# Patient Record
Sex: FEMALE | Race: WHITE | NOT HISPANIC OR LATINO | Employment: OTHER | URBAN - METROPOLITAN AREA
[De-identification: names, ages, dates, MRNs, and addresses within clinical notes are randomized per-mention and may not be internally consistent; named-entity substitution may affect disease eponyms.]

---

## 2023-03-09 ENCOUNTER — TELEPHONE (OUTPATIENT)
Dept: PRIMARY CARE | Facility: CLINIC | Age: 68
End: 2023-03-09
Payer: MEDICARE

## 2023-03-09 DIAGNOSIS — E11.9 TYPE 2 DIABETES MELLITUS WITHOUT COMPLICATION, WITHOUT LONG-TERM CURRENT USE OF INSULIN (MULTI): Primary | ICD-10-CM

## 2023-03-09 PROBLEM — I10 BENIGN HYPERTENSION: Status: ACTIVE | Noted: 2023-03-09

## 2023-03-09 PROBLEM — R26.9 GAIT ABNORMALITY: Status: ACTIVE | Noted: 2023-03-09

## 2023-03-09 PROBLEM — E03.9 HYPOTHYROID: Status: ACTIVE | Noted: 2023-03-09

## 2023-03-09 PROBLEM — H93.19 TINNITUS: Status: ACTIVE | Noted: 2023-03-09

## 2023-03-09 PROBLEM — G43.019 INTRACTABLE MIGRAINE WITHOUT AURA AND WITHOUT STATUS MIGRAINOSUS: Status: ACTIVE | Noted: 2023-03-09

## 2023-03-09 PROBLEM — R19.5 POSITIVE COLORECTAL CANCER SCREENING USING COLOGUARD TEST: Status: ACTIVE | Noted: 2023-03-09

## 2023-03-09 PROBLEM — M50.90 CERVICAL DISC DISEASE: Status: ACTIVE | Noted: 2023-03-09

## 2023-03-09 PROBLEM — E78.5 DYSLIPIDEMIA: Status: ACTIVE | Noted: 2023-03-09

## 2023-03-09 PROBLEM — G93.5 CHIARI I MALFORMATION (MULTI): Status: ACTIVE | Noted: 2023-03-09

## 2023-03-09 RX ORDER — METOPROLOL SUCCINATE 50 MG/1
1 TABLET, EXTENDED RELEASE ORAL DAILY
COMMUNITY
End: 2023-07-31 | Stop reason: SDUPTHER

## 2023-03-09 RX ORDER — LEVOTHYROXINE SODIUM 75 UG/1
1 TABLET ORAL DAILY
COMMUNITY
Start: 2017-03-24 | End: 2023-03-20 | Stop reason: SDUPTHER

## 2023-03-09 RX ORDER — MELOXICAM 15 MG/1
TABLET ORAL
COMMUNITY
Start: 2022-08-01 | End: 2023-05-03 | Stop reason: SDUPTHER

## 2023-03-09 RX ORDER — LISINOPRIL 5 MG/1
1 TABLET ORAL DAILY
COMMUNITY
End: 2023-07-31 | Stop reason: SDUPTHER

## 2023-03-09 RX ORDER — ATORVASTATIN CALCIUM 40 MG/1
TABLET, FILM COATED ORAL
COMMUNITY
Start: 2022-08-27 | End: 2023-04-03 | Stop reason: SDUPTHER

## 2023-03-09 RX ORDER — BLOOD-GLUCOSE METER
EACH MISCELLANEOUS
COMMUNITY
Start: 2019-05-17 | End: 2024-05-01 | Stop reason: SDUPTHER

## 2023-03-09 RX ORDER — METFORMIN HYDROCHLORIDE 500 MG/1
1000 TABLET ORAL
Qty: 360 TABLET | Refills: 1 | Status: SHIPPED | OUTPATIENT
Start: 2023-03-09 | End: 2023-07-31 | Stop reason: SDUPTHER

## 2023-03-09 NOTE — TELEPHONE ENCOUNTER
Spoke with patient she would like to take the 500 mg tabs equaling 1000 so she would like to take 2 500mg tabs twice daily.

## 2023-03-17 ENCOUNTER — TELEPHONE (OUTPATIENT)
Dept: PRIMARY CARE | Facility: CLINIC | Age: 68
End: 2023-03-17
Payer: MEDICARE

## 2023-03-17 DIAGNOSIS — E03.9 HYPOTHYROIDISM, UNSPECIFIED TYPE: Primary | ICD-10-CM

## 2023-03-17 NOTE — TELEPHONE ENCOUNTER
**left on voicemail     Refill request for Synthroid 75 mcg    No pharmacy was left on voicemail   --she is asking for a 30 day supply with refills (cheaper for a 30 day supply)

## 2023-03-20 RX ORDER — LEVOTHYROXINE SODIUM 75 UG/1
75 TABLET ORAL DAILY
Qty: 30 TABLET | Refills: 0 | Status: SHIPPED | OUTPATIENT
Start: 2023-03-20 | End: 2023-04-17 | Stop reason: SDUPTHER

## 2023-04-03 ENCOUNTER — TELEPHONE (OUTPATIENT)
Dept: PRIMARY CARE | Facility: CLINIC | Age: 68
End: 2023-04-03
Payer: MEDICARE

## 2023-04-03 DIAGNOSIS — E78.49 OTHER HYPERLIPIDEMIA: Primary | ICD-10-CM

## 2023-04-03 RX ORDER — ATORVASTATIN CALCIUM 40 MG/1
40 TABLET, FILM COATED ORAL DAILY
Qty: 90 TABLET | Refills: 3 | Status: SHIPPED | OUTPATIENT
Start: 2023-04-03 | End: 2023-07-31 | Stop reason: SDUPTHER

## 2023-04-17 ENCOUNTER — TELEPHONE (OUTPATIENT)
Dept: PRIMARY CARE | Facility: CLINIC | Age: 68
End: 2023-04-17
Payer: MEDICARE

## 2023-04-17 DIAGNOSIS — E03.9 HYPOTHYROIDISM, UNSPECIFIED TYPE: ICD-10-CM

## 2023-04-17 RX ORDER — LEVOTHYROXINE SODIUM 75 UG/1
75 TABLET ORAL DAILY
Qty: 90 TABLET | Refills: 1 | Status: SHIPPED | OUTPATIENT
Start: 2023-04-17 | End: 2023-07-31 | Stop reason: SDUPTHER

## 2023-04-17 NOTE — TELEPHONE ENCOUNTER
She needs a refill on her synthroid 75 mcg takes it 1 time a day please send to [facility] in [place]

## 2023-05-03 DIAGNOSIS — R26.9 GAIT ABNORMALITY: Primary | ICD-10-CM

## 2023-05-03 RX ORDER — MELOXICAM 15 MG/1
15 TABLET ORAL AS NEEDED
Qty: 30 TABLET | Refills: 1 | Status: SHIPPED | OUTPATIENT
Start: 2023-05-03 | End: 2023-07-05 | Stop reason: SDUPTHER

## 2023-07-05 DIAGNOSIS — R26.9 GAIT ABNORMALITY: ICD-10-CM

## 2023-07-05 RX ORDER — MELOXICAM 15 MG/1
15 TABLET ORAL AS NEEDED
Qty: 30 TABLET | Refills: 1 | Status: SHIPPED | OUTPATIENT
Start: 2023-07-05 | End: 2023-07-31 | Stop reason: SDUPTHER

## 2023-07-31 ENCOUNTER — OFFICE VISIT (OUTPATIENT)
Dept: PRIMARY CARE | Facility: CLINIC | Age: 68
End: 2023-07-31
Payer: MEDICARE

## 2023-07-31 VITALS
OXYGEN SATURATION: 98 % | BODY MASS INDEX: 30.37 KG/M2 | HEART RATE: 64 BPM | HEIGHT: 66 IN | SYSTOLIC BLOOD PRESSURE: 128 MMHG | DIASTOLIC BLOOD PRESSURE: 82 MMHG | RESPIRATION RATE: 16 BRPM | WEIGHT: 189 LBS

## 2023-07-31 DIAGNOSIS — I10 BENIGN HYPERTENSION: Chronic | ICD-10-CM

## 2023-07-31 DIAGNOSIS — M54.16 LUMBAR RADICULOPATHY, RIGHT: Primary | ICD-10-CM

## 2023-07-31 DIAGNOSIS — E78.49 OTHER HYPERLIPIDEMIA: ICD-10-CM

## 2023-07-31 DIAGNOSIS — E11.9 TYPE 2 DIABETES MELLITUS WITHOUT COMPLICATION, WITHOUT LONG-TERM CURRENT USE OF INSULIN (MULTI): ICD-10-CM

## 2023-07-31 DIAGNOSIS — E03.9 HYPOTHYROIDISM, UNSPECIFIED TYPE: ICD-10-CM

## 2023-07-31 PROBLEM — Z00.00 MEDICARE ANNUAL WELLNESS VISIT, SUBSEQUENT: Status: ACTIVE | Noted: 2023-07-31

## 2023-07-31 PROCEDURE — 99214 OFFICE O/P EST MOD 30 MIN: CPT | Performed by: STUDENT IN AN ORGANIZED HEALTH CARE EDUCATION/TRAINING PROGRAM

## 2023-07-31 PROCEDURE — 3044F HG A1C LEVEL LT 7.0%: CPT | Performed by: STUDENT IN AN ORGANIZED HEALTH CARE EDUCATION/TRAINING PROGRAM

## 2023-07-31 PROCEDURE — 3079F DIAST BP 80-89 MM HG: CPT | Performed by: STUDENT IN AN ORGANIZED HEALTH CARE EDUCATION/TRAINING PROGRAM

## 2023-07-31 PROCEDURE — 1036F TOBACCO NON-USER: CPT | Performed by: STUDENT IN AN ORGANIZED HEALTH CARE EDUCATION/TRAINING PROGRAM

## 2023-07-31 PROCEDURE — 4010F ACE/ARB THERAPY RXD/TAKEN: CPT | Performed by: STUDENT IN AN ORGANIZED HEALTH CARE EDUCATION/TRAINING PROGRAM

## 2023-07-31 PROCEDURE — 1160F RVW MEDS BY RX/DR IN RCRD: CPT | Performed by: STUDENT IN AN ORGANIZED HEALTH CARE EDUCATION/TRAINING PROGRAM

## 2023-07-31 PROCEDURE — 3074F SYST BP LT 130 MM HG: CPT | Performed by: STUDENT IN AN ORGANIZED HEALTH CARE EDUCATION/TRAINING PROGRAM

## 2023-07-31 PROCEDURE — 1159F MED LIST DOCD IN RCRD: CPT | Performed by: STUDENT IN AN ORGANIZED HEALTH CARE EDUCATION/TRAINING PROGRAM

## 2023-07-31 RX ORDER — ATORVASTATIN CALCIUM 40 MG/1
40 TABLET, FILM COATED ORAL DAILY
Qty: 90 TABLET | Refills: 3 | Status: SHIPPED | OUTPATIENT
Start: 2023-07-31 | End: 2024-04-03

## 2023-07-31 RX ORDER — LISINOPRIL 5 MG/1
5 TABLET ORAL DAILY
Qty: 90 TABLET | Refills: 1 | Status: SHIPPED | OUTPATIENT
Start: 2023-07-31 | End: 2023-09-11

## 2023-07-31 RX ORDER — METFORMIN HYDROCHLORIDE 500 MG/1
1000 TABLET ORAL
Qty: 360 TABLET | Refills: 1 | Status: SHIPPED | OUTPATIENT
Start: 2023-07-31 | End: 2023-09-20

## 2023-07-31 RX ORDER — METHYLPREDNISOLONE 4 MG/1
TABLET ORAL
Qty: 21 TABLET | Refills: 0 | Status: SHIPPED | OUTPATIENT
Start: 2023-07-31 | End: 2023-08-07

## 2023-07-31 RX ORDER — LEVOTHYROXINE SODIUM 75 UG/1
75 TABLET ORAL DAILY
Qty: 90 TABLET | Refills: 1 | Status: SHIPPED | OUTPATIENT
Start: 2023-07-31 | End: 2023-10-17

## 2023-07-31 RX ORDER — TIZANIDINE 4 MG/1
4 TABLET ORAL NIGHTLY PRN
Qty: 30 TABLET | Refills: 0 | Status: SHIPPED | OUTPATIENT
Start: 2023-07-31 | End: 2023-11-01

## 2023-07-31 RX ORDER — MELOXICAM 15 MG/1
15 TABLET ORAL AS NEEDED
Qty: 30 TABLET | Refills: 1 | Status: SHIPPED | OUTPATIENT
Start: 2023-07-31 | End: 2023-08-28

## 2023-07-31 RX ORDER — METOPROLOL SUCCINATE 50 MG/1
50 TABLET, EXTENDED RELEASE ORAL DAILY
Qty: 30 TABLET | Refills: 2 | Status: SHIPPED | OUTPATIENT
Start: 2023-07-31 | End: 2023-09-11

## 2023-07-31 ASSESSMENT — ENCOUNTER SYMPTOMS
ARTHRALGIAS: 1
FATIGUE: 0
NUMBNESS: 0
LIGHT-HEADEDNESS: 0
MYALGIAS: 1
OCCASIONAL FEELINGS OF UNSTEADINESS: 0
SHORTNESS OF BREATH: 0
DEPRESSION: 0
TREMORS: 0
PALPITATIONS: 0
BACK PAIN: 1
SPEECH DIFFICULTY: 0
LOSS OF SENSATION IN FEET: 0
FEVER: 0
SEIZURES: 0
UNEXPECTED WEIGHT CHANGE: 0
WEAKNESS: 0

## 2023-07-31 ASSESSMENT — PATIENT HEALTH QUESTIONNAIRE - PHQ9
2. FEELING DOWN, DEPRESSED OR HOPELESS: NOT AT ALL
SUM OF ALL RESPONSES TO PHQ9 QUESTIONS 1 AND 2: 0
1. LITTLE INTEREST OR PLEASURE IN DOING THINGS: NOT AT ALL

## 2023-07-31 NOTE — ASSESSMENT & PLAN NOTE
Due for A1c  Continues metformin 1000mg BID  Is on atorvastatin 40mg, and lisinopril 5mg  Recommend annual foot and eye exams

## 2023-07-31 NOTE — ASSESSMENT & PLAN NOTE
Mobic for inflammation, to be taken with food  Tizanidine for muscle relaxation, counseled on side effect of drowsiness and precautions to take as well as timing medication when not driving or operating heavy machinery   Given AVS with gentle stretching routine to incorporate at home  No red flag symptoms warranting imaging at this time  Follow up if symptoms worsen or fail to improve  Medrol dose pack

## 2023-07-31 NOTE — PROGRESS NOTES
"Patient Name:  Nanci Colón  MRN:  [id number]  :  1955    Subjective   Patient ID: Nanci Colón is a 67 y.o. female who presents for RIGHT SIDE pinched nerve .    HPI     66 yo female presents in need of refills for chronic medical conditions and concern of back pain     This has happened before to her  Did well with PT and steroid  Was told it was a \"pinched nerve\"  No trauma hx  Denies saddle anesthesia, bowel or bladder incontinence, radiation of symptom, hx of malignancy, hx of chronic steroid usage  Having trouble doing home exercises due to pain     Review of Systems   Constitutional:  Negative for fatigue, fever and unexpected weight change.   Respiratory:  Negative for shortness of breath.    Cardiovascular:  Negative for chest pain and palpitations.   Musculoskeletal:  Positive for arthralgias, back pain and myalgias.   Allergic/Immunologic: Negative for immunocompromised state.   Neurological:  Negative for tremors, seizures, syncope, speech difficulty, weakness, light-headedness and numbness.     Objective   /82   Pulse 64   Resp 16   Ht 1.676 m (5' 6\")   Wt 85.7 kg (189 lb)   SpO2 98%   BMI 30.51 kg/m²     Physical Exam  Constitutional:       Appearance: Normal appearance.   HENT:      Head: Normocephalic and atraumatic.   Cardiovascular:      Rate and Rhythm: Normal rate.   Pulmonary:      Effort: Pulmonary effort is normal.   Musculoskeletal:      Comments: R lumbar paraspinal hypertonicity worse with rotation   Neurological:      General: No focal deficit present.      Mental Status: She is alert and oriented to person, place, and time.      Sensory: No sensory deficit.      Motor: No weakness.      Coordination: Coordination normal.      Gait: Gait normal.   Psychiatric:         Mood and Affect: Mood normal.         Behavior: Behavior normal.     Assessment/Plan   Problem List Items Addressed This Visit       Type 2 diabetes mellitus without complication, without long-term " current use of insulin (CMS/HCC) (Chronic)     Due for A1c  Continues metformin 1000mg BID  Is on atorvastatin 40mg, and lisinopril 5mg  Recommend annual foot and eye exams         Relevant Medications    metFORMIN (Glucophage) 500 mg tablet    Other Relevant Orders    Hemoglobin A1C    Comprehensive Metabolic Panel    Hypothyroid (Chronic)     TSH checked in 2023, WNL continue 75mcg dose of levothyroxine         Relevant Medications    levothyroxine (Synthroid) 75 mcg tablet    Benign hypertension (Chronic)     Goal BP >130/80  Continue lisinopril 5mg   Work on maintaining a healthy weight, monitoring sodium intake, consistent activity and exercise  Avoid chronic use of NSAIDs  Do not use sudafed for decongestant when ill           Relevant Medications    lisinopril 5 mg tablet    metoprolol succinate XL (Toprol-XL) 50 mg 24 hr tablet    Other Relevant Orders    ECG 12 lead    Lumbar radiculopathy, right - Primary     Mobic for inflammation, to be taken with food  Tizanidine for muscle relaxation, counseled on side effect of drowsiness and precautions to take as well as timing medication when not driving or operating heavy machinery   Given AVS with gentle stretching routine to incorporate at home  No red flag symptoms warranting imaging at this time  Follow up if symptoms worsen or fail to improve  Medrol dose pack           Relevant Medications    meloxicam (Mobic) 15 mg tablet    methylPREDNISolone (Medrol Dospak) 4 mg tablets    tiZANidine (Zanaflex) 4 mg tablet    Other Relevant Orders    Follow Up In [facility] - PCP - Established     Other Visit Diagnoses       Other hyperlipidemia        Relevant Medications    atorvastatin (Lipitor) 40 mg tablet    Other Relevant Orders    Lipid Panel

## 2023-07-31 NOTE — ASSESSMENT & PLAN NOTE
Goal BP >130/80  Continue lisinopril 5mg   Work on maintaining a healthy weight, monitoring sodium intake, consistent activity and exercise  Avoid chronic use of NSAIDs  Do not use sudafed for decongestant when ill

## 2023-07-31 NOTE — ASSESSMENT & PLAN NOTE
PNEUMONIA vaccine- Ordered/Declined/Not Indicated  SHINGLES vaccine- Ordered/Declined/Not Indicated  Screening tests:  Colon cancer screening--> Ordered/Declined/Not Indicated  Breast Cancer screening--> Ordered/Declined/Not Indicated  Cervical Cancer Screening--> Not Indicated  DXA screening--> Ordered/Declined/Not Indicated  During the course of the visit the patient was educated and counseled about age appropriate screening and preventive services. Completed preventive screenings were documented in the chart and orders were placed for outstanding screenings/procedures as documented in the Assessment and Plan.  Patient Instructions (the written plan) was given to the patient at check out.

## 2023-08-21 ENCOUNTER — TELEPHONE (OUTPATIENT)
Dept: PRIMARY CARE | Facility: CLINIC | Age: 68
End: 2023-08-21
Payer: MEDICARE

## 2023-08-21 NOTE — TELEPHONE ENCOUNTER
Patient was seen on 7/31 and medication was called in to help her pain she's having for possible pinched nerve. The medication hasn't been helping and still having pain. She wants to know if something else can be called in or do you want her to make an appointment to be seen. Ty/Natasha

## 2023-08-21 NOTE — TELEPHONE ENCOUNTER
Did she start PT yet that was also ordered at last appointment. Also recommend trying OTC voltaren gel for relief.

## 2023-08-28 DIAGNOSIS — M54.16 LUMBAR RADICULOPATHY, RIGHT: ICD-10-CM

## 2023-08-28 RX ORDER — MELOXICAM 15 MG/1
15 TABLET ORAL DAILY PRN
Qty: 90 TABLET | Refills: 0 | Status: SHIPPED
Start: 2023-08-28 | End: 2023-11-01 | Stop reason: WASHOUT

## 2023-09-10 DIAGNOSIS — I10 BENIGN HYPERTENSION: Chronic | ICD-10-CM

## 2023-09-11 RX ORDER — LISINOPRIL 5 MG/1
5 TABLET ORAL DAILY
Qty: 90 TABLET | Refills: 1 | Status: SHIPPED | OUTPATIENT
Start: 2023-09-11 | End: 2024-03-11

## 2023-09-11 RX ORDER — METOPROLOL SUCCINATE 50 MG/1
50 TABLET, EXTENDED RELEASE ORAL DAILY
Qty: 90 TABLET | Refills: 1 | Status: SHIPPED | OUTPATIENT
Start: 2023-09-11 | End: 2024-03-11

## 2023-09-19 DIAGNOSIS — E11.9 TYPE 2 DIABETES MELLITUS WITHOUT COMPLICATION, WITHOUT LONG-TERM CURRENT USE OF INSULIN (MULTI): ICD-10-CM

## 2023-09-20 RX ORDER — METFORMIN HYDROCHLORIDE 500 MG/1
TABLET ORAL
Qty: 360 TABLET | Refills: 0 | Status: SHIPPED
Start: 2023-09-20 | End: 2023-11-01 | Stop reason: SDUPTHER

## 2023-10-13 DIAGNOSIS — E03.9 HYPOTHYROIDISM, UNSPECIFIED TYPE: ICD-10-CM

## 2023-10-17 RX ORDER — LEVOTHYROXINE SODIUM 75 UG/1
75 TABLET ORAL DAILY
Qty: 90 TABLET | Refills: 1 | Status: SHIPPED | OUTPATIENT
Start: 2023-10-17 | End: 2023-11-01 | Stop reason: SDUPTHER

## 2023-10-23 ENCOUNTER — LAB (OUTPATIENT)
Dept: LAB | Facility: LAB | Age: 68
End: 2023-10-23
Payer: MEDICARE

## 2023-10-23 DIAGNOSIS — E11.9 TYPE 2 DIABETES MELLITUS WITHOUT COMPLICATION, WITHOUT LONG-TERM CURRENT USE OF INSULIN (MULTI): ICD-10-CM

## 2023-10-23 DIAGNOSIS — E78.49 OTHER HYPERLIPIDEMIA: ICD-10-CM

## 2023-10-23 LAB
ALBUMIN SERPL BCP-MCNC: 4.6 G/DL (ref 3.4–5)
ALP SERPL-CCNC: 56 U/L (ref 33–136)
ALT SERPL W P-5'-P-CCNC: 14 U/L (ref 7–45)
ANION GAP SERPL CALC-SCNC: 14 MMOL/L (ref 10–20)
AST SERPL W P-5'-P-CCNC: 17 U/L (ref 9–39)
BILIRUB SERPL-MCNC: 0.8 MG/DL (ref 0–1.2)
BUN SERPL-MCNC: 23 MG/DL (ref 6–23)
CALCIUM SERPL-MCNC: 9.8 MG/DL (ref 8.6–10.3)
CHLORIDE SERPL-SCNC: 103 MMOL/L (ref 98–107)
CHOLEST SERPL-MCNC: 189 MG/DL (ref 0–199)
CHOLESTEROL/HDL RATIO: 3.2
CO2 SERPL-SCNC: 25 MMOL/L (ref 21–32)
CREAT SERPL-MCNC: 0.79 MG/DL (ref 0.5–1.05)
GFR SERPL CREATININE-BSD FRML MDRD: 83 ML/MIN/1.73M*2
GLUCOSE SERPL-MCNC: 116 MG/DL (ref 74–99)
HDLC SERPL-MCNC: 59.9 MG/DL
LDLC SERPL CALC-MCNC: 104 MG/DL
NON HDL CHOLESTEROL: 129 MG/DL (ref 0–149)
POTASSIUM SERPL-SCNC: 5.1 MMOL/L (ref 3.5–5.3)
PROT SERPL-MCNC: 6.8 G/DL (ref 6.4–8.2)
SODIUM SERPL-SCNC: 137 MMOL/L (ref 136–145)
TRIGL SERPL-MCNC: 126 MG/DL (ref 0–149)
VLDL: 25 MG/DL (ref 0–40)

## 2023-10-23 PROCEDURE — 80061 LIPID PANEL: CPT

## 2023-10-23 PROCEDURE — 83036 HEMOGLOBIN GLYCOSYLATED A1C: CPT

## 2023-10-23 PROCEDURE — 80053 COMPREHEN METABOLIC PANEL: CPT

## 2023-10-23 PROCEDURE — 36415 COLL VENOUS BLD VENIPUNCTURE: CPT

## 2023-10-24 LAB
EST. AVERAGE GLUCOSE BLD GHB EST-MCNC: 131 MG/DL
HBA1C MFR BLD: 6.2 %

## 2023-11-01 ENCOUNTER — OFFICE VISIT (OUTPATIENT)
Dept: PRIMARY CARE | Facility: CLINIC | Age: 68
End: 2023-11-01
Payer: MEDICARE

## 2023-11-01 VITALS
DIASTOLIC BLOOD PRESSURE: 71 MMHG | HEART RATE: 76 BPM | OXYGEN SATURATION: 95 % | SYSTOLIC BLOOD PRESSURE: 127 MMHG | HEIGHT: 66 IN | RESPIRATION RATE: 16 BRPM | BODY MASS INDEX: 30.53 KG/M2 | WEIGHT: 190 LBS

## 2023-11-01 DIAGNOSIS — E11.9 TYPE 2 DIABETES MELLITUS WITHOUT COMPLICATION, WITHOUT LONG-TERM CURRENT USE OF INSULIN (MULTI): ICD-10-CM

## 2023-11-01 DIAGNOSIS — Z00.00 MEDICARE ANNUAL WELLNESS VISIT, INITIAL: Primary | ICD-10-CM

## 2023-11-01 DIAGNOSIS — Z12.31 ENCOUNTER FOR SCREENING MAMMOGRAM FOR BREAST CANCER: ICD-10-CM

## 2023-11-01 DIAGNOSIS — E03.9 HYPOTHYROIDISM, UNSPECIFIED TYPE: ICD-10-CM

## 2023-11-01 DIAGNOSIS — E78.5 DYSLIPIDEMIA: ICD-10-CM

## 2023-11-01 DIAGNOSIS — M47.817 LUMBAR AND SACRAL OSTEOARTHRITIS: ICD-10-CM

## 2023-11-01 DIAGNOSIS — L98.9 SKIN LESION: ICD-10-CM

## 2023-11-01 PROCEDURE — 1036F TOBACCO NON-USER: CPT | Performed by: STUDENT IN AN ORGANIZED HEALTH CARE EDUCATION/TRAINING PROGRAM

## 2023-11-01 PROCEDURE — 3078F DIAST BP <80 MM HG: CPT | Performed by: STUDENT IN AN ORGANIZED HEALTH CARE EDUCATION/TRAINING PROGRAM

## 2023-11-01 PROCEDURE — 3044F HG A1C LEVEL LT 7.0%: CPT | Performed by: STUDENT IN AN ORGANIZED HEALTH CARE EDUCATION/TRAINING PROGRAM

## 2023-11-01 PROCEDURE — G0438 PPPS, INITIAL VISIT: HCPCS | Performed by: STUDENT IN AN ORGANIZED HEALTH CARE EDUCATION/TRAINING PROGRAM

## 2023-11-01 PROCEDURE — 4010F ACE/ARB THERAPY RXD/TAKEN: CPT | Performed by: STUDENT IN AN ORGANIZED HEALTH CARE EDUCATION/TRAINING PROGRAM

## 2023-11-01 PROCEDURE — 99397 PER PM REEVAL EST PAT 65+ YR: CPT | Performed by: STUDENT IN AN ORGANIZED HEALTH CARE EDUCATION/TRAINING PROGRAM

## 2023-11-01 PROCEDURE — 99214 OFFICE O/P EST MOD 30 MIN: CPT | Performed by: STUDENT IN AN ORGANIZED HEALTH CARE EDUCATION/TRAINING PROGRAM

## 2023-11-01 PROCEDURE — 1160F RVW MEDS BY RX/DR IN RCRD: CPT | Performed by: STUDENT IN AN ORGANIZED HEALTH CARE EDUCATION/TRAINING PROGRAM

## 2023-11-01 PROCEDURE — 1159F MED LIST DOCD IN RCRD: CPT | Performed by: STUDENT IN AN ORGANIZED HEALTH CARE EDUCATION/TRAINING PROGRAM

## 2023-11-01 PROCEDURE — 93000 ELECTROCARDIOGRAM COMPLETE: CPT | Performed by: STUDENT IN AN ORGANIZED HEALTH CARE EDUCATION/TRAINING PROGRAM

## 2023-11-01 PROCEDURE — 3049F LDL-C 100-129 MG/DL: CPT | Performed by: STUDENT IN AN ORGANIZED HEALTH CARE EDUCATION/TRAINING PROGRAM

## 2023-11-01 PROCEDURE — 1170F FXNL STATUS ASSESSED: CPT | Performed by: STUDENT IN AN ORGANIZED HEALTH CARE EDUCATION/TRAINING PROGRAM

## 2023-11-01 PROCEDURE — 3074F SYST BP LT 130 MM HG: CPT | Performed by: STUDENT IN AN ORGANIZED HEALTH CARE EDUCATION/TRAINING PROGRAM

## 2023-11-01 RX ORDER — LEVOTHYROXINE SODIUM 75 UG/1
75 TABLET ORAL DAILY
Qty: 30 TABLET | Refills: 3
Start: 2023-11-01 | End: 2024-03-11

## 2023-11-01 RX ORDER — LEVOTHYROXINE SODIUM 75 UG/1
75 TABLET ORAL DAILY
Qty: 30 TABLET | Refills: 3 | Status: SHIPPED
Start: 2023-11-01 | End: 2023-11-01 | Stop reason: ENTERED-IN-ERROR

## 2023-11-01 RX ORDER — CELECOXIB 200 MG/1
200 CAPSULE ORAL 2 TIMES DAILY PRN
Qty: 60 CAPSULE | Refills: 3 | Status: SHIPPED | OUTPATIENT
Start: 2023-11-01 | End: 2024-03-04

## 2023-11-01 RX ORDER — METFORMIN HYDROCHLORIDE 500 MG/1
500 TABLET ORAL
Qty: 360 TABLET | Refills: 0
Start: 2023-11-01 | End: 2024-05-01 | Stop reason: DRUGHIGH

## 2023-11-01 ASSESSMENT — ACTIVITIES OF DAILY LIVING (ADL)
GROCERY_SHOPPING: INDEPENDENT
MANAGING_FINANCES: INDEPENDENT
BATHING: INDEPENDENT
DRESSING: INDEPENDENT
TAKING_MEDICATION: INDEPENDENT
DOING_HOUSEWORK: INDEPENDENT

## 2023-11-01 ASSESSMENT — VISUAL ACUITY
OD_CC: 20/20
OS_CC: 20/20

## 2023-11-01 ASSESSMENT — ENCOUNTER SYMPTOMS
FATIGUE: 0
APPETITE CHANGE: 0
ABDOMINAL PAIN: 0
VOICE CHANGE: 0
FACIAL ASYMMETRY: 0
DYSPHORIC MOOD: 0
CONFUSION: 0
FEVER: 0
ARTHRALGIAS: 1
LOSS OF SENSATION IN FEET: 0
OCCASIONAL FEELINGS OF UNSTEADINESS: 0
UNEXPECTED WEIGHT CHANGE: 0
ACTIVITY CHANGE: 0
PALPITATIONS: 0
BACK PAIN: 1
DEPRESSION: 0
TROUBLE SWALLOWING: 0
SPEECH DIFFICULTY: 0

## 2023-11-01 ASSESSMENT — PATIENT HEALTH QUESTIONNAIRE - PHQ9
SUM OF ALL RESPONSES TO PHQ9 QUESTIONS 1 AND 2: 0
2. FEELING DOWN, DEPRESSED OR HOPELESS: NOT AT ALL
1. LITTLE INTEREST OR PLEASURE IN DOING THINGS: NOT AT ALL

## 2023-11-01 NOTE — PROGRESS NOTES
"Subjective   Reason for Visit: Nanci Colón is an 67 y.o. female here for a Medicare Wellness visit.     Past Medical, Surgical, and Family History reviewed and updated in chart.    Reviewed all medications by prescribing practitioner or clinical pharmacist (such as prescriptions, OTCs, herbal therapies and supplements) and documented in the medical record.    HPI    66 yo female presents for initial medicare and general follow up    Recent labs reviewed: Doing well with A1c 6.7 to 6.2 , trying to eat differently to make progress  On statin but increase in recent LDL       Patient Care Team:  Diana Blunt DO as PCP - General (Family Medicine)  Oneil Vargas MD as On Call Attending Physician (Neurology)  Brandon Kohli MD as Referring Physician (Orthopaedic Surgery)     Review of Systems   Constitutional:  Negative for activity change, appetite change, fatigue, fever and unexpected weight change.   HENT:  Negative for trouble swallowing and voice change.    Eyes:  Negative for visual disturbance.   Cardiovascular:  Negative for chest pain, palpitations and leg swelling.   Gastrointestinal:  Negative for abdominal pain.   Musculoskeletal:  Positive for arthralgias and back pain. Negative for gait problem.   Skin:  Negative for rash.   Allergic/Immunologic: Negative for immunocompromised state.   Neurological:  Negative for facial asymmetry and speech difficulty.   Psychiatric/Behavioral:  Negative for confusion and dysphoric mood.      Objective   Vitals:  /71   Pulse 76   Resp 16   Ht 1.676 m (5' 6\")   Wt 86.2 kg (190 lb)   SpO2 95%   BMI 30.67 kg/m²       Physical Exam  Constitutional:       Appearance: Normal appearance.   HENT:      Head: Normocephalic and atraumatic.        Comments: Small raised red lesion of the nose, scaly  Eyes:      Extraocular Movements: Extraocular movements intact.   Cardiovascular:      Rate and Rhythm: Normal rate and regular rhythm.   Pulmonary:      Effort: " Pulmonary effort is normal. No respiratory distress.   Musculoskeletal:      Right lower leg: No edema.      Left lower leg: No edema.   Skin:     Coloration: Skin is not jaundiced or pale.   Neurological:      General: No focal deficit present.      Mental Status: She is alert and oriented to person, place, and time.   Psychiatric:         Mood and Affect: Mood normal.         Behavior: Behavior normal.         Thought Content: Thought content normal.         Judgment: Judgment normal.     Assessment/Plan   Problem List Items Addressed This Visit       Type 2 diabetes mellitus without complication, without long-term current use of insulin (CMS/[facility]) (Chronic)    Current Assessment & Plan     Improving A1c now at 6.2, discussed possible decrease of metformin with patient, currently taking 1000mg BID-->500mg BID now and see if stable   Is on atorvastatin 40mg, and lisinopril 5mg  Recommend annual foot and eye exams         Relevant Medications    metFORMIN (Glucophage) 500 mg tablet    Other Relevant Orders    Hemoglobin A1c    Hypothyroid (Chronic)    Relevant Medications    levothyroxine (Synthroid) 75 mcg tablet    Other Relevant Orders    Tsh With Reflex To Free T4 If Abnormal    Dyslipidemia    Relevant Orders    Lipid Panel    Medicare annual wellness visit, initial - Primary    Current Assessment & Plan     PNEUMONIA vaccine-Declined  SHINGLES vaccine- Declined  INFLUENZA vaccine- Declined  Screening tests:  Colon cancer screening--> Completed 2022  Breast Cancer screening--> Ordered  Cervical Cancer Screening--> Seeing [facility]  DXA screening-->Declined  During the course of the visit the patient was educated and counseled about age appropriate screening and preventive services. Completed preventive screenings were documented in the chart and orders were placed for outstanding screenings/procedures as documented in the Assessment and Plan.  Patient Instructions (the written plan) was given to the  patient at check out.           Relevant Orders    ECG 12 lead (Clinic Performed) (Completed)    Lumbar and sacral osteoarthritis (Chronic)    Current Assessment & Plan     Radicular symptoms resolved but chronic arthritic ache continues   Has tizanidine just as needed, mobic takes PRN   Will start celebrex instead of mobic, continue to take with food   Wants to try glucosamine   PT has not helped          Relevant Medications    celecoxib (CeleBREX) 200 mg capsule    Other Relevant Orders    Follow Up In [facility] - PCP - Established     Other Visit Diagnoses       Encounter for screening mammogram for breast cancer        Relevant Orders    BI mammo bilateral screening tomosynthesis    Skin lesion        Relevant Orders    Referral to Dermatology

## 2023-11-01 NOTE — PATIENT INSTRUCTIONS
Cut back on metformin to 500mg twice a day   New medicine to try instead of meloxicam   Mammogram  Before follow up labs  Dermatology follow up

## 2023-11-01 NOTE — ASSESSMENT & PLAN NOTE
Radicular symptoms resolved but chronic arthritic ache continues   Has tizanidine just as needed, mobic takes PRN   Will start celebrex instead of mobic, continue to take with food   Wants to try glucosamine   PT has not helped

## 2023-11-01 NOTE — ASSESSMENT & PLAN NOTE
PNEUMONIA vaccine-Declined  SHINGLES vaccine- Declined  INFLUENZA vaccine- Declined  Screening tests:  Colon cancer screening--> Completed 2022  Breast Cancer screening--> Ordered  Cervical Cancer Screening--> Seeing [facility]  DXA screening-->Declined  During the course of the visit the patient was educated and counseled about age appropriate screening and preventive services. Completed preventive screenings were documented in the chart and orders were placed for outstanding screenings/procedures as documented in the Assessment and Plan.  Patient Instructions (the written plan) was given to the patient at check out.

## 2023-11-01 NOTE — ASSESSMENT & PLAN NOTE
Improving A1c now at 6.2, discussed possible decrease of metformin with patient, currently taking 1000mg BID-->500mg BID now and see if stable   Is on atorvastatin 40mg, and lisinopril 5mg  Recommend annual foot and eye exams

## 2024-03-02 DIAGNOSIS — M47.817 LUMBAR AND SACRAL OSTEOARTHRITIS: ICD-10-CM

## 2024-03-04 RX ORDER — CELECOXIB 200 MG/1
CAPSULE ORAL
Qty: 60 CAPSULE | Refills: 3 | Status: SHIPPED | OUTPATIENT
Start: 2024-03-04

## 2024-03-11 DIAGNOSIS — I10 BENIGN HYPERTENSION: Chronic | ICD-10-CM

## 2024-03-11 DIAGNOSIS — E03.9 HYPOTHYROIDISM, UNSPECIFIED TYPE: ICD-10-CM

## 2024-03-11 RX ORDER — LEVOTHYROXINE SODIUM 75 UG/1
75 TABLET ORAL DAILY
Qty: 90 TABLET | Refills: 3 | Status: SHIPPED | OUTPATIENT
Start: 2024-03-11

## 2024-03-11 RX ORDER — LISINOPRIL 5 MG/1
5 TABLET ORAL DAILY
Qty: 90 TABLET | Refills: 3 | Status: SHIPPED | OUTPATIENT
Start: 2024-03-11

## 2024-03-11 RX ORDER — METOPROLOL SUCCINATE 50 MG/1
50 TABLET, EXTENDED RELEASE ORAL DAILY
Qty: 90 TABLET | Refills: 3 | Status: SHIPPED | OUTPATIENT
Start: 2024-03-11

## 2024-04-03 DIAGNOSIS — E78.49 OTHER HYPERLIPIDEMIA: ICD-10-CM

## 2024-04-03 RX ORDER — ATORVASTATIN CALCIUM 40 MG/1
40 TABLET, FILM COATED ORAL DAILY
Qty: 90 TABLET | Refills: 3 | Status: SHIPPED | OUTPATIENT
Start: 2024-04-03

## 2024-04-22 ENCOUNTER — LAB (OUTPATIENT)
Dept: LAB | Facility: LAB | Age: 69
End: 2024-04-22
Payer: MEDICARE

## 2024-04-22 ENCOUNTER — TELEPHONE (OUTPATIENT)
Dept: PRIMARY CARE | Facility: CLINIC | Age: 69
End: 2024-04-22

## 2024-04-22 DIAGNOSIS — E78.5 DYSLIPIDEMIA: ICD-10-CM

## 2024-04-22 DIAGNOSIS — E11.9 TYPE 2 DIABETES MELLITUS WITHOUT COMPLICATION, WITHOUT LONG-TERM CURRENT USE OF INSULIN (MULTI): ICD-10-CM

## 2024-04-22 DIAGNOSIS — E03.9 HYPOTHYROIDISM, UNSPECIFIED TYPE: ICD-10-CM

## 2024-04-22 LAB
CHOLEST SERPL-MCNC: 193 MG/DL (ref 0–199)
CHOLESTEROL/HDL RATIO: 2.7
EST. AVERAGE GLUCOSE BLD GHB EST-MCNC: 166 MG/DL
HBA1C MFR BLD: 7.4 %
HDLC SERPL-MCNC: 70.5 MG/DL
LDLC SERPL CALC-MCNC: 102 MG/DL
NON HDL CHOLESTEROL: 123 MG/DL (ref 0–149)
TRIGL SERPL-MCNC: 102 MG/DL (ref 0–149)
TSH SERPL-ACNC: 3.83 MIU/L (ref 0.44–3.98)
VLDL: 20 MG/DL (ref 0–40)

## 2024-04-22 PROCEDURE — 83036 HEMOGLOBIN GLYCOSYLATED A1C: CPT

## 2024-04-22 PROCEDURE — 84443 ASSAY THYROID STIM HORMONE: CPT

## 2024-04-22 PROCEDURE — 36415 COLL VENOUS BLD VENIPUNCTURE: CPT

## 2024-04-22 PROCEDURE — 80061 LIPID PANEL: CPT

## 2024-04-22 NOTE — TELEPHONE ENCOUNTER
----- Message from Diana Blunt DO sent at 4/22/2024  9:51 AM EDT -----  A1c has significantly worsened since last labs 6.2 to 7.4  Increase metformin to 1000mg BID  Work on regular exercise and making changes to diet- lean protein and fiber are priority, work on reducing processed foods and those with added sugars.

## 2024-04-23 ENCOUNTER — TELEPHONE (OUTPATIENT)
Dept: PRIMARY CARE | Facility: CLINIC | Age: 69
End: 2024-04-23
Payer: MEDICARE

## 2024-04-23 NOTE — TELEPHONE ENCOUNTER
Patient has not been able to sleep on and off for last few weeks.wondering if you can prescribe her medication to help.   Ty gonzales  Upcoming apt 05-01-24

## 2024-04-24 NOTE — TELEPHONE ENCOUNTER
"-Sleep only long enough to feel rested and then get out of bed  -Go to bed and get up at the same time every day  -Do not try to force yourself to sleep. If you can't sleep, get out of bed and try again later.  -Have coffee, tea, and other foods that have caffeine only in the morning and not after 3 PM.   -Avoid alcohol in the late afternoon, evening, and bedtime  -Avoid smoking, especially in the evening  -Keep your bedroom dark, cool, quiet, and free of reminders of work or other things that cause you stress.  -Exercise several days a week, but not right before bed  -Avoid looking at phones or reading devices (\"e-books\") that give off light before bed. This can make it harder to fall asleep.    Other things that can improve sleep include:  -Relaxation therapy, in which you focus on relaxing all the muscles in your body 1 by 1  -You can drink non-caffeinated teas at bed time such as Lavender or chamomile  -Try essential oil aromatherapy mist in your bedroom to create a relaxing atmosphere.    I would also recommend she try melatonin 5mg nightly until her appointment if still having issues can discuss other options.     "

## 2024-05-01 ENCOUNTER — OFFICE VISIT (OUTPATIENT)
Dept: PRIMARY CARE | Facility: CLINIC | Age: 69
End: 2024-05-01
Payer: MEDICARE

## 2024-05-01 VITALS
HEIGHT: 66 IN | OXYGEN SATURATION: 97 % | SYSTOLIC BLOOD PRESSURE: 118 MMHG | WEIGHT: 187 LBS | HEART RATE: 84 BPM | BODY MASS INDEX: 30.05 KG/M2 | DIASTOLIC BLOOD PRESSURE: 80 MMHG

## 2024-05-01 DIAGNOSIS — E11.9 TYPE 2 DIABETES MELLITUS WITHOUT COMPLICATION, WITHOUT LONG-TERM CURRENT USE OF INSULIN (MULTI): Primary | ICD-10-CM

## 2024-05-01 DIAGNOSIS — E78.5 DYSLIPIDEMIA: ICD-10-CM

## 2024-05-01 DIAGNOSIS — H93.13 TINNITUS OF BOTH EARS: ICD-10-CM

## 2024-05-01 DIAGNOSIS — F51.01 PRIMARY INSOMNIA: ICD-10-CM

## 2024-05-01 DIAGNOSIS — Z86.39 HX OF THYROID NODULE: ICD-10-CM

## 2024-05-01 DIAGNOSIS — H61.22 IMPACTED CERUMEN, LEFT EAR: ICD-10-CM

## 2024-05-01 PROCEDURE — 4010F ACE/ARB THERAPY RXD/TAKEN: CPT | Performed by: STUDENT IN AN ORGANIZED HEALTH CARE EDUCATION/TRAINING PROGRAM

## 2024-05-01 PROCEDURE — 1160F RVW MEDS BY RX/DR IN RCRD: CPT | Performed by: STUDENT IN AN ORGANIZED HEALTH CARE EDUCATION/TRAINING PROGRAM

## 2024-05-01 PROCEDURE — 99214 OFFICE O/P EST MOD 30 MIN: CPT | Performed by: STUDENT IN AN ORGANIZED HEALTH CARE EDUCATION/TRAINING PROGRAM

## 2024-05-01 PROCEDURE — 1159F MED LIST DOCD IN RCRD: CPT | Performed by: STUDENT IN AN ORGANIZED HEALTH CARE EDUCATION/TRAINING PROGRAM

## 2024-05-01 PROCEDURE — 69210 REMOVE IMPACTED EAR WAX UNI: CPT | Performed by: STUDENT IN AN ORGANIZED HEALTH CARE EDUCATION/TRAINING PROGRAM

## 2024-05-01 PROCEDURE — 3049F LDL-C 100-129 MG/DL: CPT | Performed by: STUDENT IN AN ORGANIZED HEALTH CARE EDUCATION/TRAINING PROGRAM

## 2024-05-01 PROCEDURE — 3079F DIAST BP 80-89 MM HG: CPT | Performed by: STUDENT IN AN ORGANIZED HEALTH CARE EDUCATION/TRAINING PROGRAM

## 2024-05-01 PROCEDURE — 1036F TOBACCO NON-USER: CPT | Performed by: STUDENT IN AN ORGANIZED HEALTH CARE EDUCATION/TRAINING PROGRAM

## 2024-05-01 PROCEDURE — 3051F HG A1C>EQUAL 7.0%<8.0%: CPT | Performed by: STUDENT IN AN ORGANIZED HEALTH CARE EDUCATION/TRAINING PROGRAM

## 2024-05-01 PROCEDURE — 3074F SYST BP LT 130 MM HG: CPT | Performed by: STUDENT IN AN ORGANIZED HEALTH CARE EDUCATION/TRAINING PROGRAM

## 2024-05-01 RX ORDER — BLOOD-GLUCOSE METER
100 EACH MISCELLANEOUS
Qty: 100 STRIP | Refills: 3 | Status: SHIPPED | OUTPATIENT
Start: 2024-05-01

## 2024-05-01 RX ORDER — MELATONIN 5 MG
CAPSULE ORAL
COMMUNITY

## 2024-05-01 RX ORDER — METFORMIN HYDROCHLORIDE 500 MG/1
1000 TABLET ORAL
Qty: 360 TABLET | Refills: 0 | Status: SHIPPED | OUTPATIENT
Start: 2024-05-01

## 2024-05-01 RX ORDER — TRAZODONE HYDROCHLORIDE 50 MG/1
50 TABLET ORAL NIGHTLY PRN
Qty: 30 TABLET | Refills: 1 | Status: SHIPPED | OUTPATIENT
Start: 2024-05-01 | End: 2025-05-01

## 2024-05-01 ASSESSMENT — ENCOUNTER SYMPTOMS
SHORTNESS OF BREATH: 0
LOSS OF SENSATION IN FEET: 0
DEPRESSION: 0
FEVER: 0
OCCASIONAL FEELINGS OF UNSTEADINESS: 0
FATIGUE: 1
SLEEP DISTURBANCE: 1
CONFUSION: 0

## 2024-05-01 ASSESSMENT — COLUMBIA-SUICIDE SEVERITY RATING SCALE - C-SSRS
6. HAVE YOU EVER DONE ANYTHING, STARTED TO DO ANYTHING, OR PREPARED TO DO ANYTHING TO END YOUR LIFE?: NO
1. IN THE PAST MONTH, HAVE YOU WISHED YOU WERE DEAD OR WISHED YOU COULD GO TO SLEEP AND NOT WAKE UP?: NO
2. HAVE YOU ACTUALLY HAD ANY THOUGHTS OF KILLING YOURSELF?: NO

## 2024-05-01 NOTE — PATIENT INSTRUCTIONS
"What can I do to improve my sleep? - You can follow good \"sleep hygiene.\" That means that you:    -Sleep only long enough to feel rested and then get out of bed  -Go to bed and get up at the same time every day  -Do not try to force yourself to sleep. If you can't sleep, get out of bed and try again later.  -Have coffee, tea, and other foods that have caffeine only in the morning and not after 3 PM.   -Avoid alcohol in the late afternoon, evening, and bedtime  -Avoid smoking, especially in the evening  -Keep your bedroom dark, cool, quiet, and free of reminders of work or other things that cause you stress.  -Exercise several days a week, but not right before bed  -Avoid looking at phones or reading devices (\"e-books\") that give off light before bed. This can make it harder to fall asleep.    Other things that can improve sleep include:  -Relaxation therapy, in which you focus on relaxing all the muscles in your body 1 by 1  -You can drink non-caffeinated teas at bed time such as Lavender or chamomile  -Try essential oil aromatherapy mist in your bedroom to create a relaxing atmosphere.    "

## 2024-05-01 NOTE — ASSESSMENT & PLAN NOTE
Significantly worsened on labs- 6.2 to 7.4  We will go back to 1000mg BID dosing of metformin   Is on atorvastatin 40mg, and lisinopril 5mg  Recommend annual foot and eye exams

## 2024-05-01 NOTE — PROGRESS NOTES
"Patient Name:  Nanci Colón  MRN:  [id number]  :  1955    Subjective   Patient ID: Nanci Colón is a 68 y.o. female who presents for Follow-up (Pt here for follow up, go over labs and has had insomnia tried the melatonin and it didn't help so she took 10 mg and it helped her fall asleep but she is unable to stay asleep.  Has ringing in her ears for years but now is louder and keeping her awake.  Has hx of thyroid nodules and they haven't been checked in a while and would like them checked. ).    HPI     69 yo female presents for follow up and concerns    Main concern is chronic sleep issues  Cutting back on screen time       A1c 6.2 to 7.4    Hx of thyroid nodules, requesting check   Component  Ref Range & Units 9 d ago  (24) 1 yr ago  (23) 4 yr ago  (19) 4 yr ago  (19)   Thyroid Stimulating Hormone  0.44 - 3.98 mIU/L 3.83 1.55 CM 1.37 CM 1.21      Review of Systems   Constitutional:  Positive for fatigue. Negative for fever.   HENT:  Positive for tinnitus.    Respiratory:  Negative for shortness of breath.    Cardiovascular:  Negative for chest pain.   Allergic/Immunologic: Negative for immunocompromised state.   Psychiatric/Behavioral:  Positive for sleep disturbance. Negative for confusion.      Objective   /80 (BP Location: Left arm, Patient Position: Sitting)   Pulse 84   Ht 1.676 m (5' 6\")   Wt 84.8 kg (187 lb)   SpO2 97%   BMI 30.18 kg/m²     Physical Exam  Constitutional:       Appearance: Normal appearance.   HENT:      Head: Normocephalic and atraumatic.      Right Ear: Tympanic membrane normal.      Left Ear: Tympanic membrane normal. There is impacted cerumen.   Cardiovascular:      Rate and Rhythm: Normal rate and regular rhythm.   Pulmonary:      Effort: Pulmonary effort is normal.   Neurological:      General: No focal deficit present.      Mental Status: She is alert and oriented to person, place, and time.   Psychiatric:         Mood and Affect: " Mood normal.         Behavior: Behavior normal.     Assessment/Plan   Problem List Items Addressed This Visit             ICD-10-CM    Type 2 diabetes mellitus without complication, without long-term current use of insulin (Multi) - Primary (Chronic) E11.9     Significantly worsened on labs- 6.2 to 7.4  We will go back to 1000mg BID dosing of metformin   Is on atorvastatin 40mg, and lisinopril 5mg  Recommend annual foot and eye exams         Relevant Medications    metFORMIN (Glucophage) 500 mg tablet    Other Relevant Orders    Follow Up In [facility] - PCP - Established    Hemoglobin A1C    Tinnitus H93.19    Relevant Orders    Referral to Audiology    Dyslipidemia E78.5    Relevant Orders    Lipid Panel    Primary insomnia F51.01     10mg melatonin is not helping  Only getting 3-4 hours nightly  Review of sleep hygiene          Relevant Medications    melatonin 5 mg capsule    traZODone (Desyrel) 50 mg tablet    Other Relevant Orders    Comprehensive Metabolic Panel    Hx of thyroid nodule Z86.39    Relevant Orders    US thyroid    Tsh With Reflex To Free T4 If Abnormal     Other Visit Diagnoses         Codes    Impacted cerumen, left ear     H61.22        Impacted cerumen was identified blocking the ear canal.   External canal was lavaged with warm water  No trauma to the external ear canal during procedure. Patient tolerated well. verbal care instructions provided to patient.

## 2024-05-06 ENCOUNTER — TELEPHONE (OUTPATIENT)
Dept: PRIMARY CARE | Facility: CLINIC | Age: 69
End: 2024-05-06
Payer: MEDICARE

## 2024-05-06 NOTE — TELEPHONE ENCOUNTER
Patient called into the answering service and said the pill you gave her is not helping her sleep.

## 2024-05-07 NOTE — TELEPHONE ENCOUNTER
She can try taking 2 tablets nightly. If that doesn't work then I would recommend making an appointment to review other options.

## 2024-05-16 ENCOUNTER — HOSPITAL ENCOUNTER (OUTPATIENT)
Dept: RADIOLOGY | Facility: HOSPITAL | Age: 69
Discharge: HOME | End: 2024-05-16
Payer: MEDICARE

## 2024-05-16 DIAGNOSIS — Z86.39 HX OF THYROID NODULE: ICD-10-CM

## 2024-05-16 PROCEDURE — 76536 US EXAM OF HEAD AND NECK: CPT

## 2024-05-16 PROCEDURE — 76536 US EXAM OF HEAD AND NECK: CPT | Performed by: RADIOLOGY

## 2024-05-20 ENCOUNTER — TELEPHONE (OUTPATIENT)
Dept: PRIMARY CARE | Facility: CLINIC | Age: 69
End: 2024-05-20
Payer: MEDICARE

## 2024-05-20 NOTE — TELEPHONE ENCOUNTER
----- Message from Diana Blunt, DO sent at 5/19/2024 12:59 PM EDT -----  Lab work still pending completion but US showing consistent fingdings from her previous US in 2014. No further workup indicated at this time.

## 2024-06-05 ENCOUNTER — CLINICAL SUPPORT (OUTPATIENT)
Dept: AUDIOLOGY | Facility: HOSPITAL | Age: 69
End: 2024-06-05
Payer: MEDICARE

## 2024-06-05 DIAGNOSIS — H90.3 SENSORINEURAL HEARING LOSS, ASYMMETRICAL: ICD-10-CM

## 2024-06-05 DIAGNOSIS — H93.13 TINNITUS OF BOTH EARS: Primary | ICD-10-CM

## 2024-06-05 PROCEDURE — 92550 TYMPANOMETRY & REFLEX THRESH: CPT | Performed by: AUDIOLOGIST

## 2024-06-05 PROCEDURE — 92557 COMPREHENSIVE HEARING TEST: CPT | Performed by: AUDIOLOGIST

## 2024-06-05 ASSESSMENT — PAIN SCALES - GENERAL: PAINLEVEL_OUTOF10: 4

## 2024-06-05 ASSESSMENT — PAIN - FUNCTIONAL ASSESSMENT: PAIN_FUNCTIONAL_ASSESSMENT: 0-10

## 2024-06-05 NOTE — LETTER
2024         Diana Blunt DO  [address] [facility] [address]  [place] [id number]      Patient: Nanci Colón   YOB: 1955   Date of Visit: 2024       Dear Diana Blunt DO:    Thank you for referring Nanci Colón to me for evaluation. Below are my notes for this consultation.  If you have questions, please do not hesitate to call me. I look forward to following your patient along with you.       Sincerely,     MARTÍN Fonseca, CCC-A  Senior Audiologist      CC:   No Recipients  ______________________________________________________________________________________    AUDIOLOGY ADULT AUDIOMETRIC EVALUATION      Name:  Nanci Colón  :  1955  Age:  68 y.o.  Date of Evaluation:  2024     History:  Reason for visit:  Ms. Colón is seen today at the request of Diana Blunt DO for an evaluation of hearing.  Patient complains of Tinnitus and Hearing Loss (Previous test had shown left sided hearing loss.  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  Family history of hearing loss:  older brother, young adult with ear trauma during  service  Otitis Media: recurrent in childhood, none in adulthood    Otalgia:  no  Aural Fullness:  no  PE Tubes:  no  Other otologic surgical history:  no  Hearing aid history: none, recommended 4 years ago, but did not pursue  Other significant history: none    EVALUATION         RESULTS:    Otoscopic Evaluation:         Both ears:  clear ear canal, tympanic membrane visualized         Immittance Measures: 226Hz       Right Probe Ear: Tympanogram shows normal middle ear pressure, static compliance, and ear canal volume.  Acoustic reflexes (ipsilateral and contralateral) were consistent with pure tone results.       Left Probe Ear: Tympanogram shows normal middle ear pressure, static compliance, and ear canal volume.  Acoustic reflexes were absent ipsilaterally  and contralaterally.  Ear canal seal was difficult to establish and maintain      Pure Tone Audiometry:  Conventional audiometry (125-8000Hz) via insert earphones with good response reliability       Right ear: normal hearing sensitivity from 125-1500Hz sloping to moderate high frequency sensorineural hearing loss        Left ear: normal hearing sensitivity from 125-500Hz sloping to severe high frequency sensorineural hearing loss     Speech Audiometry:        Right Ear:  Speech Reception Threshold (SRT) was obtained at 15 dBHL                 Word Recognition scores were excellent at 40dBSL re: SRT       Left Ear:  Speech Reception Threshold (SRT) was obtained at 30 dBHL                 Word Recognition scores were excellent at 40dBSL re: SRT    Distortion Product Otoacoustic Emissions: Assesses the cochlear outer hair cell function (7412-0125 Hz frequency range)         Right Ear:  present 1500Hz, absent 2000-8000Hz          Left Ear:  absent      IMPRESSIONS:  Today's test results are consistent with severe high frequency sensorineural hearing loss in the left ear and moderate high frequency sensorineural hearing loss in the right ear.  Her word discrimination scores are excellent bilaterally.  Her tympanic membrane mobility is normal bilaterally.  If there are no medical contraindications, this patient could be considered a good candidate for binaural amplification.     RECOMMENDATIONS:  -Medical / Otolaryngology consultation regarding asymmetric hearing loss.  Obtain  medical clearance for hearing aid fitting  -Hearing Aid consultation. Patient to check with insurance regarding benefits and covered providers.   -Enrichment of the sound environment (such as use of soft music, fan, or noise generators) and use of hearing aids to reduce tinnitus annoyance.    PATIENT EDUCATION:   Discussed results and recommendations at length with Ms. Colón.  Questions were addressed and the patient was encouraged to contact our department should concerns arise.    Time:  12:58 to 13:50    MARTÍN Fonseca, CCC-A  Senior Audiologist

## 2024-06-05 NOTE — PROGRESS NOTES
AUDIOLOGY ADULT AUDIOMETRIC EVALUATION      Name:  Nanci Colón  :  1955  Age:  68 y.o.  Date of Evaluation:  2024     History:  Reason for visit:  Ms. Colón is seen today at the request of Diana Blunt DO for an evaluation of hearing.  Patient complains of Tinnitus and Hearing Loss (Previous test had shown left sided hearing loss.  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  Family history of hearing loss:  older brother, young adult with ear trauma during  service  Otitis Media: recurrent in childhood, none in adulthood    Otalgia:  no  Aural Fullness:  no  PE Tubes:  no  Other otologic surgical history:  no  Hearing aid history: none, recommended 4 years ago, but did not pursue  Other significant history: none    EVALUATION         RESULTS:    Otoscopic Evaluation:        Both ears:  clear ear canal, tympanic membrane visualized         Immittance Measures: 226Hz       Right Probe Ear: Tympanogram shows normal middle ear pressure, static compliance, and ear canal volume.  Acoustic reflexes (ipsilateral and contralateral) were consistent with pure tone results.       Left Probe Ear: Tympanogram shows normal middle ear pressure, static compliance, and ear canal volume.  Acoustic reflexes were absent ipsilaterally  and contralaterally.  Ear canal seal was difficult to establish and maintain      Pure Tone Audiometry:  Conventional audiometry (125-8000Hz) via insert earphones with good response  reliability       Right ear: normal hearing sensitivity from 125-1500Hz sloping to moderate high frequency sensorineural hearing loss        Left ear: normal hearing sensitivity from 125-500Hz sloping to severe high frequency sensorineural hearing loss     Speech Audiometry:        Right Ear:  Speech Reception Threshold (SRT) was obtained at 15 dBHL                 Word Recognition scores were excellent at 40dBSL re: SRT       Left Ear:  Speech Reception Threshold (SRT) was obtained at 30 dBHL                 Word Recognition scores were excellent at 40dBSL re: SRT    Distortion Product Otoacoustic Emissions: Assesses the cochlear outer hair cell function (3508-2662 Hz frequency range)         Right Ear:  present 1500Hz, absent 2000-8000Hz          Left Ear:  absent      IMPRESSIONS:  Today's test results are consistent with severe high frequency sensorineural hearing loss in the left ear and moderate high frequency sensorineural hearing loss in the right ear.  Her word discrimination scores are excellent bilaterally.  Her tympanic membrane mobility is normal bilaterally.  If there are no medical contraindications, this patient could be considered a good candidate for binaural amplification.     RECOMMENDATIONS:  -Medical / Otolaryngology consultation regarding asymmetric hearing loss.  Obtain medical clearance for hearing aid fitting  -Hearing Aid consultation. Patient to check with insurance regarding benefits and covered providers.   -Enrichment of the sound environment (such as use of soft music, fan, or noise generators) and use of hearing aids to reduce tinnitus annoyance.    PATIENT EDUCATION:   Discussed results and recommendations at length with Ms. Colón.  Questions were addressed and the patient was encouraged to contact our department should concerns arise.    Time:  12:58 to 13:50    MARTÍN Fonseca, CCC-A  Senior Audiologist

## 2024-06-22 DIAGNOSIS — M47.817 LUMBAR AND SACRAL OSTEOARTHRITIS: ICD-10-CM

## 2024-06-22 DIAGNOSIS — E11.9 TYPE 2 DIABETES MELLITUS WITHOUT COMPLICATION, WITHOUT LONG-TERM CURRENT USE OF INSULIN (MULTI): ICD-10-CM

## 2024-06-24 RX ORDER — METFORMIN HYDROCHLORIDE 500 MG/1
1000 TABLET ORAL
Qty: 360 TABLET | Refills: 1 | Status: SHIPPED | OUTPATIENT
Start: 2024-06-24

## 2024-06-24 RX ORDER — CELECOXIB 200 MG/1
CAPSULE ORAL
Qty: 60 CAPSULE | Refills: 3 | Status: SHIPPED | OUTPATIENT
Start: 2024-06-24

## 2024-07-08 DIAGNOSIS — F51.01 PRIMARY INSOMNIA: ICD-10-CM

## 2024-07-08 RX ORDER — TRAZODONE HYDROCHLORIDE 50 MG/1
TABLET ORAL
Qty: 30 TABLET | Refills: 1 | Status: SHIPPED | OUTPATIENT
Start: 2024-07-08

## 2024-08-21 ENCOUNTER — OFFICE VISIT (OUTPATIENT)
Dept: PRIMARY CARE | Facility: CLINIC | Age: 69
End: 2024-08-21
Payer: MEDICARE

## 2024-08-21 VITALS
WEIGHT: 181.8 LBS | HEIGHT: 66 IN | SYSTOLIC BLOOD PRESSURE: 114 MMHG | DIASTOLIC BLOOD PRESSURE: 60 MMHG | BODY MASS INDEX: 29.22 KG/M2 | HEART RATE: 80 BPM | RESPIRATION RATE: 16 BRPM | OXYGEN SATURATION: 98 %

## 2024-08-21 DIAGNOSIS — R11.0 NAUSEA: ICD-10-CM

## 2024-08-21 DIAGNOSIS — E11.9 TYPE 2 DIABETES MELLITUS WITHOUT COMPLICATION, WITHOUT LONG-TERM CURRENT USE OF INSULIN (MULTI): Chronic | ICD-10-CM

## 2024-08-21 DIAGNOSIS — R10.9 STOMACH PAIN: Primary | ICD-10-CM

## 2024-08-21 DIAGNOSIS — F51.01 PRIMARY INSOMNIA: ICD-10-CM

## 2024-08-21 LAB — POC HEMOGLOBIN A1C: 7.5 % (ref 4.2–6.5)

## 2024-08-21 PROCEDURE — 99214 OFFICE O/P EST MOD 30 MIN: CPT | Performed by: NURSE PRACTITIONER

## 2024-08-21 PROCEDURE — 3008F BODY MASS INDEX DOCD: CPT | Performed by: NURSE PRACTITIONER

## 2024-08-21 PROCEDURE — 3051F HG A1C>EQUAL 7.0%<8.0%: CPT | Performed by: NURSE PRACTITIONER

## 2024-08-21 PROCEDURE — 83036 HEMOGLOBIN GLYCOSYLATED A1C: CPT | Performed by: NURSE PRACTITIONER

## 2024-08-21 PROCEDURE — 1125F AMNT PAIN NOTED PAIN PRSNT: CPT | Performed by: NURSE PRACTITIONER

## 2024-08-21 PROCEDURE — 1036F TOBACCO NON-USER: CPT | Performed by: NURSE PRACTITIONER

## 2024-08-21 PROCEDURE — 4010F ACE/ARB THERAPY RXD/TAKEN: CPT | Performed by: NURSE PRACTITIONER

## 2024-08-21 PROCEDURE — 3074F SYST BP LT 130 MM HG: CPT | Performed by: NURSE PRACTITIONER

## 2024-08-21 PROCEDURE — 3078F DIAST BP <80 MM HG: CPT | Performed by: NURSE PRACTITIONER

## 2024-08-21 PROCEDURE — 1159F MED LIST DOCD IN RCRD: CPT | Performed by: NURSE PRACTITIONER

## 2024-08-21 PROCEDURE — 1160F RVW MEDS BY RX/DR IN RCRD: CPT | Performed by: NURSE PRACTITIONER

## 2024-08-21 PROCEDURE — 3049F LDL-C 100-129 MG/DL: CPT | Performed by: NURSE PRACTITIONER

## 2024-08-21 RX ORDER — METFORMIN HYDROCHLORIDE 500 MG/1
500 TABLET ORAL
Qty: 360 TABLET | Refills: 1 | Status: SHIPPED | OUTPATIENT
Start: 2024-08-21

## 2024-08-21 RX ORDER — PIOGLITAZONEHYDROCHLORIDE 30 MG/1
30 TABLET ORAL DAILY
Qty: 30 TABLET | Refills: 2 | Status: SHIPPED | OUTPATIENT
Start: 2024-08-21 | End: 2024-11-19

## 2024-08-21 RX ORDER — DOXEPIN HYDROCHLORIDE 10 MG/1
10 CAPSULE ORAL NIGHTLY
Qty: 30 CAPSULE | Refills: 2 | Status: SHIPPED | OUTPATIENT
Start: 2024-08-21 | End: 2024-11-19

## 2024-08-21 RX ORDER — PANTOPRAZOLE SODIUM 20 MG/1
20 TABLET, DELAYED RELEASE ORAL 2 TIMES DAILY
Qty: 60 TABLET | Refills: 2 | Status: SHIPPED | OUTPATIENT
Start: 2024-08-21 | End: 2024-11-19

## 2024-08-21 ASSESSMENT — ENCOUNTER SYMPTOMS
SLEEP DISTURBANCE: 1
NAUSEA: 1
OCCASIONAL FEELINGS OF UNSTEADINESS: 0
LOSS OF SENSATION IN FEET: 0
DEPRESSION: 0
BACK PAIN: 1
ABDOMINAL PAIN: 1

## 2024-08-21 ASSESSMENT — ANXIETY QUESTIONNAIRES
5. BEING SO RESTLESS THAT IT IS HARD TO SIT STILL: NOT AT ALL
4. TROUBLE RELAXING: NOT AT ALL
GAD7 TOTAL SCORE: 0
1. FEELING NERVOUS, ANXIOUS, OR ON EDGE: NOT AT ALL
3. WORRYING TOO MUCH ABOUT DIFFERENT THINGS: NOT AT ALL
7. FEELING AFRAID AS IF SOMETHING AWFUL MIGHT HAPPEN: NOT AT ALL
2. NOT BEING ABLE TO STOP OR CONTROL WORRYING: NOT AT ALL
6. BECOMING EASILY ANNOYED OR IRRITABLE: NOT AT ALL

## 2024-08-21 ASSESSMENT — COLUMBIA-SUICIDE SEVERITY RATING SCALE - C-SSRS
2. HAVE YOU ACTUALLY HAD ANY THOUGHTS OF KILLING YOURSELF?: NO
1. IN THE PAST MONTH, HAVE YOU WISHED YOU WERE DEAD OR WISHED YOU COULD GO TO SLEEP AND NOT WAKE UP?: NO
6. HAVE YOU EVER DONE ANYTHING, STARTED TO DO ANYTHING, OR PREPARED TO DO ANYTHING TO END YOUR LIFE?: NO

## 2024-08-21 ASSESSMENT — PAIN SCALES - GENERAL: PAINLEVEL: 2

## 2024-08-21 NOTE — PATIENT INSTRUCTIONS
Your in-office A1C was elevated today at 7.5%. I have prescribed pantoprazole 20mg twice daily for new onset of abdominal pain associated with nausea and radiating to the back since doubling your dose of Metformin.  I recommend that you reduce Metformin to 50mg twice daily and start Actos 30 mg daily.  I have prescribed Doxepin to be taken 30 minutes before bedtime for sleeplessness. Trazodone discontinued. Complete labs as ordered by your PCP and keep your predetermined office visit with your PCP on 11/20/2024.

## 2024-08-21 NOTE — PROGRESS NOTES
"Subjective   Patient ID: Nanci Colón is a 68 y.o. female who presents for GI Problem (X 2 weeks stomach pain, frequent BM pain radiates up to rib cage while she lays down sleeps on back or side , nausea) and Insomnia (Stopped melatonin and trazadone to see if it would help her stomach no change really would like something for sleep ).    This is a patient of Dr. Blunt coming in with multiple medical complaints.  She complains of burning sensation in his stomach associated with nausea and radiating to her back and upper ribs.  Reports that her symptoms are exacerbated by laying down.  Reports that her symptoms started when her metformin was increased from 500 mg twice daily to 1000 mg twice daily and has been worsening for the past 2 weeks.  She does not drink alcohol and is not jaundiced.  She denies history of acute pancreatitis.  Advised that Mylanta alleviates her symptoms by about 70%.  She also complains of sleeplessness and reports that she stopped taking trazodone that was prescribed for her because it was not helping her symptoms.  Her in office hemoglobin A1c was elevated at 7.5% today.         Review of Systems   Gastrointestinal:  Positive for abdominal pain and nausea.   Musculoskeletal:  Positive for back pain.   Psychiatric/Behavioral:  Positive for sleep disturbance.    All other systems reviewed and are negative.      Objective   /60 (BP Location: Left arm, Patient Position: Sitting, BP Cuff Size: Adult)   Pulse 80   Resp 16   Ht 1.676 m (5' 6\")   Wt 82.5 kg (181 lb 12.8 oz)   SpO2 98%   BMI 29.34 kg/m²     Physical Exam  Constitutional:       Appearance: Normal appearance.   HENT:      Head: Normocephalic and atraumatic.      Right Ear: External ear normal.      Left Ear: External ear normal.      Nose: Nose normal.      Mouth/Throat:      Mouth: Mucous membranes are moist.   Cardiovascular:      Rate and Rhythm: Normal rate.   Pulmonary:      Effort: Pulmonary effort is normal. "   Abdominal:      General: Bowel sounds are normal. There is no distension.      Palpations: Abdomen is soft. There is no mass.      Tenderness: There is no abdominal tenderness. There is no right CVA tenderness, left CVA tenderness, guarding or rebound.      Hernia: No hernia is present.   Musculoskeletal:      Cervical back: Neck supple.   Skin:     General: Skin is warm and dry.   Neurological:      General: No focal deficit present.      Mental Status: She is alert and oriented to person, place, and time.   Psychiatric:         Mood and Affect: Mood normal.         Behavior: Behavior normal.         Thought Content: Thought content normal.         Judgment: Judgment normal.         Assessment/Plan   Problem List Items Addressed This Visit    None

## 2024-09-09 ENCOUNTER — TELEPHONE (OUTPATIENT)
Dept: PRIMARY CARE | Facility: CLINIC | Age: 69
End: 2024-09-09

## 2024-09-09 ENCOUNTER — HOSPITAL ENCOUNTER (EMERGENCY)
Facility: HOSPITAL | Age: 69
Discharge: OTHER INSTITUTION | End: 2024-09-11
Attending: EMERGENCY MEDICINE
Payer: MEDICARE

## 2024-09-09 ENCOUNTER — LAB (OUTPATIENT)
Dept: LAB | Facility: LAB | Age: 69
End: 2024-09-09
Payer: MEDICARE

## 2024-09-09 ENCOUNTER — APPOINTMENT (OUTPATIENT)
Dept: RADIOLOGY | Facility: HOSPITAL | Age: 69
End: 2024-09-09
Payer: MEDICARE

## 2024-09-09 ENCOUNTER — APPOINTMENT (OUTPATIENT)
Dept: PRIMARY CARE | Facility: CLINIC | Age: 69
End: 2024-09-09
Payer: MEDICARE

## 2024-09-09 ENCOUNTER — HOSPITAL ENCOUNTER (OUTPATIENT)
Dept: RADIOLOGY | Facility: HOSPITAL | Age: 69
Discharge: HOME | End: 2024-09-09
Payer: MEDICARE

## 2024-09-09 VITALS
BODY MASS INDEX: 28.9 KG/M2 | SYSTOLIC BLOOD PRESSURE: 118 MMHG | OXYGEN SATURATION: 98 % | DIASTOLIC BLOOD PRESSURE: 76 MMHG | HEIGHT: 66 IN | HEART RATE: 62 BPM | WEIGHT: 179.8 LBS

## 2024-09-09 DIAGNOSIS — E11.9 TYPE 2 DIABETES MELLITUS WITHOUT COMPLICATION, WITHOUT LONG-TERM CURRENT USE OF INSULIN (MULTI): ICD-10-CM

## 2024-09-09 DIAGNOSIS — R63.4 WEIGHT LOSS: ICD-10-CM

## 2024-09-09 DIAGNOSIS — Z86.39 HX OF THYROID NODULE: ICD-10-CM

## 2024-09-09 DIAGNOSIS — R17 JAUNDICE: ICD-10-CM

## 2024-09-09 DIAGNOSIS — N17.9 AKI (ACUTE KIDNEY INJURY) (CMS-HCC): Primary | ICD-10-CM

## 2024-09-09 DIAGNOSIS — K86.89 PANCREATIC MASS (HHS-HCC): ICD-10-CM

## 2024-09-09 DIAGNOSIS — E78.5 DYSLIPIDEMIA: ICD-10-CM

## 2024-09-09 DIAGNOSIS — R17 JAUNDICE: Primary | ICD-10-CM

## 2024-09-09 DIAGNOSIS — F51.01 PRIMARY INSOMNIA: ICD-10-CM

## 2024-09-09 LAB
ALBUMIN SERPL BCP-MCNC: 3.9 G/DL (ref 3.4–5)
ALBUMIN SERPL BCP-MCNC: 4 G/DL (ref 3.4–5)
ALP SERPL-CCNC: 1701 U/L (ref 33–136)
ALP SERPL-CCNC: 1707 U/L (ref 33–136)
ALT SERPL W P-5'-P-CCNC: 270 U/L (ref 7–45)
ALT SERPL W P-5'-P-CCNC: 294 U/L (ref 7–45)
ANION GAP SERPL CALC-SCNC: 16 MMOL/L (ref 10–20)
ANION GAP SERPL CALC-SCNC: 16 MMOL/L (ref 10–20)
AST SERPL W P-5'-P-CCNC: 494 U/L (ref 9–39)
AST SERPL W P-5'-P-CCNC: 535 U/L (ref 9–39)
BASOPHILS # BLD AUTO: 0.03 X10*3/UL (ref 0–0.1)
BASOPHILS # BLD AUTO: 0.05 X10*3/UL (ref 0–0.1)
BASOPHILS NFR BLD AUTO: 0.5 %
BASOPHILS NFR BLD AUTO: 0.8 %
BILIRUB DIRECT SERPL-MCNC: 6.1 MG/DL (ref 0–0.3)
BILIRUB SERPL-MCNC: 10.2 MG/DL (ref 0–1.2)
BILIRUB SERPL-MCNC: 10.6 MG/DL (ref 0–1.2)
BUN SERPL-MCNC: 53 MG/DL (ref 6–23)
BUN SERPL-MCNC: 56 MG/DL (ref 6–23)
CALCIUM SERPL-MCNC: 8.8 MG/DL (ref 8.6–10.3)
CALCIUM SERPL-MCNC: 9.2 MG/DL (ref 8.6–10.3)
CHLORIDE SERPL-SCNC: 105 MMOL/L (ref 98–107)
CHLORIDE SERPL-SCNC: 107 MMOL/L (ref 98–107)
CHOLEST SERPL-MCNC: 130 MG/DL (ref 0–199)
CHOLESTEROL/HDL RATIO: 14.3
CO2 SERPL-SCNC: 16 MMOL/L (ref 21–32)
CO2 SERPL-SCNC: 18 MMOL/L (ref 21–32)
CREAT SERPL-MCNC: 4.21 MG/DL (ref 0.5–1.05)
CREAT SERPL-MCNC: 4.69 MG/DL (ref 0.5–1.05)
EGFRCR SERPLBLD CKD-EPI 2021: 10 ML/MIN/1.73M*2
EGFRCR SERPLBLD CKD-EPI 2021: 11 ML/MIN/1.73M*2
EOSINOPHIL # BLD AUTO: 0.17 X10*3/UL (ref 0–0.7)
EOSINOPHIL # BLD AUTO: 0.22 X10*3/UL (ref 0–0.7)
EOSINOPHIL NFR BLD AUTO: 2.6 %
EOSINOPHIL NFR BLD AUTO: 3.4 %
ERYTHROCYTE [DISTWIDTH] IN BLOOD BY AUTOMATED COUNT: 14.8 % (ref 11.5–14.5)
ERYTHROCYTE [DISTWIDTH] IN BLOOD BY AUTOMATED COUNT: 15 % (ref 11.5–14.5)
EST. AVERAGE GLUCOSE BLD GHB EST-MCNC: 186 MG/DL
GLUCOSE SERPL-MCNC: 200 MG/DL (ref 74–99)
GLUCOSE SERPL-MCNC: 206 MG/DL (ref 74–99)
HBA1C MFR BLD: 8.1 %
HCT VFR BLD AUTO: 37.3 % (ref 36–46)
HCT VFR BLD AUTO: 39.5 % (ref 36–46)
HCV AB SER QL: NONREACTIVE
HDLC SERPL-MCNC: 9.1 MG/DL
HGB BLD-MCNC: 12.6 G/DL (ref 12–16)
HGB BLD-MCNC: 12.9 G/DL (ref 12–16)
IMM GRANULOCYTES # BLD AUTO: 0.02 X10*3/UL (ref 0–0.7)
IMM GRANULOCYTES # BLD AUTO: 0.02 X10*3/UL (ref 0–0.7)
IMM GRANULOCYTES NFR BLD AUTO: 0.3 % (ref 0–0.9)
IMM GRANULOCYTES NFR BLD AUTO: 0.3 % (ref 0–0.9)
LACTATE SERPL-SCNC: 1 MMOL/L (ref 0.4–2)
LDLC SERPL CALC-MCNC: 59 MG/DL
LIPASE SERPL-CCNC: 1089 U/L (ref 9–82)
LIPASE SERPL-CCNC: 1477 U/L (ref 9–82)
LYMPHOCYTES # BLD AUTO: 0.79 X10*3/UL (ref 1.2–4.8)
LYMPHOCYTES # BLD AUTO: 1.12 X10*3/UL (ref 1.2–4.8)
LYMPHOCYTES NFR BLD AUTO: 12.2 %
LYMPHOCYTES NFR BLD AUTO: 17.1 %
MCH RBC QN AUTO: 30.6 PG (ref 26–34)
MCH RBC QN AUTO: 31 PG (ref 26–34)
MCHC RBC AUTO-ENTMCNC: 32.7 G/DL (ref 32–36)
MCHC RBC AUTO-ENTMCNC: 33.8 G/DL (ref 32–36)
MCV RBC AUTO: 92 FL (ref 80–100)
MCV RBC AUTO: 94 FL (ref 80–100)
MONOCYTES # BLD AUTO: 0.71 X10*3/UL (ref 0.1–1)
MONOCYTES # BLD AUTO: 0.83 X10*3/UL (ref 0.1–1)
MONOCYTES NFR BLD AUTO: 11 %
MONOCYTES NFR BLD AUTO: 12.7 %
NEUTROPHILS # BLD AUTO: 4.39 X10*3/UL (ref 1.2–7.7)
NEUTROPHILS # BLD AUTO: 4.67 X10*3/UL (ref 1.2–7.7)
NEUTROPHILS NFR BLD AUTO: 66.8 %
NEUTROPHILS NFR BLD AUTO: 72.3 %
NON HDL CHOLESTEROL: 121 MG/DL (ref 0–149)
NRBC BLD-RTO: 0 /100 WBCS (ref 0–0)
NRBC BLD-RTO: 0 /100 WBCS (ref 0–0)
PLATELET # BLD AUTO: 125 X10*3/UL (ref 150–450)
PLATELET # BLD AUTO: 135 X10*3/UL (ref 150–450)
POTASSIUM SERPL-SCNC: 4.2 MMOL/L (ref 3.5–5.3)
POTASSIUM SERPL-SCNC: 4.3 MMOL/L (ref 3.5–5.3)
PROT SERPL-MCNC: 6.9 G/DL (ref 6.4–8.2)
PROT SERPL-MCNC: 7.2 G/DL (ref 6.4–8.2)
RBC # BLD AUTO: 4.06 X10*6/UL (ref 4–5.2)
RBC # BLD AUTO: 4.21 X10*6/UL (ref 4–5.2)
SODIUM SERPL-SCNC: 135 MMOL/L (ref 136–145)
SODIUM SERPL-SCNC: 135 MMOL/L (ref 136–145)
TRIGL SERPL-MCNC: 312 MG/DL (ref 0–149)
TSH SERPL-ACNC: 1.23 MIU/L (ref 0.44–3.98)
VLDL: 62 MG/DL (ref 0–40)
WBC # BLD AUTO: 6.5 X10*3/UL (ref 4.4–11.3)
WBC # BLD AUTO: 6.6 X10*3/UL (ref 4.4–11.3)

## 2024-09-09 PROCEDURE — 74176 CT ABD & PELVIS W/O CONTRAST: CPT

## 2024-09-09 PROCEDURE — 80053 COMPREHEN METABOLIC PANEL: CPT

## 2024-09-09 PROCEDURE — 2500000004 HC RX 250 GENERAL PHARMACY W/ HCPCS (ALT 636 FOR OP/ED): Performed by: EMERGENCY MEDICINE

## 2024-09-09 PROCEDURE — 76705 ECHO EXAM OF ABDOMEN: CPT

## 2024-09-09 PROCEDURE — 83605 ASSAY OF LACTIC ACID: CPT | Performed by: NURSE PRACTITIONER

## 2024-09-09 PROCEDURE — 82248 BILIRUBIN DIRECT: CPT | Performed by: NURSE PRACTITIONER

## 2024-09-09 PROCEDURE — 3051F HG A1C>EQUAL 7.0%<8.0%: CPT | Performed by: STUDENT IN AN ORGANIZED HEALTH CARE EDUCATION/TRAINING PROGRAM

## 2024-09-09 PROCEDURE — 3078F DIAST BP <80 MM HG: CPT | Performed by: STUDENT IN AN ORGANIZED HEALTH CARE EDUCATION/TRAINING PROGRAM

## 2024-09-09 PROCEDURE — 1036F TOBACCO NON-USER: CPT | Performed by: STUDENT IN AN ORGANIZED HEALTH CARE EDUCATION/TRAINING PROGRAM

## 2024-09-09 PROCEDURE — 4010F ACE/ARB THERAPY RXD/TAKEN: CPT | Performed by: STUDENT IN AN ORGANIZED HEALTH CARE EDUCATION/TRAINING PROGRAM

## 2024-09-09 PROCEDURE — 3074F SYST BP LT 130 MM HG: CPT | Performed by: STUDENT IN AN ORGANIZED HEALTH CARE EDUCATION/TRAINING PROGRAM

## 2024-09-09 PROCEDURE — 76705 ECHO EXAM OF ABDOMEN: CPT | Performed by: RADIOLOGY

## 2024-09-09 PROCEDURE — 85025 COMPLETE CBC W/AUTO DIFF WBC: CPT

## 2024-09-09 PROCEDURE — 2500000004 HC RX 250 GENERAL PHARMACY W/ HCPCS (ALT 636 FOR OP/ED): Performed by: NURSE PRACTITIONER

## 2024-09-09 PROCEDURE — 80048 BASIC METABOLIC PNL TOTAL CA: CPT | Performed by: NURSE PRACTITIONER

## 2024-09-09 PROCEDURE — 3049F LDL-C 100-129 MG/DL: CPT | Performed by: STUDENT IN AN ORGANIZED HEALTH CARE EDUCATION/TRAINING PROGRAM

## 2024-09-09 PROCEDURE — 84443 ASSAY THYROID STIM HORMONE: CPT

## 2024-09-09 PROCEDURE — 83690 ASSAY OF LIPASE: CPT

## 2024-09-09 PROCEDURE — 96374 THER/PROPH/DIAG INJ IV PUSH: CPT

## 2024-09-09 PROCEDURE — 80061 LIPID PANEL: CPT

## 2024-09-09 PROCEDURE — 3008F BODY MASS INDEX DOCD: CPT | Performed by: STUDENT IN AN ORGANIZED HEALTH CARE EDUCATION/TRAINING PROGRAM

## 2024-09-09 PROCEDURE — 83036 HEMOGLOBIN GLYCOSYLATED A1C: CPT

## 2024-09-09 PROCEDURE — 1160F RVW MEDS BY RX/DR IN RCRD: CPT | Performed by: STUDENT IN AN ORGANIZED HEALTH CARE EDUCATION/TRAINING PROGRAM

## 2024-09-09 PROCEDURE — 99214 OFFICE O/P EST MOD 30 MIN: CPT | Performed by: STUDENT IN AN ORGANIZED HEALTH CARE EDUCATION/TRAINING PROGRAM

## 2024-09-09 PROCEDURE — 83690 ASSAY OF LIPASE: CPT | Performed by: NURSE PRACTITIONER

## 2024-09-09 PROCEDURE — 86803 HEPATITIS C AB TEST: CPT

## 2024-09-09 PROCEDURE — 36415 COLL VENOUS BLD VENIPUNCTURE: CPT

## 2024-09-09 PROCEDURE — 96361 HYDRATE IV INFUSION ADD-ON: CPT

## 2024-09-09 PROCEDURE — 74176 CT ABD & PELVIS W/O CONTRAST: CPT | Performed by: STUDENT IN AN ORGANIZED HEALTH CARE EDUCATION/TRAINING PROGRAM

## 2024-09-09 PROCEDURE — 99285 EMERGENCY DEPT VISIT HI MDM: CPT | Mod: 25

## 2024-09-09 PROCEDURE — 1159F MED LIST DOCD IN RCRD: CPT | Performed by: STUDENT IN AN ORGANIZED HEALTH CARE EDUCATION/TRAINING PROGRAM

## 2024-09-09 PROCEDURE — 85025 COMPLETE CBC W/AUTO DIFF WBC: CPT | Performed by: NURSE PRACTITIONER

## 2024-09-09 PROCEDURE — 36415 COLL VENOUS BLD VENIPUNCTURE: CPT | Performed by: NURSE PRACTITIONER

## 2024-09-09 RX ORDER — ONDANSETRON HYDROCHLORIDE 2 MG/ML
4 INJECTION, SOLUTION INTRAVENOUS ONCE
Status: COMPLETED | OUTPATIENT
Start: 2024-09-09 | End: 2024-09-09

## 2024-09-09 RX ORDER — ONDANSETRON 8 MG/1
8 TABLET, ORALLY DISINTEGRATING ORAL EVERY 8 HOURS PRN
Qty: 10 TABLET | Refills: 0 | Status: SHIPPED | OUTPATIENT
Start: 2024-09-09 | End: 2024-09-16

## 2024-09-09 ASSESSMENT — LIFESTYLE VARIABLES
HAVE YOU EVER FELT YOU SHOULD CUT DOWN ON YOUR DRINKING: NO
EVER FELT BAD OR GUILTY ABOUT YOUR DRINKING: NO
EVER HAD A DRINK FIRST THING IN THE MORNING TO STEADY YOUR NERVES TO GET RID OF A HANGOVER: NO
HAVE PEOPLE ANNOYED YOU BY CRITICIZING YOUR DRINKING: NO
TOTAL SCORE: 0

## 2024-09-09 ASSESSMENT — ENCOUNTER SYMPTOMS
DECREASED CONCENTRATION: 0
LOSS OF SENSATION IN FEET: 0
ABDOMINAL PAIN: 1
UNEXPECTED WEIGHT CHANGE: 1
FATIGUE: 1
VOMITING: 0
DIARRHEA: 1
APPETITE CHANGE: 1
DEPRESSION: 0
OCCASIONAL FEELINGS OF UNSTEADINESS: 0
NAUSEA: 1
ACTIVITY CHANGE: 1
SHORTNESS OF BREATH: 0
CONFUSION: 0
ABDOMINAL DISTENTION: 0
ANAL BLEEDING: 0

## 2024-09-09 ASSESSMENT — COLUMBIA-SUICIDE SEVERITY RATING SCALE - C-SSRS
2. HAVE YOU ACTUALLY HAD ANY THOUGHTS OF KILLING YOURSELF?: NO
1. IN THE PAST MONTH, HAVE YOU WISHED YOU WERE DEAD OR WISHED YOU COULD GO TO SLEEP AND NOT WAKE UP?: NO
2. HAVE YOU ACTUALLY HAD ANY THOUGHTS OF KILLING YOURSELF?: NO
6. HAVE YOU EVER DONE ANYTHING, STARTED TO DO ANYTHING, OR PREPARED TO DO ANYTHING TO END YOUR LIFE?: NO
6. HAVE YOU EVER DONE ANYTHING, STARTED TO DO ANYTHING, OR PREPARED TO DO ANYTHING TO END YOUR LIFE?: NO
1. IN THE PAST MONTH, HAVE YOU WISHED YOU WERE DEAD OR WISHED YOU COULD GO TO SLEEP AND NOT WAKE UP?: NO

## 2024-09-09 ASSESSMENT — PATIENT HEALTH QUESTIONNAIRE - PHQ9
2. FEELING DOWN, DEPRESSED OR HOPELESS: NOT AT ALL
1. LITTLE INTEREST OR PLEASURE IN DOING THINGS: NOT AT ALL
SUM OF ALL RESPONSES TO PHQ9 QUESTIONS 1 AND 2: 0

## 2024-09-09 ASSESSMENT — PAIN - FUNCTIONAL ASSESSMENT: PAIN_FUNCTIONAL_ASSESSMENT: 0-10

## 2024-09-09 NOTE — ED PROVIDER NOTES
HPI   Chief Complaint   Patient presents with    abnormal labs     Jaundice       68-year-old female with a past medical history of diabetes, hypothyroidism, hyperlipidemia presents to the emergency department today at direction of her primary care provider for abdominal pain and yellowing of eyes.  Patient states that she has been having abdominal discomfort for the past month that radiates to her right and left upper quadrant.  Was initially in the epigastric region.  She does have nausea but no significant vomiting.  She is also having frequent bowel movements.  No blood or mucus noted.  She was decreased on her metformin which helped the burning in the stomach however she continues to have the radiation of the abdominal pain and is having increasing yellowing of her skin over the past couple of days.  She does have increased fatigue and decreased in appetite.  She has had weight loss as well.  Denies any chest pain, shortness of breath, dizziness headache or syncope.  No known fever or chills.  States she is not in a significant amount of pain at this time.                          Rachel Coma Scale Score: 15                  Patient History   Past Medical History:   Diagnosis Date    Personal history of other diseases of the circulatory system 09/15/2017    History of hypertension    Personal history of other endocrine, nutritional and metabolic disease 02/24/2016    History of type 2 diabetes mellitus    Personal history of other endocrine, nutritional and metabolic disease 03/09/2017    History of hypothyroidism    Personal history of other endocrine, nutritional and metabolic disease     History of hyperlipidemia    Type 2 diabetes mellitus with other skin complications (Multi)     Diabetic dermopathy     Past Surgical History:   Procedure Laterality Date    OTHER SURGICAL HISTORY  09/01/2022    Foot surgery    OTHER SURGICAL HISTORY  09/01/2022    Appendectomy    OTHER SURGICAL HISTORY  09/01/2022    Knee  surgery     No family history on file.  Social History     Tobacco Use    Smoking status: Never    Smokeless tobacco: Never   Vaping Use    Vaping status: Never Used   Substance Use Topics    Alcohol use: Never    Drug use: Never       Physical Exam   ED Triage Vitals [09/09/24 1640]   Temperature Heart Rate Respirations BP   36.9 °C (98.4 °F) 66 18 141/80      Pulse Ox Temp Source Heart Rate Source Patient Position   97 % Skin Monitor Sitting      BP Location FiO2 (%)     Left arm --       Physical Exam  Vitals and nursing note reviewed.   Constitutional:       General: She is not in acute distress.     Appearance: Normal appearance. She is not toxic-appearing.      Comments: Scleral icterus   HENT:      Right Ear: Tympanic membrane normal.      Left Ear: Tympanic membrane normal.      Mouth/Throat:      Mouth: Mucous membranes are moist.      Pharynx: Oropharynx is clear.   Eyes:      Extraocular Movements: Extraocular movements intact.      Pupils: Pupils are equal, round, and reactive to light.   Cardiovascular:      Rate and Rhythm: Normal rate and regular rhythm.      Pulses: Normal pulses.      Heart sounds: Normal heart sounds.   Pulmonary:      Effort: Pulmonary effort is normal.      Breath sounds: Normal breath sounds.   Abdominal:      General: Abdomen is flat. Bowel sounds are normal.      Palpations: Abdomen is soft.      Tenderness: There is abdominal tenderness in the right upper quadrant and left upper quadrant. There is no guarding or rebound.   Musculoskeletal:         General: Normal range of motion.      Cervical back: Normal range of motion and neck supple.   Skin:     General: Skin is warm and dry.      Capillary Refill: Capillary refill takes less than 2 seconds.   Neurological:      General: No focal deficit present.      Mental Status: She is alert and oriented to person, place, and time.   Psychiatric:         Mood and Affect: Mood normal.         Behavior: Behavior normal.          Judgment: Judgment normal.         Labs Reviewed   CBC WITH AUTO DIFFERENTIAL - Abnormal       Result Value    WBC 6.6      nRBC 0.0      RBC 4.06      Hemoglobin 12.6      Hematocrit 37.3      MCV 92      MCH 31.0      MCHC 33.8      RDW 14.8 (*)     Platelets 125 (*)     Neutrophils % 66.8      Immature Granulocytes %, Automated 0.3      Lymphocytes % 17.1      Monocytes % 12.7      Eosinophils % 2.6      Basophils % 0.5      Neutrophils Absolute 4.39      Immature Granulocytes Absolute, Automated 0.02      Lymphocytes Absolute 1.12 (*)     Monocytes Absolute 0.83      Eosinophils Absolute 0.17      Basophils Absolute 0.03     BASIC METABOLIC PANEL - Abnormal    Glucose 200 (*)     Sodium 135 (*)     Potassium 4.3      Chloride 107      Bicarbonate 16 (*)     Anion Gap 16      Urea Nitrogen 56 (*)     Creatinine 4.69 (*)     eGFR 10 (*)     Calcium 9.2     HEPATIC FUNCTION PANEL - Abnormal    Albumin 3.9      Bilirubin, Total 10.2 (*)     Bilirubin, Direct 6.1 (*)     Alkaline Phosphatase 1,701 (*)      (*)      (*)     Total Protein 7.2     LIPASE - Abnormal    Lipase 1,089 (*)     Narrative:     Venipuncture immediately after or during the administration of Metamizole may lead to falsely low results. Testing should be performed immediately prior to Metamizole dosing.   LACTATE - Normal    Lactate 1.0      Narrative:     Venipuncture immediately after or during the administration of Metamizole may lead to falsely low results. Testing should be performed immediately  prior to Metamizole dosing.     Pain Management Panel           No data to display              CT abdomen pelvis wo IV contrast   Final Result   1.  Limited noncontrast CT scan. There is distended gallbladder with   pancreatic and biliary dilatation without CT evidence of biliary   calculi. There is question of fullness of the pancreatic head   concerning for an underlying pancreatic head mass. Further evaluation   with multiphase MR  abdomen is recommended.             Signed by: Adelfo Álvarez 9/9/2024 8:48 PM   Dictation workstation:   [id number]      US gallbladder    (Results Pending)       ED Course & MDM   ED Course as of 09/09/24 2125   Mon Sep 09, 2024   2117 Patient's  went home and is not at bedside at this time.  Patient did request that I call the  and update him on the results. [RB]      ED Course User Index  [RB] NUVIA Clark       Medical Decision Making  I did review patient's labs from outpatient setting which are highly concerning at this time.  She has tenderness in the left upper and right upper quadrant has declining wanting anything for discomfort.  She does have nausea.  Was given Zofran and fluids in the ED.  Labs are obtained.  Lipase is elevated at 1089.  Hepatic function shows an elevated bilirubin at 10.2 and direct bili at 6.1.  Alk phos of 1701  and AST of 494.  BMP shows an MARK with a BUN of 56 and creat of 4.69.  10 months ago she had normal kidney functions.  Lactic is negative CBC shows no leukocytosis or signs of anemia.  CT of the abdomen pelvis shows pancreatic and biliary dilation without evidence of biliary calculi there is question of fullness in the pancreatic head concerning for an underlying pancreatic head mass.  An ultrasound was ordered.  I discussed results noted with patient and  over the phone.  Plan to request transfer for the patient for further evaluation.  Will continue to monitor in the ED until transferred.  See oncoming physician's note for further details.        Procedure  Procedures     NUVIA Clark  09/09/24 2127

## 2024-09-09 NOTE — PROGRESS NOTES
"Patient Name:  Nanci Colón  MRN:  [id number]  :  1955    Subjective   Patient ID: Nanci Colón is a 68 y.o. female who presents for Follow-up (Discuss from deepti's sick visit /Horrible taste in mouth, nausea, aching, tiredness  ).    HPI     67 yo female presents for follow up from sick visit  with deepti   X 2 weeks stomach pain, frequent BM pain radiates up to rib cage while she lays down sleeps on back or side , nausea     --> given pantoprazole, reduced metformin to 500mg BID, started actos 30mg    --> doxepin given for sleep, trazodone discontinued    Reports that her burning in her stomach is better but all other symptoms remain    Stopped taking actos     Noted today to have increased yellowing of the skin, her family is noticing it as well     [phone]    Review of Systems   Constitutional:  Positive for activity change, appetite change, fatigue and unexpected weight change.   Respiratory:  Negative for shortness of breath.    Cardiovascular:  Negative for chest pain.   Gastrointestinal:  Positive for abdominal pain, diarrhea and nausea. Negative for abdominal distention, anal bleeding and vomiting.   Allergic/Immunologic: Negative for immunocompromised state.   Psychiatric/Behavioral:  Negative for confusion and decreased concentration.      Objective   /76   Pulse 62   Ht 1.676 m (5' 6\")   Wt 81.6 kg (179 lb 12.8 oz)   SpO2 98%   BMI 29.02 kg/m²     Physical Exam  Constitutional:       Appearance: Normal appearance.   HENT:      Head: Normocephalic and atraumatic.   Eyes:      Comments: Jaundice of the sclera    Cardiovascular:      Rate and Rhythm: Normal rate and regular rhythm.   Pulmonary:      Effort: Pulmonary effort is normal.   Abdominal:      General: Bowel sounds are normal.      Comments: Epigastric tenderness    Skin:     Coloration: Skin is jaundiced.   Neurological:      Mental Status: She is alert and oriented to person, place, and time.   Psychiatric:         " Mood and Affect: Mood normal.         Behavior: Behavior normal.     Assessment/Plan   Problem List Items Addressed This Visit    None  Visit Diagnoses         Codes    Jaundice    -  Primary R17    Relevant Medications    ondansetron ODT (Zofran-ODT) 8 mg disintegrating tablet    Other Relevant Orders    Comprehensive metabolic panel    CBC and Auto Differential    CT abdomen pelvis w and wo IV contrast    Hepatitis C antibody    Weight loss     R63.4    Relevant Medications    ondansetron ODT (Zofran-ODT) 8 mg disintegrating tablet    Other Relevant Orders    CT abdomen pelvis w and wo IV contrast    Hepatitis C antibody          Concerned with systemic symptoms not ongoing for a month, with the precipitous weight loss  Rule out liver/pancreatic/gallbladder etiology with labs and imaging  Will get labs today, imaging we will schedule for first available  Stop metformin  Zofran for nausea to try to help her tolerate food better  Close follow up   Continue pantoprazole

## 2024-09-09 NOTE — TELEPHONE ENCOUNTER
----- Message from Diana Blunt sent at 9/9/2024  2:40 PM EDT -----  Initial results from labs: extremely high lipase and liver function tests, plus low platelets. I would recommend the ED to expedite testing and start therapy for likely pancreatitis or liver etiology.

## 2024-09-10 PROCEDURE — 2500000001 HC RX 250 WO HCPCS SELF ADMINISTERED DRUGS (ALT 637 FOR MEDICARE OP): Performed by: EMERGENCY MEDICINE

## 2024-09-10 PROCEDURE — 2500000004 HC RX 250 GENERAL PHARMACY W/ HCPCS (ALT 636 FOR OP/ED): Performed by: EMERGENCY MEDICINE

## 2024-09-10 PROCEDURE — 96376 TX/PRO/DX INJ SAME DRUG ADON: CPT

## 2024-09-10 RX ORDER — ONDANSETRON HYDROCHLORIDE 2 MG/ML
4 INJECTION, SOLUTION INTRAVENOUS ONCE
Status: COMPLETED | OUTPATIENT
Start: 2024-09-10 | End: 2024-09-10

## 2024-09-10 RX ORDER — LISINOPRIL 10 MG/1
5 TABLET ORAL DAILY
Status: DISCONTINUED | OUTPATIENT
Start: 2024-09-10 | End: 2024-09-10

## 2024-09-10 RX ORDER — DOXEPIN HYDROCHLORIDE 10 MG/1
10 CAPSULE ORAL NIGHTLY
Status: DISCONTINUED | OUTPATIENT
Start: 2024-09-10 | End: 2024-09-11 | Stop reason: HOSPADM

## 2024-09-10 RX ORDER — OXYCODONE HYDROCHLORIDE 5 MG/1
5 TABLET ORAL ONCE
Status: COMPLETED | OUTPATIENT
Start: 2024-09-10 | End: 2024-09-10

## 2024-09-10 RX ORDER — LEVOTHYROXINE SODIUM 50 UG/1
75 TABLET ORAL DAILY
Status: DISCONTINUED | OUTPATIENT
Start: 2024-09-10 | End: 2024-09-11 | Stop reason: HOSPADM

## 2024-09-10 RX ORDER — METOPROLOL SUCCINATE 50 MG/1
50 TABLET, EXTENDED RELEASE ORAL DAILY
Status: DISCONTINUED | OUTPATIENT
Start: 2024-09-10 | End: 2024-09-11 | Stop reason: HOSPADM

## 2024-09-10 ASSESSMENT — PAIN SCALES - GENERAL
PAINLEVEL_OUTOF10: 6
PAINLEVEL_OUTOF10: 0 - NO PAIN
PAINLEVEL_OUTOF10: 0 - NO PAIN

## 2024-09-10 ASSESSMENT — PAIN - FUNCTIONAL ASSESSMENT: PAIN_FUNCTIONAL_ASSESSMENT: 0-10

## 2024-09-10 NOTE — PROGRESS NOTES
Patient care signed out to me by Dr. Servin. Pt awaiting tx to [facility]. No beds are expected today. Will allow pt to eat.    MDM/ED Course  ED Course as of 09/10/24 1000   Mon Sep 09, 2024   2117 Patient's  went home and is not at bedside at this time.  Patient did request that I call the  and update him on the results. [RB]      ED Course User Index  [RB] Connie Pastor, APRN-CNP         Diagnoses as of 09/10/24 1000   MARK (acute kidney injury) (CMS-HCC)   Pancreatic mass ([facility])

## 2024-09-10 NOTE — ED PROVIDER NOTES
The patient's case was signed to me by the outgoing physician.  Per their instructions, I followed up with the patient's CAT scan of the abdomen and pelvis.  This demonstrated a likely mass on the pancreatic head pancreas as well as dilatation of the biliary tree.  Given this, the patient will likely require ERCP which is not available at [facility].  I paged and spoke with internal medicine at [facility] regarding this patient's case.  I spoke with Dr. Peres regarding this patient's case.  She agreed the patient could be admitted to her service.  This time, the patient is awaiting transfer.  Patient's case is signed out to the oncoming physician please see their note for further evaluation and ultimate disposition from the emergency department however, I I expect however the patient to be transferred in otherwise stable condition to [facility].     Javid Servin MD  09/13/24 3492

## 2024-09-11 ENCOUNTER — APPOINTMENT (OUTPATIENT)
Dept: RADIOLOGY | Facility: HOSPITAL | Age: 69
DRG: 438 | End: 2024-09-11
Payer: MEDICARE

## 2024-09-11 ENCOUNTER — HOSPITAL ENCOUNTER (INPATIENT)
Facility: HOSPITAL | Age: 69
LOS: 4 days | Discharge: HOME | DRG: 438 | End: 2024-09-15
Attending: HOSPITALIST | Admitting: HOSPITALIST
Payer: MEDICARE

## 2024-09-11 ENCOUNTER — APPOINTMENT (OUTPATIENT)
Dept: GASTROENTEROLOGY | Facility: HOSPITAL | Age: 69
DRG: 438 | End: 2024-09-11
Payer: MEDICARE

## 2024-09-11 ENCOUNTER — ANESTHESIA EVENT (OUTPATIENT)
Dept: GASTROENTEROLOGY | Facility: HOSPITAL | Age: 69
End: 2024-09-11
Payer: MEDICARE

## 2024-09-11 ENCOUNTER — ANESTHESIA (OUTPATIENT)
Dept: GASTROENTEROLOGY | Facility: HOSPITAL | Age: 69
End: 2024-09-11
Payer: MEDICARE

## 2024-09-11 VITALS
BODY MASS INDEX: 28.61 KG/M2 | SYSTOLIC BLOOD PRESSURE: 145 MMHG | OXYGEN SATURATION: 98 % | RESPIRATION RATE: 18 BRPM | WEIGHT: 178 LBS | HEIGHT: 66 IN | TEMPERATURE: 98.1 F | HEART RATE: 91 BPM | DIASTOLIC BLOOD PRESSURE: 82 MMHG

## 2024-09-11 DIAGNOSIS — K86.89 PANCREATIC MASS (HHS-HCC): ICD-10-CM

## 2024-09-11 DIAGNOSIS — N17.9 AKI (ACUTE KIDNEY INJURY) (CMS-HCC): Primary | ICD-10-CM

## 2024-09-11 DIAGNOSIS — C80.1 BILIARY OBSTRUCTION DUE TO MALIGNANT NEOPLASM (MULTI): ICD-10-CM

## 2024-09-11 DIAGNOSIS — K83.1 BILIARY OBSTRUCTION DUE TO MALIGNANT NEOPLASM (MULTI): ICD-10-CM

## 2024-09-11 DIAGNOSIS — K83.1 OBSTRUCTION OF BILE DUCT (CMS-HCC): ICD-10-CM

## 2024-09-11 LAB
ABO GROUP (TYPE) IN BLOOD: NORMAL
ALBUMIN SERPL BCP-MCNC: 3.2 G/DL (ref 3.4–5)
ALP SERPL-CCNC: 1831 U/L (ref 33–136)
ALT SERPL W P-5'-P-CCNC: 290 U/L (ref 7–45)
ANION GAP SERPL CALC-SCNC: 17 MMOL/L (ref 10–20)
ANTIBODY SCREEN: NORMAL
AST SERPL W P-5'-P-CCNC: 594 U/L (ref 9–39)
BASOPHILS # BLD AUTO: 0.05 X10*3/UL (ref 0–0.1)
BASOPHILS NFR BLD AUTO: 0.7 %
BILIRUB DIRECT SERPL-MCNC: 6.9 MG/DL (ref 0–0.3)
BILIRUB SERPL-MCNC: 10.4 MG/DL (ref 0–1.2)
BUN SERPL-MCNC: 48 MG/DL (ref 6–23)
CALCIUM SERPL-MCNC: 9 MG/DL (ref 8.6–10.3)
CANCER AG19-9 SERPL-ACNC: 2960.13 U/ML
CHLORIDE SERPL-SCNC: 110 MMOL/L (ref 98–107)
CO2 SERPL-SCNC: 14 MMOL/L (ref 21–32)
CREAT SERPL-MCNC: 4.35 MG/DL (ref 0.5–1.05)
EGFRCR SERPLBLD CKD-EPI 2021: 11 ML/MIN/1.73M*2
EOSINOPHIL # BLD AUTO: 0.26 X10*3/UL (ref 0–0.7)
EOSINOPHIL NFR BLD AUTO: 3.7 %
ERYTHROCYTE [DISTWIDTH] IN BLOOD BY AUTOMATED COUNT: 15.5 % (ref 11.5–14.5)
EST. AVERAGE GLUCOSE BLD GHB EST-MCNC: 183 MG/DL
GLUCOSE BLD MANUAL STRIP-MCNC: 119 MG/DL (ref 74–99)
GLUCOSE BLD MANUAL STRIP-MCNC: 122 MG/DL (ref 74–99)
GLUCOSE BLD MANUAL STRIP-MCNC: 146 MG/DL (ref 74–99)
GLUCOSE BLD MANUAL STRIP-MCNC: 148 MG/DL (ref 74–99)
GLUCOSE BLD MANUAL STRIP-MCNC: 237 MG/DL (ref 74–99)
GLUCOSE SERPL-MCNC: 148 MG/DL (ref 74–99)
HBA1C MFR BLD: 8 %
HCT VFR BLD AUTO: 32.9 % (ref 36–46)
HGB BLD-MCNC: 11.1 G/DL (ref 12–16)
HOLD SPECIMEN: NORMAL
IMM GRANULOCYTES # BLD AUTO: 0.02 X10*3/UL (ref 0–0.7)
IMM GRANULOCYTES NFR BLD AUTO: 0.3 % (ref 0–0.9)
INR PPP: 1.7 (ref 0.9–1.1)
LYMPHOCYTES # BLD AUTO: 1.08 X10*3/UL (ref 1.2–4.8)
LYMPHOCYTES NFR BLD AUTO: 15.5 %
MAGNESIUM SERPL-MCNC: 1.9 MG/DL (ref 1.6–2.4)
MCH RBC QN AUTO: 30.9 PG (ref 26–34)
MCHC RBC AUTO-ENTMCNC: 33.7 G/DL (ref 32–36)
MCV RBC AUTO: 92 FL (ref 80–100)
MONOCYTES # BLD AUTO: 0.99 X10*3/UL (ref 0.1–1)
MONOCYTES NFR BLD AUTO: 14.2 %
NEUTROPHILS # BLD AUTO: 4.59 X10*3/UL (ref 1.2–7.7)
NEUTROPHILS NFR BLD AUTO: 65.6 %
NRBC BLD-RTO: 0 /100 WBCS (ref 0–0)
PLATELET # BLD AUTO: 119 X10*3/UL (ref 150–450)
POTASSIUM SERPL-SCNC: 3.8 MMOL/L (ref 3.5–5.3)
PROT SERPL-MCNC: 6 G/DL (ref 6.4–8.2)
PROTHROMBIN TIME: 19 SECONDS (ref 9.8–12.8)
RBC # BLD AUTO: 3.59 X10*6/UL (ref 4–5.2)
RH FACTOR (ANTIGEN D): NORMAL
SODIUM SERPL-SCNC: 137 MMOL/L (ref 136–145)
WBC # BLD AUTO: 7 X10*3/UL (ref 4.4–11.3)

## 2024-09-11 PROCEDURE — 3700000002 HC GENERAL ANESTHESIA TIME - EACH INCREMENTAL 1 MINUTE

## 2024-09-11 PROCEDURE — 82947 ASSAY GLUCOSE BLOOD QUANT: CPT

## 2024-09-11 PROCEDURE — 2720000007 HC OR 272 NO HCPCS

## 2024-09-11 PROCEDURE — 88342 IMHCHEM/IMCYTCHM 1ST ANTB: CPT | Performed by: PATHOLOGY

## 2024-09-11 PROCEDURE — 43238 EGD US FINE NEEDLE BX/ASPIR: CPT | Performed by: INTERNAL MEDICINE

## 2024-09-11 PROCEDURE — 83735 ASSAY OF MAGNESIUM: CPT | Performed by: HOSPITALIST

## 2024-09-11 PROCEDURE — C1876 STENT, NON-COA/NON-COV W/DEL: HCPCS

## 2024-09-11 PROCEDURE — 88341 IMHCHEM/IMCYTCHM EA ADD ANTB: CPT | Performed by: PATHOLOGY

## 2024-09-11 PROCEDURE — 43274 ERCP DUCT STENT PLACEMENT: CPT | Performed by: INTERNAL MEDICINE

## 2024-09-11 PROCEDURE — 80048 BASIC METABOLIC PNL TOTAL CA: CPT | Performed by: HOSPITALIST

## 2024-09-11 PROCEDURE — 85025 COMPLETE CBC W/AUTO DIFF WBC: CPT | Performed by: HOSPITALIST

## 2024-09-11 PROCEDURE — 86901 BLOOD TYPING SEROLOGIC RH(D): CPT | Performed by: HOSPITALIST

## 2024-09-11 PROCEDURE — 88172 CYTP DX EVAL FNA 1ST EA SITE: CPT | Performed by: PATHOLOGY

## 2024-09-11 PROCEDURE — 86301 IMMUNOASSAY TUMOR CA 19-9: CPT | Mod: AHULAB | Performed by: HOSPITALIST

## 2024-09-11 PROCEDURE — 2500000004 HC RX 250 GENERAL PHARMACY W/ HCPCS (ALT 636 FOR OP/ED): Performed by: ANESTHESIOLOGIST ASSISTANT

## 2024-09-11 PROCEDURE — 36415 COLL VENOUS BLD VENIPUNCTURE: CPT | Performed by: HOSPITALIST

## 2024-09-11 PROCEDURE — 2500000004 HC RX 250 GENERAL PHARMACY W/ HCPCS (ALT 636 FOR OP/ED): Performed by: HOSPITALIST

## 2024-09-11 PROCEDURE — 88307 TISSUE EXAM BY PATHOLOGIST: CPT | Performed by: PATHOLOGY

## 2024-09-11 PROCEDURE — 88305 TISSUE EXAM BY PATHOLOGIST: CPT | Performed by: PATHOLOGY

## 2024-09-11 PROCEDURE — 7100000001 HC RECOVERY ROOM TIME - INITIAL BASE CHARGE

## 2024-09-11 PROCEDURE — 2500000005 HC RX 250 GENERAL PHARMACY W/O HCPCS: Performed by: HOSPITALIST

## 2024-09-11 PROCEDURE — 88341 IMHCHEM/IMCYTCHM EA ADD ANTB: CPT | Mod: TC,AHULAB | Performed by: INTERNAL MEDICINE

## 2024-09-11 PROCEDURE — A43274 PR ERCP STENT PLACEMENT BILIARY/PANCREATIC DUCT: Performed by: ANESTHESIOLOGIST ASSISTANT

## 2024-09-11 PROCEDURE — 76770 US EXAM ABDO BACK WALL COMP: CPT

## 2024-09-11 PROCEDURE — 85610 PROTHROMBIN TIME: CPT | Performed by: HOSPITALIST

## 2024-09-11 PROCEDURE — 1100000001 HC PRIVATE ROOM DAILY

## 2024-09-11 PROCEDURE — 99223 1ST HOSP IP/OBS HIGH 75: CPT | Performed by: HOSPITALIST

## 2024-09-11 PROCEDURE — 83036 HEMOGLOBIN GLYCOSYLATED A1C: CPT | Mod: AHULAB | Performed by: HOSPITALIST

## 2024-09-11 PROCEDURE — 2500000001 HC RX 250 WO HCPCS SELF ADMINISTERED DRUGS (ALT 637 FOR MEDICARE OP): Performed by: HOSPITALIST

## 2024-09-11 PROCEDURE — 2500000005 HC RX 250 GENERAL PHARMACY W/O HCPCS: Performed by: ANESTHESIOLOGIST ASSISTANT

## 2024-09-11 PROCEDURE — 2500000001 HC RX 250 WO HCPCS SELF ADMINISTERED DRUGS (ALT 637 FOR MEDICARE OP): Performed by: ANESTHESIOLOGIST ASSISTANT

## 2024-09-11 PROCEDURE — 3700000001 HC GENERAL ANESTHESIA TIME - INITIAL BASE CHARGE

## 2024-09-11 PROCEDURE — 2780000003 HC OR 278 NO HCPCS

## 2024-09-11 PROCEDURE — 88173 CYTOPATH EVAL FNA REPORT: CPT | Mod: TC | Performed by: INTERNAL MEDICINE

## 2024-09-11 PROCEDURE — 99222 1ST HOSP IP/OBS MODERATE 55: CPT | Performed by: SURGERY

## 2024-09-11 PROCEDURE — 0F798DZ DILATION OF COMMON BILE DUCT WITH INTRALUMINAL DEVICE, VIA NATURAL OR ARTIFICIAL OPENING ENDOSCOPIC: ICD-10-PCS | Performed by: INTERNAL MEDICINE

## 2024-09-11 PROCEDURE — 82248 BILIRUBIN DIRECT: CPT | Performed by: HOSPITALIST

## 2024-09-11 PROCEDURE — 2500000004 HC RX 250 GENERAL PHARMACY W/ HCPCS (ALT 636 FOR OP/ED): Performed by: INTERNAL MEDICINE

## 2024-09-11 PROCEDURE — 88173 CYTOPATH EVAL FNA REPORT: CPT | Performed by: PATHOLOGY

## 2024-09-11 PROCEDURE — 7100000002 HC RECOVERY ROOM TIME - EACH INCREMENTAL 1 MINUTE

## 2024-09-11 PROCEDURE — A43274 PR ERCP STENT PLACEMENT BILIARY/PANCREATIC DUCT: Performed by: ANESTHESIOLOGY

## 2024-09-11 PROCEDURE — 76770 US EXAM ABDO BACK WALL COMP: CPT | Performed by: STUDENT IN AN ORGANIZED HEALTH CARE EDUCATION/TRAINING PROGRAM

## 2024-09-11 PROCEDURE — C1769 GUIDE WIRE: HCPCS

## 2024-09-11 PROCEDURE — 0FBG8ZX EXCISION OF PANCREAS, VIA NATURAL OR ARTIFICIAL OPENING ENDOSCOPIC, DIAGNOSTIC: ICD-10-PCS | Performed by: INTERNAL MEDICINE

## 2024-09-11 PROCEDURE — 2550000001 HC RX 255 CONTRASTS: Performed by: INTERNAL MEDICINE

## 2024-09-11 PROCEDURE — 99221 1ST HOSP IP/OBS SF/LOW 40: CPT

## 2024-09-11 RX ORDER — FENTANYL CITRATE 50 UG/ML
INJECTION, SOLUTION INTRAMUSCULAR; INTRAVENOUS CONTINUOUS PRN
Status: DISCONTINUED | OUTPATIENT
Start: 2024-09-11 | End: 2024-09-11

## 2024-09-11 RX ORDER — ONDANSETRON HYDROCHLORIDE 2 MG/ML
4 INJECTION, SOLUTION INTRAVENOUS EVERY 8 HOURS PRN
Status: DISCONTINUED | OUTPATIENT
Start: 2024-09-11 | End: 2024-09-15 | Stop reason: HOSPADM

## 2024-09-11 RX ORDER — MIDAZOLAM HYDROCHLORIDE 1 MG/ML
INJECTION INTRAMUSCULAR; INTRAVENOUS CONTINUOUS PRN
Status: DISCONTINUED | OUTPATIENT
Start: 2024-09-11 | End: 2024-09-11

## 2024-09-11 RX ORDER — PROPOFOL 10 MG/ML
INJECTION, EMULSION INTRAVENOUS CONTINUOUS PRN
Status: DISCONTINUED | OUTPATIENT
Start: 2024-09-11 | End: 2024-09-11

## 2024-09-11 RX ORDER — DEXTROSE 50 % IN WATER (D50W) INTRAVENOUS SYRINGE
25
Status: DISCONTINUED | OUTPATIENT
Start: 2024-09-11 | End: 2024-09-15 | Stop reason: HOSPADM

## 2024-09-11 RX ORDER — LIDOCAINE HYDROCHLORIDE 40 MG/ML
SOLUTION TOPICAL AS NEEDED
Status: DISCONTINUED | OUTPATIENT
Start: 2024-09-11 | End: 2024-09-11

## 2024-09-11 RX ORDER — HEPARIN SODIUM 5000 [USP'U]/ML
5000 INJECTION, SOLUTION INTRAVENOUS; SUBCUTANEOUS EVERY 8 HOURS SCHEDULED
Status: DISCONTINUED | OUTPATIENT
Start: 2024-09-11 | End: 2024-09-15 | Stop reason: HOSPADM

## 2024-09-11 RX ORDER — TRAMADOL HYDROCHLORIDE 50 MG/1
50 TABLET ORAL ONCE
Status: COMPLETED | OUTPATIENT
Start: 2024-09-11 | End: 2024-09-11

## 2024-09-11 RX ORDER — MORPHINE SULFATE 2 MG/ML
1 INJECTION, SOLUTION INTRAMUSCULAR; INTRAVENOUS EVERY 4 HOURS PRN
Status: DISCONTINUED | OUTPATIENT
Start: 2024-09-11 | End: 2024-09-12

## 2024-09-11 RX ORDER — SENNOSIDES 8.6 MG/1
2 TABLET ORAL 2 TIMES DAILY
Status: DISCONTINUED | OUTPATIENT
Start: 2024-09-11 | End: 2024-09-15 | Stop reason: HOSPADM

## 2024-09-11 RX ORDER — LIDOCAINE HYDROCHLORIDE 20 MG/ML
INJECTION, SOLUTION EPIDURAL; INFILTRATION; INTRACAUDAL; PERINEURAL AS NEEDED
Status: DISCONTINUED | OUTPATIENT
Start: 2024-09-11 | End: 2024-09-11

## 2024-09-11 RX ORDER — LEVOTHYROXINE SODIUM 75 UG/1
75 TABLET ORAL DAILY
Status: DISCONTINUED | OUTPATIENT
Start: 2024-09-11 | End: 2024-09-15 | Stop reason: HOSPADM

## 2024-09-11 RX ORDER — MORPHINE SULFATE 2 MG/ML
2 INJECTION, SOLUTION INTRAMUSCULAR; INTRAVENOUS ONCE
Status: COMPLETED | OUTPATIENT
Start: 2024-09-11 | End: 2024-09-11

## 2024-09-11 RX ORDER — SODIUM CHLORIDE, SODIUM LACTATE, POTASSIUM CHLORIDE, CALCIUM CHLORIDE 600; 310; 30; 20 MG/100ML; MG/100ML; MG/100ML; MG/100ML
50 INJECTION, SOLUTION INTRAVENOUS CONTINUOUS
Status: DISCONTINUED | OUTPATIENT
Start: 2024-09-11 | End: 2024-09-15 | Stop reason: HOSPADM

## 2024-09-11 RX ORDER — METOPROLOL SUCCINATE 50 MG/1
50 TABLET, EXTENDED RELEASE ORAL DAILY
Status: DISCONTINUED | OUTPATIENT
Start: 2024-09-11 | End: 2024-09-15 | Stop reason: HOSPADM

## 2024-09-11 RX ORDER — TALC
3 POWDER (GRAM) TOPICAL NIGHTLY PRN
Status: DISCONTINUED | OUTPATIENT
Start: 2024-09-11 | End: 2024-09-15 | Stop reason: HOSPADM

## 2024-09-11 RX ORDER — DEXTROSE 50 % IN WATER (D50W) INTRAVENOUS SYRINGE
12.5
Status: DISCONTINUED | OUTPATIENT
Start: 2024-09-11 | End: 2024-09-15 | Stop reason: HOSPADM

## 2024-09-11 RX ORDER — ONDANSETRON 4 MG/1
4 TABLET, ORALLY DISINTEGRATING ORAL EVERY 8 HOURS PRN
Status: DISCONTINUED | OUTPATIENT
Start: 2024-09-11 | End: 2024-09-15 | Stop reason: HOSPADM

## 2024-09-11 RX ORDER — INSULIN LISPRO 100 [IU]/ML
0-10 INJECTION, SOLUTION INTRAVENOUS; SUBCUTANEOUS EVERY 4 HOURS
Status: DISCONTINUED | OUTPATIENT
Start: 2024-09-11 | End: 2024-09-14

## 2024-09-11 RX ORDER — MORPHINE SULFATE 2 MG/ML
2 INJECTION, SOLUTION INTRAMUSCULAR; INTRAVENOUS EVERY 4 HOURS PRN
Status: DISCONTINUED | OUTPATIENT
Start: 2024-09-11 | End: 2024-09-12

## 2024-09-11 SDOH — SOCIAL STABILITY: SOCIAL INSECURITY: HAS ANYONE EVER THREATENED TO HURT YOUR FAMILY OR YOUR PETS?: NO

## 2024-09-11 SDOH — SOCIAL STABILITY: SOCIAL INSECURITY: WERE YOU ABLE TO COMPLETE ALL THE BEHAVIORAL HEALTH SCREENINGS?: YES

## 2024-09-11 SDOH — SOCIAL STABILITY: SOCIAL INSECURITY: DO YOU FEEL ANYONE HAS EXPLOITED OR TAKEN ADVANTAGE OF YOU FINANCIALLY OR OF YOUR PERSONAL PROPERTY?: NO

## 2024-09-11 SDOH — SOCIAL STABILITY: SOCIAL INSECURITY: ARE THERE ANY APPARENT SIGNS OF INJURIES/BEHAVIORS THAT COULD BE RELATED TO ABUSE/NEGLECT?: NO

## 2024-09-11 SDOH — SOCIAL STABILITY: SOCIAL INSECURITY: ABUSE: ADULT

## 2024-09-11 SDOH — HEALTH STABILITY: MENTAL HEALTH: CURRENT SMOKER: 0

## 2024-09-11 SDOH — SOCIAL STABILITY: SOCIAL INSECURITY: HAVE YOU HAD THOUGHTS OF HARMING ANYONE ELSE?: NO

## 2024-09-11 SDOH — SOCIAL STABILITY: SOCIAL INSECURITY: DO YOU FEEL UNSAFE GOING BACK TO THE PLACE WHERE YOU ARE LIVING?: NO

## 2024-09-11 SDOH — SOCIAL STABILITY: SOCIAL INSECURITY: DOES ANYONE TRY TO KEEP YOU FROM HAVING/CONTACTING OTHER FRIENDS OR DOING THINGS OUTSIDE YOUR HOME?: NO

## 2024-09-11 SDOH — SOCIAL STABILITY: SOCIAL INSECURITY: ARE YOU OR HAVE YOU BEEN THREATENED OR ABUSED PHYSICALLY, EMOTIONALLY, OR SEXUALLY BY ANYONE?: NO

## 2024-09-11 ASSESSMENT — LIFESTYLE VARIABLES
AUDIT-C TOTAL SCORE: 0
AUDIT-C TOTAL SCORE: 0
HOW OFTEN DO YOU HAVE 6 OR MORE DRINKS ON ONE OCCASION: NEVER
HOW MANY STANDARD DRINKS CONTAINING ALCOHOL DO YOU HAVE ON A TYPICAL DAY: PATIENT DOES NOT DRINK
HOW OFTEN DO YOU HAVE A DRINK CONTAINING ALCOHOL: NEVER
SKIP TO QUESTIONS 9-10: 1

## 2024-09-11 ASSESSMENT — ENCOUNTER SYMPTOMS
ALLERGIC/IMMUNOLOGIC NEGATIVE: 1
CARDIOVASCULAR NEGATIVE: 1
ABDOMINAL DISTENTION: 0
HEMATOLOGIC/LYMPHATIC NEGATIVE: 1
BLOOD IN STOOL: 0
ENDOCRINE NEGATIVE: 1
DIARRHEA: 0
FATIGUE: 1
NEUROLOGICAL NEGATIVE: 1
ABDOMINAL PAIN: 1
PSYCHIATRIC NEGATIVE: 1
APPETITE CHANGE: 1
UNEXPECTED WEIGHT CHANGE: 1
EYES NEGATIVE: 1
NAUSEA: 1
MUSCULOSKELETAL NEGATIVE: 1
VOMITING: 0
RESPIRATORY NEGATIVE: 1
COLOR CHANGE: 1

## 2024-09-11 ASSESSMENT — PAIN SCALES - GENERAL
PAINLEVEL_OUTOF10: 0 - NO PAIN
PAINLEVEL_OUTOF10: 0 - NO PAIN
PAINLEVEL_OUTOF10: 7
PAINLEVEL_OUTOF10: 7
PAINLEVEL_OUTOF10: 0 - NO PAIN
PAINLEVEL_OUTOF10: 7
PAINLEVEL_OUTOF10: 7
PAINLEVEL_OUTOF10: 0 - NO PAIN
PAINLEVEL_OUTOF10: 2
PAINLEVEL_OUTOF10: 0 - NO PAIN

## 2024-09-11 ASSESSMENT — PAIN SCALES - WONG BAKER
WONGBAKER_NUMERICALRESPONSE: HURTS EVEN MORE
WONGBAKER_NUMERICALRESPONSE: HURTS EVEN MORE

## 2024-09-11 ASSESSMENT — COLUMBIA-SUICIDE SEVERITY RATING SCALE - C-SSRS
2. HAVE YOU ACTUALLY HAD ANY THOUGHTS OF KILLING YOURSELF?: NO
6. HAVE YOU EVER DONE ANYTHING, STARTED TO DO ANYTHING, OR PREPARED TO DO ANYTHING TO END YOUR LIFE?: NO
1. IN THE PAST MONTH, HAVE YOU WISHED YOU WERE DEAD OR WISHED YOU COULD GO TO SLEEP AND NOT WAKE UP?: NO

## 2024-09-11 ASSESSMENT — PATIENT HEALTH QUESTIONNAIRE - PHQ9
2. FEELING DOWN, DEPRESSED OR HOPELESS: NOT AT ALL
1. LITTLE INTEREST OR PLEASURE IN DOING THINGS: NOT AT ALL
SUM OF ALL RESPONSES TO PHQ9 QUESTIONS 1 & 2: 0

## 2024-09-11 ASSESSMENT — ACTIVITIES OF DAILY LIVING (ADL)
LACK_OF_TRANSPORTATION: NO
ASSISTIVE_DEVICE: EYEGLASSES
WALKS IN HOME: INDEPENDENT
PATIENT'S MEMORY ADEQUATE TO SAFELY COMPLETE DAILY ACTIVITIES?: YES
FEEDING YOURSELF: INDEPENDENT
HEARING - LEFT EAR: FUNCTIONAL
ADEQUATE_TO_COMPLETE_ADL: YES
DRESSING YOURSELF: INDEPENDENT
BATHING: INDEPENDENT
GROOMING: INDEPENDENT
TOILETING: INDEPENDENT
JUDGMENT_ADEQUATE_SAFELY_COMPLETE_DAILY_ACTIVITIES: YES
LACK_OF_TRANSPORTATION: NO
HEARING - RIGHT EAR: FUNCTIONAL

## 2024-09-11 ASSESSMENT — PAIN - FUNCTIONAL ASSESSMENT
PAIN_FUNCTIONAL_ASSESSMENT: VAS (VISUAL ANALOG SCALE)
PAIN_FUNCTIONAL_ASSESSMENT: 0-10

## 2024-09-11 ASSESSMENT — COGNITIVE AND FUNCTIONAL STATUS - GENERAL
MOBILITY SCORE: 24
PATIENT BASELINE BEDBOUND: NO
DAILY ACTIVITIY SCORE: 24

## 2024-09-11 ASSESSMENT — PAIN DESCRIPTION - LOCATION
LOCATION: ABDOMEN
LOCATION: HEAD
LOCATION: ABDOMEN
LOCATION: ABDOMEN

## 2024-09-11 ASSESSMENT — PAIN DESCRIPTION - ORIENTATION
ORIENTATION: ANTERIOR
ORIENTATION: RIGHT;LEFT;MID;UPPER
ORIENTATION: RIGHT

## 2024-09-11 ASSESSMENT — PAIN SCALES - PAIN ASSESSMENT IN ADVANCED DEMENTIA (PAINAD): TOTALSCORE: MEDICATION (SEE MAR)

## 2024-09-11 NOTE — CONSULTS
Reason For Consult  Obstructive jaundice    Assessment/Plan   Patient with obstructive jaundice with a CA 19-9 greater than 2500.  Just got back from ERCP/EUS.  Pancreatic head mass was described and biopsies were performed.  A biliary stent was placed.  Ms. Colón [place] [facility] [place] [facility]ology unit.  No plans for surgical intervention on gallbladder at this time      History Of Present Illness  Nanci Colón is a 68 y.o. female presenting with 2 to 3-week history of malaise upper abdominal pain decreased appetite.  Recently she noticed her urine been much darker.  She went to [facility] where she was found to be jaundiced with a bilirubin over 10.  Transferred to our facility for further evaluation.  Surgical history of appendectomy and some orthopedic procedures.  Medical history of GERD, hyperlipidemia, diabetes     Past Medical History  She has a past medical history of Personal history of other diseases of the circulatory system (09/15/2017), Personal history of other endocrine, nutritional and metabolic disease (02/24/2016), Personal history of other endocrine, nutritional and metabolic disease (03/09/2017), Personal history of other endocrine, nutritional and metabolic disease, and Type 2 diabetes mellitus with other skin complications (Multi).    Surgical History  She has a past surgical history that includes Other surgical history (09/01/2022); Other surgical history (09/01/2022); and Other surgical history (09/01/2022).     Social History  She reports that she has never smoked. She has never used smokeless tobacco. She reports that she does not drink alcohol and does not use drugs.    Family History  No family history on file.     Allergies  Shellfish containing products    Review of Systems  Constitutional: no weight loss, no fevers, no malaise  HEENT: negative  Neck: negative  Pulmonary: no SOB, no cough  CV: no chest pain,  "otherwise negative  GI: See HPI  : no hematuria, retention.  MS: no aches/pains  Neurologic: negative  Skin: no rashes, lesions  HEME: no bleeding tendency, no bruising  Psych: no mood issues    Physical Exam  General: well appearing, no acute distress, well nourished  HEENT: Scleral icterus present  Neck: supple  Pulmonary: lungs clear to auscultation bilaterally  CV: RR, S1S2, no murmurs.  Pulses palpable and equal.  Good capillary refill  Abdomen: soft, minimally tender epigastrium right upper quadrant.  : grossly normal external genitalia  MS: grossly normal  Neurologic: alert and oriented, strength/sensation intact  Skin: Floridly jaundiced, no lesions  Psych: mood appropriate    Last Recorded Vitals  Blood pressure 140/72, pulse 57, temperature 36.6 °C (97.9 °F), temperature source Temporal, resp. rate 14, height 1.676 m (5' 5.98\"), weight 84.5 kg (186 lb 3.2 oz), SpO2 96%.    Relevant Results  Lab Results   Component Value Date    WBC 7.0 09/11/2024    HGB 11.1 (L) 09/11/2024    HCT 32.9 (L) 09/11/2024    MCV 92 09/11/2024     (L) 09/11/2024         Sodium   Date Value Ref Range Status   09/11/2024 137 136 - 145 mmol/L Final     Lactate   Date Value Ref Range Status   09/09/2024 1.0 0.4 - 2.0 mmol/L Final     Comment:     MILD ICTERUS DETECTED. The result may be falsely decreased due to icterus or other interferents. Clinical correlation is recommended. Repeat testing may be considered.     Chloride   Date Value Ref Range Status   09/11/2024 110 (H) 98 - 107 mmol/L Final     Urea Nitrogen   Date Value Ref Range Status   09/11/2024 48 (H) 6 - 23 mg/dL Final         Lab Results   Component Value Date    K 3.8 09/11/2024    CALCIUM 9.0 09/11/2024      CT abdomen pelvis wo IV contrast 09/09/2024    Narrative  Interpreted By:  Adelfo Álvarez,  STUDY:  CT ABDOMEN PELVIS WO IV CONTRAST;  9/9/2024 8:18 pm    INDICATION:  Signs/Symptoms:generalized abd pain; elevated LFT's, jaundice,  elevated " lipase.    COMPARISON:  None    ACCESSION NUMBER(S):  [id number]    ORDERING CLINICIAN:  MIRIAN SIMMONS    TECHNIQUE:  CT of the abdomen and pelvis was performed. Contiguous axial images  were obtained through the abdomen and pelvis. Coronal and sagittal  reconstructions at 3 mm slice thickness were performed.  No  intravenous contrast was administered.    FINDINGS:  Please note that the evaluation of vessels, lymph nodes and organs is  limited without intravenous contrast.    LOWER CHEST:  There is no acute abnormality in the lower chest. The heart is normal  in size without evidence of pericardial effusion. No pleural effusion  is present.    ABDOMEN:    LIVER:  The liver is normal in size and contour.    BILE DUCTS:  There is intrahepatic and extrahepatic biliary dilatation.    GALLBLADDER:  The gallbladder is distended and there is suspected dependent sludge  in the gallbladder. No gallbladder wall thickening.    PANCREAS:  There is pancreatic ductal dilatation measuring 5 mm and there is  suspected focal fullness of the pancreatic head, for example axial  image 41 and coronal image 57.    SPLEEN:  Normal-size.    ADRENAL GLANDS:  Within normal limits.    KIDNEYS AND URETERS:  The kidneys are normal in size and unremarkable in appearance.  No  hydroureteronephrosis or nephroureterolithiasis is identified.    PELVIS:    BLADDER:  The urinary bladder is incompletely distended, limiting assessment.    REPRODUCTIVE ORGANS:  No pelvic masses.    BOWEL:  The stomach is incompletely distended, limiting evaluation for focal  wall thickening. There is no bowel wall dilatation or obstruction.  The appendix is filled with appendicolith and appears normal.  Scattered colonic diverticulosis without diverticulitis.    VESSELS:  The abdominal aorta is normal in caliber.    PERITONEUM/RETROPERITONEUM/LYMPH NODES:  There is no ascites or intraperitoneal free air. There is no  lymphadenopathy by CT criteria. There is  nonspecific central  mesenteric edema.    ABDOMINAL WALL:  The abdominal wall soft tissues appear normal.    BONES:  No suspicious osseous lesions are identified. Degenerative discogenic  disease is noted in the lower thoracic and lumbar spine. Grade 2  degenerative anterolisthesis of L5 over S1.    Impression  1.  Limited noncontrast CT scan. There is distended gallbladder with  pancreatic and biliary dilatation without CT evidence of biliary  calculi. There is question of fullness of the pancreatic head  concerning for an underlying pancreatic head mass. Further evaluation  with multiphase MR abdomen is recommended.      Signed by: Adelfo Álvarez 9/9/2024 8:48 PM  Dictation workstation:   [id number]          I spent 40 minutes in the professional and overall care of this patient.

## 2024-09-11 NOTE — CARE PLAN
The patient's goals for the shift include sleep    The clinical goals for the shift include medication

## 2024-09-11 NOTE — ANESTHESIA PROCEDURE NOTES
Airway  Date/Time: 9/11/2024 2:00 PM  Urgency: elective    Airway not difficult    Staffing  Performed: VEL   Authorized by: Joaquín Petersen MD    Performed by: VEL King  Patient location during procedure: OR    Indications and Patient Condition  Indications for airway management: anesthesia  Spontaneous ventilation: present  Sedation level: moderate (conscious sedation)  Preoxygenated: yes  Mask difficulty assessment: 4 - unable to mask vent +/- NMBA  Planned trial extubation    Final Airway Details  Final airway type: endotracheal airway      Successful airway: ETT  Cuffed: yes   Successful intubation technique: direct laryngoscopy  Facilitating devices/methods: intubating stylet  Endotracheal tube insertion site: oral  Blade: Monserrat  Blade size: #3  ETT size (mm): 7.0  Cormack-Lehane Classification: grade IIa - partial view of glottis  Placement verified by: capnometry   Measured from: lips  ETT to lips (cm): 21  Number of attempts at approach: 1

## 2024-09-11 NOTE — H&P
History Of Present Illness  Nanci Colón is a 68 y.o. female with a PMH of DM2, hypothyroidism, HLD, insomnia, Chiari I malformation presenting with acute pancreatitis, MARK, elevated LFTs.  Ms Colón has not been feeling well for the past month. Has been fatigued, SOB, dec po intake due to change of taste and nausea with almost all food and drink.   Has had epigastric pain that is burning; awakens her from sleep. The burning pain tends to be worse when she is hungry although it is not always relieved with food.   +nausea. Denies vomiting or diarrhea.   She saw a NP at her PCP's office 8/21 who diagnosed her with gastritis secondary to Metformin. He dec her Metformin dose and prescribed a medication for her stomach, she cannot remember the name of it. Provided minimal relief. At that time she was noted to have no scleral icterus, per pt.   Recently a family member noted that her eyes were yellow.   She has noticed that her urine is more yellow than normal.     No family history of pancreatic cancer.     Work up at [place] [facility] showed normal CBC; Cr sig elevated at 4.69, previous Cr 7/23 was 0.7.   LFTs inc, , .   T bili 10.2, D bili 6.1  Lipase 1089.   CT A/P showed intra and extrahepatic duct dilatation, distended GB, filled with sludge, no wall thickening.   Focal fullness of the pancreatic head.   Ms Colón was transferred to [facility] for ERCP.      Past Medical History  Past Medical History:   Diagnosis Date    Personal history of other diseases of the circulatory system 09/15/2017    History of hypertension    Personal history of other endocrine, nutritional and metabolic disease 02/24/2016    History of type 2 diabetes mellitus    Personal history of other endocrine, nutritional and metabolic disease 03/09/2017    History of hypothyroidism    Personal history of other endocrine, nutritional and metabolic disease     History of hyperlipidemia    Type 2 diabetes mellitus with other skin complications  (Multi)     Diabetic dermopathy       Surgical History  Past Surgical History:   Procedure Laterality Date    OTHER SURGICAL HISTORY  09/01/2022    Foot surgery    OTHER SURGICAL HISTORY  09/01/2022    Appendectomy    OTHER SURGICAL HISTORY  09/01/2022    Knee surgery        Social History  She reports that she has never smoked. She has never used smokeless tobacco. She reports that she does not drink alcohol and does not use drugs.    Family History  No family history on file.     Allergies  Shellfish containing products    Review of Systems   Constitutional:  Positive for appetite change, fatigue and unexpected weight change.   HENT: Negative.     Eyes: Negative.    Respiratory: Negative.     Cardiovascular: Negative.    Gastrointestinal:  Positive for abdominal pain and nausea. Negative for abdominal distention, blood in stool, diarrhea and vomiting.   Genitourinary: Negative.    Musculoskeletal: Negative.    Skin: Negative.    Allergic/Immunologic: Negative.    Neurological: Negative.    Hematological: Negative.    Psychiatric/Behavioral: Negative.          Physical Exam  Vitals reviewed.   Constitutional:       Appearance: Normal appearance.      Comments: Middle aged female, pleasant, no distress. Alert, oriented, able to provide answers to questions.    HENT:      Head: Normocephalic and atraumatic.      Mouth/Throat:      Mouth: Mucous membranes are moist.   Eyes:      General: Scleral icterus present.      Extraocular Movements: Extraocular movements intact.      Conjunctiva/sclera: Conjunctivae normal.      Pupils: Pupils are equal, round, and reactive to light.   Cardiovascular:      Rate and Rhythm: Normal rate and regular rhythm.      Pulses: Normal pulses.      Heart sounds: Normal heart sounds.   Pulmonary:      Effort: Pulmonary effort is normal.      Breath sounds: Normal breath sounds.      Comments: Lungs clear  Abdominal:      General: Abdomen is flat. Bowel sounds are normal.      Palpations:  "Abdomen is soft.      Comments: Abdomen soft, non-distended, no pain on palpation.   Strong's sign negative.    Musculoskeletal:         General: Normal range of motion.   Skin:     General: Skin is warm and dry.      Coloration: Skin is jaundiced.   Neurological:      General: No focal deficit present.      Mental Status: She is alert and oriented to person, place, and time.   Psychiatric:         Mood and Affect: Mood normal.         Behavior: Behavior normal.         Thought Content: Thought content normal.         Judgment: Judgment normal.          Last Recorded Vitals  Blood pressure 150/79, pulse 78, temperature 37 °C (98.6 °F), temperature source Oral, resp. rate 16, height 1.676 m (5' 5.98\"), weight 84.5 kg (186 lb 3.2 oz), SpO2 94%.    Relevant Results        Results for orders placed or performed during the hospital encounter of 09/11/24 (from the past 24 hour(s))   SST TOP   Result Value Ref Range    Extra Tube Hold for add-ons.    CBC and Auto Differential   Result Value Ref Range    WBC 7.0 4.4 - 11.3 x10*3/uL    nRBC 0.0 0.0 - 0.0 /100 WBCs    RBC 3.59 (L) 4.00 - 5.20 x10*6/uL    Hemoglobin 11.1 (L) 12.0 - 16.0 g/dL    Hematocrit 32.9 (L) 36.0 - 46.0 %    MCV 92 80 - 100 fL    MCH 30.9 26.0 - 34.0 pg    MCHC 33.7 32.0 - 36.0 g/dL    RDW 15.5 (H) 11.5 - 14.5 %    Platelets 119 (L) 150 - 450 x10*3/uL    Neutrophils % 65.6 40.0 - 80.0 %    Immature Granulocytes %, Automated 0.3 0.0 - 0.9 %    Lymphocytes % 15.5 13.0 - 44.0 %    Monocytes % 14.2 2.0 - 10.0 %    Eosinophils % 3.7 0.0 - 6.0 %    Basophils % 0.7 0.0 - 2.0 %    Neutrophils Absolute 4.59 1.20 - 7.70 x10*3/uL    Immature Granulocytes Absolute, Automated 0.02 0.00 - 0.70 x10*3/uL    Lymphocytes Absolute 1.08 (L) 1.20 - 4.80 x10*3/uL    Monocytes Absolute 0.99 0.10 - 1.00 x10*3/uL    Eosinophils Absolute 0.26 0.00 - 0.70 x10*3/uL    Basophils Absolute 0.05 0.00 - 0.10 x10*3/uL   Hepatic Function Panel   Result Value Ref Range    Albumin 3.2 (L) " 3.4 - 5.0 g/dL    Bilirubin, Total 10.4 (H) 0.0 - 1.2 mg/dL    Bilirubin, Direct 6.9 (H) 0.0 - 0.3 mg/dL    Alkaline Phosphatase 1,831 (H) 33 - 136 U/L     (H) 7 - 45 U/L     (H) 9 - 39 U/L    Total Protein 6.0 (L) 6.4 - 8.2 g/dL   Basic metabolic panel   Result Value Ref Range    Glucose 148 (H) 74 - 99 mg/dL    Sodium 137 136 - 145 mmol/L    Potassium 3.8 3.5 - 5.3 mmol/L    Chloride 110 (H) 98 - 107 mmol/L    Bicarbonate 14 (L) 21 - 32 mmol/L    Anion Gap 17 10 - 20 mmol/L    Urea Nitrogen 48 (H) 6 - 23 mg/dL    Creatinine 4.35 (H) 0.50 - 1.05 mg/dL    eGFR 11 (L) >60 mL/min/1.73m*2    Calcium 9.0 8.6 - 10.3 mg/dL   Type And Screen   Result Value Ref Range    ABO TYPE A     Rh TYPE POS     ANTIBODY SCREEN NEG    Protime-INR   Result Value Ref Range    Protime 19.0 (H) 9.8 - 12.8 seconds    INR 1.7 (H) 0.9 - 1.1   Magnesium   Result Value Ref Range    Magnesium 1.90 1.60 - 2.40 mg/dL     US gallbladder    Result Date: 9/9/2024  Interpreted By:  Xander Hawkins, STUDY: US GALLBLADDER;  9/9/2024 9:44 pm   INDICATION: Signs/Symptoms:abnormal liver enzymes.     COMPARISON: CT 9/9/2024   ACCESSION NUMBER(S): [id number]   ORDERING CLINICIAN: VALE HOOKS   TECHNIQUE: Multiple sonographic grayscale and color images of the right upper quadrant were performed.   FINDINGS: The examination is limited secondary to shadowing from overlying ribs. The liver demonstrates normal echogenicity. There is intrahepatic and extrahepatic biliary duct dilatation. The common bile duct measures 1.6 cm in diameter proximally. The common bile duct is obscured distally secondary to shadowing from overlying bowel gas. There is gallbladder sludge and gallbladder wall thickening measuring 4 mm in thickness. Per the sonographer, sonographic Strong sign is negative.   There is obscuration of the pancreatic head and tail secondary to shadowing from overlying bowel gas. There is evidence of dilatation of the pancreatic duct  measuring 8 mm in diameter.   The right kidney measures 10.1 cm in length. No right hydronephrosis.       Intrahepatic and extrahepatic biliary duct dilatation with the proximal common bile duct measuring 1.6 cm in diameter. There is also evidence of dilatation of the pancreatic duct measuring 8 mm in diameter. There is obscuration of the distal common bile duct and the pancreatic head secondary to shadowing from bowel gas on the ultrasound examination. As described on the 9/9/2024 CT examination, the findings are concerning for underlying mass, and pancreatic protocol MRI is recommended for further evaluation.   Gallbladder sludge and wall thickening. Per the sonographer, sonographic Strong sign is negative.   MACRO: None   Signed by: Xander Hawkins 9/9/2024 10:18 PM Dictation workstation:   [id number]    CT abdomen pelvis wo IV contrast    Result Date: 9/9/2024  Interpreted By:  Adelfo Álvarez, STUDY: CT ABDOMEN PELVIS WO IV CONTRAST;  9/9/2024 8:18 pm   INDICATION: Signs/Symptoms:generalized abd pain; elevated LFT's, jaundice, elevated lipase.   COMPARISON: None   ACCESSION NUMBER(S): [id number]   ORDERING CLINICIAN: MIRIAN SIMMONS   TECHNIQUE: CT of the abdomen and pelvis was performed. Contiguous axial images were obtained through the abdomen and pelvis. Coronal and sagittal reconstructions at 3 mm slice thickness were performed.  No intravenous contrast was administered.   FINDINGS: Please note that the evaluation of vessels, lymph nodes and organs is limited without intravenous contrast.   LOWER CHEST: There is no acute abnormality in the lower chest. The heart is normal in size without evidence of pericardial effusion. No pleural effusion is present.   ABDOMEN:   LIVER: The liver is normal in size and contour.   BILE DUCTS: There is intrahepatic and extrahepatic biliary dilatation.   GALLBLADDER: The gallbladder is distended and there is suspected dependent sludge in the gallbladder. No gallbladder wall  thickening.   PANCREAS: There is pancreatic ductal dilatation measuring 5 mm and there is suspected focal fullness of the pancreatic head, for example axial image 41 and coronal image 57.   SPLEEN: Normal-size.   ADRENAL GLANDS: Within normal limits.   KIDNEYS AND URETERS: The kidneys are normal in size and unremarkable in appearance.  No hydroureteronephrosis or nephroureterolithiasis is identified.   PELVIS:   BLADDER: The urinary bladder is incompletely distended, limiting assessment.   REPRODUCTIVE ORGANS: No pelvic masses.   BOWEL: The stomach is incompletely distended, limiting evaluation for focal wall thickening. There is no bowel wall dilatation or obstruction. The appendix is filled with appendicolith and appears normal. Scattered colonic diverticulosis without diverticulitis.   VESSELS: The abdominal aorta is normal in caliber.   PERITONEUM/RETROPERITONEUM/LYMPH NODES: There is no ascites or intraperitoneal free air. There is no lymphadenopathy by CT criteria. There is nonspecific central mesenteric edema.   ABDOMINAL WALL: The abdominal wall soft tissues appear normal.   BONES: No suspicious osseous lesions are identified. Degenerative discogenic disease is noted in the lower thoracic and lumbar spine. Grade 2 degenerative anterolisthesis of L5 over S1.       1.  Limited noncontrast CT scan. There is distended gallbladder with pancreatic and biliary dilatation without CT evidence of biliary calculi. There is question of fullness of the pancreatic head concerning for an underlying pancreatic head mass. Further evaluation with multiphase MR abdomen is recommended.     Signed by: Adelfo Álvarez 9/9/2024 8:48 PM Dictation workstation:   [id number]        Assessment/Plan   Assessment & Plan  MARK (acute kidney injury) (CMS-[facility])  - US kidney  - UA  - may be pre-renal due to poor po intake  - Nephrology consult    Acute pancreatitis  - likely the source of her epigastric pain  - NPO  - IVF  - Morphine for pain.      Possible pancreatic head mass  - MR Abdomen   - GI consult for possible EGD with biopsy  - Check CA 19-9    Distended GB with sludge  - Strong's sign negative  - General surgery consult for possible cholecystectomy    Hypothyroidism  - cont synthroid    DM2  - ISS   - check blood glu every 4 hrs    Code status  - FULL           I spent 65 minutes in the professional and overall care of this patient.      Krysta Peres MD

## 2024-09-11 NOTE — PROGRESS NOTES
09/11/24 1206   Discharge Planning   Living Arrangements Spouse/significant other   Support Systems Spouse/significant other;Children;Family members   Assistance Needed independent wtih all care at baseline   Type of Residence Private residence   Who is requesting discharge planning? Provider   Home or Post Acute Services None   Expected Discharge Disposition Home   Does the patient need discharge transport arranged? No   Financial Resource Strain   How hard is it for you to pay for the very basics like food, housing, medical care, and heating? Not hard   Housing Stability   In the last 12 months, was there a time when you were not able to pay the mortgage or rent on time? N   In the past 12 months, how many times have you moved where you were living? 0   At any time in the past 12 months, were you homeless or living in a shelter (including now)? N   Transportation Needs   In the past 12 months, has lack of transportation kept you from medical appointments or from getting medications? no   In the past 12 months, has lack of transportation kept you from meetings, work, or from getting things needed for daily living? No     Met with patient at bedside  Explained role of TCC  Patient admitted acute pancreatitis, jaundice    Patient independent at baseline  Dr Diana Blnut pcp  Ty in [place]   Able to get self to appts, to obtain meds, food, etc  Denies any further dc needs  Spouse or family will transport patient home when discharged    ADOD 1-3 days  BARRIERS labs, MARK, elevated LFTs   DISPO home

## 2024-09-11 NOTE — ANESTHESIA PREPROCEDURE EVALUATION
Patient: Nanci Colón    Procedure Information       Date/Time: 09/11/24 1330    Scheduled providers: Steven Casas DO; Joaquín Petersen MD    Procedures:       ENDOSCOPIC ULTRASOUND (UPPER)      ERCP    Location: [facility]            Relevant Problems   Cardiac   (+) Benign hypertension      Endocrine   (+) Hypothyroid   (+) Type 2 diabetes mellitus without complication, without long-term current use of insulin (Multi)      Musculoskeletal   (+) Lumbar and sacral osteoarthritis      HEENT   (+) Sensorineural hearing loss, asymmetrical       Clinical information reviewed:    Allergies  Meds                Past Medical History:   Diagnosis Date    Personal history of other diseases of the circulatory system 09/15/2017    History of hypertension    Personal history of other endocrine, nutritional and metabolic disease 02/24/2016    History of type 2 diabetes mellitus    Personal history of other endocrine, nutritional and metabolic disease 03/09/2017    History of hypothyroidism    Personal history of other endocrine, nutritional and metabolic disease     History of hyperlipidemia    Type 2 diabetes mellitus with other skin complications (Multi)     Diabetic dermopathy      Past Surgical History:   Procedure Laterality Date    OTHER SURGICAL HISTORY  09/01/2022    Foot surgery    OTHER SURGICAL HISTORY  09/01/2022    Appendectomy    OTHER SURGICAL HISTORY  09/01/2022    Knee surgery     Social History     Tobacco Use    Smoking status: Never    Smokeless tobacco: Never   Vaping Use    Vaping status: Never Used   Substance Use Topics    Alcohol use: Never    Drug use: Never      Current Outpatient Medications   Medication Instructions    atorvastatin (LIPITOR) 40 mg, oral, Daily    blood sugar diagnostic (OneTouch Verio test strips) strip 100 strips, miscellaneous, Daily RT    celecoxib (CeleBREX) 200 mg capsule TAKE ONE CAPSULE BY MOUTH TWICE A DAY AS NEEDED FOR MODERATE PAIN    doxepin (SINEQUAN) 10  "mg, oral, Nightly    lisinopril 5 mg, oral, Daily    metoprolol succinate XL (TOPROL-XL) 50 mg, oral, Daily    ondansetron ODT (ZOFRAN-ODT) 8 mg, oral, Every 8 hours PRN    Synthroid 75 mcg, oral, Daily      Allergies   Allergen Reactions    Shellfish Containing Products Anaphylaxis        Chemistry    Lab Results   Component Value Date/Time     09/11/2024 0209    K 3.8 09/11/2024 0209     (H) 09/11/2024 0209    CO2 14 (L) 09/11/2024 0209    BUN 48 (H) 09/11/2024 0209    CREATININE 4.35 (H) 09/11/2024 0209    Lab Results   Component Value Date/Time    CALCIUM 9.0 09/11/2024 0209    ALKPHOS 1,831 (H) 09/11/2024 0209     (H) 09/11/2024 0209     (H) 09/11/2024 0209    BILITOT 10.4 (H) 09/11/2024 0209          Lab Results   Component Value Date    HGBA1C 8.0 (H) 09/11/2024     Lab Results   Component Value Date/Time    WBC 7.0 09/11/2024 0209    HGB 11.1 (L) 09/11/2024 0209    HCT 32.9 (L) 09/11/2024 0209     (L) 09/11/2024 0209     Lab Results   Component Value Date/Time    PROTIME 19.0 (H) 09/11/2024 0209    INR 1.7 (H) 09/11/2024 0209     No results found for: \"ABORH\"  No results found for this or any previous visit (from the past 4464 hour(s)).  No results found for this or any previous visit from the past 1095 days.       Visit Vitals  /72   Pulse 62   Temp 36.7 °C (98.1 °F) (Temporal)   Resp 14   Ht 1.676 m (5' 5.98\")   Wt 84.5 kg (186 lb 3.2 oz)   SpO2 98%   BMI 30.07 kg/m²   OB Status Postmenopausal   Smoking Status Never   BSA 1.98 m²     NPO/Void Status  Date of Last Liquid: 09/11/24  Time of Last Liquid: 0830  Date of Last Solid: 09/10/24  Time of Last Solid: 1100        Physical Exam    Airway  Mallampati: II  TM distance: >3 FB  Neck ROM: full     Cardiovascular - normal exam     Dental - normal exam     Pulmonary - normal exam     Abdominal - normal exam             Anesthesia Plan    History of general anesthesia?: yes  History of complications of general " anesthesia?: no    ASA 2     general     The patient is not a current smoker.    intravenous induction   Postoperative administration of opioids is intended.  Anesthetic plan and risks discussed with patient.  Use of blood products discussed with patient who.    Plan discussed with CAA.

## 2024-09-11 NOTE — CONSULTS
Reason For Consult  ERCP for ductal dilatation, possible obstruction of CBD, biopsy of pancreatic head mass.    History Of Present Illness  Nanci Colón is a 68 y.o. female with a PMH of DM2, hypothyroidism, HLD, insomnia, Chiari I malformation presenting with acute pancreatitis, MARK, and elevated LFTs.   Patient reports that symptoms started last month.  She was having low energy, weird taste in her mouth, nausea but no vomiting, and pain along with burning in the epigastric area.  She saw a primary care CNP on 8/21/2024 who thought that her symptoms are due to increase in her metformin dose, and she was prescribed pantoprazole.  She went to her PCP in the [place] on 9/9/2024 because her symptoms would not go away even though the burning got better.  Her outpatient labs showed an elevated bilirubin at 10.2 and direct bili at 6.1. Alk phos of 1701,  and AST of 494. BMP showed MARK with a BUN of 56 and creat of 4.69. 10 months ago she had normal kidney functions. CT A/P showed pancreatic and biliary dilation without evidence of biliary calculi. Fullness in the pancreatic head was concerning for an underlying pancreatic head mass. Pt was transferred to [facility] for further workup.  PT denies fever/chills, itching, dysphagia, odynophagia, hematemesis, hematochezia, melena, nausea/vomiting/diarrhea, hematuria, EtOH use, marijuana, tobacco products, street drugs, NSAIDs, sick contact, and family history of UGIC/CRC.  She is not on anticoagulation.   Today labs notable for BUN/creatinine 48/4.35, GFR 11, alk phos 1,831, , , bili total 10.4, bili direct 6.9 INR 1.7, H&H 11.1/32.9.  Lipase on 9/9 1,089.  CT A/P on 9/9/2024 showed intrahepatic and extrahepatic biliary dilatation. The gallbladder is distended and there is suspected dependent sludge in the gallbladder. No gallbladder wall thickening. There is pancreatic ductal dilatation measuring 5 mm and there is suspected focal fullness of the  pancreatic head.   US gallbladder on 9/9/2024 showed intrahepatic and extrahepatic biliary duct dilatation with the proximal common bile duct measuring 1.6 cm in diameter. There is also evidence of dilatation of the pancreatic duct measuring 8 mm in diameter. There is obscuration of the distal common bile duct and the pancreatic head secondary to shadowing from bowel gas on the ultrasound examination.     Gallbladder sludge and wall thickening. Per the sonographer, sonographic Strong sign is negative.  No EGD in the past.   Last colonoscopy on 10/14/2022 done  because of positive Cologuard testing showed diverticulosis in the sigmoid colon and descending colon.  Hemorrhoids found perianal exam. No specimens collected.  Recommended repeat colonoscopy within 5 years for screening purposes.  GI was consulted for ERCP for ductal dilatation, possible obstruction of CBD, biopsy of pancreatic head mass.      Past Medical History  She has a past medical history of Personal history of other diseases of the circulatory system (09/15/2017), Personal history of other endocrine, nutritional and metabolic disease (02/24/2016), Personal history of other endocrine, nutritional and metabolic disease (03/09/2017), Personal history of other endocrine, nutritional and metabolic disease, and Type 2 diabetes mellitus with other skin complications (Multi).    Surgical History  She has a past surgical history that includes Other surgical history (09/01/2022); Other surgical history (09/01/2022); and Other surgical history (09/01/2022).     Social History  She reports that she has never smoked. She has never used smokeless tobacco. She reports that she does not drink alcohol and does not use drugs.    Family History  No family history on file.     Allergies  Shellfish containing products    Review of Systems  Review of Systems   Constitutional:  Positive for fatigue.   HENT: Negative.     Eyes: Negative.    Respiratory: Negative.    "  Cardiovascular: Negative.    Gastrointestinal:  Positive for abdominal pain and nausea.   Endocrine: Negative.    Genitourinary: Negative.    Musculoskeletal: Negative.    Skin:  Positive for color change.   Allergic/Immunologic: Negative.    Neurological: Negative.    Hematological: Negative.    Psychiatric/Behavioral: Negative.           Physical Exam  Physical Exam  Vitals reviewed.   Constitutional:       Appearance: She is obese.   HENT:      Head: Normocephalic and atraumatic.   Cardiovascular:      Rate and Rhythm: Normal rate and regular rhythm.      Pulses: Normal pulses.      Heart sounds: Normal heart sounds.   Pulmonary:      Effort: Pulmonary effort is normal.      Breath sounds: Normal breath sounds.   Abdominal:      General: Bowel sounds are normal. There is no distension.      Palpations: Abdomen is soft. There is no mass.      Tenderness: There is no abdominal tenderness. There is no guarding or rebound.      Hernia: No hernia is present.   Musculoskeletal:         General: Normal range of motion.      Cervical back: Normal range of motion and neck supple.   Skin:     Coloration: Skin is jaundiced.   Neurological:      General: No focal deficit present.      Mental Status: She is alert and oriented to person, place, and time.   Psychiatric:         Mood and Affect: Mood normal.         Behavior: Behavior normal.           Last Recorded Vitals  Blood pressure 150/79, pulse 78, temperature 37 °C (98.6 °F), temperature source Oral, resp. rate 16, height 1.676 m (5' 5.98\"), weight 84.5 kg (186 lb 3.2 oz), SpO2 94%.    Relevant Results      Scheduled medications  [Held by provider] heparin (porcine), 5,000 Units, subcutaneous, q8h LAMONTE  insulin lispro, 0-10 Units, subcutaneous, q4h  levothyroxine, 75 mcg, oral, Daily  metoprolol succinate XL, 50 mg, oral, Daily  sennosides, 2 tablet, oral, BID      Continuous medications  lactated Ringer's, 100 mL/hr, Last Rate: 100 mL/hr (09/11/24 0211)      PRN " medications  PRN medications: dextrose, dextrose, glucagon, glucagon, melatonin, morphine, morphine, ondansetron ODT **OR** ondansetron      Results for orders placed or performed during the hospital encounter of 09/11/24 (from the past 24 hour(s))   SST TOP   Result Value Ref Range    Extra Tube Hold for add-ons.    CBC and Auto Differential   Result Value Ref Range    WBC 7.0 4.4 - 11.3 x10*3/uL    nRBC 0.0 0.0 - 0.0 /100 WBCs    RBC 3.59 (L) 4.00 - 5.20 x10*6/uL    Hemoglobin 11.1 (L) 12.0 - 16.0 g/dL    Hematocrit 32.9 (L) 36.0 - 46.0 %    MCV 92 80 - 100 fL    MCH 30.9 26.0 - 34.0 pg    MCHC 33.7 32.0 - 36.0 g/dL    RDW 15.5 (H) 11.5 - 14.5 %    Platelets 119 (L) 150 - 450 x10*3/uL    Neutrophils % 65.6 40.0 - 80.0 %    Immature Granulocytes %, Automated 0.3 0.0 - 0.9 %    Lymphocytes % 15.5 13.0 - 44.0 %    Monocytes % 14.2 2.0 - 10.0 %    Eosinophils % 3.7 0.0 - 6.0 %    Basophils % 0.7 0.0 - 2.0 %    Neutrophils Absolute 4.59 1.20 - 7.70 x10*3/uL    Immature Granulocytes Absolute, Automated 0.02 0.00 - 0.70 x10*3/uL    Lymphocytes Absolute 1.08 (L) 1.20 - 4.80 x10*3/uL    Monocytes Absolute 0.99 0.10 - 1.00 x10*3/uL    Eosinophils Absolute 0.26 0.00 - 0.70 x10*3/uL    Basophils Absolute 0.05 0.00 - 0.10 x10*3/uL   Hepatic Function Panel   Result Value Ref Range    Albumin 3.2 (L) 3.4 - 5.0 g/dL    Bilirubin, Total 10.4 (H) 0.0 - 1.2 mg/dL    Bilirubin, Direct 6.9 (H) 0.0 - 0.3 mg/dL    Alkaline Phosphatase 1,831 (H) 33 - 136 U/L     (H) 7 - 45 U/L     (H) 9 - 39 U/L    Total Protein 6.0 (L) 6.4 - 8.2 g/dL   Basic metabolic panel   Result Value Ref Range    Glucose 148 (H) 74 - 99 mg/dL    Sodium 137 136 - 145 mmol/L    Potassium 3.8 3.5 - 5.3 mmol/L    Chloride 110 (H) 98 - 107 mmol/L    Bicarbonate 14 (L) 21 - 32 mmol/L    Anion Gap 17 10 - 20 mmol/L    Urea Nitrogen 48 (H) 6 - 23 mg/dL    Creatinine 4.35 (H) 0.50 - 1.05 mg/dL    eGFR 11 (L) >60 mL/min/1.73m*2    Calcium 9.0 8.6 - 10.3 mg/dL    Type And Screen   Result Value Ref Range    ABO TYPE A     Rh TYPE POS     ANTIBODY SCREEN NEG    Protime-INR   Result Value Ref Range    Protime 19.0 (H) 9.8 - 12.8 seconds    INR 1.7 (H) 0.9 - 1.1   Magnesium   Result Value Ref Range    Magnesium 1.90 1.60 - 2.40 mg/dL   POCT GLUCOSE   Result Value Ref Range    POCT Glucose 148 (H) 74 - 99 mg/dL   Hemoglobin A1C   Result Value Ref Range    Hemoglobin A1C 8.0 (H) see below %    Estimated Average Glucose 183 Not Established mg/dL   POCT GLUCOSE   Result Value Ref Range    POCT Glucose 122 (H) 74 - 99 mg/dL    US renal complete    Result Date: 9/11/2024  Interpreted By:  Asher Knutson, STUDY: US RENAL COMPLETE; 9/11/2024 4:51 am   INDICATION: Signs/Symptoms:MARK.   COMPARISON: CT abdomen and pelvis 09/09/2024   ACCESSION NUMBER(S): [id number]   ORDERING CLINICIAN: ASAEL WIGGINS   TECHNIQUE: Sonography of the kidneys and urinary bladder was performed.   FINDINGS: Right Kidney: *Renal length: 11.2 cm *Parenchyma: Normal parenchymal echogenicity. Normal parenchymal thickness. *Collecting system: No hydronephrosis. *Calculus: No echogenic, shadowing calculus. *Lesion: None.   Left Kidney: *Renal length: 10.8 cm *Parenchyma: Normal parenchymal echogenicity. Normal parenchymal thickness. *Collecting system: No hydronephrosis. *Calculus: No echogenic, shadowing calculus. *Lesion: None.   Bladder: Partially distended, limited for evaluation.       No hydronephrosis.   MACRO: None   Signed by: Asher Knutson 9/11/2024 8:06 AM Dictation workstation:   [id number]    US gallbladder    Result Date: 9/9/2024  Interpreted By:  Xander Hawkins, STUDY: US GALLBLADDER;  9/9/2024 9:44 pm   INDICATION: Signs/Symptoms:abnormal liver enzymes.     COMPARISON: CT 9/9/2024   ACCESSION NUMBER(S): [id number]   ORDERING CLINICIAN: VAEL HOOKS   TECHNIQUE: Multiple sonographic grayscale and color images of the right upper quadrant were performed.   FINDINGS: The examination is  limited secondary to shadowing from overlying ribs. The liver demonstrates normal echogenicity. There is intrahepatic and extrahepatic biliary duct dilatation. The common bile duct measures 1.6 cm in diameter proximally. The common bile duct is obscured distally secondary to shadowing from overlying bowel gas. There is gallbladder sludge and gallbladder wall thickening measuring 4 mm in thickness. Per the sonographer, sonographic Strong sign is negative.   There is obscuration of the pancreatic head and tail secondary to shadowing from overlying bowel gas. There is evidence of dilatation of the pancreatic duct measuring 8 mm in diameter.   The right kidney measures 10.1 cm in length. No right hydronephrosis.       Intrahepatic and extrahepatic biliary duct dilatation with the proximal common bile duct measuring 1.6 cm in diameter. There is also evidence of dilatation of the pancreatic duct measuring 8 mm in diameter. There is obscuration of the distal common bile duct and the pancreatic head secondary to shadowing from bowel gas on the ultrasound examination. As described on the 9/9/2024 CT examination, the findings are concerning for underlying mass, and pancreatic protocol MRI is recommended for further evaluation.   Gallbladder sludge and wall thickening. Per the sonographer, sonographic Strong sign is negative.   MACRO: None   Signed by: Xander Hawkins 9/9/2024 10:18 PM Dictation workstation:   [id number]    CT abdomen pelvis wo IV contrast    Result Date: 9/9/2024  Interpreted By:  Adelfo Álvarez, STUDY: CT ABDOMEN PELVIS WO IV CONTRAST;  9/9/2024 8:18 pm   INDICATION: Signs/Symptoms:generalized abd pain; elevated LFT's, jaundice, elevated lipase.   COMPARISON: None   ACCESSION NUMBER(S): [id number]   ORDERING CLINICIAN: MIRIAN SIMMONS   TECHNIQUE: CT of the abdomen and pelvis was performed. Contiguous axial images were obtained through the abdomen and pelvis. Coronal and sagittal reconstructions at 3 mm  slice thickness were performed.  No intravenous contrast was administered.   FINDINGS: Please note that the evaluation of vessels, lymph nodes and organs is limited without intravenous contrast.   LOWER CHEST: There is no acute abnormality in the lower chest. The heart is normal in size without evidence of pericardial effusion. No pleural effusion is present.   ABDOMEN:   LIVER: The liver is normal in size and contour.   BILE DUCTS: There is intrahepatic and extrahepatic biliary dilatation.   GALLBLADDER: The gallbladder is distended and there is suspected dependent sludge in the gallbladder. No gallbladder wall thickening.   PANCREAS: There is pancreatic ductal dilatation measuring 5 mm and there is suspected focal fullness of the pancreatic head, for example axial image 41 and coronal image 57.   SPLEEN: Normal-size.   ADRENAL GLANDS: Within normal limits.   KIDNEYS AND URETERS: The kidneys are normal in size and unremarkable in appearance.  No hydroureteronephrosis or nephroureterolithiasis is identified.   PELVIS:   BLADDER: The urinary bladder is incompletely distended, limiting assessment.   REPRODUCTIVE ORGANS: No pelvic masses.   BOWEL: The stomach is incompletely distended, limiting evaluation for focal wall thickening. There is no bowel wall dilatation or obstruction. The appendix is filled with appendicolith and appears normal. Scattered colonic diverticulosis without diverticulitis.   VESSELS: The abdominal aorta is normal in caliber.   PERITONEUM/RETROPERITONEUM/LYMPH NODES: There is no ascites or intraperitoneal free air. There is no lymphadenopathy by CT criteria. There is nonspecific central mesenteric edema.   ABDOMINAL WALL: The abdominal wall soft tissues appear normal.   BONES: No suspicious osseous lesions are identified. Degenerative discogenic disease is noted in the lower thoracic and lumbar spine. Grade 2 degenerative anterolisthesis of L5 over S1.       1.  Limited noncontrast CT scan.  There is distended gallbladder with pancreatic and biliary dilatation without CT evidence of biliary calculi. There is question of fullness of the pancreatic head concerning for an underlying pancreatic head mass. Further evaluation with multiphase MR abdomen is recommended.     Signed by: Adelfo Álvarez 9/9/2024 8:48 PM Dictation workstation:   [id number]      Assessment/Plan   Nanci Colón is a 68 y.o. female with a PMH of DM2, hypothyroidism, HLD, insomnia, Chiari I malformation presenting with acute pancreatitis, MARK, and elevated LFTs.   GI was consulted for ERCP for ductal dilatation, possible obstruction of CBD, biopsy of pancreatic head mass.  Patient with increased LFTs, lipase, jaundice, abdominal pain, MARK, focal fullness of pancreatic head and double duct sign on imaging.  Etiologies include  acute pancreatitis, choledocholithiasis, neoplasm.  Patient was discussed with advanced GI who agreed to EUS with FNA/ERCP.    -Keep patient n.p.o.  -Hold subcutaneous heparin for procedure today  -EUS with FNA/ERCP today  -Supportive care as per primary team  -Recommend consulting nephrology for MARK  -Recommendations pending EUS with FNA/ERCP  -GI will follow    Case discussed with Dr. Moncada and Dr. Casas    I spent 45 minutes in the professional and overall care of this patient.      WENDIE Otoole-CNP

## 2024-09-11 NOTE — ASSESSMENT & PLAN NOTE
- US kidney  - UA  - may be pre-renal due to poor po intake  - Nephrology consult    Acute pancreatitis  - likely the source of her epigastric pain  - NPO  - IVF  - Morphine for pain.     Possible pancreatic head mass  - MR Abdomen   - GI consult for possible EGD with biopsy  - Check CA 19-9    Distended GB with sludge  - Strong's sign negative  - General surgery consult for possible cholecystectomy    Hypothyroidism  - cont synthroid    DM2  - ISS   - check blood glu every 4 hrs    Code status  - FULL

## 2024-09-11 NOTE — CARE PLAN
The patient's goals for the shift include sleep    The clinical goals for the shift include maintain patient safety throughout the shift. Keep patient informed    Problem: Pain - Adult  Goal: Verbalizes/displays adequate comfort level or baseline comfort level  Outcome: Progressing     Problem: Safety - Adult  Goal: Free from fall injury  Outcome: Progressing     Problem: Discharge Planning  Goal: Discharge to home or other facility with appropriate resources  Outcome: Progressing     Problem: Chronic Conditions and Co-morbidities  Goal: Patient's chronic conditions and co-morbidity symptoms are monitored and maintained or improved  Outcome: Progressing     Problem: Pain  Goal: Takes deep breaths with improved pain control throughout the shift  Outcome: Progressing  Goal: Turns in bed with improved pain control throughout the shift  Outcome: Progressing  Goal: Walks with improved pain control throughout the shift  Outcome: Progressing  Goal: Performs ADL's with improved pain control throughout shift  Outcome: Progressing  Goal: Participates in PT with improved pain control throughout the shift  Outcome: Progressing  Goal: Free from opioid side effects throughout the shift  Outcome: Progressing  Goal: Free from acute confusion related to pain meds throughout the shift  Outcome: Progressing     Problem: Skin  Goal: Decreased wound size/increased tissue granulation at next dressing change  Outcome: Progressing  Goal: Participates in plan/prevention/treatment measures  Outcome: Progressing  Goal: Prevent/manage excess moisture  Outcome: Progressing  Goal: Prevent/minimize sheer/friction injuries  Outcome: Progressing  Goal: Promote/optimize nutrition  Outcome: Progressing  Goal: Promote skin healing  Outcome: Progressing

## 2024-09-11 NOTE — ANESTHESIA POSTPROCEDURE EVALUATION
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    Anesthesia Post Evaluation    Patient location during evaluation: PACU  Patient participation: complete - patient participated  Level of consciousness: awake  Pain management: adequate  Airway patency: patent  Cardiovascular status: acceptable  Respiratory status: acceptable  Hydration status: acceptable  Postoperative Nausea and Vomiting: none        No notable events documented.

## 2024-09-12 ENCOUNTER — APPOINTMENT (OUTPATIENT)
Dept: RADIOLOGY | Facility: HOSPITAL | Age: 69
DRG: 438 | End: 2024-09-12
Payer: MEDICARE

## 2024-09-12 LAB
ALBUMIN SERPL BCP-MCNC: 3.2 G/DL (ref 3.4–5)
ALP SERPL-CCNC: 1763 U/L (ref 33–136)
ALT SERPL W P-5'-P-CCNC: 308 U/L (ref 7–45)
ANION GAP SERPL CALC-SCNC: 18 MMOL/L (ref 10–20)
AST SERPL W P-5'-P-CCNC: 519 U/L (ref 9–39)
BILIRUB SERPL-MCNC: 6.2 MG/DL (ref 0–1.2)
BUN SERPL-MCNC: 45 MG/DL (ref 6–23)
CALCIUM SERPL-MCNC: 9.1 MG/DL (ref 8.6–10.3)
CHLORIDE SERPL-SCNC: 107 MMOL/L (ref 98–107)
CO2 SERPL-SCNC: 15 MMOL/L (ref 21–32)
CREAT SERPL-MCNC: 3.86 MG/DL (ref 0.5–1.05)
CREAT UR-MCNC: 29 MG/DL (ref 20–320)
EGFRCR SERPLBLD CKD-EPI 2021: 12 ML/MIN/1.73M*2
ERYTHROCYTE [DISTWIDTH] IN BLOOD BY AUTOMATED COUNT: 15.9 % (ref 11.5–14.5)
GLUCOSE BLD MANUAL STRIP-MCNC: 140 MG/DL (ref 74–99)
GLUCOSE BLD MANUAL STRIP-MCNC: 150 MG/DL (ref 74–99)
GLUCOSE BLD MANUAL STRIP-MCNC: 161 MG/DL (ref 74–99)
GLUCOSE BLD MANUAL STRIP-MCNC: 204 MG/DL (ref 74–99)
GLUCOSE SERPL-MCNC: 179 MG/DL (ref 74–99)
HCT VFR BLD AUTO: 33.3 % (ref 36–46)
HGB BLD-MCNC: 11.1 G/DL (ref 12–16)
LIPASE SERPL-CCNC: 891 U/L (ref 9–82)
MCH RBC QN AUTO: 30.9 PG (ref 26–34)
MCHC RBC AUTO-ENTMCNC: 33.3 G/DL (ref 32–36)
MCV RBC AUTO: 93 FL (ref 80–100)
NRBC BLD-RTO: 0 /100 WBCS (ref 0–0)
PLATELET # BLD AUTO: 119 X10*3/UL (ref 150–450)
POTASSIUM SERPL-SCNC: 4.4 MMOL/L (ref 3.5–5.3)
PROT SERPL-MCNC: 6.1 G/DL (ref 6.4–8.2)
PROT UR-ACNC: 49 MG/DL (ref 5–24)
PROT/CREAT UR: 1.69 MG/MG CREAT (ref 0–0.17)
RBC # BLD AUTO: 3.59 X10*6/UL (ref 4–5.2)
SODIUM SERPL-SCNC: 136 MMOL/L (ref 136–145)
WBC # BLD AUTO: 5.4 X10*3/UL (ref 4.4–11.3)

## 2024-09-12 PROCEDURE — 2500000001 HC RX 250 WO HCPCS SELF ADMINISTERED DRUGS (ALT 637 FOR MEDICARE OP): Performed by: HOSPITALIST

## 2024-09-12 PROCEDURE — 2500000004 HC RX 250 GENERAL PHARMACY W/ HCPCS (ALT 636 FOR OP/ED)

## 2024-09-12 PROCEDURE — 2500000004 HC RX 250 GENERAL PHARMACY W/ HCPCS (ALT 636 FOR OP/ED): Performed by: INTERNAL MEDICINE

## 2024-09-12 PROCEDURE — 85027 COMPLETE CBC AUTOMATED: CPT | Performed by: INTERNAL MEDICINE

## 2024-09-12 PROCEDURE — 83690 ASSAY OF LIPASE: CPT

## 2024-09-12 PROCEDURE — 99232 SBSQ HOSP IP/OBS MODERATE 35: CPT | Performed by: INTERNAL MEDICINE

## 2024-09-12 PROCEDURE — 82570 ASSAY OF URINE CREATININE: CPT | Performed by: INTERNAL MEDICINE

## 2024-09-12 PROCEDURE — 80053 COMPREHEN METABOLIC PANEL: CPT | Performed by: INTERNAL MEDICINE

## 2024-09-12 PROCEDURE — 1100000001 HC PRIVATE ROOM DAILY

## 2024-09-12 PROCEDURE — 2500000004 HC RX 250 GENERAL PHARMACY W/ HCPCS (ALT 636 FOR OP/ED): Performed by: HOSPITALIST

## 2024-09-12 PROCEDURE — 2500000001 HC RX 250 WO HCPCS SELF ADMINISTERED DRUGS (ALT 637 FOR MEDICARE OP): Performed by: INTERNAL MEDICINE

## 2024-09-12 PROCEDURE — 36415 COLL VENOUS BLD VENIPUNCTURE: CPT | Performed by: INTERNAL MEDICINE

## 2024-09-12 PROCEDURE — 99233 SBSQ HOSP IP/OBS HIGH 50: CPT | Performed by: INTERNAL MEDICINE

## 2024-09-12 PROCEDURE — 82947 ASSAY GLUCOSE BLOOD QUANT: CPT

## 2024-09-12 RX ORDER — PROCHLORPERAZINE 25 MG/1
25 SUPPOSITORY RECTAL EVERY 12 HOURS PRN
Status: DISCONTINUED | OUTPATIENT
Start: 2024-09-12 | End: 2024-09-15 | Stop reason: HOSPADM

## 2024-09-12 RX ORDER — PROCHLORPERAZINE MALEATE 10 MG
10 TABLET ORAL EVERY 6 HOURS PRN
Status: DISCONTINUED | OUTPATIENT
Start: 2024-09-12 | End: 2024-09-15 | Stop reason: HOSPADM

## 2024-09-12 RX ORDER — PROCHLORPERAZINE EDISYLATE 5 MG/ML
10 INJECTION INTRAMUSCULAR; INTRAVENOUS EVERY 6 HOURS PRN
Status: DISCONTINUED | OUTPATIENT
Start: 2024-09-12 | End: 2024-09-15 | Stop reason: HOSPADM

## 2024-09-12 RX ORDER — HYDROMORPHONE HYDROCHLORIDE 0.2 MG/ML
0.1 INJECTION INTRAMUSCULAR; INTRAVENOUS; SUBCUTANEOUS EVERY 4 HOURS PRN
Status: DISCONTINUED | OUTPATIENT
Start: 2024-09-12 | End: 2024-09-15 | Stop reason: HOSPADM

## 2024-09-12 RX ORDER — HYDROMORPHONE HYDROCHLORIDE 0.2 MG/ML
0.2 INJECTION INTRAMUSCULAR; INTRAVENOUS; SUBCUTANEOUS EVERY 4 HOURS PRN
Status: DISCONTINUED | OUTPATIENT
Start: 2024-09-12 | End: 2024-09-13

## 2024-09-12 RX ORDER — DOXEPIN HYDROCHLORIDE 10 MG/1
10 CAPSULE ORAL NIGHTLY
Status: DISCONTINUED | OUTPATIENT
Start: 2024-09-12 | End: 2024-09-15 | Stop reason: HOSPADM

## 2024-09-12 ASSESSMENT — PAIN DESCRIPTION - ORIENTATION: ORIENTATION: RIGHT;LEFT;UPPER

## 2024-09-12 ASSESSMENT — COGNITIVE AND FUNCTIONAL STATUS - GENERAL
DAILY ACTIVITIY SCORE: 24
MOBILITY SCORE: 24

## 2024-09-12 ASSESSMENT — PAIN SCALES - GENERAL
PAINLEVEL_OUTOF10: 3
PAINLEVEL_OUTOF10: 0 - NO PAIN
PAINLEVEL_OUTOF10: 8
PAINLEVEL_OUTOF10: 0 - NO PAIN
PAINLEVEL_OUTOF10: 3
PAINLEVEL_OUTOF10: 7
PAINLEVEL_OUTOF10: 7
PAINLEVEL_OUTOF10: 6
PAINLEVEL_OUTOF10: 0 - NO PAIN
PAINLEVEL_OUTOF10: 2

## 2024-09-12 ASSESSMENT — PAIN - FUNCTIONAL ASSESSMENT
PAIN_FUNCTIONAL_ASSESSMENT: 0-10

## 2024-09-12 ASSESSMENT — PAIN DESCRIPTION - DESCRIPTORS: DESCRIPTORS: SHARP

## 2024-09-12 ASSESSMENT — PAIN DESCRIPTION - LOCATION: LOCATION: ABDOMEN

## 2024-09-12 NOTE — PROGRESS NOTES
Nanci Colón is a 68 y.o. female on day 1 of admission presenting with MARK (acute kidney injury) (CMS-[facility]).      Subjective   Pt seen and examined.        Objective     Last Recorded Vitals  /65 (BP Location: Left arm, Patient Position: Lying)   Pulse 62   Temp 36.8 °C (98.3 °F) (Oral)   Resp 18   Wt 84.5 kg (186 lb 3.2 oz)   SpO2 96%   Intake/Output last 3 Shifts:    Intake/Output Summary (Last 24 hours) at 9/12/2024 1138  Last data filed at 9/12/2024 0700  Gross per 24 hour   Intake 710 ml   Output 200 ml   Net 510 ml       Admission Weight  Weight: 84.5 kg (186 lb 3.2 oz) (09/11/24 0135)    Daily Weight  09/11/24 : 84.5 kg (186 lb 3.2 oz)      Physical Exam  Constitutional: No acute distress, awake, alert  Head/Neck: Neck supple, scleral icterus  Respiratory/Thorax: Lungs are Clear to auscultation  Cardiovascular: Regular, rate and rhythm,  2+ equal pulses of the extremities, normal S 1and S 2  Gastrointestinal: Nondistended, soft, non-tender, no rebound tenderness or guarding  Extremities: No edema. No calf tenderness.  Neurological: Awake and alert. No focal neurological deficits  Skin: jaundiced  Psychological: Appropriate mood and behavior    Relevant Results  Results for orders placed or performed during the hospital encounter of 09/11/24 (from the past 24 hour(s))   POCT GLUCOSE   Result Value Ref Range    POCT Glucose 119 (H) 74 - 99 mg/dL   POCT GLUCOSE   Result Value Ref Range    POCT Glucose 146 (H) 74 - 99 mg/dL   POCT GLUCOSE   Result Value Ref Range    POCT Glucose 237 (H) 74 - 99 mg/dL   CBC   Result Value Ref Range    WBC 5.4 4.4 - 11.3 x10*3/uL    nRBC 0.0 0.0 - 0.0 /100 WBCs    RBC 3.59 (L) 4.00 - 5.20 x10*6/uL    Hemoglobin 11.1 (L) 12.0 - 16.0 g/dL    Hematocrit 33.3 (L) 36.0 - 46.0 %    MCV 93 80 - 100 fL    MCH 30.9 26.0 - 34.0 pg    MCHC 33.3 32.0 - 36.0 g/dL    RDW 15.9 (H) 11.5 - 14.5 %    Platelets 119 (L) 150 - 450 x10*3/uL   Comprehensive Metabolic Panel   Result Value Ref  Range    Glucose 179 (H) 74 - 99 mg/dL    Sodium 136 136 - 145 mmol/L    Potassium 4.4 3.5 - 5.3 mmol/L    Chloride 107 98 - 107 mmol/L    Bicarbonate 15 (L) 21 - 32 mmol/L    Anion Gap 18 10 - 20 mmol/L    Urea Nitrogen 45 (H) 6 - 23 mg/dL    Creatinine 3.86 (H) 0.50 - 1.05 mg/dL    eGFR 12 (L) >60 mL/min/1.73m*2    Calcium 9.1 8.6 - 10.3 mg/dL    Albumin 3.2 (L) 3.4 - 5.0 g/dL    Alkaline Phosphatase 1,763 (H) 33 - 136 U/L    Total Protein 6.1 (L) 6.4 - 8.2 g/dL     (H) 9 - 39 U/L    Bilirubin, Total 6.2 (H) 0.0 - 1.2 mg/dL     (H) 7 - 45 U/L        FL GI ERCP   Final Result      US renal complete   Final Result   No hydronephrosis.        MACRO:   None        Signed by: Asher Knutson 9/11/2024 8:06 AM   Dictation workstation:   [id number]      MR abdomen wo IV contrast    (Results Pending)       Scheduled medications  doxepin, 10 mg, oral, Nightly  [Held by provider] heparin (porcine), 5,000 Units, subcutaneous, q8h LAMONTE  insulin lispro, 0-10 Units, subcutaneous, q4h  levothyroxine, 75 mcg, oral, Daily  metoprolol succinate XL, 50 mg, oral, Daily  sennosides, 2 tablet, oral, BID      Continuous medications  lactated Ringer's, 100 mL/hr, Last Rate: 100 mL/hr (09/12/24 0927)      PRN medications  PRN medications: dextrose, dextrose, glucagon, glucagon, HYDROmorphone, HYDROmorphone, melatonin, ondansetron ODT **OR** ondansetron         Assessment/Plan                  Principal Problem:    MARK (acute kidney injury) (CMS-HCC)    MARK  - US kidney noted  - UA  - may be pre-renal due to poor po intake  - Nephrology consult     Acute pancreatitis  - likely the source of her epigastric pain  - NPO  - IVF  - Morphine for pain.      Possible pancreatic head mass  Obstructive jaundice  - MR Abdomen pending  - CA 19-9  -greater than 2500  -ERCP: pancreatic head mass  -await further recs     Distended GB with sludge  - Strong's sign negative  - General surgery consult for possible cholecystectomy      Hypothyroidism  - cont synthroid     DM2  - ISS   - check blood glu every 4 hrs     Code status  - FULL              Jayden Goldberg MD

## 2024-09-12 NOTE — PROGRESS NOTES
Nanci Colón is a 68 y.o. female on day 1 of admission presenting with MARK (acute kidney injury) (CMS-HCC).    Plan: Continues treatment for obs jaundice, biopsies performed. Per Surgery, no intervention, but will need follow up with Surgical Oncology at [facility] for further plans. MARK downtrending slowly.  Disposition: Home with spouse  Barrier: kidney function  ADOD:  1-2 days     09/12/24 0822   Discharge Planning   Expected Discharge Disposition Home       Diana Eng RN

## 2024-09-12 NOTE — PROGRESS NOTES
"Nanci Colón is a 68 y.o. female on day 1 of admission presenting with MARK (acute kidney injury) (CMS-HCC).    Subjective   No acute events overnight.  Patient reports that she has abdominal pain sometimes sharp in the epigastric area which get worse while up.  Had yesterday dry heaving after extubation.  Reports nausea especially after drinking liquids.  Discussed with patient results of EUS/FNA/ERCP.       Objective     Physical Exam  Constitutional:       Appearance: Normal appearance.   HENT:      Head: Normocephalic and atraumatic.   Eyes:      Comments: Yellow    Cardiovascular:      Rate and Rhythm: Normal rate and regular rhythm.      Pulses: Normal pulses.      Heart sounds: Normal heart sounds.   Pulmonary:      Effort: Pulmonary effort is normal.      Breath sounds: Normal breath sounds.   Abdominal:      General: Bowel sounds are normal. There is no distension.      Palpations: There is no mass.      Tenderness: There is abdominal tenderness. There is no guarding or rebound.      Hernia: No hernia is present.   Musculoskeletal:         General: Normal range of motion.      Cervical back: Normal range of motion and neck supple.   Skin:     General: Skin is warm and dry.      Coloration: Skin is jaundiced.   Neurological:      Mental Status: She is alert.         Last Recorded Vitals  Blood pressure 135/65, pulse 62, temperature 36.8 °C (98.3 °F), temperature source Oral, resp. rate 18, height 1.676 m (5' 5.98\"), weight 84.5 kg (186 lb 3.2 oz), SpO2 96%.  Intake/Output last 3 Shifts:  I/O last 3 completed shifts:  In: 1851 (21.9 mL/kg) [P.O.:200; I.V.:1651 (19.5 mL/kg)]  Out: 200 (2.4 mL/kg) [Urine:200 (0.1 mL/kg/hr)]  Weight: 84.5 kg     Relevant Results      Scheduled medications  doxepin, 10 mg, oral, Nightly  [Held by provider] heparin (porcine), 5,000 Units, subcutaneous, q8h LAMONTE  insulin lispro, 0-10 Units, subcutaneous, q4h  levothyroxine, 75 mcg, oral, Daily  metoprolol succinate XL, 50 mg, " oral, Daily  sennosides, 2 tablet, oral, BID      Continuous medications  lactated Ringer's, 100 mL/hr, Last Rate: 100 mL/hr (09/12/24 0927)      PRN medications  PRN medications: dextrose, dextrose, glucagon, glucagon, HYDROmorphone, HYDROmorphone, melatonin, ondansetron ODT **OR** ondansetron     Results for orders placed or performed during the hospital encounter of 09/11/24 (from the past 24 hour(s))   POCT GLUCOSE   Result Value Ref Range    POCT Glucose 119 (H) 74 - 99 mg/dL   POCT GLUCOSE   Result Value Ref Range    POCT Glucose 146 (H) 74 - 99 mg/dL   POCT GLUCOSE   Result Value Ref Range    POCT Glucose 237 (H) 74 - 99 mg/dL   CBC   Result Value Ref Range    WBC 5.4 4.4 - 11.3 x10*3/uL    nRBC 0.0 0.0 - 0.0 /100 WBCs    RBC 3.59 (L) 4.00 - 5.20 x10*6/uL    Hemoglobin 11.1 (L) 12.0 - 16.0 g/dL    Hematocrit 33.3 (L) 36.0 - 46.0 %    MCV 93 80 - 100 fL    MCH 30.9 26.0 - 34.0 pg    MCHC 33.3 32.0 - 36.0 g/dL    RDW 15.9 (H) 11.5 - 14.5 %    Platelets 119 (L) 150 - 450 x10*3/uL   Comprehensive Metabolic Panel   Result Value Ref Range    Glucose 179 (H) 74 - 99 mg/dL    Sodium 136 136 - 145 mmol/L    Potassium 4.4 3.5 - 5.3 mmol/L    Chloride 107 98 - 107 mmol/L    Bicarbonate 15 (L) 21 - 32 mmol/L    Anion Gap 18 10 - 20 mmol/L    Urea Nitrogen 45 (H) 6 - 23 mg/dL    Creatinine 3.86 (H) 0.50 - 1.05 mg/dL    eGFR 12 (L) >60 mL/min/1.73m*2    Calcium 9.1 8.6 - 10.3 mg/dL    Albumin 3.2 (L) 3.4 - 5.0 g/dL    Alkaline Phosphatase 1,763 (H) 33 - 136 U/L    Total Protein 6.1 (L) 6.4 - 8.2 g/dL     (H) 9 - 39 U/L    Bilirubin, Total 6.2 (H) 0.0 - 1.2 mg/dL     (H) 7 - 45 U/L   POCT GLUCOSE   Result Value Ref Range    POCT Glucose 161 (H) 74 - 99 mg/dL   POCT GLUCOSE   Result Value Ref Range    POCT Glucose 204 (H) 74 - 99 mg/dL        FL GI ERCP    Result Date: 9/11/2024  These images are not reportable by radiology and will not be interpreted by  Radiologists.    Endoscopic Retrograde  Cholangiopancreatography (ERCP)    Result Date: 9/11/2024  Table formatting from the original result was not included. Impression The common bile duct was deeply cannulated after 8 attempts by employing a needle knife access technique. Cannulation was difficult due to papillary stenosis. Contrast injection was performed.  Irregular and severe stricture was visualized in the common bile duct. Complete biliary sphincterotomy was performed and one 10 mm x 6 cm uncovered stent was placed in the common bile duct.  Findings The major papilla was native. The major papilla had a normal appearance. The common bile duct was deeply cannulated after 8 attempts using a traction sphincterotome and needle knife with guidewire by employing a needle knife access technique. Cannulation was difficult due to papillary stenosis. No bleeding was observed. Contrast injection was performed in the distal common bile duct and mid common bile duct. The injection extended beyond the stricture. Ductal flow was adequate. The study's image quality was excellent. Irregular and severe stricture was visualized in the common bile duct. The stricture was traversable by wire. Complete biliary sphincterotomy was performed using a sphincterotome. One 10 mm x 6 cm uncovered metal stent was placed successfully in the common bile duct. Recommendation Referral to Medical and Surgical Oncology for comprehensive treatment plan.   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 Preprocedure A history and physical has been performed, and patient medication allergies have been reviewed. The patient's tolerance of previous anesthesia has been reviewed. The risks and benefits of the procedure and the sedation options and risks were discussed with the patient. All questions were answered and informed consent obtained. Rectal Indomethacin was  not administered. Details of the Procedure The patient was already under sedation prior to the procedure. The patient's blood pressure, heart rate, level of consciousness, oxygen, respirations, ECG and ETCO2 were monitored throughout the procedure. The scope was introduced through the mouth and advanced to the second part of the duodenum. Clinical intention was achieved. The patient's estimated blood loss was minimal (<5 mL). The procedure was not difficult. The patient tolerated the procedure well. There were no apparent adverse events. Events Procedure Events Event Event Time ENDO SCOPE IN TIME 9/11/2024  2:03 PM ENDO SCOPE OUT TIME 9/11/2024  2:20 PM ENDO SCOPE IN TIME 9/11/2024  2:28 PM ENDO SCOPE OUT TIME 9/11/2024  2:40 PM Specimens ID Type Source Tests Collected by Time 1 :  Non-Gynecologic Cytology PANCREAS FINE NEEDLE ASPIRATION HEAD CYTOLOGY CONSULTATION (NON-GYNECOLOGIC) Supriya Puckett RN 9/11/2024 1409 2 : pancreas head mass FNB Tissue PANCREAS BIOPSY SURGICAL PATHOLOGY EXAM Supriya Puckett RN 9/11/2024 1411 Procedure Location [facility] A [address] [facility] [id number] [phone] Referring Provider WENDIE Otoole-CNP Procedure Provider Steven Casas,      Endoscopic Ultrasound (Upper) w FNA    Result Date: 9/11/2024  Table formatting from the original result was not included. Impression Irregular mass measuring 43 mm x 34 mm with poorly defined margins was visualized in the pancreas; fine needle biopsy passes were taken. A sample was sent for histology analysis. Onsite cytology results were preliminarily atypical The mass appears to be borderline resectable with abutment of the confluence.  No adenopathy was visualized.  Both the bile duct and pancreatic ducts are obstructed. Findings Irregular mass measuring 43 mm x 34 mm with poorly defined margins was visualized in the pancreas; fine needle biopsy passes were taken with a 22 gauge needle using a  transduodenal approach guided by Doppler. A sample was sent for histology analysis. Onsite cytology results were preliminarily atypical The mass appears to be borderline resectable with abutment of the confluence.  No adenopathy was visualized.  Both the bile duct and pancreatic ducts are obstructed. Recommendation Await pathology results Proceed with ERCP for biliary stent placement.   Indication Pancreatic mass ([facility]-HCC) Staff Staff Role Steven Casas DO Proceduralist Medications See Anesthesia Record. Preprocedure A history and physical has been performed, and patient medication allergies have been reviewed. The patient's tolerance of previous anesthesia has been reviewed. The risks and benefits of the procedure and the sedation options and risks were discussed with the patient. All questions were answered and informed consent obtained. Details of the Procedure The patient underwent general anesthesia, which was administered by an anesthesia professional. The patient's blood pressure, heart rate, level of consciousness, oxygen, respirations, ECG and ETCO2 were monitored throughout the procedure. The linear scope was introduced through the mouth and advanced to the second part of the duodenum. The procedure was not difficult. The patient tolerated the procedure well. There were no apparent adverse events. Events Procedure Events Event Event Time ENDO SCOPE IN TIME 9/11/2024  2:03 PM ENDO SCOPE OUT TIME 9/11/2024  2:20 PM Specimens ID Type Source Tests Collected by Time 1 :  Non-Gynecologic Cytology PANCREAS FINE NEEDLE ASPIRATION HEAD CYTOLOGY CONSULTATION (NON-GYNECOLOGIC) Supriya Puckett RN 9/11/2024 1409 2 : pancreas head mass FNB Tissue PANCREAS BIOPSY SURGICAL PATHOLOGY EXAM Supriya Puckett RN 9/11/2024 1411 Procedure Location [facility] A [address] [facility] [id number] [phone] Referring Provider Tomas Jaramillo APRN-CNP Procedure Provider Steven Casas DO      US renal complete    Result Date: 9/11/2024  Interpreted By:  Asher Knutson, STUDY: US RENAL COMPLETE; 9/11/2024 4:51 am   INDICATION: Signs/Symptoms:MARK.   COMPARISON: CT abdomen and pelvis 09/09/2024   ACCESSION NUMBER(S): [id number]   ORDERING CLINICIAN: ASAEL WIGGINS   TECHNIQUE: Sonography of the kidneys and urinary bladder was performed.   FINDINGS: Right Kidney: *Renal length: 11.2 cm *Parenchyma: Normal parenchymal echogenicity. Normal parenchymal thickness. *Collecting system: No hydronephrosis. *Calculus: No echogenic, shadowing calculus. *Lesion: None.   Left Kidney: *Renal length: 10.8 cm *Parenchyma: Normal parenchymal echogenicity. Normal parenchymal thickness. *Collecting system: No hydronephrosis. *Calculus: No echogenic, shadowing calculus. *Lesion: None.   Bladder: Partially distended, limited for evaluation.       No hydronephrosis.   MACRO: None   Signed by: Asher Knutson 9/11/2024 8:06 AM Dictation workstation:   [id number]    US gallbladder    Result Date: 9/9/2024  Interpreted By:  Xander Hawkins, STUDY: US GALLBLADDER;  9/9/2024 9:44 pm   INDICATION: Signs/Symptoms:abnormal liver enzymes.     COMPARISON: CT 9/9/2024   ACCESSION NUMBER(S): [id number]   ORDERING CLINICIAN: VALE HOOKS   TECHNIQUE: Multiple sonographic grayscale and color images of the right upper quadrant were performed.   FINDINGS: The examination is limited secondary to shadowing from overlying ribs. The liver demonstrates normal echogenicity. There is intrahepatic and extrahepatic biliary duct dilatation. The common bile duct measures 1.6 cm in diameter proximally. The common bile duct is obscured distally secondary to shadowing from overlying bowel gas. There is gallbladder sludge and gallbladder wall thickening measuring 4 mm in thickness. Per the sonographer, sonographic Strong sign is negative.   There is obscuration of the pancreatic head and tail secondary to shadowing from overlying bowel gas. There  is evidence of dilatation of the pancreatic duct measuring 8 mm in diameter.   The right kidney measures 10.1 cm in length. No right hydronephrosis.       Intrahepatic and extrahepatic biliary duct dilatation with the proximal common bile duct measuring 1.6 cm in diameter. There is also evidence of dilatation of the pancreatic duct measuring 8 mm in diameter. There is obscuration of the distal common bile duct and the pancreatic head secondary to shadowing from bowel gas on the ultrasound examination. As described on the 9/9/2024 CT examination, the findings are concerning for underlying mass, and pancreatic protocol MRI is recommended for further evaluation.   Gallbladder sludge and wall thickening. Per the sonographer, sonographic Strong sign is negative.   MACRO: None   Signed by: Xander Hawkins 9/9/2024 10:18 PM Dictation workstation:   [id number]    CT abdomen pelvis wo IV contrast    Result Date: 9/9/2024  Interpreted By:  Adelfo Álvarez, STUDY: CT ABDOMEN PELVIS WO IV CONTRAST;  9/9/2024 8:18 pm   INDICATION: Signs/Symptoms:generalized abd pain; elevated LFT's, jaundice, elevated lipase.   COMPARISON: None   ACCESSION NUMBER(S): [id number]   ORDERING CLINICIAN: MIRIAN SIMMONS   TECHNIQUE: CT of the abdomen and pelvis was performed. Contiguous axial images were obtained through the abdomen and pelvis. Coronal and sagittal reconstructions at 3 mm slice thickness were performed.  No intravenous contrast was administered.   FINDINGS: Please note that the evaluation of vessels, lymph nodes and organs is limited without intravenous contrast.   LOWER CHEST: There is no acute abnormality in the lower chest. The heart is normal in size without evidence of pericardial effusion. No pleural effusion is present.   ABDOMEN:   LIVER: The liver is normal in size and contour.   BILE DUCTS: There is intrahepatic and extrahepatic biliary dilatation.   GALLBLADDER: The gallbladder is distended and there is suspected  dependent sludge in the gallbladder. No gallbladder wall thickening.   PANCREAS: There is pancreatic ductal dilatation measuring 5 mm and there is suspected focal fullness of the pancreatic head, for example axial image 41 and coronal image 57.   SPLEEN: Normal-size.   ADRENAL GLANDS: Within normal limits.   KIDNEYS AND URETERS: The kidneys are normal in size and unremarkable in appearance.  No hydroureteronephrosis or nephroureterolithiasis is identified.   PELVIS:   BLADDER: The urinary bladder is incompletely distended, limiting assessment.   REPRODUCTIVE ORGANS: No pelvic masses.   BOWEL: The stomach is incompletely distended, limiting evaluation for focal wall thickening. There is no bowel wall dilatation or obstruction. The appendix is filled with appendicolith and appears normal. Scattered colonic diverticulosis without diverticulitis.   VESSELS: The abdominal aorta is normal in caliber.   PERITONEUM/RETROPERITONEUM/LYMPH NODES: There is no ascites or intraperitoneal free air. There is no lymphadenopathy by CT criteria. There is nonspecific central mesenteric edema.   ABDOMINAL WALL: The abdominal wall soft tissues appear normal.   BONES: No suspicious osseous lesions are identified. Degenerative discogenic disease is noted in the lower thoracic and lumbar spine. Grade 2 degenerative anterolisthesis of L5 over S1.       1.  Limited noncontrast CT scan. There is distended gallbladder with pancreatic and biliary dilatation without CT evidence of biliary calculi. There is question of fullness of the pancreatic head concerning for an underlying pancreatic head mass. Further evaluation with multiphase MR abdomen is recommended.     Signed by: Adelfo Álvarez 9/9/2024 8:48 PM Dictation workstation:   [id number]    * Cannot find OR log *  Last relevant procedure:                          Assessment/Plan   Assessment & Plan  MARK (acute kidney injury) (CMS-[facility])    Nanci Colón is a 68 y.o. female with a PMH of DM2,  hypothyroidism, HLD, insomnia, Chiari I malformation presenting with acute pancreatitis, MARK, and elevated LFTs.   S/p EUS/FNA and ERCP yesterday with Dr. Casas which showed irregular mass measuring 43 mm x 34 mm with poorly defined margins was visualized in the pancreas; fine needle biopsy passes were taken. A sample was sent for histology analysis. Onsite cytology results were preliminarily atypical. Dr. Casas recommended referral to Medical and Surgical Oncology for comprehensive treatment plan.  Patient complains of intermittent sharp abdominal pain.  Suspect post ERCP pancreatitis.  LFTs trending down except ALT.     -Full liquid diet  -Dr. Td Mandujano (surgical oncology) notified about EUS/FNA findings   -Supportive care as per primary team  -Recommend increase IV LR to 125 ml/hr  -Continue to monitor LFTs daily  -Will recheck lipase  -Vitamin K 10 mg IV once and recheck INR in am  -Recommend CT A/P tomorrow if patient's clinical picture worsens   -GI will follow         Will discuss with Dr. Moncada and WENDIE De Guzman-CNP

## 2024-09-12 NOTE — PROGRESS NOTES
Pharmacy Medication History Review   Spoke to the patient, only change, Pt  stopped her Diabetic meds for now until it is figured out what is going on with her    Nanciterrence Colón is a 68 y.o. female admitted for MARK (acute kidney injury) (CMS-HCC). Pharmacy reviewed the patient's [id number] medications and allergies for accuracy.    The list below reflectives the updated PTA list. Please review each medication in order reconciliation for additional clarification and justification.     Prior to Admission Medications   Prescriptions Last Dose Informant   atorvastatin (Lipitor) 40 mg tablet 9/10/2024    Sig: TAKE ONE TABLET BY MOUTH EVERY DAY   blood sugar diagnostic (OneTouch Verio test strips) strip     Si strips once daily.   celecoxib (CeleBREX) 200 mg capsule 9/10/2024    Sig: TAKE ONE CAPSULE BY MOUTH TWICE A DAY AS NEEDED FOR MODERATE PAIN   doxepin (SINEquan) 10 mg capsule Past Week    Sig: Take 1 capsule (10 mg) by mouth once daily at bedtime.   levothyroxine (Synthroid) 75 mcg tablet 9/10/2024    Sig: TAKE ONE TABLET BY MOUTH EVERY DAY   lisinopril 5 mg tablet Past Week    Sig: TAKE ONE TABLET BY MOUTH EVERY DAY   metoprolol succinate XL (Toprol-XL) 50 mg 24 hr tablet Past Week    Sig: TAKE ONE TABLET BY MOUTH EVERY DAY   ondansetron ODT (Zofran-ODT) 8 mg disintegrating tablet Past Week    Sig: Take 1 tablet (8 mg) by mouth every 8 hours if needed for nausea or vomiting for up to 7 days.      Facility-Administered Medications: None       The list below reflectives the updated allergy list. Please review each documented allergy for additional clarification and justification.  Allergies  Reviewed by Joanne Youngblood RN on 2024        Severity Reactions Comments    Shellfish Containing Products High Anaphylaxis             Below are additional concerns with the patient's PTA list.      Cyndie Fernandes

## 2024-09-12 NOTE — CONSULTS
Inpatient consult to Nephrology  Consult performed by: Herb Gomez DO  Consult ordered by: Krysta Peres MD      Reason For Consult  Acute tubular necrosis    History Of Present Illness  Nanci Colón is a 68 y.o. female with a past medical history of non-insulin-dependent diabetes, hypothyroidism, dyslipidemia, hypertension who presented to the [facility] ED for evaluation of abdominal discomfort and scleral icterus at the request of her PCP.  She reported a 1 month history of abdominal discomfort, nausea and poor by mouth intake for liquids and solids.  She notes weakness and weight loss.  She continued her outpatient medications which included Celebrex and 5 mg of lisinopril.    In the ED, labs were notable for an acute kidney injury having a creatinine of 4.21 mg/deciliter from a previous normal baseline in October 2023.  There was transaminitis, and elevated alkaline phosphatase, T. bili of 10.6, lipase of 1477 with a normal lactate.  CT imaging without contrast showed findings concerning for pancreatic head mass.  He was transferred to [facility] for GI evaluation.  She is status post EUS/FNA with findings of an irregular mass in the pancreas with fine-needle biopsy obtained.  Bile and pancreatic ducts were obstructed.  Underwent ERCP with biliary sphincterotomy and common bile duct stent placement.  Nephrology is consulted for acute kidney injury.     Past Medical History:   Diagnosis Date    Personal history of other diseases of the circulatory system 09/15/2017    History of hypertension    Personal history of other endocrine, nutritional and metabolic disease 02/24/2016    History of type 2 diabetes mellitus    Personal history of other endocrine, nutritional and metabolic disease 03/09/2017    History of hypothyroidism    Personal history of other endocrine, nutritional and metabolic disease     History of hyperlipidemia    Type 2 diabetes mellitus with other skin complications (Multi)     Diabetic  dermopathy       Past Surgical History:   Procedure Laterality Date    OTHER SURGICAL HISTORY  09/01/2022    Foot surgery    OTHER SURGICAL HISTORY  09/01/2022    Appendectomy    OTHER SURGICAL HISTORY  09/01/2022    Knee surgery       Social History     Tobacco Use    Smoking status: Never    Smokeless tobacco: Never   Vaping Use    Vaping status: Never Used   Substance Use Topics    Alcohol use: Never    Drug use: Never        Family History  No family history on file.     Allergies  Shellfish containing products    Review of Systems   10 point review of systems obtained and negative unless stated in HPI    Physical Exam   Constitutional:       Appearance: Awake, alert and oriented x 3.  In no acute distress.  HENT:      Head: Normocephalic and atraumatic.      Mouth: Mucous membranes are moist.   Eyes:      General: Scleral icterus present.      Pupils: Pupils are equal, round, and reactive to light.   Cardiovascular:      Rate and Rhythm: Normal rate and regular rhythm.      Pulses: Normal pulses.      Heart sounds: Normal heart sounds.   Pulmonary:      Effort: Pulmonary effort is normal.      Breath sounds: Normal breath sounds.   Abdominal:      General: Bowel sounds are normal.      Palpations: Abdomen is soft.  Nontender, no rebound or guarding.  Musculoskeletal:         General: Normal range of motion.  No synovitis.  Skin:     General: Skin is warm and dry.  Positive jaundice.   Neurological:      General: No focal deficit present.      Mental Status: She is alert and oriented to person, place, and time.   Psychiatric:         Mood and Affect: Mood normal.         Behavior: Behavior normal.           I&O 24HR    Intake/Output Summary (Last 24 hours) at 9/12/2024 1506  Last data filed at 9/12/2024 1258  Gross per 24 hour   Intake 540 ml   Output 200 ml   Net 340 ml       Vitals 24HR  Heart Rate:  [51-68]   Temp:  [36.1 °C (97 °F)-37.1 °C (98.8 °F)]   Resp:  [13-18]   BP: (135-156)/(65-76)   SpO2:  [96 %-98  %]     Scheduled Medications  doxepin, 10 mg, oral, Nightly  [Held by provider] heparin (porcine), 5,000 Units, subcutaneous, q8h LAMONTE  insulin lispro, 0-10 Units, subcutaneous, q4h  levothyroxine, 75 mcg, oral, Daily  metoprolol succinate XL, 50 mg, oral, Daily  sennosides, 2 tablet, oral, BID      Continuous medications  lactated Ringer's, 125 mL/hr, Last Rate: 125 mL/hr (09/12/24 1451)        PRN medications: dextrose, dextrose, glucagon, glucagon, HYDROmorphone, HYDROmorphone, melatonin, ondansetron ODT **OR** ondansetron     Relevant Results  Results from last 7 days   Lab Units 09/12/24  0548 09/11/24  0209 09/09/24  1826 09/09/24  1145   WBC AUTO x10*3/uL 5.4 7.0 6.6 6.5   HEMOGLOBIN g/dL 11.1* 11.1* 12.6 12.9   HEMATOCRIT % 33.3* 32.9* 37.3 39.5   PLATELETS AUTO x10*3/uL 119* 119* 125* 135*   NEUTROS PCT AUTO %  --  65.6 66.8 72.3   LYMPHS PCT AUTO %  --  15.5 17.1 12.2   MONOS PCT AUTO %  --  14.2 12.7 11.0   EOS PCT AUTO %  --  3.7 2.6 3.4      Results from last 7 days   Lab Units 09/12/24  0548 09/11/24  0209 09/09/24  1826   SODIUM mmol/L 136 137 135*   POTASSIUM mmol/L 4.4 3.8 4.3   CHLORIDE mmol/L 107 110* 107   CO2 mmol/L 15* 14* 16*   BUN mg/dL 45* 48* 56*   CREATININE mg/dL 3.86* 4.35* 4.69*   CALCIUM mg/dL 9.1 9.0 9.2   PROTEIN TOTAL g/dL 6.1* 6.0* 7.2   BILIRUBIN TOTAL mg/dL 6.2* 10.4* 10.2*   ALK PHOS U/L 1,763* 1,831* 1,701*   ALT U/L 308* 290* 270*   AST U/L 519* 594* 494*   GLUCOSE mg/dL 179* 148* 200*      FL GI ERCP   Final Result      US renal complete   Final Result   No hydronephrosis.        MACRO:   None        Signed by: Asher Knuston 9/11/2024 8:06 AM   Dictation workstation:   [id number]      MR abdomen wo IV contrast    (Results Pending)         Assessment/Plan   Nanci Colón is a 68 y.o. female with a past medical history of non-insulin-dependent diabetes, hypothyroidism, dyslipidemia, hypertension who presented to the [facility] ED for evaluation of abdominal discomfort and  scleral icterus at the request of her PCP.  She reported a 1 month history of abdominal discomfort, nausea and poor by mouth intake for liquids and solids.  She notes weakness and weight loss.  She continued her outpatient medications which included Celebrex and 5 mg of lisinopril.    In the ED, labs were notable for an acute kidney injury having a creatinine of 4.21 mg/deciliter from a previous normal baseline in October 2023.  There was transaminitis, and elevated alkaline phosphatase, T. bili of 10.6, lipase of 1477 with a normal lactate.  CT imaging without contrast showed findings concerning for pancreatic head mass. She was transferred to [facility] for GI evaluation.  She is status post EUS/FNA with findings of an irregular mass in the pancreas with fine-needle biopsy obtained.  Bile and pancreatic ducts were obstructed.  Underwent ERCP with biliary sphincterotomy and common bile duct stent placement.  CA 19-9 was 2,960. Nephrology is consulted for acute kidney injury.    Acute kidney injury is due to acute tubular necrosis from prolonged poor by mouth intake with concomitant use of lisinopril and Celebrex. There has been marginal renal function improvement with IV volume expansion.  Fortunately she is nonoliguric.  Her ACE inhibitor and anti-inflammatory are held. She is hemodynamically stable. Renal ultrasound was unremarkable. I will check a urinalysis and obtain urine indices. MRI of the abdomen is now ordered without contrast. Pathology is pending. Recommend avoiding further nephrotoxins. I will leave her on the LR at 100 mg/Hr overnight. Trend her RFP. Will follow.     Principal Problem:    MARK (acute kidney injury) (CMS-[facility])      I spent 60 minutes in the professional and overall care of this patient.      Herb Gomez, DO

## 2024-09-13 LAB
ALBUMIN SERPL BCP-MCNC: 3.2 G/DL (ref 3.4–5)
ALP SERPL-CCNC: 1439 U/L (ref 33–136)
ALT SERPL W P-5'-P-CCNC: 230 U/L (ref 7–45)
ANION GAP SERPL CALC-SCNC: 15 MMOL/L (ref 10–20)
AST SERPL W P-5'-P-CCNC: 308 U/L (ref 9–39)
BILIRUB SERPL-MCNC: 4.1 MG/DL (ref 0–1.2)
BUN SERPL-MCNC: 40 MG/DL (ref 6–23)
CALCIUM SERPL-MCNC: 9.2 MG/DL (ref 8.6–10.3)
CHLORIDE SERPL-SCNC: 108 MMOL/L (ref 98–107)
CO2 SERPL-SCNC: 20 MMOL/L (ref 21–32)
CREAT SERPL-MCNC: 3.48 MG/DL (ref 0.5–1.05)
EGFRCR SERPLBLD CKD-EPI 2021: 14 ML/MIN/1.73M*2
ERYTHROCYTE [DISTWIDTH] IN BLOOD BY AUTOMATED COUNT: 15.4 % (ref 11.5–14.5)
GLUCOSE BLD MANUAL STRIP-MCNC: 101 MG/DL (ref 74–99)
GLUCOSE BLD MANUAL STRIP-MCNC: 124 MG/DL (ref 74–99)
GLUCOSE BLD MANUAL STRIP-MCNC: 133 MG/DL (ref 74–99)
GLUCOSE BLD MANUAL STRIP-MCNC: 143 MG/DL (ref 74–99)
GLUCOSE BLD MANUAL STRIP-MCNC: 149 MG/DL (ref 74–99)
GLUCOSE BLD MANUAL STRIP-MCNC: 197 MG/DL (ref 74–99)
GLUCOSE BLD MANUAL STRIP-MCNC: 98 MG/DL (ref 74–99)
GLUCOSE SERPL-MCNC: 106 MG/DL (ref 74–99)
HCT VFR BLD AUTO: 34 % (ref 36–46)
HGB BLD-MCNC: 11.3 G/DL (ref 12–16)
INR PPP: 1.3 (ref 0.9–1.1)
MCH RBC QN AUTO: 31 PG (ref 26–34)
MCHC RBC AUTO-ENTMCNC: 33.2 G/DL (ref 32–36)
MCV RBC AUTO: 93 FL (ref 80–100)
NRBC BLD-RTO: 0 /100 WBCS (ref 0–0)
PLATELET # BLD AUTO: 125 X10*3/UL (ref 150–450)
POTASSIUM SERPL-SCNC: 3.6 MMOL/L (ref 3.5–5.3)
PROT SERPL-MCNC: 6.6 G/DL (ref 6.4–8.2)
PROTHROMBIN TIME: 14.7 SECONDS (ref 9.8–12.8)
RBC # BLD AUTO: 3.65 X10*6/UL (ref 4–5.2)
SODIUM SERPL-SCNC: 139 MMOL/L (ref 136–145)
WBC # BLD AUTO: 7.1 X10*3/UL (ref 4.4–11.3)

## 2024-09-13 PROCEDURE — 2500000004 HC RX 250 GENERAL PHARMACY W/ HCPCS (ALT 636 FOR OP/ED): Performed by: INTERNAL MEDICINE

## 2024-09-13 PROCEDURE — 2500000001 HC RX 250 WO HCPCS SELF ADMINISTERED DRUGS (ALT 637 FOR MEDICARE OP): Performed by: INTERNAL MEDICINE

## 2024-09-13 PROCEDURE — 36415 COLL VENOUS BLD VENIPUNCTURE: CPT | Performed by: INTERNAL MEDICINE

## 2024-09-13 PROCEDURE — 2500000001 HC RX 250 WO HCPCS SELF ADMINISTERED DRUGS (ALT 637 FOR MEDICARE OP): Performed by: HOSPITALIST

## 2024-09-13 PROCEDURE — 1100000001 HC PRIVATE ROOM DAILY

## 2024-09-13 PROCEDURE — 80053 COMPREHEN METABOLIC PANEL: CPT | Performed by: INTERNAL MEDICINE

## 2024-09-13 PROCEDURE — 2500000002 HC RX 250 W HCPCS SELF ADMINISTERED DRUGS (ALT 637 FOR MEDICARE OP, ALT 636 FOR OP/ED): Performed by: HOSPITALIST

## 2024-09-13 PROCEDURE — 99233 SBSQ HOSP IP/OBS HIGH 50: CPT | Performed by: INTERNAL MEDICINE

## 2024-09-13 PROCEDURE — 99232 SBSQ HOSP IP/OBS MODERATE 35: CPT | Performed by: INTERNAL MEDICINE

## 2024-09-13 PROCEDURE — 82947 ASSAY GLUCOSE BLOOD QUANT: CPT

## 2024-09-13 PROCEDURE — 85610 PROTHROMBIN TIME: CPT

## 2024-09-13 PROCEDURE — 85027 COMPLETE CBC AUTOMATED: CPT | Performed by: INTERNAL MEDICINE

## 2024-09-13 RX ORDER — OXYCODONE HYDROCHLORIDE 5 MG/1
5 TABLET ORAL EVERY 6 HOURS PRN
Status: DISCONTINUED | OUTPATIENT
Start: 2024-09-13 | End: 2024-09-15 | Stop reason: HOSPADM

## 2024-09-13 ASSESSMENT — COGNITIVE AND FUNCTIONAL STATUS - GENERAL
MOBILITY SCORE: 24
DAILY ACTIVITIY SCORE: 24
MOBILITY SCORE: 24
DAILY ACTIVITIY SCORE: 24

## 2024-09-13 ASSESSMENT — PAIN SCALES - GENERAL
PAINLEVEL_OUTOF10: 0 - NO PAIN
PAINLEVEL_OUTOF10: 4
PAINLEVEL_OUTOF10: 0 - NO PAIN
PAINLEVEL_OUTOF10: 7
PAINLEVEL_OUTOF10: 6

## 2024-09-13 ASSESSMENT — PAIN - FUNCTIONAL ASSESSMENT
PAIN_FUNCTIONAL_ASSESSMENT: 0-10

## 2024-09-13 ASSESSMENT — PAIN DESCRIPTION - LOCATION: LOCATION: ABDOMEN

## 2024-09-13 ASSESSMENT — PAIN DESCRIPTION - ORIENTATION: ORIENTATION: RIGHT;ANTERIOR;MID

## 2024-09-13 NOTE — PROGRESS NOTES
"Nanci Colón is a 68 y.o. female who was referred to the Clinical Pharmacy Team to complete a virtual Transitions of Care encounter for discharge medication optimization. The patient was referred for their T2DM.    Attending: Dr. Jayden Goldberg    PCP: Dr. Diana Blunt    _______________________________________________________________________  PHARMACY ASSESSMENT    Home Pharmacy: [facility]  Meds to beds? yes  _______________________________________________________________________  DIABETES ASSESSMENT    CURRENT PHARMACOTHERAPY  - In a bit of limo - many medications are on hold      Lab Results   Component Value Date    HGBA1C 8.0 (H) 09/11/2024       Lab Results   Component Value Date    CHOL 130 09/09/2024    CHOL 193 04/22/2024    CHOL 189 10/23/2023     Lab Results   Component Value Date    HDL 9.1 09/09/2024    HDL 70.5 04/22/2024    HDL 59.9 10/23/2023     Lab Results   Component Value Date    LDLCALC 59 09/09/2024    LDLCALC 102 (H) 04/22/2024    LDLCALC 104 (H) 10/23/2023     Lab Results   Component Value Date    TRIG 312 (H) 09/09/2024    TRIG 102 04/22/2024    TRIG 126 10/23/2023     No components found for: \"CHOLHDL\"    ___________________________________________________________________  PATIENT EDUCATION/GOALS  - Introduced post-discharge pharmacy team and how they might be able to help patient with glucose control as current health state is navigated with pancreatic mass under investigation  _______________________________________________________________________  RECOMMENDATIONS/PLAN  Continue all medications per medical team  Please send prescriptions to [facility] pharmacy for assistance on insurance prior authorization and copay. Prescriptions will be delivered to the patient's bedside prior to discharge with the Meds to Beds program.   Continuity of care will be provided by PCP and clinical pharmacy team      Lisa Casanova, Martin    Verbal consent to manage patient's drug therapy was " obtained from the patient. They were informed they may decline to participate or withdraw from participation in pharmacy services at any time.

## 2024-09-13 NOTE — PROGRESS NOTES
09/13/24 0933   Discharge Planning   Home or Post Acute Services None   Expected Discharge Disposition Home     PLAN/BARRIER: waiting on MRI and GI clearance  DISP: Home with no homecare needs at this time  ADOD: tomorrow  Randa Vale RN

## 2024-09-13 NOTE — PROGRESS NOTES
"Nanci Colón is a 68 y.o. female on day 2 of admission presenting with MARK (acute kidney injury) (CMS-HCC).    Subjective   Patient states she feels much better today. Improved appetite, decreased abdominal pain and no nausea and vomiting over nite. Patient had ultrasound this am but results in process.      Objective     Physical Exam    Last Recorded Vitals  Blood pressure 139/85, pulse 62, temperature 36.6 °C (97.8 °F), resp. rate 16, height 1.676 m (5' 5.98\"), weight 84.5 kg (186 lb 3.2 oz), SpO2 94%.  Intake/Output last 3 Shifts:  I/O last 3 completed shifts:  In: 1879 (22.2 mL/kg) [P.O.:480; I.V.:1349 (16 mL/kg); IV Piggyback:50]  Out: 1450 (17.2 mL/kg) [Urine:1450 (0.5 mL/kg/hr)]  Weight: 84.5 kg     Relevant Results      FL GI ERCP    Result Date: 9/11/2024  These images are not reportable by radiology and will not be interpreted by  Radiologists.    Endoscopic Retrograde Cholangiopancreatography (ERCP)    Result Date: 9/11/2024  Table formatting from the original result was not included. Impression The common bile duct was deeply cannulated after 8 attempts by employing a needle knife access technique. Cannulation was difficult due to papillary stenosis. Contrast injection was performed.  Irregular and severe stricture was visualized in the common bile duct. Complete biliary sphincterotomy was performed and one 10 mm x 6 cm uncovered stent was placed in the common bile duct.  Findings The major papilla was native. The major papilla had a normal appearance. The common bile duct was deeply cannulated after 8 attempts using a traction sphincterotome and needle knife with guidewire by employing a needle knife access technique. Cannulation was difficult due to papillary stenosis. No bleeding was observed. Contrast injection was performed in the distal common bile duct and mid common bile duct. The injection extended beyond the stricture. Ductal flow was adequate. The study's image quality was excellent. " Irregular and severe stricture was visualized in the common bile duct. The stricture was traversable by wire. Complete biliary sphincterotomy was performed using a sphincterotome. One 10 mm x 6 cm uncovered metal stent was placed successfully in the common bile duct. Recommendation Referral to Medical and Surgical Oncology for comprehensive treatment plan.   Indication Pancreatic mass (HHS-HCC), Biliary obstruction due to malignant neoplasm (Multi) Staff Staff Role Steven Casas,  Proceduralist Medications See Anesthesia Record. Preprocedure A history and physical has been performed, and patient medication allergies have been reviewed. The patient's tolerance of previous anesthesia has been reviewed. The risks and benefits of the procedure and the sedation options and risks were discussed with the patient. All questions were answered and informed consent obtained. Rectal Indomethacin was not administered. Details of the Procedure The patient was already under sedation prior to the procedure. The patient's blood pressure, heart rate, level of consciousness, oxygen, respirations, ECG and ETCO2 were monitored throughout the procedure. The scope was introduced through the mouth and advanced to the second part of the duodenum. Clinical intention was achieved. The patient's estimated blood loss was minimal (<5 mL). The procedure was not difficult. The patient tolerated the procedure well. There were no apparent adverse events. Events Procedure Events Event Event Time ENDO SCOPE IN TIME 9/11/2024  2:03 PM ENDO SCOPE OUT TIME 9/11/2024  2:20 PM ENDO SCOPE IN TIME 9/11/2024  2:28 PM ENDO SCOPE OUT TIME 9/11/2024  2:40 PM Specimens ID Type Source Tests Collected by Time 1 :  Non-Gynecologic Cytology PANCREAS FINE NEEDLE ASPIRATION HEAD CYTOLOGY CONSULTATION (NON-GYNECOLOGIC) Supriya Puckett RN 9/11/2024 2963 2 : pancreas head mass FNB Tissue PANCREAS BIOPSY SURGICAL PATHOLOGY EXAM Supriya Puckett RN 9/11/2024 1411 Procedure  Location [facility] [address] [facility] [address] [phone] Referring Provider WENDIE Otoole-CNP Procedure Provider Steven Casas DO     Endoscopic Ultrasound (Upper) w FNA    Result Date: 9/11/2024  Table formatting from the original result was not included. Impression Irregular mass measuring 43 mm x 34 mm with poorly defined margins was visualized in the pancreas; fine needle biopsy passes were taken. A sample was sent for histology analysis. Onsite cytology results were preliminarily atypical The mass appears to be borderline resectable with abutment of the confluence.  No adenopathy was visualized.  Both the bile duct and pancreatic ducts are obstructed. Findings Irregular mass measuring 43 mm x 34 mm with poorly defined margins was visualized in the pancreas; fine needle biopsy passes were taken with a 22 gauge needle using a transduodenal approach guided by Doppler. A sample was sent for histology analysis. Onsite cytology results were preliminarily atypical The mass appears to be borderline resectable with abutment of the confluence.  No adenopathy was visualized.  Both the bile duct and pancreatic ducts are obstructed. Recommendation Await pathology results Proceed with ERCP for biliary stent placement.   Indication Pancreatic mass ([facility]) Staff Staff Role Steven Casas DO Proceduralist Medications See Anesthesia Record. Preprocedure A history and physical has been performed, and patient medication allergies have been reviewed. The patient's tolerance of previous anesthesia has been reviewed. The risks and benefits of the procedure and the sedation options and risks were discussed with the patient. All questions were answered and informed consent obtained. Details of the Procedure The patient underwent general anesthesia, which was administered by an anesthesia professional. The patient's blood pressure, heart rate, level of consciousness,  oxygen, respirations, ECG and ETCO2 were monitored throughout the procedure. The linear scope was introduced through the mouth and advanced to the second part of the duodenum. The procedure was not difficult. The patient tolerated the procedure well. There were no apparent adverse events. Events Procedure Events Event Event Time ENDO SCOPE IN TIME 9/11/2024  2:03 PM ENDO SCOPE OUT TIME 9/11/2024  2:20 PM Specimens ID Type Source Tests Collected by Time 1 :  Non-Gynecologic Cytology PANCREAS FINE NEEDLE ASPIRATION HEAD CYTOLOGY CONSULTATION (NON-GYNECOLOGIC) Supriya Puckett RN 9/11/2024 1409 2 : pancreas head mass FNB Tissue PANCREAS BIOPSY SURGICAL PATHOLOGY EXAM Supriya Puckett RN 9/11/2024 1411 Procedure Location [facility] [address] [facility] [address] [phone] Referring Provider Tomas Jaramillo APRN-CNP Procedure Provider Steven Casas DO     US renal complete    Result Date: 9/11/2024  Interpreted By:  Asher Knutson, STUDY: US RENAL COMPLETE; 9/11/2024 4:51 am   INDICATION: Signs/Symptoms:MARK.   COMPARISON: CT abdomen and pelvis 09/09/2024   ACCESSION NUMBER(S): [id number]   ORDERING CLINICIAN: ASAEL WIGGINS   TECHNIQUE: Sonography of the kidneys and urinary bladder was performed.   FINDINGS: Right Kidney: *Renal length: 11.2 cm *Parenchyma: Normal parenchymal echogenicity. Normal parenchymal thickness. *Collecting system: No hydronephrosis. *Calculus: No echogenic, shadowing calculus. *Lesion: None.   Left Kidney: *Renal length: 10.8 cm *Parenchyma: Normal parenchymal echogenicity. Normal parenchymal thickness. *Collecting system: No hydronephrosis. *Calculus: No echogenic, shadowing calculus. *Lesion: None.   Bladder: Partially distended, limited for evaluation.       No hydronephrosis.   MACRO: None   Signed by: Asher Knutson 9/11/2024 8:06 AM Dictation workstation:   [id number]    US gallbladder    Result Date: 9/9/2024  Interpreted By:  Ross  Xander, STUDY: US GALLBLADDER;  9/9/2024 9:44 pm   INDICATION: Signs/Symptoms:abnormal liver enzymes.     COMPARISON: CT 9/9/2024   ACCESSION NUMBER(S): [id number]   ORDERING CLINICIAN: VALE HOOKS   TECHNIQUE: Multiple sonographic grayscale and color images of the right upper quadrant were performed.   FINDINGS: The examination is limited secondary to shadowing from overlying ribs. The liver demonstrates normal echogenicity. There is intrahepatic and extrahepatic biliary duct dilatation. The common bile duct measures 1.6 cm in diameter proximally. The common bile duct is obscured distally secondary to shadowing from overlying bowel gas. There is gallbladder sludge and gallbladder wall thickening measuring 4 mm in thickness. Per the sonographer, sonographic Strong sign is negative.   There is obscuration of the pancreatic head and tail secondary to shadowing from overlying bowel gas. There is evidence of dilatation of the pancreatic duct measuring 8 mm in diameter.   The right kidney measures 10.1 cm in length. No right hydronephrosis.       Intrahepatic and extrahepatic biliary duct dilatation with the proximal common bile duct measuring 1.6 cm in diameter. There is also evidence of dilatation of the pancreatic duct measuring 8 mm in diameter. There is obscuration of the distal common bile duct and the pancreatic head secondary to shadowing from bowel gas on the ultrasound examination. As described on the 9/9/2024 CT examination, the findings are concerning for underlying mass, and pancreatic protocol MRI is recommended for further evaluation.   Gallbladder sludge and wall thickening. Per the sonographer, sonographic Strong sign is negative.   MACRO: None   Signed by: Xander Hawkins 9/9/2024 10:18 PM Dictation workstation:   [id number]    CT abdomen pelvis wo IV contrast    Result Date: 9/9/2024  Interpreted By:  Adelfo Álvarez, STUDY: CT ABDOMEN PELVIS WO IV CONTRAST;  9/9/2024 8:18 pm   INDICATION:  Signs/Symptoms:generalized abd pain; elevated LFT's, jaundice, elevated lipase.   COMPARISON: None   ACCESSION NUMBER(S): [id number]   ORDERING CLINICIAN: MIRIAN SIMMONS   TECHNIQUE: CT of the abdomen and pelvis was performed. Contiguous axial images were obtained through the abdomen and pelvis. Coronal and sagittal reconstructions at 3 mm slice thickness were performed.  No intravenous contrast was administered.   FINDINGS: Please note that the evaluation of vessels, lymph nodes and organs is limited without intravenous contrast.   LOWER CHEST: There is no acute abnormality in the lower chest. The heart is normal in size without evidence of pericardial effusion. No pleural effusion is present.   ABDOMEN:   LIVER: The liver is normal in size and contour.   BILE DUCTS: There is intrahepatic and extrahepatic biliary dilatation.   GALLBLADDER: The gallbladder is distended and there is suspected dependent sludge in the gallbladder. No gallbladder wall thickening.   PANCREAS: There is pancreatic ductal dilatation measuring 5 mm and there is suspected focal fullness of the pancreatic head, for example axial image 41 and coronal image 57.   SPLEEN: Normal-size.   ADRENAL GLANDS: Within normal limits.   KIDNEYS AND URETERS: The kidneys are normal in size and unremarkable in appearance.  No hydroureteronephrosis or nephroureterolithiasis is identified.   PELVIS:   BLADDER: The urinary bladder is incompletely distended, limiting assessment.   REPRODUCTIVE ORGANS: No pelvic masses.   BOWEL: The stomach is incompletely distended, limiting evaluation for focal wall thickening. There is no bowel wall dilatation or obstruction. The appendix is filled with appendicolith and appears normal. Scattered colonic diverticulosis without diverticulitis.   VESSELS: The abdominal aorta is normal in caliber.   PERITONEUM/RETROPERITONEUM/LYMPH NODES: There is no ascites or intraperitoneal free air. There is no lymphadenopathy by CT  criteria. There is nonspecific central mesenteric edema.   ABDOMINAL WALL: The abdominal wall soft tissues appear normal.   BONES: No suspicious osseous lesions are identified. Degenerative discogenic disease is noted in the lower thoracic and lumbar spine. Grade 2 degenerative anterolisthesis of L5 over S1.       1.  Limited noncontrast CT scan. There is distended gallbladder with pancreatic and biliary dilatation without CT evidence of biliary calculi. There is question of fullness of the pancreatic head concerning for an underlying pancreatic head mass. Further evaluation with multiphase MR abdomen is recommended.     Signed by: Adelfo Álvarez 9/9/2024 8:48 PM Dictation workstation:   [id number]    * Cannot find OR log *  Last relevant procedure:                          Assessment/Plan   Assessment & Plan    Jordanntvaleria Colón is a 68 y.o. female with a PMH of DM2, hypothyroidism, HLD, insomnia, Chiari I malformation presenting with acute pancreatitis, MARK, and elevated LFTs.   S/p EUS/FNA and ERCP 9/11/2024 with Dr. Casas which showed irregular mass measuring 43 mm x 34 mm with poorly defined margins was visualized in the pancreas; fine needle biopsy passes were taken. A sample was sent for histology analysis. Onsite cytology results were preliminarily atypical. Dr. Casas recommended referral to Medical and Surgical Oncology for comprehensive treatment plan.    Suspect post ERCP pancreatitis.  LFTs trending down except ALT. INR after vit K 1.3.     No MRI Abd at this time as sx wants to wait for MARK to resolve in order to do MRI with contrast for staging.     -Full liquid diet  -Dr. Td Mandujano following  -Supportive care as per primary team  -continue IV LR to 125 ml/hr  -GI will sign off at this time. Please contact for any further needs or questions.        I spent 25 minutes in the professional and overall care of this patient.      Yara Hayward, APRN-CNP

## 2024-09-13 NOTE — PROGRESS NOTES
Nanci Colón is a 68 y.o. female on day 2 of admission presenting with No Principal Problem: There is no principal problem currently on the Problem List. Please update the Problem List and refresh..      Subjective   Nanci Colón is a 68 y.o. female with a past medical history of non-insulin-dependent diabetes, hypothyroidism, dyslipidemia, hypertension who presented to the [facility] ED for evaluation of abdominal discomfort and scleral icterus at the request of her PCP.  She reported a 1 month history of abdominal discomfort, nausea and poor by mouth intake for liquids and solids.  She notes weakness and weight loss.  She continued her outpatient medications which included Celebrex and 5 mg of lisinopril.     In the ED, labs were notable for an acute kidney injury having a creatinine of 4.21 mg/deciliter from a previous normal baseline in October 2023.  There was transaminitis, and elevated alkaline phosphatase, T. bili of 10.6, lipase of 1477 with a normal lactate.  CT imaging without contrast showed findings concerning for pancreatic head mass.  He was transferred to [facility] for GI evaluation.  She is status post EUS/FNA with findings of an irregular mass in the pancreas with fine-needle biopsy obtained.  Bile and pancreatic ducts were obstructed.  Underwent ERCP with biliary sphincterotomy and common bile duct stent placement.  Nephrology is consulted for acute kidney injury.       Objective          Vitals 24HR  Heart Rate:  [53-70]   Temp:  [36.6 °C (97.8 °F)-36.7 °C (98.1 °F)]   Resp:  [16-18]   BP: (106-142)/(55-85)   SpO2:  [94 %-98 %]     Intake/Output last 3 Shifts:    Intake/Output Summary (Last 24 hours) at 9/13/2024 1417  Last data filed at 9/13/2024 0810  Gross per 24 hour   Intake 1639 ml   Output 1450 ml   Net 189 ml       Physical Exam  Constitutional:       Appearance: Awake, alert and oriented x 3.  In no acute distress.  HENT:      Head: Normocephalic and atraumatic.      Mouth: Mucous  membranes are moist.   Eyes:      General: Scleral icterus present.      Pupils: Pupils are equal, round, and reactive to light.   Cardiovascular:      Rate and Rhythm: Normal rate and regular rhythm.      Pulses: Normal pulses.      Heart sounds: Normal heart sounds.   Pulmonary:      Effort: Pulmonary effort is normal.      Breath sounds: Normal breath sounds.   Abdominal:      General: Bowel sounds are normal.      Palpations: Abdomen is soft.  Nontender, no rebound or guarding.  Musculoskeletal:         General: Normal range of motion.  No synovitis.  Skin:     General: Skin is warm and dry.  Positive jaundice.   Neurological:      General: No focal deficit present.      Mental Status: She is alert and oriented to person, place, and time.   Psychiatric:         Mood and Affect: Mood normal.         Behavior: Behavior normal.        Scheduled Medications  doxepin, 10 mg, oral, Nightly  heparin (porcine), 5,000 Units, subcutaneous, q8h LAMONTE  insulin lispro, 0-10 Units, subcutaneous, q4h  levothyroxine, 75 mcg, oral, Daily  metoprolol succinate XL, 50 mg, oral, Daily  sennosides, 2 tablet, oral, BID      Continuous medications  lactated Ringer's, 50 mL/hr, Last Rate: 100 mL/hr (09/13/24 1102)        PRN medications: dextrose, dextrose, glucagon, glucagon, HYDROmorphone, HYDROmorphone, melatonin, ondansetron ODT **OR** ondansetron, prochlorperazine **OR** prochlorperazine **OR** prochlorperazine     Relevant Results  Results from last 7 days   Lab Units 09/13/24  0655 09/12/24  0548 09/11/24  0209 09/09/24  1826 09/09/24  1145   WBC AUTO x10*3/uL 7.1 5.4 7.0 6.6 6.5   HEMOGLOBIN g/dL 11.3* 11.1* 11.1* 12.6 12.9   HEMATOCRIT % 34.0* 33.3* 32.9* 37.3 39.5   PLATELETS AUTO x10*3/uL 125* 119* 119* 125* 135*   NEUTROS PCT AUTO %  --   --  65.6 66.8 72.3   LYMPHS PCT AUTO %  --   --  15.5 17.1 12.2   MONOS PCT AUTO %  --   --  14.2 12.7 11.0   EOS PCT AUTO %  --   --  3.7 2.6 3.4     Results from last 7 days   Lab Units  09/13/24  0655 09/12/24  0548 09/11/24  0209   SODIUM mmol/L 139 136 137   POTASSIUM mmol/L 3.6 4.4 3.8   CHLORIDE mmol/L 108* 107 110*   CO2 mmol/L 20* 15* 14*   BUN mg/dL 40* 45* 48*   CREATININE mg/dL 3.48* 3.86* 4.35*   GLUCOSE mg/dL 106* 179* 148*   CALCIUM mg/dL 9.2 9.1 9.0       FL GI ERCP   Final Result      US renal complete   Final Result   No hydronephrosis.        MACRO:   None        Signed by: Asher Knutson 9/11/2024 8:06 AM   Dictation workstation:   [id number]      MR abdomen wo IV contrast    (Results Pending)            Assessment/Plan      Nanci Colón is a 68 y.o. female with a past medical history of non-insulin-dependent diabetes, hypothyroidism, dyslipidemia, hypertension who presented to the [facility] ED for evaluation of abdominal discomfort and scleral icterus at the request of her PCP.  She reported a 1 month history of abdominal discomfort, nausea and poor by mouth intake for liquids and solids.  She notes weakness and weight loss.  She continued her outpatient medications which included Celebrex and 5 mg of lisinopril.     In the ED, labs were notable for an acute kidney injury having a creatinine of 4.21 mg/deciliter from a previous normal baseline in October 2023.  There was transaminitis, and elevated alkaline phosphatase, T. bili of 10.6, lipase of 1477 with a normal lactate.  CT imaging without contrast showed findings concerning for pancreatic head mass. She was transferred to [facility] for GI evaluation.  She is status post EUS/FNA with findings of an irregular mass in the pancreas with fine-needle biopsy obtained.  Bile and pancreatic ducts were obstructed.  Underwent ERCP with biliary sphincterotomy and common bile duct stent placement.  CA 19-9 was 2,960. Nephrology is consulted for acute kidney injury.     Acute kidney injury is due to acute tubular necrosis from prolonged poor by mouth intake with concomitant use of lisinopril and Celebrex. Her creatinine is sluggishly  improving with IV volume expansion. Blood pressures are not low. She is starting to retain fluid. She is currently NPO thus I will not stop the LR but cut her fluids back to 50 ml/hr to run overnight. Her ACE inhibitor and anti-inflammatory are held. She is non oliguric. Renal ultrasound was unremarkable. Urinalysis is pending. UP:CR ratio of 1.69, possibly related to MARK vs DM. She will go for an MRI of the abdomen, now ordered without contrast. Pathology is pending. Recommend avoiding further nephrotoxins. Trend her RFP. Will follow.         Active Problems:  There are no active Hospital Problems.       I spent 40 minutes in the professional and overall care of this patient.      Herb Gomez, DO

## 2024-09-13 NOTE — PROGRESS NOTES
Nanci Colón is a 68 y.o. female on day 2 of admission presenting with No Principal Problem: There is no principal problem currently on the Problem List. Please update the Problem List and refresh..      Subjective   Pt seen and examined.        Objective     Last Recorded Vitals  /85   Pulse 62   Temp 36.6 °C (97.8 °F)   Resp 16   Wt 84.5 kg (186 lb 3.2 oz)   SpO2 94%   Intake/Output last 3 Shifts:    Intake/Output Summary (Last 24 hours) at 9/13/2024 1343  Last data filed at 9/13/2024 0810  Gross per 24 hour   Intake 1639 ml   Output 1750 ml   Net -111 ml       Admission Weight  Weight: 84.5 kg (186 lb 3.2 oz) (09/11/24 0135)    Daily Weight  09/11/24 : 84.5 kg (186 lb 3.2 oz)      Physical Exam  Constitutional: No acute distress, awake, alert  Head/Neck: Neck supple, scleral icterus  Respiratory/Thorax: Lungs are Clear to auscultation  Cardiovascular: Regular, rate and rhythm,  2+ equal pulses of the extremities, normal S 1and S 2  Gastrointestinal: Nondistended, soft, non-tender, no rebound tenderness or guarding  Extremities: No edema. No calf tenderness.  Neurological: Awake and alert. No focal neurological deficits  Skin: jaundiced  Psychological: Appropriate mood and behavior    Relevant Results  Results for orders placed or performed during the hospital encounter of 09/11/24 (from the past 24 hour(s))   Protein, Urine Random   Result Value Ref Range    Total Protein, Urine Random 49 (H) 5 - 24 mg/dL    Creatinine, Urine Random 29.0 20.0 - 320.0 mg/dL    T. Protein/Creatinine Ratio 1.69 (H) 0.00 - 0.17 mg/mg Creat   POCT GLUCOSE   Result Value Ref Range    POCT Glucose 140 (H) 74 - 99 mg/dL   POCT GLUCOSE   Result Value Ref Range    POCT Glucose 150 (H) 74 - 99 mg/dL   POCT GLUCOSE   Result Value Ref Range    POCT Glucose 133 (H) 74 - 99 mg/dL   POCT GLUCOSE   Result Value Ref Range    POCT Glucose 124 (H) 74 - 99 mg/dL   CBC   Result Value Ref Range    WBC 7.1 4.4 - 11.3 x10*3/uL    nRBC 0.0  0.0 - 0.0 /100 WBCs    RBC 3.65 (L) 4.00 - 5.20 x10*6/uL    Hemoglobin 11.3 (L) 12.0 - 16.0 g/dL    Hematocrit 34.0 (L) 36.0 - 46.0 %    MCV 93 80 - 100 fL    MCH 31.0 26.0 - 34.0 pg    MCHC 33.2 32.0 - 36.0 g/dL    RDW 15.4 (H) 11.5 - 14.5 %    Platelets 125 (L) 150 - 450 x10*3/uL   Comprehensive Metabolic Panel   Result Value Ref Range    Glucose 106 (H) 74 - 99 mg/dL    Sodium 139 136 - 145 mmol/L    Potassium 3.6 3.5 - 5.3 mmol/L    Chloride 108 (H) 98 - 107 mmol/L    Bicarbonate 20 (L) 21 - 32 mmol/L    Anion Gap 15 10 - 20 mmol/L    Urea Nitrogen 40 (H) 6 - 23 mg/dL    Creatinine 3.48 (H) 0.50 - 1.05 mg/dL    eGFR 14 (L) >60 mL/min/1.73m*2    Calcium 9.2 8.6 - 10.3 mg/dL    Albumin 3.2 (L) 3.4 - 5.0 g/dL    Alkaline Phosphatase 1,439 (H) 33 - 136 U/L    Total Protein 6.6 6.4 - 8.2 g/dL     (H) 9 - 39 U/L    Bilirubin, Total 4.1 (H) 0.0 - 1.2 mg/dL     (H) 7 - 45 U/L   Protime-INR   Result Value Ref Range    Protime 14.7 (H) 9.8 - 12.8 seconds    INR 1.3 (H) 0.9 - 1.1   POCT GLUCOSE   Result Value Ref Range    POCT Glucose 98 74 - 99 mg/dL   POCT GLUCOSE   Result Value Ref Range    POCT Glucose 197 (H) 74 - 99 mg/dL   POCT GLUCOSE   Result Value Ref Range    POCT Glucose 143 (H) 74 - 99 mg/dL        FL GI ERCP   Final Result      US renal complete   Final Result   No hydronephrosis.        MACRO:   None        Signed by: Asher Knutson 9/11/2024 8:06 AM   Dictation workstation:   [id number]      MR abdomen wo IV contrast    (Results Pending)       Scheduled medications  doxepin, 10 mg, oral, Nightly  heparin (porcine), 5,000 Units, subcutaneous, q8h LAMONTE  insulin lispro, 0-10 Units, subcutaneous, q4h  levothyroxine, 75 mcg, oral, Daily  metoprolol succinate XL, 50 mg, oral, Daily  sennosides, 2 tablet, oral, BID      Continuous medications  lactated Ringer's, 100 mL/hr, Last Rate: 100 mL/hr (09/13/24 1102)      PRN medications  PRN medications: dextrose, dextrose, glucagon, glucagon,  HYDROmorphone, HYDROmorphone, melatonin, ondansetron ODT **OR** ondansetron, prochlorperazine **OR** prochlorperazine **OR** prochlorperazine         Assessment/Plan                  Active Problems:  There are no active Hospital Problems.    MARK  - US kidney noted  - UA  - may be pre-renal due to poor po intake  - Nephrology consult     Acute pancreatitis  - likely the source of her epigastric pain  - IVF  - Morphine for pain.      Possible pancreatic head mass  Obstructive jaundice  - MR Abdomen pending  - CA 19-9  -greater than 2500  -ERCP: pancreatic head mass  -await further recs     Distended GB with sludge  - Strong's sign negative  - General surgery consult for possible cholecystectomy     Hypothyroidism  - cont synthroid     DM2  - ISS   - check blood glu every 4 hrs     Code status  - FULL              Jayden Goldberg MD

## 2024-09-14 LAB
ALBUMIN SERPL BCP-MCNC: 3.1 G/DL (ref 3.4–5)
ALP SERPL-CCNC: 1187 U/L (ref 33–136)
ALT SERPL W P-5'-P-CCNC: 178 U/L (ref 7–45)
ANION GAP SERPL CALC-SCNC: 15 MMOL/L (ref 10–20)
APPEARANCE UR: CLEAR
AST SERPL W P-5'-P-CCNC: 227 U/L (ref 9–39)
BACTERIA #/AREA URNS AUTO: ABNORMAL /HPF
BILIRUB DIRECT SERPL-MCNC: 2.2 MG/DL (ref 0–0.3)
BILIRUB SERPL-MCNC: 3.8 MG/DL (ref 0–1.2)
BILIRUB UR STRIP.AUTO-MCNC: NEGATIVE MG/DL
BUN SERPL-MCNC: 38 MG/DL (ref 6–23)
CALCIUM SERPL-MCNC: 8.5 MG/DL (ref 8.6–10.3)
CHLORIDE SERPL-SCNC: 104 MMOL/L (ref 98–107)
CO2 SERPL-SCNC: 20 MMOL/L (ref 21–32)
COLOR UR: COLORLESS
CREAT SERPL-MCNC: 3.04 MG/DL (ref 0.5–1.05)
EGFRCR SERPLBLD CKD-EPI 2021: 16 ML/MIN/1.73M*2
ERYTHROCYTE [DISTWIDTH] IN BLOOD BY AUTOMATED COUNT: 14.9 % (ref 11.5–14.5)
GLUCOSE BLD MANUAL STRIP-MCNC: 113 MG/DL (ref 74–99)
GLUCOSE BLD MANUAL STRIP-MCNC: 120 MG/DL (ref 74–99)
GLUCOSE BLD MANUAL STRIP-MCNC: 136 MG/DL (ref 74–99)
GLUCOSE BLD MANUAL STRIP-MCNC: 152 MG/DL (ref 74–99)
GLUCOSE BLD MANUAL STRIP-MCNC: 189 MG/DL (ref 74–99)
GLUCOSE SERPL-MCNC: 125 MG/DL (ref 74–99)
GLUCOSE UR STRIP.AUTO-MCNC: NORMAL MG/DL
HCT VFR BLD AUTO: 31.1 % (ref 36–46)
HGB BLD-MCNC: 10.3 G/DL (ref 12–16)
INR PPP: 1.2 (ref 0.9–1.1)
IRON SATN MFR SERPL: 30 % (ref 25–45)
IRON SERPL-MCNC: 77 UG/DL (ref 35–150)
KETONES UR STRIP.AUTO-MCNC: NEGATIVE MG/DL
LEUKOCYTE ESTERASE UR QL STRIP.AUTO: NEGATIVE
MCH RBC QN AUTO: 30 PG (ref 26–34)
MCHC RBC AUTO-ENTMCNC: 33.1 G/DL (ref 32–36)
MCV RBC AUTO: 91 FL (ref 80–100)
NITRITE UR QL STRIP.AUTO: NEGATIVE
NRBC BLD-RTO: 0 /100 WBCS (ref 0–0)
PH UR STRIP.AUTO: 5.5 [PH]
PLATELET # BLD AUTO: 118 X10*3/UL (ref 150–450)
POTASSIUM SERPL-SCNC: 3.3 MMOL/L (ref 3.5–5.3)
PROT SERPL-MCNC: 5.7 G/DL (ref 6.4–8.2)
PROT UR STRIP.AUTO-MCNC: ABNORMAL MG/DL
PROTHROMBIN TIME: 13.5 SECONDS (ref 9.8–12.8)
RBC # BLD AUTO: 3.43 X10*6/UL (ref 4–5.2)
RBC # UR STRIP.AUTO: ABNORMAL /UL
RBC #/AREA URNS AUTO: >20 /HPF
SODIUM SERPL-SCNC: 136 MMOL/L (ref 136–145)
SP GR UR STRIP.AUTO: 1
TIBC SERPL-MCNC: 257 UG/DL (ref 240–445)
UIBC SERPL-MCNC: 180 UG/DL (ref 110–370)
UROBILINOGEN UR STRIP.AUTO-MCNC: NORMAL MG/DL
WBC # BLD AUTO: 6.7 X10*3/UL (ref 4.4–11.3)
WBC #/AREA URNS AUTO: ABNORMAL /HPF

## 2024-09-14 PROCEDURE — 82947 ASSAY GLUCOSE BLOOD QUANT: CPT

## 2024-09-14 PROCEDURE — 2500000001 HC RX 250 WO HCPCS SELF ADMINISTERED DRUGS (ALT 637 FOR MEDICARE OP): Performed by: INTERNAL MEDICINE

## 2024-09-14 PROCEDURE — 36415 COLL VENOUS BLD VENIPUNCTURE: CPT | Performed by: INTERNAL MEDICINE

## 2024-09-14 PROCEDURE — 82248 BILIRUBIN DIRECT: CPT | Performed by: INTERNAL MEDICINE

## 2024-09-14 PROCEDURE — 85610 PROTHROMBIN TIME: CPT | Performed by: INTERNAL MEDICINE

## 2024-09-14 PROCEDURE — 81001 URINALYSIS AUTO W/SCOPE: CPT | Performed by: INTERNAL MEDICINE

## 2024-09-14 PROCEDURE — 80048 BASIC METABOLIC PNL TOTAL CA: CPT | Performed by: INTERNAL MEDICINE

## 2024-09-14 PROCEDURE — 2500000004 HC RX 250 GENERAL PHARMACY W/ HCPCS (ALT 636 FOR OP/ED): Performed by: INTERNAL MEDICINE

## 2024-09-14 PROCEDURE — 2500000002 HC RX 250 W HCPCS SELF ADMINISTERED DRUGS (ALT 637 FOR MEDICARE OP, ALT 636 FOR OP/ED): Performed by: INTERNAL MEDICINE

## 2024-09-14 PROCEDURE — 2500000001 HC RX 250 WO HCPCS SELF ADMINISTERED DRUGS (ALT 637 FOR MEDICARE OP): Performed by: HOSPITALIST

## 2024-09-14 PROCEDURE — 82728 ASSAY OF FERRITIN: CPT | Mod: AHULAB | Performed by: INTERNAL MEDICINE

## 2024-09-14 PROCEDURE — 85027 COMPLETE CBC AUTOMATED: CPT | Performed by: INTERNAL MEDICINE

## 2024-09-14 PROCEDURE — 99232 SBSQ HOSP IP/OBS MODERATE 35: CPT | Performed by: INTERNAL MEDICINE

## 2024-09-14 PROCEDURE — 1100000001 HC PRIVATE ROOM DAILY

## 2024-09-14 PROCEDURE — 83540 ASSAY OF IRON: CPT | Performed by: INTERNAL MEDICINE

## 2024-09-14 RX ORDER — INSULIN LISPRO 100 [IU]/ML
0-10 INJECTION, SOLUTION INTRAVENOUS; SUBCUTANEOUS EVERY 6 HOURS
Status: DISCONTINUED | OUTPATIENT
Start: 2024-09-14 | End: 2024-09-15 | Stop reason: HOSPADM

## 2024-09-14 RX ORDER — POTASSIUM CHLORIDE 1.5 G/1.58G
20 POWDER, FOR SOLUTION ORAL ONCE
Status: COMPLETED | OUTPATIENT
Start: 2024-09-14 | End: 2024-09-14

## 2024-09-14 ASSESSMENT — PAIN SCALES - GENERAL
PAINLEVEL_OUTOF10: 2
PAINLEVEL_OUTOF10: 2
PAINLEVEL_OUTOF10: 4
PAINLEVEL_OUTOF10: 7

## 2024-09-14 ASSESSMENT — COGNITIVE AND FUNCTIONAL STATUS - GENERAL
MOBILITY SCORE: 23
DAILY ACTIVITIY SCORE: 24
CLIMB 3 TO 5 STEPS WITH RAILING: A LITTLE

## 2024-09-14 ASSESSMENT — PAIN DESCRIPTION - DESCRIPTORS
DESCRIPTORS: ACHING
DESCRIPTORS: ACHING

## 2024-09-14 ASSESSMENT — PAIN DESCRIPTION - LOCATION: LOCATION: ABDOMEN

## 2024-09-14 ASSESSMENT — PAIN - FUNCTIONAL ASSESSMENT
PAIN_FUNCTIONAL_ASSESSMENT: 0-10
PAIN_FUNCTIONAL_ASSESSMENT: 0-10

## 2024-09-14 NOTE — CARE PLAN
Problem: Pain - Adult  Goal: Verbalizes/displays adequate comfort level or baseline comfort level  Outcome: Progressing     Problem: Safety - Adult  Goal: Free from fall injury  Outcome: Progressing     Problem: Discharge Planning  Goal: Discharge to home or other facility with appropriate resources  Outcome: Progressing     Problem: Chronic Conditions and Co-morbidities  Goal: Patient's chronic conditions and co-morbidity symptoms are monitored and maintained or improved  Outcome: Progressing     Problem: Pain  Goal: Takes deep breaths with improved pain control throughout the shift  Outcome: Progressing  Goal: Turns in bed with improved pain control throughout the shift  Outcome: Progressing  Goal: Walks with improved pain control throughout the shift  Outcome: Progressing  Goal: Performs ADL's with improved pain control throughout shift  Outcome: Progressing  Goal: Participates in PT with improved pain control throughout the shift  Outcome: Progressing  Goal: Free from opioid side effects throughout the shift  Outcome: Progressing  Goal: Free from acute confusion related to pain meds throughout the shift  Outcome: Progressing     Problem: Skin  Goal: Decreased wound size/increased tissue granulation at next dressing change  Outcome: Progressing  Goal: Participates in plan/prevention/treatment measures  Outcome: Progressing  Goal: Prevent/manage excess moisture  Outcome: Progressing  Goal: Prevent/minimize sheer/friction injuries  Outcome: Progressing  Goal: Promote/optimize nutrition  Outcome: Progressing  Goal: Promote skin healing  Outcome: Progressing

## 2024-09-14 NOTE — PROGRESS NOTES
Nanci Colón is a 68 y.o. female on day 3 of admission presenting with No Principal Problem: There is no principal problem currently on the Problem List. Please update the Problem List and refresh..      Subjective   Pt seen and examined.        Objective     Last Recorded Vitals  /74 (BP Location: Left arm, Patient Position: Lying)   Pulse 70   Temp 36.7 °C (98.1 °F) (Temporal)   Resp 18   Wt 84.5 kg (186 lb 3.2 oz)   SpO2 97%   Intake/Output last 3 Shifts:    Intake/Output Summary (Last 24 hours) at 9/14/2024 1016  Last data filed at 9/14/2024 0800  Gross per 24 hour   Intake 2840 ml   Output 1200 ml   Net 1640 ml       Admission Weight  Weight: 84.5 kg (186 lb 3.2 oz) (09/11/24 0135)    Daily Weight  09/11/24 : 84.5 kg (186 lb 3.2 oz)      Physical Exam  Constitutional: No acute distress, awake, alert  Head/Neck: Neck supple, scleral icterus  Respiratory/Thorax: Lungs are Clear to auscultation  Cardiovascular: Regular, rate and rhythm,  2+ equal pulses of the extremities, normal S 1and S 2  Gastrointestinal: Nondistended, soft, non-tender, no rebound tenderness or guarding  Extremities: No edema. No calf tenderness.  Neurological: Awake and alert. No focal neurological deficits  Skin: jaundiced  Psychological: Appropriate mood and behavior    Relevant Results  Results for orders placed or performed during the hospital encounter of 09/11/24 (from the past 24 hour(s))   POCT GLUCOSE   Result Value Ref Range    POCT Glucose 197 (H) 74 - 99 mg/dL   POCT GLUCOSE   Result Value Ref Range    POCT Glucose 143 (H) 74 - 99 mg/dL   POCT GLUCOSE   Result Value Ref Range    POCT Glucose 101 (H) 74 - 99 mg/dL   POCT GLUCOSE   Result Value Ref Range    POCT Glucose 149 (H) 74 - 99 mg/dL   POCT GLUCOSE   Result Value Ref Range    POCT Glucose 113 (H) 74 - 99 mg/dL   POCT GLUCOSE   Result Value Ref Range    POCT Glucose 120 (H) 74 - 99 mg/dL   CBC   Result Value Ref Range    WBC 6.7 4.4 - 11.3 x10*3/uL    nRBC 0.0  0.0 - 0.0 /100 WBCs    RBC 3.43 (L) 4.00 - 5.20 x10*6/uL    Hemoglobin 10.3 (L) 12.0 - 16.0 g/dL    Hematocrit 31.1 (L) 36.0 - 46.0 %    MCV 91 80 - 100 fL    MCH 30.0 26.0 - 34.0 pg    MCHC 33.1 32.0 - 36.0 g/dL    RDW 14.9 (H) 11.5 - 14.5 %    Platelets 118 (L) 150 - 450 x10*3/uL   Basic Metabolic Panel   Result Value Ref Range    Glucose 125 (H) 74 - 99 mg/dL    Sodium 136 136 - 145 mmol/L    Potassium 3.3 (L) 3.5 - 5.3 mmol/L    Chloride 104 98 - 107 mmol/L    Bicarbonate 20 (L) 21 - 32 mmol/L    Anion Gap 15 10 - 20 mmol/L    Urea Nitrogen 38 (H) 6 - 23 mg/dL    Creatinine 3.04 (H) 0.50 - 1.05 mg/dL    eGFR 16 (L) >60 mL/min/1.73m*2    Calcium 8.5 (L) 8.6 - 10.3 mg/dL   Protime-INR   Result Value Ref Range    Protime 13.5 (H) 9.8 - 12.8 seconds    INR 1.2 (H) 0.9 - 1.1   Hepatic function panel   Result Value Ref Range    Albumin 3.1 (L) 3.4 - 5.0 g/dL    Bilirubin, Total 3.8 (H) 0.0 - 1.2 mg/dL    Bilirubin, Direct 2.2 (H) 0.0 - 0.3 mg/dL    Alkaline Phosphatase 1,187 (H) 33 - 136 U/L     (H) 7 - 45 U/L     (H) 9 - 39 U/L    Total Protein 5.7 (L) 6.4 - 8.2 g/dL   POCT GLUCOSE   Result Value Ref Range    POCT Glucose 189 (H) 74 - 99 mg/dL        FL GI ERCP   Final Result      US renal complete   Final Result   No hydronephrosis.        MACRO:   None        Signed by: Asher Knutson 9/11/2024 8:06 AM   Dictation workstation:   [id number]          Scheduled medications  doxepin, 10 mg, oral, Nightly  heparin (porcine), 5,000 Units, subcutaneous, q8h LAMONTE  insulin lispro, 0-10 Units, subcutaneous, q6h  levothyroxine, 75 mcg, oral, Daily  metoprolol succinate XL, 50 mg, oral, Daily  sennosides, 2 tablet, oral, BID      Continuous medications  lactated Ringer's, 50 mL/hr, Last Rate: 50 mL/hr (09/14/24 0531)      PRN medications  PRN medications: dextrose, dextrose, glucagon, glucagon, HYDROmorphone, melatonin, ondansetron ODT **OR** ondansetron, oxyCODONE, prochlorperazine **OR** prochlorperazine  **OR** prochlorperazine         Assessment/Plan                  Active Problems:  There are no active Hospital Problems.    MARK  - US kidney noted  - UA  - may be pre-renal due to poor po intake  - Nephrology following  -improving     Acute pancreatitis  - likely the source of her epigastric pain  - IVF     Possible pancreatic head mass  Obstructive jaundice  - MR Abdomen pending  - CA 19-9  -greater than 2500  -ERCP: pancreatic head mass  -await further recs     Distended GB with sludge  - Strong's sign negative  - General surgery consult      Hypothyroidism  - cont synthroid     DM2  - ISS   - check blood glu every 4 hrs     Code status  - FULL              Jayden Goldberg MD

## 2024-09-14 NOTE — CARE PLAN
Problem: Pain - Adult  Goal: Verbalizes/displays adequate comfort level or baseline comfort level  Outcome: Not Progressing  Flowsheets (Taken 9/14/2024 1318)  Verbalizes/displays adequate comfort level or baseline comfort level:   Encourage patient to monitor pain and request assistance   Assess pain using appropriate pain scale   Administer analgesics based on type and severity of pain and evaluate response   Implement non-pharmacological measures as appropriate and evaluate response     Problem: Safety - Adult  Goal: Free from fall injury  Outcome: Progressing     Problem: Discharge Planning  Goal: Discharge to home or other facility with appropriate resources  Outcome: Progressing     Problem: Chronic Conditions and Co-morbidities  Goal: Patient's chronic conditions and co-morbidity symptoms are monitored and maintained or improved  Outcome: Progressing     Problem: Pain  Goal: Takes deep breaths with improved pain control throughout the shift  Outcome: Progressing  Goal: Walks with improved pain control throughout the shift  Outcome: Progressing  Goal: Performs ADL's with improved pain control throughout shift  Outcome: Progressing  Goal: Free from opioid side effects throughout the shift  Outcome: Progressing  Goal: Free from acute confusion related to pain meds throughout the shift  Outcome: Progressing     Problem: Skin  Goal: Participates in plan/prevention/treatment measures  Outcome: Progressing  Flowsheets (Taken 9/14/2024 1318)  Participates in plan/prevention/treatment measures: Increase activity/out of bed for meals  Goal: Prevent/manage excess moisture  Outcome: Progressing  Flowsheets (Taken 9/14/2024 1318)  Prevent/manage excess moisture: Moisturize dry skin  Goal: Prevent/minimize sheer/friction injuries  Outcome: Progressing  Flowsheets (Taken 9/14/2024 1318)  Prevent/minimize sheer/friction injuries: Increase activity/out of bed for meals  Goal: Promote/optimize nutrition  Outcome:  Progressing  Flowsheets (Taken 9/14/2024 1318)  Promote/optimize nutrition: Consume > 50% meals/supplements     Problem: Pain - Adult  Goal: Verbalizes/displays adequate comfort level or baseline comfort level  Outcome: Not Progressing  Flowsheets (Taken 9/14/2024 1318)  Verbalizes/displays adequate comfort level or baseline comfort level:   Encourage patient to monitor pain and request assistance   Assess pain using appropriate pain scale   Administer analgesics based on type and severity of pain and evaluate response   Implement non-pharmacological measures as appropriate and evaluate response

## 2024-09-15 VITALS
RESPIRATION RATE: 16 BRPM | BODY MASS INDEX: 29.92 KG/M2 | WEIGHT: 186.2 LBS | SYSTOLIC BLOOD PRESSURE: 114 MMHG | TEMPERATURE: 98.3 F | OXYGEN SATURATION: 95 % | DIASTOLIC BLOOD PRESSURE: 76 MMHG | HEIGHT: 66 IN | HEART RATE: 69 BPM

## 2024-09-15 LAB
ALBUMIN SERPL BCP-MCNC: 3 G/DL (ref 3.4–5)
ALBUMIN SERPL BCP-MCNC: 3.2 G/DL (ref 3.4–5)
ALP SERPL-CCNC: 1089 U/L (ref 33–136)
ALP SERPL-CCNC: 1190 U/L (ref 33–136)
ALT SERPL W P-5'-P-CCNC: 167 U/L (ref 7–45)
ALT SERPL W P-5'-P-CCNC: 180 U/L (ref 7–45)
ANION GAP SERPL CALC-SCNC: 14 MMOL/L (ref 10–20)
AST SERPL W P-5'-P-CCNC: 226 U/L (ref 9–39)
AST SERPL W P-5'-P-CCNC: 244 U/L (ref 9–39)
BILIRUB DIRECT SERPL-MCNC: 2.1 MG/DL (ref 0–0.3)
BILIRUB SERPL-MCNC: 3.5 MG/DL (ref 0–1.2)
BILIRUB SERPL-MCNC: 3.7 MG/DL (ref 0–1.2)
BUN SERPL-MCNC: 38 MG/DL (ref 6–23)
CALCIUM SERPL-MCNC: 8.8 MG/DL (ref 8.6–10.3)
CHLORIDE SERPL-SCNC: 103 MMOL/L (ref 98–107)
CO2 SERPL-SCNC: 23 MMOL/L (ref 21–32)
CREAT SERPL-MCNC: 2.7 MG/DL (ref 0.5–1.05)
EGFRCR SERPLBLD CKD-EPI 2021: 19 ML/MIN/1.73M*2
ERYTHROCYTE [DISTWIDTH] IN BLOOD BY AUTOMATED COUNT: 14.6 % (ref 11.5–14.5)
FERRITIN SERPL-MCNC: 2369 NG/ML (ref 8–150)
GLUCOSE BLD MANUAL STRIP-MCNC: 105 MG/DL (ref 74–99)
GLUCOSE BLD MANUAL STRIP-MCNC: 119 MG/DL (ref 74–99)
GLUCOSE BLD MANUAL STRIP-MCNC: 159 MG/DL (ref 74–99)
GLUCOSE SERPL-MCNC: 127 MG/DL (ref 74–99)
HCT VFR BLD AUTO: 30.7 % (ref 36–46)
HGB BLD-MCNC: 10.4 G/DL (ref 12–16)
MCH RBC QN AUTO: 31 PG (ref 26–34)
MCHC RBC AUTO-ENTMCNC: 33.9 G/DL (ref 32–36)
MCV RBC AUTO: 91 FL (ref 80–100)
NRBC BLD-RTO: 0 /100 WBCS (ref 0–0)
PHOSPHATE SERPL-MCNC: 3.3 MG/DL (ref 2.5–4.9)
PLATELET # BLD AUTO: 120 X10*3/UL (ref 150–450)
POTASSIUM SERPL-SCNC: 3.9 MMOL/L (ref 3.5–5.3)
PROT SERPL-MCNC: 6.1 G/DL (ref 6.4–8.2)
PROT SERPL-MCNC: 6.5 G/DL (ref 6.4–8.2)
RBC # BLD AUTO: 3.36 X10*6/UL (ref 4–5.2)
SODIUM SERPL-SCNC: 136 MMOL/L (ref 136–145)
WBC # BLD AUTO: 6.5 X10*3/UL (ref 4.4–11.3)

## 2024-09-15 PROCEDURE — 2500000002 HC RX 250 W HCPCS SELF ADMINISTERED DRUGS (ALT 637 FOR MEDICARE OP, ALT 636 FOR OP/ED): Performed by: INTERNAL MEDICINE

## 2024-09-15 PROCEDURE — 80053 COMPREHEN METABOLIC PANEL: CPT | Performed by: INTERNAL MEDICINE

## 2024-09-15 PROCEDURE — 82947 ASSAY GLUCOSE BLOOD QUANT: CPT

## 2024-09-15 PROCEDURE — 2500000001 HC RX 250 WO HCPCS SELF ADMINISTERED DRUGS (ALT 637 FOR MEDICARE OP): Performed by: HOSPITALIST

## 2024-09-15 PROCEDURE — 2500000001 HC RX 250 WO HCPCS SELF ADMINISTERED DRUGS (ALT 637 FOR MEDICARE OP): Performed by: INTERNAL MEDICINE

## 2024-09-15 PROCEDURE — 84100 ASSAY OF PHOSPHORUS: CPT | Performed by: INTERNAL MEDICINE

## 2024-09-15 PROCEDURE — 36415 COLL VENOUS BLD VENIPUNCTURE: CPT | Performed by: INTERNAL MEDICINE

## 2024-09-15 PROCEDURE — 2500000004 HC RX 250 GENERAL PHARMACY W/ HCPCS (ALT 636 FOR OP/ED): Performed by: INTERNAL MEDICINE

## 2024-09-15 PROCEDURE — 80076 HEPATIC FUNCTION PANEL: CPT | Mod: CCI | Performed by: INTERNAL MEDICINE

## 2024-09-15 PROCEDURE — 85027 COMPLETE CBC AUTOMATED: CPT | Performed by: INTERNAL MEDICINE

## 2024-09-15 PROCEDURE — 99239 HOSP IP/OBS DSCHRG MGMT >30: CPT | Performed by: INTERNAL MEDICINE

## 2024-09-15 RX ORDER — OXYCODONE HYDROCHLORIDE 5 MG/1
5 TABLET ORAL EVERY 6 HOURS PRN
Qty: 10 TABLET | Refills: 0 | Status: SHIPPED | OUTPATIENT
Start: 2024-09-15 | End: 2024-09-18

## 2024-09-15 ASSESSMENT — COGNITIVE AND FUNCTIONAL STATUS - GENERAL
CLIMB 3 TO 5 STEPS WITH RAILING: A LITTLE
CLIMB 3 TO 5 STEPS WITH RAILING: A LITTLE
DAILY ACTIVITIY SCORE: 24
MOBILITY SCORE: 23
CLIMB 3 TO 5 STEPS WITH RAILING: A LITTLE
MOBILITY SCORE: 23
CLIMB 3 TO 5 STEPS WITH RAILING: A LITTLE
MOBILITY SCORE: 23
DAILY ACTIVITIY SCORE: 24
DAILY ACTIVITIY SCORE: 24
MOBILITY SCORE: 23
DAILY ACTIVITIY SCORE: 24

## 2024-09-15 ASSESSMENT — PAIN SCALES - GENERAL
PAINLEVEL_OUTOF10: 7
PAINLEVEL_OUTOF10: 0 - NO PAIN

## 2024-09-15 NOTE — PROGRESS NOTES
Nanci Colón is a 68 y.o. female on day 4 of admission presenting with No Principal Problem: There is no principal problem currently on the Problem List. Please update the Problem List and refresh..      Subjective   Nanci Colón is a 68 y.o. female with a past medical history of non-insulin-dependent diabetes, hypothyroidism, dyslipidemia, hypertension who presented to the [facility] ED for evaluation of abdominal discomfort and scleral icterus at the request of her PCP.  She reported a 1 month history of abdominal discomfort, nausea and poor by mouth intake for liquids and solids.  She notes weakness and weight loss.  She continued her outpatient medications which included Celebrex and 5 mg of lisinopril.     In the ED, labs were notable for an acute kidney injury having a creatinine of 4.21 mg/deciliter from a previous normal baseline in October 2023.  There was transaminitis, and elevated alkaline phosphatase, T. bili of 10.6, lipase of 1477 with a normal lactate.  CT imaging without contrast showed findings concerning for pancreatic head mass.  He was [facility] evaluation.  She is status post EUS/FNA with findings of an irregular mass in the pancreas with fine-needle biopsy obtained.  Bile and pancreatic ducts were obstructed.  Underwent ERCP with biliary sphincterotomy and common bile duct stent placement.  Nephrology is consulted for acute kidney injury.  On a soft diet, feeling better overall again today.       Objective          Vitals 24HR  Heart Rate:  [63-67]   Temp:  [36.7 °C (98.1 °F)-37.3 °C (99.1 °F)]   Resp:  [16-18]   BP: (127-152)/(75-84)   SpO2:  [93 %-95 %]     Intake/Output last 3 Shifts:    Intake/Output Summary (Last 24 hours) at 9/15/2024 1021  Last data filed at 9/15/2024 0753  Gross per 24 hour   Intake 1080 ml   Output 2800 ml   Net -1720 ml       Physical Exam  Constitutional:       Appearance: Awake, alert and oriented x 3.  In no acute distress.  HENT:       Head: Normocephalic and atraumatic.      Mouth: Mucous membranes are moist.   Eyes:      General: Scleral icterus present.      Pupils: Pupils are equal, round, and reactive to light.   Cardiovascular:      Rate and Rhythm: Normal rate and regular rhythm.      Pulses: Normal pulses.      Heart sounds: Normal heart sounds.   Pulmonary:      Effort: Pulmonary effort is normal.      Breath sounds: Normal breath sounds.   Abdominal:      General: Bowel sounds are normal.      Palpations: Abdomen is soft.  Nontender, no rebound or guarding.  Musculoskeletal:         General: Normal range of motion.  No synovitis.  Skin:     General: Skin is warm and dry.  Positive jaundice.   Neurological:      General: No focal deficit present.      Mental Status: She is alert and oriented to person, place, and time.   Psychiatric:         Mood and Affect: Mood normal.         Behavior: Behavior normal.        Scheduled Medications  doxepin, 10 mg, oral, Nightly  heparin (porcine), 5,000 Units, subcutaneous, q8h LAMONTE  insulin lispro, 0-10 Units, subcutaneous, q6h  levothyroxine, 75 mcg, oral, Daily  metoprolol succinate XL, 50 mg, oral, Daily  sennosides, 2 tablet, oral, BID      Continuous medications  lactated Ringer's, 50 mL/hr, Last Rate: 50 mL/hr (09/14/24 1348)        PRN medications: dextrose, dextrose, glucagon, glucagon, HYDROmorphone, melatonin, ondansetron ODT **OR** ondansetron, oxyCODONE, prochlorperazine **OR** prochlorperazine **OR** prochlorperazine     Relevant Results  Results from last 7 days   Lab Units 09/15/24  0810 09/14/24  0641 09/13/24  0655 09/12/24  0548 09/11/24  0209 09/09/24  1826 09/09/24  1145   WBC AUTO x10*3/uL 6.5 6.7 7.1   < > 7.0 6.6 6.5   HEMOGLOBIN g/dL 10.4* 10.3* 11.3*   < > 11.1* 12.6 12.9   HEMATOCRIT % 30.7* 31.1* 34.0*   < > 32.9* 37.3 39.5   PLATELETS AUTO x10*3/uL 120* 118* 125*   < > 119* 125* 135*   NEUTROS PCT AUTO %  --   --   --   --  65.6 66.8 72.3   LYMPHS PCT AUTO %  --   --   --    --  15.5 17.1 12.2   MONOS PCT AUTO %  --   --   --   --  14.2 12.7 11.0   EOS PCT AUTO %  --   --   --   --  3.7 2.6 3.4    < > = values in this interval not displayed.     Results from last 7 days   Lab Units 09/15/24  0810 09/14/24  0641 09/13/24  0655   SODIUM mmol/L 136 136 139   POTASSIUM mmol/L 3.9 3.3* 3.6   CHLORIDE mmol/L 103 104 108*   CO2 mmol/L 23 20* 20*   BUN mg/dL 38* 38* 40*   CREATININE mg/dL 2.70* 3.04* 3.48*   GLUCOSE mg/dL 127* 125* 106*   CALCIUM mg/dL 8.8 8.5* 9.2       FL GI ERCP   Final Result      US renal complete   Final Result   No hydronephrosis.        MACRO:   None        Signed by: Asher Knutson 9/11/2024 8:06 AM   Dictation workstation:   [id number]               Assessment/Plan      Nanci Colón is a 68 y.o. female with a past medical history of non-insulin-dependent diabetes, hypothyroidism, dyslipidemia, hypertension who presented to the [facility] ED for evaluation of abdominal discomfort and scleral icterus at the request of her PCP.  She reported a 1 month history of abdominal discomfort, nausea and poor by mouth intake for liquids and solids.  She notes weakness and weight loss.  She continued her outpatient medications which included Celebrex and 5 mg of lisinopril.     In the ED, labs were notable for an acute kidney injury having a creatinine of 4.21 mg/deciliter from a previous normal baseline in October 2023.  There was transaminitis, and elevated alkaline phosphatase, T. bili of 10.6, lipase of 1477 with a normal lactate.  CT imaging without contrast showed findings concerning for pancreatic head mass. She was transferred to [facility] for GI evaluation.  She is status post EUS/FNA with findings of an irregular mass in the pancreas with fine-needle biopsy obtained.  Bile and pancreatic ducts were obstructed.  Underwent ERCP with biliary sphincterotomy and common bile duct stent placement.  CA 19-9 was 2,960. Nephrology is consulted for acute kidney injury.     Acute  kidney injury is due to acute tubular necrosis from prolonged poor by mouth intake with concomitant use of lisinopril and Celebrex. Her creatinine is sluggishly improving with IV volume expansion. Blood pressures are not low. She is starting to retain fluid. She is currently NPO thus I will not stop the LR but cut her fluids back to 50 ml/hr to run overnight. Her ACE inhibitor and anti-inflammatory are held. She is non oliguric. Renal ultrasound was unremarkable. Urinalysis is pending. UP:CR ratio of 1.69, possibly related to MARK vs DM.  The MRI was not done, I would not be against gadolinium as long as it was gadoteridol.  Pathology is pending.  No renal barriers to discharge.  Blood pressures look okay.  I would not be against her going back to her typical diet, she will avoid sodium.  Please have her do a renal function panel, hepatic panel, and CBC on Tuesday or Wednesday of this week and have it sent to me at [phone].  I spoke with her  Don who was in the room.  It would be very helpful if we can assist her with setting up oncology appointments.        Active Problems:  There are no active Hospital Problems.       I spent 40 minutes in the professional and overall care of this patient.      José Miguel Jacobo MD

## 2024-09-15 NOTE — CARE PLAN
The patient's goals for the shift include to rest    The clinical goals for the shift include Patient will remain HDS    Over the shift, the patient did not make progress toward the following goals. Barriers to progression include n/a. Recommendations to address these barriers include n/a.

## 2024-09-15 NOTE — CARE PLAN
The patient's goals for the shift include to rest    The clinical goals for the shift include Patient will remain HDS    Over the shift, the patient did not make progress toward the following goals. Barriers to progression include . Recommendations to address these barriers include .

## 2024-09-15 NOTE — NURSING NOTE
Patient was discharged home and transported off the unit to the [facility] via the cta. Patient skin was intact with some bruising at her IV site on her right anticubital space. Her IV was removed prior to leaving the unit. She denied any pain or discomfort. All discharge instructions were reviewed with patient prior to discharge along with some writtien instructions on when to schedule her follow up appt. and out patient testing.

## 2024-09-15 NOTE — CARE PLAN
The patient's goals for the shift include to rest    The clinical goals for the shift include Patient will remain HDS    Over the shift, the patient did not make progress toward the following goals. Barriers to progression include . Recommendations to address these barriers include .  Patient will remain in a safe environment.

## 2024-09-15 NOTE — DISCHARGE SUMMARY
Discharge Diagnosis  Obstructive jaundice, MARK    Issues Requiring Follow-Up  PCP, surgical-oncology and nephrology follow up    Discharge Meds     Medication List      START taking these medications     oxyCODONE 5 mg immediate release tablet; Commonly known as: Roxicodone;   Take 1 tablet (5 mg) by mouth every 6 hours if needed for severe pain (7 -   10) for up to 3 days.     CONTINUE taking these medications     atorvastatin 40 mg tablet; Commonly known as: Lipitor; TAKE ONE TABLET   BY MOUTH EVERY DAY   doxepin 10 mg capsule; Commonly known as: SINEquan; Take 1 capsule (10   mg) by mouth once daily at bedtime.   metoprolol succinate XL 50 mg 24 hr tablet; Commonly known as:   Toprol-XL; TAKE ONE TABLET BY MOUTH EVERY DAY   ondansetron ODT 8 mg disintegrating tablet; Commonly known as:   Zofran-ODT; Take 1 tablet (8 mg) by mouth every 8 hours if needed for   nausea or vomiting for up to 7 days.   OneTouch Verio test strips strip; Generic drug: blood sugar diagnostic;   100 strips once daily.   Synthroid 75 mcg tablet; Generic drug: levothyroxine; TAKE ONE TABLET BY   MOUTH EVERY DAY     STOP taking these medications     celecoxib 200 mg capsule; Commonly known as: CeleBREX   lisinopril 5 mg tablet       Test Results Pending At Discharge  Pending Labs       Order Current Status    Sodium, Urine Random Collected (09/12/24 1619)    Urinalysis with Reflex Microscopic Collected (09/12/24 1619)    Cytology Consultation (Non-Gynecologic) In process    Surgical Pathology Exam In process    Ferritin Preliminary result            Hospital Course     MARK  - US kidney noted  - UA  - may be pre-renal due to poor po intake  - Nephrology following  -improving     Acute pancreatitis  - likely the source of her epigastric pain  - IVF     Possible pancreatic head mass  Obstructive jaundice  - MR Abdomen pending  - CA 19-9  -greater than 2500  -ERCP: pancreatic head mass  -await further recs     Distended GB with sludge  - Tae's  sign negative  - General surgery consult      Hypothyroidism  - cont synthroid     DM2  - ISS   - check blood glu every 4 hrs     Code status  - FULL      Patient will be discharged today in stable condition.  She will follow-up with nephrology, PCP and surgical oncology as outpatient.  Nephrology and surgical oncology referral has been created.      Discharge time spent more than 30 minutes.    Pertinent Physical Exam At Time of Discharge  Physical Exam  Constitutional: No acute distress, awake, alert  Head/Neck: Neck supple, scleral icterus  Respiratory/Thorax: Lungs are Clear to auscultation  Cardiovascular: Regular, rate and rhythm,  2+ equal pulses of the extremities, normal S 1and S 2  Gastrointestinal: Nondistended, soft, non-tender, no rebound tenderness or guarding  Extremities: No edema. No calf tenderness.  Neurological: Awake and alert. No focal neurological deficits  Skin: jaundiced  Psychological: Appropriate mood and behavior  Outpatient Follow-Up  Future Appointments   Date Time Provider Department Center   9/16/2024  2:20 PM Diana Blunt DO [id number] [facility]   11/20/2024  1:40 PM DO EARL [id number] [facility]         Jayden Goldberg MD

## 2024-09-16 ENCOUNTER — PATIENT OUTREACH (OUTPATIENT)
Dept: PRIMARY CARE | Facility: CLINIC | Age: 69
End: 2024-09-16

## 2024-09-16 ENCOUNTER — APPOINTMENT (OUTPATIENT)
Dept: PRIMARY CARE | Facility: CLINIC | Age: 69
End: 2024-09-16
Payer: MEDICARE

## 2024-09-16 DIAGNOSIS — E11.9 TYPE 2 DIABETES MELLITUS WITHOUT COMPLICATION, WITHOUT LONG-TERM CURRENT USE OF INSULIN (MULTI): Chronic | ICD-10-CM

## 2024-09-16 DIAGNOSIS — R10.9 STOMACH PAIN: Primary | ICD-10-CM

## 2024-09-16 NOTE — PROGRESS NOTES
Discharge Facility: [facility]  Discharge Diagnosis: MARK, Obstructive Jaundice  Admission Date: 11 Sep 24  Discharge Date: 15 Sep 24    PCP Appointment Date: 26 Sep 24  Specialist Appointment Date: 16 Oct 24 (Surg Onc, Nazia)  Hospital Encounter and Summary Linked: Yes    See discharge assessment below for further details     Engagement  Call Start Time: 0928 (9/16/2024  9:38 AM)    Medications  Medications reviewed with patient/caregiver?: Yes (9/16/2024  9:38 AM)  Is the patient having any side effects they believe may be caused by any medication additions or changes?: No (9/16/2024  9:38 AM)  Does the patient have all medications ordered at discharge?: Yes (9/16/2024  9:38 AM)  Prescription Comments: None (9/16/2024  9:38 AM)  Is the patient taking all medications as directed (includes completed medication regime)?: Yes (9/16/2024  9:38 AM)  Medication Comments: None (9/16/2024  9:38 AM)    Appointments  Does the patient have a primary care provider?: Yes (follow up set by another for 26 Sep 24) (9/16/2024  9:38 AM)  Care Management Interventions: Verified appointment date/time/provider (9/16/2024  9:38 AM)  Has the patient kept scheduled appointments due by today?: Yes (9/16/2024  9:38 AM)  Care Management Interventions: Advised patient to keep appointment (9/16/2024  9:38 AM)    Self Management  What is the home health agency?: N/A (9/16/2024  9:38 AM)  Has home health visited the patient within 72 hours of discharge?: Not applicable (9/16/2024  9:38 AM)  What Durable Medical Equipment (DME) was ordered?: N/A (9/16/2024  9:38 AM)    Patient Teaching  Does the patient have access to their discharge instructions?: Yes (9/16/2024  9:38 AM)  Care Management Interventions: Reviewed instructions with patient (9/16/2024  9:38 AM)  What is the patient's perception of their health status since discharge?: Improving (9/16/2024  9:38 AM)  Is the patient/caregiver able to teach back the hierarchy of who to call/visit for  symptoms/problems? PCP, Specialist, Home Health nurse, Urgent Care, ED, 911: Yes (9/16/2024  9:38 AM)  Patient/Caregiver Education Comments: None (9/16/2024  9:38 AM)    Wrap Up  Wrap Up Additional Comments: None (9/16/2024  9:38 AM)  Call End Time: 0938 (9/16/2024  9:38 AM)    Pt grateful for outreach call and is agreeable to being contacted after PCP appt to see how they are doing.

## 2024-09-17 ENCOUNTER — LAB (OUTPATIENT)
Dept: LAB | Facility: LAB | Age: 69
End: 2024-09-17
Payer: MEDICARE

## 2024-09-17 DIAGNOSIS — K83.1 BILIARY OBSTRUCTION DUE TO MALIGNANT NEOPLASM (MULTI): ICD-10-CM

## 2024-09-17 DIAGNOSIS — R11.0 NAUSEA: ICD-10-CM

## 2024-09-17 DIAGNOSIS — C80.1 BILIARY OBSTRUCTION DUE TO MALIGNANT NEOPLASM (MULTI): ICD-10-CM

## 2024-09-17 DIAGNOSIS — R10.9 STOMACH PAIN: ICD-10-CM

## 2024-09-17 DIAGNOSIS — E11.9 TYPE 2 DIABETES MELLITUS WITHOUT COMPLICATION, WITHOUT LONG-TERM CURRENT USE OF INSULIN (MULTI): Chronic | ICD-10-CM

## 2024-09-17 LAB
ALBUMIN SERPL BCP-MCNC: 3.7 G/DL (ref 3.4–5)
ALP SERPL-CCNC: 1048 U/L (ref 33–136)
ALT SERPL W P-5'-P-CCNC: 177 U/L (ref 7–45)
AMYLASE SERPL-CCNC: 66 U/L (ref 29–103)
ANION GAP SERPL CALC-SCNC: 15 MMOL/L (ref 10–20)
AST SERPL W P-5'-P-CCNC: 199 U/L (ref 9–39)
BILIRUB DIRECT SERPL-MCNC: 1.7 MG/DL (ref 0–0.3)
BILIRUB SERPL-MCNC: 3.4 MG/DL (ref 0–1.2)
BUN SERPL-MCNC: 43 MG/DL (ref 6–23)
CALCIUM SERPL-MCNC: 8.2 MG/DL (ref 8.6–10.3)
CHLORIDE SERPL-SCNC: 97 MMOL/L (ref 98–107)
CO2 SERPL-SCNC: 25 MMOL/L (ref 21–32)
CREAT SERPL-MCNC: 2.41 MG/DL (ref 0.5–1.05)
EGFRCR SERPLBLD CKD-EPI 2021: 22 ML/MIN/1.73M*2
ERYTHROCYTE [DISTWIDTH] IN BLOOD BY AUTOMATED COUNT: 14.2 % (ref 11.5–14.5)
GLUCOSE SERPL-MCNC: 355 MG/DL (ref 74–99)
HCT VFR BLD AUTO: 32.9 % (ref 36–46)
HGB BLD-MCNC: 10.4 G/DL (ref 12–16)
LAB AP ASR DISCLAIMER: NORMAL
LABORATORY COMMENT REPORT: NORMAL
LIPASE SERPL-CCNC: 296 U/L (ref 9–82)
MCH RBC QN AUTO: 30.1 PG (ref 26–34)
MCHC RBC AUTO-ENTMCNC: 31.6 G/DL (ref 32–36)
MCV RBC AUTO: 95 FL (ref 80–100)
NRBC BLD-RTO: 0 /100 WBCS (ref 0–0)
PATH REPORT.FINAL DX SPEC: NORMAL
PATH REPORT.GROSS SPEC: NORMAL
PATH REPORT.RELEVANT HX SPEC: NORMAL
PATH REPORT.TOTAL CANCER: NORMAL
PHOSPHATE SERPL-MCNC: 3.6 MG/DL (ref 2.5–4.9)
PLATELET # BLD AUTO: 154 X10*3/UL (ref 150–450)
POTASSIUM SERPL-SCNC: 3.6 MMOL/L (ref 3.5–5.3)
PROT SERPL-MCNC: 6.3 G/DL (ref 6.4–8.2)
RBC # BLD AUTO: 3.45 X10*6/UL (ref 4–5.2)
SODIUM SERPL-SCNC: 133 MMOL/L (ref 136–145)
WBC # BLD AUTO: 7.4 X10*3/UL (ref 4.4–11.3)

## 2024-09-17 PROCEDURE — 82150 ASSAY OF AMYLASE: CPT

## 2024-09-17 PROCEDURE — 82248 BILIRUBIN DIRECT: CPT

## 2024-09-17 PROCEDURE — 83690 ASSAY OF LIPASE: CPT

## 2024-09-17 PROCEDURE — 84100 ASSAY OF PHOSPHORUS: CPT

## 2024-09-17 PROCEDURE — 36415 COLL VENOUS BLD VENIPUNCTURE: CPT

## 2024-09-17 PROCEDURE — 80053 COMPREHEN METABOLIC PANEL: CPT

## 2024-09-17 PROCEDURE — 85027 COMPLETE CBC AUTOMATED: CPT

## 2024-09-17 NOTE — PROGRESS NOTES
Chief Complaint:  Pancreatic Cancer    HPI:  67 yo woman from [facility] at [facility] for abd pain, jaundice, pancreatitis, MARK    Initial TB 10.6.  Initial Cr 4.21 (baseline < 1).  Initial lipase 1477.  19-9 = 2960 with TB elevated    US:  IMPRESSION:  Intrahepatic and extrahepatic biliary duct dilatation with the  proximal common bile duct measuring 1.6 cm in diameter. There is also  evidence of dilatation of the pancreatic duct measuring 8 mm in  diameter. There is obscuration of the distal common bile duct and the  pancreatic head secondary to shadowing from bowel gas on the  ultrasound examination. As described on the 9/9/2024 CT examination,  the findings are concerning for underlying mass, and pancreatic  protocol MRI is recommended for further evaluation.  Gallbladder sludge and wall thickening. Per the sonographer,  sonographic Strong sign is negative.    NONCON CT:  IMPRESSION:  1.  Limited noncontrast CT scan. There is distended gallbladder with  pancreatic and biliary dilatation without CT evidence of biliary  calculi. There is question of fullness of the pancreatic head  concerning for an underlying pancreatic head mass. Further evaluation  with multiphase MR abdomen is recommended.      EUS:    Result Text   Impression  Irregular mass measuring 43 mm x 34 mm with poorly defined margins was visualized in the pancreas; fine needle biopsy passes were taken. A sample was sent for histology analysis. Onsite cytology results were preliminarily atypical  The mass appears to be borderline resectable with abutment of the confluence.  No adenopathy was visualized.  Both the bile duct and pancreatic ducts are obstructed.          Component  Resulting Agency   FINAL DIAGNOSIS   A.  Pancreas, head, mass, FNB:  -Minute marked atypical cells/glands, consistent with adenocarcinoma (see comment).     Comment:  An immunostain for CKAE1/3 was attempted, however the tissue is not visible on the immunohistochemically-stained  slide.      Please also see the report of the concurrent cytology specimen ([id number]).      The case was additionally reviewed with Dr. Merle Carrillo, who concurs with the above diagnosis.      ERCP:  Findings  The major papilla was native. The major papilla had a normal appearance. The common bile duct was deeply cannulated after 8 attempts using a traction sphincterotome and needle knife with guidewire by employing a needle knife access technique. Cannulation was difficult due to papillary stenosis. No bleeding was observed. Contrast injection was performed in the distal common bile duct and mid common bile duct. The injection extended beyond the stricture. Ductal flow was adequate. The study's image quality was excellent. Irregular and severe stricture was visualized in the common bile duct. The stricture was traversable by wire. Complete biliary sphincterotomy was performed using a sphincterotome. One 10 mm x 6 cm uncovered metal stent was placed successfully in the common bile duct    Labs done post discharge and day before today's visit:  gluc 355, Cr 2.41 (slow downtrend), TB 3.4 (slow downtrend)    Stomach  little upset.  No vomting.  Bowels moving.    PMH:  Chiari 1 Malformation, DM ith A1c 8.1, hypothyroid, HTN    PSH:  appy, L knee, L foot    Soc:  .  Here with .  2 kids.    PE: Appears stated age.  Sclera slightly icteric.  No lymphadenopathy.  Breath sounds clear without wheezing.  Heart regular without obvious murmur.  Abdomen protuberant but soft and nontender.    Impression / Plan:    This patient has a newly diagnosed pancreatic cancer.  She is not yet staged because part of her presentation was acute kidney injury and we have not been able to get appropriate contrast-enhanced scans.  Her 19-9 was quite elevated when her bilirubin was quite high.  Laboratory work yesterday did show that her liver function tests and creatinine are slowly improving.  However they are not yet appropriate  for her staging scans.    Today I talked with this patient about her diagnosis of pancreatic cancer and how we need to complete her staging scans to devise an appropriate treatment plan.  We talked about those being a CT of the chest and a pancreas protocol CT.  We can consider an MRI of the pancreas but I would prefer CT if possible and safe.  If we were to see clear-cut evidence of disseminated disease that would put her down a palliative chemotherapy pathway.  If we however were to see localized disease we would be on the neoadjuvant treatment pathway whether this is radiologically resectable, borderline resectable, or locally advanced.  We would do a diagnostic laparoscopy and Mediport in advance of that treatment.  We talked about the rationale for localized pancreas cancer to use neoadjuvant chemotherapy and occasionally radiation therapy.    The plan as of now is as follows.  This patient will touch base with her primary physician about her blood sugars.  Some of her medications were stopped in the hospital and I think she needs to get back on them.  On Monday of next week we will repeat a CMP to evaluate her creatinine and liver function tests.  Assuming her creatinine is appropriate we will get her in for her staging scans later that week.  As of right now I have her set for a diagnostic laparoscopy and Mediport on September 30.  Again all of this is fluid.  The last thing that I will do is arrange for this patient to see my colleague in medical oncology to discuss neoadjuvant therapy.  I told her that it is possible that her initial visit would have to be at the main campus instead of at [facility] but that hopefully all subsequent visits could be at [facility].  I told her that it is quite likely she could get treatment at [facility] which is closer to her home.    Today this patient signed electronic informed consent after discussion about diagnostic laparoscopy and Mediport.  She does not  need to be seen in preadmission testing.    This note has been dictated with voice recognition software and has not been reviewed for grammar or content errors.    Update 9/28:  Pt's repeat labs this past Monday with Cr still 1.6 and GFR 36, so I did not want to give contrast for a PPCT.  We got an MRI/CP which has been read:  IMPRESSION:  1.  Ill-defined pancreatic head and uncinate process hypoenhancing  mass measuring 3.5 cm. There is abrupt cutoff of the main pancreatic  duct at the level of the pancreatic neck with dilatation of the  distal pancreatic duct. There is less than 90 degrees of abutment of  the portal mesenteric confluence by the mass. The hepatic arteries  and SMA are not involved by the mass.  2. No lymphadenopathy.  3. Mild intrahepatic biliary dilatation with a common bile duct stent  in place and expected pneumobilia.  4. Layering contents within the gallbladder lumen which may represent  gallbladder sludge.    I have reviewed and concur with read.  This is a radiographically resectable PDAC (caveat is that chest CT has not been read yet, is done).  I see no arterial variants.  Pt has seen Dr Cruz.  Looks like working with PCP on sugars.  Dx Lap/Port set for 9/30.    Update 10/1  Chest CT:  IMPRESSION:  No evidence of metastatic disease.

## 2024-09-17 NOTE — H&P (VIEW-ONLY)
Chief Complaint:  Pancreatic Cancer    HPI:  67 yo woman from [facility] at [facility] for abd pain, jaundice, pancreatitis, MARK    Initial TB 10.6.  Initial Cr 4.21 (baseline < 1).  Initial lipase 1477.  19-9 = 2960 with TB elevated    US:  IMPRESSION:  Intrahepatic and extrahepatic biliary duct dilatation with the  proximal common bile duct measuring 1.6 cm in diameter. There is also  evidence of dilatation of the pancreatic duct measuring 8 mm in  diameter. There is obscuration of the distal common bile duct and the  pancreatic head secondary to shadowing from bowel gas on the  ultrasound examination. As described on the 9/9/2024 CT examination,  the findings are concerning for underlying mass, and pancreatic  protocol MRI is recommended for further evaluation.  Gallbladder sludge and wall thickening. Per the sonographer,  sonographic Strong sign is negative.    NONCON CT:  IMPRESSION:  1.  Limited noncontrast CT scan. There is distended gallbladder with  pancreatic and biliary dilatation without CT evidence of biliary  calculi. There is question of fullness of the pancreatic head  concerning for an underlying pancreatic head mass. Further evaluation  with multiphase MR abdomen is recommended.      EUS:    Result Text   Impression  Irregular mass measuring 43 mm x 34 mm with poorly defined margins was visualized in the pancreas; fine needle biopsy passes were taken. A sample was sent for histology analysis. Onsite cytology results were preliminarily atypical  The mass appears to be borderline resectable with abutment of the confluence.  No adenopathy was visualized.  Both the bile duct and pancreatic ducts are obstructed.          Component  Resulting Agency   FINAL DIAGNOSIS   A.  Pancreas, head, mass, FNB:  -Minute marked atypical cells/glands, consistent with adenocarcinoma (see comment).     Comment:  An immunostain for CKAE1/3 was attempted, however the tissue is not visible on the immunohistochemically-stained  slide.      Please also see the report of the concurrent cytology specimen ([id number]).      The case was additionally reviewed with Dr. Merle Carrillo, who concurs with the above diagnosis.      ERCP:  Findings  The major papilla was native. The major papilla had a normal appearance. The common bile duct was deeply cannulated after 8 attempts using a traction sphincterotome and needle knife with guidewire by employing a needle knife access technique. Cannulation was difficult due to papillary stenosis. No bleeding was observed. Contrast injection was performed in the distal common bile duct and mid common bile duct. The injection extended beyond the stricture. Ductal flow was adequate. The study's image quality was excellent. Irregular and severe stricture was visualized in the common bile duct. The stricture was traversable by wire. Complete biliary sphincterotomy was performed using a sphincterotome. One 10 mm x 6 cm uncovered metal stent was placed successfully in the common bile duct    Labs done post discharge and day before today's visit:  gluc 355, Cr 2.41 (slow downtrend), TB 3.4 (slow downtrend)    Stomach  little upset.  No vomting.  Bowels moving.    PMH:  Chiari 1 Malformation, DM ith A1c 8.1, hypothyroid, HTN    PSH:  appy, L knee, L foot    Soc:  .  Here with .  2 kids.    PE: Appears stated age.  Sclera slightly icteric.  No lymphadenopathy.  Breath sounds clear without wheezing.  Heart regular without obvious murmur.  Abdomen protuberant but soft and nontender.    Impression / Plan:    This patient has a newly diagnosed pancreatic cancer.  She is not yet staged because part of her presentation was acute kidney injury and we have not been able to get appropriate contrast-enhanced scans.  Her 19-9 was quite elevated when her bilirubin was quite high.  Laboratory work yesterday did show that her liver function tests and creatinine are slowly improving.  However they are not yet appropriate  for her staging scans.    Today I talked with this patient about her diagnosis of pancreatic cancer and how we need to complete her staging scans to devise an appropriate treatment plan.  We talked about those being a CT of the chest and a pancreas protocol CT.  We can consider an MRI of the pancreas but I would prefer CT if possible and safe.  If we were to see clear-cut evidence of disseminated disease that would put her down a palliative chemotherapy pathway.  If we however were to see localized disease we would be on the neoadjuvant treatment pathway whether this is radiologically resectable, borderline resectable, or locally advanced.  We would do a diagnostic laparoscopy and Mediport in advance of that treatment.  We talked about the rationale for localized pancreas cancer to use neoadjuvant chemotherapy and occasionally radiation therapy.    The plan as of now is as follows.  This patient will touch base with her primary physician about her blood sugars.  Some of her medications were stopped in the hospital and I think she needs to get back on them.  On Monday of next week we will repeat a CMP to evaluate her creatinine and liver function tests.  Assuming her creatinine is appropriate we will get her in for her staging scans later that week.  As of right now I have her set for a diagnostic laparoscopy and Mediport on September 30.  Again all of this is fluid.  The last thing that I will do is arrange for this patient to see my colleague in medical oncology to discuss neoadjuvant therapy.  I told her that it is possible that her initial visit would have to be at the main campus instead of at [facility] but that hopefully all subsequent visits could be at [facility].  I told her that it is quite likely she could get treatment at [facility] which is closer to her home.    Today this patient signed electronic informed consent after discussion about diagnostic laparoscopy and Mediport.  She does not  need to be seen in preadmission testing.    This note has been dictated with voice recognition software and has not been reviewed for grammar or content errors.    Update 9/28:  Pt's repeat labs this past Monday with Cr still 1.6 and GFR 36, so I did not want to give contrast for a PPCT.  We got an MRI/CP which has been read:  IMPRESSION:  1.  Ill-defined pancreatic head and uncinate process hypoenhancing  mass measuring 3.5 cm. There is abrupt cutoff of the main pancreatic  duct at the level of the pancreatic neck with dilatation of the  distal pancreatic duct. There is less than 90 degrees of abutment of  the portal mesenteric confluence by the mass. The hepatic arteries  and SMA are not involved by the mass.  2. No lymphadenopathy.  3. Mild intrahepatic biliary dilatation with a common bile duct stent  in place and expected pneumobilia.  4. Layering contents within the gallbladder lumen which may represent  gallbladder sludge.    I have reviewed and concur with read.  This is a radiographically resectable PDAC (caveat is that chest CT has not been read yet, is done).  I see no arterial variants.  Pt has seen Dr Cruz.  Looks like working with PCP on sugars.  Dx Lap/Port set for 9/30.

## 2024-09-18 ENCOUNTER — TELEPHONE (OUTPATIENT)
Dept: PRIMARY CARE | Facility: CLINIC | Age: 69
End: 2024-09-18
Payer: MEDICARE

## 2024-09-18 ENCOUNTER — OFFICE VISIT (OUTPATIENT)
Dept: SURGICAL ONCOLOGY | Facility: CLINIC | Age: 69
End: 2024-09-18
Payer: MEDICARE

## 2024-09-18 VITALS
WEIGHT: 175.93 LBS | OXYGEN SATURATION: 93 % | DIASTOLIC BLOOD PRESSURE: 78 MMHG | TEMPERATURE: 97.7 F | SYSTOLIC BLOOD PRESSURE: 118 MMHG | RESPIRATION RATE: 14 BRPM | BODY MASS INDEX: 28.41 KG/M2 | HEART RATE: 72 BPM

## 2024-09-18 DIAGNOSIS — E11.9 TYPE 2 DIABETES MELLITUS WITHOUT COMPLICATION, WITHOUT LONG-TERM CURRENT USE OF INSULIN (MULTI): Chronic | ICD-10-CM

## 2024-09-18 DIAGNOSIS — C25.0 MALIGNANT NEOPLASM OF HEAD OF PANCREAS (MULTI): Primary | ICD-10-CM

## 2024-09-18 DIAGNOSIS — N17.9 AKI (ACUTE KIDNEY INJURY) (CMS-HCC): ICD-10-CM

## 2024-09-18 DIAGNOSIS — C80.1 BILIARY OBSTRUCTION DUE TO MALIGNANT NEOPLASM (MULTI): Primary | ICD-10-CM

## 2024-09-18 DIAGNOSIS — K83.1 BILIARY OBSTRUCTION DUE TO MALIGNANT NEOPLASM (MULTI): Primary | ICD-10-CM

## 2024-09-18 DIAGNOSIS — D37.8 NEOPLASM OF UNCERTAIN BEHAVIOR OF PANCREAS: ICD-10-CM

## 2024-09-18 DIAGNOSIS — C25.0 MALIGNANT NEOPLASM OF HEAD OF PANCREAS (MULTI): ICD-10-CM

## 2024-09-18 PROBLEM — E03.9 HYPOTHYROIDISM: Status: ACTIVE | Noted: 2022-10-14

## 2024-09-18 PROBLEM — K64.4 RESIDUAL HEMORRHOIDAL SKIN TAGS: Status: ACTIVE | Noted: 2022-10-14

## 2024-09-18 PROBLEM — G93.5 CHIARI MALFORMATION TYPE I (MULTI): Status: ACTIVE | Noted: 2024-09-18

## 2024-09-18 PROBLEM — E78.2 MIXED HYPERLIPIDEMIA: Status: ACTIVE | Noted: 2024-09-18

## 2024-09-18 PROBLEM — Z74.09 IMPAIRED FUNCTIONAL MOBILITY, BALANCE, GAIT, AND ENDURANCE: Status: ACTIVE | Noted: 2024-09-18

## 2024-09-18 PROBLEM — R19.5 OTHER FECAL ABNORMALITIES: Status: ACTIVE | Noted: 2022-10-14

## 2024-09-18 PROBLEM — M19.90 IDIOPATHIC OSTEOARTHRITIS: Status: ACTIVE | Noted: 2024-09-18

## 2024-09-18 PROBLEM — L98.8 DIABETIC DERMOPATHY (MULTI): Status: ACTIVE | Noted: 2024-09-18

## 2024-09-18 PROBLEM — R26.81 UNSTEADY GAIT: Status: ACTIVE | Noted: 2024-09-18

## 2024-09-18 PROBLEM — K57.30 DIVERTICULOSIS OF LARGE INTESTINE WITHOUT PERFORATION OR ABSCESS WITHOUT BLEEDING: Status: ACTIVE | Noted: 2022-10-14

## 2024-09-18 PROBLEM — E11.628 DIABETIC DERMOPATHY (MULTI): Status: ACTIVE | Noted: 2024-09-18

## 2024-09-18 PROBLEM — M50.90 DISORDER OF INTERVERTEBRAL DISC OF CERVICAL SPINE: Status: ACTIVE | Noted: 2024-09-18

## 2024-09-18 PROBLEM — H61.20 IMPACTED CERUMEN: Status: ACTIVE | Noted: 2024-09-18

## 2024-09-18 PROBLEM — E03.3 POST-INFECTIOUS HYPOTHYROIDISM: Status: ACTIVE | Noted: 2024-09-18

## 2024-09-18 PROBLEM — G43.019 COMMON MIGRAINE WITH INTRACTABLE MIGRAINE: Status: ACTIVE | Noted: 2024-09-18

## 2024-09-18 PROBLEM — Z79.84 LONG TERM (CURRENT) USE OF ORAL HYPOGLYCEMIC DRUGS: Status: ACTIVE | Noted: 2022-10-14

## 2024-09-18 PROBLEM — I10 ESSENTIAL HYPERTENSION: Status: ACTIVE | Noted: 2024-09-18

## 2024-09-18 PROBLEM — G93.5 COMPRESSION OF BRAIN (MULTI): Status: ACTIVE | Noted: 2022-10-14

## 2024-09-18 PROBLEM — R26.9 UNSPECIFIED ABNORMALITIES OF GAIT AND MOBILITY: Status: ACTIVE | Noted: 2022-06-16

## 2024-09-18 PROBLEM — Z86.39 HISTORY OF ELEVATED LIPIDS: Status: ACTIVE | Noted: 2024-09-18

## 2024-09-18 PROBLEM — M25.559 ARTHRALGIA OF HIP: Status: ACTIVE | Noted: 2024-09-18

## 2024-09-18 PROBLEM — R26.9 ABNORMAL GAIT: Status: ACTIVE | Noted: 2024-09-18

## 2024-09-18 PROBLEM — R19.5 POSITIVE COLORECTAL CANCER SCREENING USING DNA-BASED STOOL TEST: Status: ACTIVE | Noted: 2024-09-18

## 2024-09-18 PROBLEM — M15.9 GENERALIZED OSTEOARTHRITIS: Status: ACTIVE | Noted: 2024-09-18

## 2024-09-18 PROCEDURE — 1111F DSCHRG MED/CURRENT MED MERGE: CPT | Performed by: SURGERY

## 2024-09-18 PROCEDURE — 3060F POS MICROALBUMINURIA REV: CPT | Performed by: SURGERY

## 2024-09-18 PROCEDURE — 3048F LDL-C <100 MG/DL: CPT | Performed by: SURGERY

## 2024-09-18 PROCEDURE — 99204 OFFICE O/P NEW MOD 45 MIN: CPT | Performed by: SURGERY

## 2024-09-18 PROCEDURE — 3074F SYST BP LT 130 MM HG: CPT | Performed by: SURGERY

## 2024-09-18 PROCEDURE — 3078F DIAST BP <80 MM HG: CPT | Performed by: SURGERY

## 2024-09-18 PROCEDURE — 3052F HG A1C>EQUAL 8.0%<EQUAL 9.0%: CPT | Performed by: SURGERY

## 2024-09-18 PROCEDURE — 99214 OFFICE O/P EST MOD 30 MIN: CPT | Performed by: SURGERY

## 2024-09-18 PROCEDURE — 1125F AMNT PAIN NOTED PAIN PRSNT: CPT | Performed by: SURGERY

## 2024-09-18 PROCEDURE — 1159F MED LIST DOCD IN RCRD: CPT | Performed by: SURGERY

## 2024-09-18 RX ORDER — SODIUM CHLORIDE, SODIUM LACTATE, POTASSIUM CHLORIDE, CALCIUM CHLORIDE 600; 310; 30; 20 MG/100ML; MG/100ML; MG/100ML; MG/100ML
20 INJECTION, SOLUTION INTRAVENOUS CONTINUOUS
OUTPATIENT
Start: 2024-09-18

## 2024-09-18 ASSESSMENT — PAIN SCALES - GENERAL: PAINLEVEL: 2

## 2024-09-18 NOTE — TELEPHONE ENCOUNTER
Miguel English, APRN-CNP  [id number] Clinical Support Staff  Please tell patient that her amylase level is normal and her lipase is down to 296 from 891.  Her lipase is heading in the right direction.  No new orders.

## 2024-09-19 ENCOUNTER — APPOINTMENT (OUTPATIENT)
Dept: PRIMARY CARE | Facility: CLINIC | Age: 69
End: 2024-09-19
Payer: MEDICARE

## 2024-09-19 LAB
LABORATORY COMMENT REPORT: NORMAL
LABORATORY COMMENT REPORT: NORMAL
PATH REPORT.FINAL DX SPEC: NORMAL
PATH REPORT.GROSS SPEC: NORMAL
PATH REPORT.INTRAOP OBS SPEC DOC: NORMAL
PATH REPORT.TOTAL CANCER: NORMAL

## 2024-09-20 ENCOUNTER — TELEPHONE (OUTPATIENT)
Dept: PRIMARY CARE | Facility: CLINIC | Age: 69
End: 2024-09-20
Payer: MEDICARE

## 2024-09-20 NOTE — TELEPHONE ENCOUNTER
Patient called requesting something for the diabetes her blood sugar has been running high since she got out of [address] - [address] [facility]  [address] [address]  Phone: [phone]  Fax: [phone]  SORIN #: --

## 2024-09-23 ENCOUNTER — PATIENT MESSAGE (OUTPATIENT)
Dept: PRIMARY CARE | Facility: CLINIC | Age: 69
End: 2024-09-23

## 2024-09-23 ENCOUNTER — LAB (OUTPATIENT)
Dept: LAB | Facility: LAB | Age: 69
End: 2024-09-23
Payer: MEDICARE

## 2024-09-23 ENCOUNTER — PATIENT OUTREACH (OUTPATIENT)
Dept: HEMATOLOGY/ONCOLOGY | Facility: HOSPITAL | Age: 69
End: 2024-09-23

## 2024-09-23 DIAGNOSIS — C25.0 MALIGNANT NEOPLASM OF HEAD OF PANCREAS (MULTI): ICD-10-CM

## 2024-09-23 DIAGNOSIS — C80.1 BILIARY OBSTRUCTION DUE TO MALIGNANT NEOPLASM (MULTI): ICD-10-CM

## 2024-09-23 DIAGNOSIS — N17.9 AKI (ACUTE KIDNEY INJURY) (CMS-HCC): ICD-10-CM

## 2024-09-23 DIAGNOSIS — K83.1 BILIARY OBSTRUCTION DUE TO MALIGNANT NEOPLASM (MULTI): ICD-10-CM

## 2024-09-23 DIAGNOSIS — E11.9 TYPE 2 DIABETES MELLITUS WITHOUT COMPLICATION, WITHOUT LONG-TERM CURRENT USE OF INSULIN (MULTI): Chronic | ICD-10-CM

## 2024-09-23 LAB
ALBUMIN SERPL BCP-MCNC: 3.6 G/DL (ref 3.4–5)
ALP SERPL-CCNC: 657 U/L (ref 33–136)
ALT SERPL W P-5'-P-CCNC: 103 U/L (ref 7–45)
ANION GAP SERPL CALC-SCNC: 17 MMOL/L (ref 10–20)
AST SERPL W P-5'-P-CCNC: 106 U/L (ref 9–39)
BILIRUB SERPL-MCNC: 2.4 MG/DL (ref 0–1.2)
BUN SERPL-MCNC: 39 MG/DL (ref 6–23)
CALCIUM SERPL-MCNC: 8.2 MG/DL (ref 8.6–10.3)
CANCER AG19-9 SERPL-ACNC: 3639.86 U/ML
CHLORIDE SERPL-SCNC: 92 MMOL/L (ref 98–107)
CO2 SERPL-SCNC: 25 MMOL/L (ref 21–32)
CREAT SERPL-MCNC: 1.58 MG/DL (ref 0.5–1.05)
EGFRCR SERPLBLD CKD-EPI 2021: 36 ML/MIN/1.73M*2
GLUCOSE SERPL-MCNC: 554 MG/DL (ref 74–99)
POTASSIUM SERPL-SCNC: 4.2 MMOL/L (ref 3.5–5.3)
PROT SERPL-MCNC: 6.3 G/DL (ref 6.4–8.2)
SODIUM SERPL-SCNC: 130 MMOL/L (ref 136–145)

## 2024-09-23 PROCEDURE — 80053 COMPREHEN METABOLIC PANEL: CPT

## 2024-09-23 PROCEDURE — 86301 IMMUNOASSAY TUMOR CA 19-9: CPT

## 2024-09-23 PROCEDURE — 36415 COLL VENOUS BLD VENIPUNCTURE: CPT

## 2024-09-23 NOTE — TELEPHONE ENCOUNTER
What course of action her oncology team takes will often dictate what agent is preferred to use for diabetic control. Often insulin is recommended. I think we can try to avoid that for the time but I do want her to stop the metformin, I sent in glipizide instead. We do need to be careful with this medication that she does not have hypoglycemic episodes, so regular BG checks are very important.

## 2024-09-24 ENCOUNTER — TELEPHONE (OUTPATIENT)
Dept: PRIMARY CARE | Facility: CLINIC | Age: 69
End: 2024-09-24
Payer: MEDICARE

## 2024-09-24 ENCOUNTER — HOSPITAL ENCOUNTER (EMERGENCY)
Facility: HOSPITAL | Age: 69
Discharge: HOME | End: 2024-09-24
Attending: STUDENT IN AN ORGANIZED HEALTH CARE EDUCATION/TRAINING PROGRAM
Payer: MEDICARE

## 2024-09-24 VITALS
DIASTOLIC BLOOD PRESSURE: 57 MMHG | RESPIRATION RATE: 20 BRPM | HEART RATE: 86 BPM | HEIGHT: 66 IN | SYSTOLIC BLOOD PRESSURE: 117 MMHG | TEMPERATURE: 98.4 F | WEIGHT: 170 LBS | BODY MASS INDEX: 27.32 KG/M2 | OXYGEN SATURATION: 98 %

## 2024-09-24 DIAGNOSIS — R73.9 HYPERGLYCEMIA: Primary | ICD-10-CM

## 2024-09-24 DIAGNOSIS — C25.9 MALIGNANT NEOPLASM OF PANCREAS, UNSPECIFIED LOCATION OF MALIGNANCY (MULTI): ICD-10-CM

## 2024-09-24 DIAGNOSIS — E11.9 TYPE 2 DIABETES MELLITUS WITHOUT COMPLICATION, WITHOUT LONG-TERM CURRENT USE OF INSULIN (MULTI): Chronic | ICD-10-CM

## 2024-09-24 LAB
ALBUMIN SERPL BCP-MCNC: 3.7 G/DL (ref 3.4–5)
ALP SERPL-CCNC: 656 U/L (ref 33–136)
ALT SERPL W P-5'-P-CCNC: 107 U/L (ref 7–45)
ANION GAP SERPL CALC-SCNC: 15 MMOL/L (ref 10–20)
APPEARANCE UR: CLEAR
AST SERPL W P-5'-P-CCNC: 116 U/L (ref 9–39)
B-OH-BUTYR SERPL-SCNC: 0.17 MMOL/L (ref 0.02–0.27)
BASE EXCESS BLDV CALC-SCNC: -1.9 MMOL/L (ref -2–3)
BASOPHILS # BLD AUTO: 0.04 X10*3/UL (ref 0–0.1)
BASOPHILS NFR BLD AUTO: 0.4 %
BILIRUB SERPL-MCNC: 2.7 MG/DL (ref 0–1.2)
BILIRUB UR STRIP.AUTO-MCNC: NEGATIVE MG/DL
BODY TEMPERATURE: 37 DEGREES CELSIUS
BUN SERPL-MCNC: 38 MG/DL (ref 6–23)
CALCIUM SERPL-MCNC: 9 MG/DL (ref 8.6–10.3)
CHLORIDE SERPL-SCNC: 90 MMOL/L (ref 98–107)
CO2 SERPL-SCNC: 26 MMOL/L (ref 21–32)
COLOR UR: ABNORMAL
CREAT SERPL-MCNC: 1.49 MG/DL (ref 0.5–1.05)
EGFRCR SERPLBLD CKD-EPI 2021: 39 ML/MIN/1.73M*2
EOSINOPHIL # BLD AUTO: 0.18 X10*3/UL (ref 0–0.7)
EOSINOPHIL NFR BLD AUTO: 1.7 %
ERYTHROCYTE [DISTWIDTH] IN BLOOD BY AUTOMATED COUNT: 13.2 % (ref 11.5–14.5)
GLUCOSE BLD MANUAL STRIP-MCNC: 348 MG/DL (ref 74–99)
GLUCOSE BLD MANUAL STRIP-MCNC: 489 MG/DL (ref 74–99)
GLUCOSE SERPL-MCNC: 537 MG/DL (ref 74–99)
GLUCOSE UR STRIP.AUTO-MCNC: ABNORMAL MG/DL
HCO3 BLDV-SCNC: 24.3 MMOL/L (ref 22–26)
HCT VFR BLD AUTO: 31.6 % (ref 36–46)
HGB BLD-MCNC: 10.7 G/DL (ref 12–16)
HOLD SPECIMEN: NORMAL
IMM GRANULOCYTES # BLD AUTO: 0.04 X10*3/UL (ref 0–0.7)
IMM GRANULOCYTES NFR BLD AUTO: 0.4 % (ref 0–0.9)
INHALED O2 CONCENTRATION: 21 %
KETONES UR STRIP.AUTO-MCNC: NEGATIVE MG/DL
LEUKOCYTE ESTERASE UR QL STRIP.AUTO: NEGATIVE
LIPASE SERPL-CCNC: 166 U/L (ref 9–82)
LYMPHOCYTES # BLD AUTO: 1.2 X10*3/UL (ref 1.2–4.8)
LYMPHOCYTES NFR BLD AUTO: 11.4 %
MCH RBC QN AUTO: 30.9 PG (ref 26–34)
MCHC RBC AUTO-ENTMCNC: 33.9 G/DL (ref 32–36)
MCV RBC AUTO: 91 FL (ref 80–100)
MONOCYTES # BLD AUTO: 0.81 X10*3/UL (ref 0.1–1)
MONOCYTES NFR BLD AUTO: 7.7 %
MUCOUS THREADS #/AREA URNS AUTO: NORMAL /LPF
NEUTROPHILS # BLD AUTO: 8.3 X10*3/UL (ref 1.2–7.7)
NEUTROPHILS NFR BLD AUTO: 78.4 %
NITRITE UR QL STRIP.AUTO: NEGATIVE
NRBC BLD-RTO: 0 /100 WBCS (ref 0–0)
OXYHGB MFR BLDV: 42.7 % (ref 45–75)
PCO2 BLDV: 46 MM HG (ref 41–51)
PH BLDV: 7.33 PH (ref 7.33–7.43)
PH UR STRIP.AUTO: 6 [PH]
PLATELET # BLD AUTO: 215 X10*3/UL (ref 150–450)
PO2 BLDV: 26 MM HG (ref 35–45)
POTASSIUM SERPL-SCNC: 3.6 MMOL/L (ref 3.5–5.3)
PROT SERPL-MCNC: 7.3 G/DL (ref 6.4–8.2)
PROT UR STRIP.AUTO-MCNC: ABNORMAL MG/DL
RBC # BLD AUTO: 3.46 X10*6/UL (ref 4–5.2)
RBC # UR STRIP.AUTO: ABNORMAL /UL
RBC #/AREA URNS AUTO: NORMAL /HPF
SAO2 % BLDV: 43 % (ref 45–75)
SODIUM SERPL-SCNC: 127 MMOL/L (ref 136–145)
SP GR UR STRIP.AUTO: 1.01
SQUAMOUS #/AREA URNS AUTO: NORMAL /HPF
UROBILINOGEN UR STRIP.AUTO-MCNC: NORMAL MG/DL
WBC # BLD AUTO: 10.6 X10*3/UL (ref 4.4–11.3)
WBC #/AREA URNS AUTO: NORMAL /HPF
WBC CLUMPS #/AREA URNS AUTO: NORMAL /HPF

## 2024-09-24 PROCEDURE — 81001 URINALYSIS AUTO W/SCOPE: CPT | Performed by: STUDENT IN AN ORGANIZED HEALTH CARE EDUCATION/TRAINING PROGRAM

## 2024-09-24 PROCEDURE — 2500000004 HC RX 250 GENERAL PHARMACY W/ HCPCS (ALT 636 FOR OP/ED)

## 2024-09-24 PROCEDURE — 82010 KETONE BODYS QUAN: CPT | Performed by: STUDENT IN AN ORGANIZED HEALTH CARE EDUCATION/TRAINING PROGRAM

## 2024-09-24 PROCEDURE — 96375 TX/PRO/DX INJ NEW DRUG ADDON: CPT

## 2024-09-24 PROCEDURE — 82810 BLOOD GASES O2 SAT ONLY: CPT | Mod: CCI | Performed by: STUDENT IN AN ORGANIZED HEALTH CARE EDUCATION/TRAINING PROGRAM

## 2024-09-24 PROCEDURE — 2500000004 HC RX 250 GENERAL PHARMACY W/ HCPCS (ALT 636 FOR OP/ED): Performed by: STUDENT IN AN ORGANIZED HEALTH CARE EDUCATION/TRAINING PROGRAM

## 2024-09-24 PROCEDURE — 96376 TX/PRO/DX INJ SAME DRUG ADON: CPT

## 2024-09-24 PROCEDURE — 82947 ASSAY GLUCOSE BLOOD QUANT: CPT

## 2024-09-24 PROCEDURE — 99284 EMERGENCY DEPT VISIT MOD MDM: CPT | Mod: 25

## 2024-09-24 PROCEDURE — 2500000002 HC RX 250 W HCPCS SELF ADMINISTERED DRUGS (ALT 637 FOR MEDICARE OP, ALT 636 FOR OP/ED): Performed by: STUDENT IN AN ORGANIZED HEALTH CARE EDUCATION/TRAINING PROGRAM

## 2024-09-24 PROCEDURE — 85025 COMPLETE CBC W/AUTO DIFF WBC: CPT | Performed by: STUDENT IN AN ORGANIZED HEALTH CARE EDUCATION/TRAINING PROGRAM

## 2024-09-24 PROCEDURE — 36415 COLL VENOUS BLD VENIPUNCTURE: CPT | Performed by: STUDENT IN AN ORGANIZED HEALTH CARE EDUCATION/TRAINING PROGRAM

## 2024-09-24 PROCEDURE — 96374 THER/PROPH/DIAG INJ IV PUSH: CPT

## 2024-09-24 PROCEDURE — 83690 ASSAY OF LIPASE: CPT | Performed by: STUDENT IN AN ORGANIZED HEALTH CARE EDUCATION/TRAINING PROGRAM

## 2024-09-24 PROCEDURE — 84075 ASSAY ALKALINE PHOSPHATASE: CPT | Performed by: STUDENT IN AN ORGANIZED HEALTH CARE EDUCATION/TRAINING PROGRAM

## 2024-09-24 RX ORDER — OXYCODONE HYDROCHLORIDE 5 MG/1
5 TABLET ORAL EVERY 6 HOURS PRN
Qty: 12 TABLET | Refills: 0 | Status: SHIPPED | OUTPATIENT
Start: 2024-09-24 | End: 2024-09-28 | Stop reason: SDUPTHER

## 2024-09-24 RX ORDER — FENTANYL CITRATE 50 UG/ML
50 INJECTION, SOLUTION INTRAMUSCULAR; INTRAVENOUS ONCE
Status: COMPLETED | OUTPATIENT
Start: 2024-09-24 | End: 2024-09-24

## 2024-09-24 RX ORDER — ONDANSETRON HYDROCHLORIDE 2 MG/ML
4 INJECTION, SOLUTION INTRAVENOUS ONCE
Status: COMPLETED | OUTPATIENT
Start: 2024-09-24 | End: 2024-09-24

## 2024-09-24 RX ORDER — INSULIN LISPRO 100 [IU]/ML
10 INJECTION, SOLUTION INTRAVENOUS; SUBCUTANEOUS ONCE
Status: COMPLETED | OUTPATIENT
Start: 2024-09-24 | End: 2024-09-24

## 2024-09-24 RX ORDER — FENTANYL CITRATE 50 UG/ML
INJECTION, SOLUTION INTRAMUSCULAR; INTRAVENOUS
Status: COMPLETED
Start: 2024-09-24 | End: 2024-09-24

## 2024-09-24 RX ORDER — DOCUSATE SODIUM 100 MG/1
100 CAPSULE, LIQUID FILLED ORAL EVERY 12 HOURS
Qty: 60 CAPSULE | Refills: 0 | Status: SHIPPED | OUTPATIENT
Start: 2024-09-24 | End: 2024-10-24

## 2024-09-24 ASSESSMENT — PAIN SCALES - GENERAL
PAINLEVEL_OUTOF10: 4
PAINLEVEL_OUTOF10: 6
PAINLEVEL_OUTOF10: 0 - NO PAIN
PAINLEVEL_OUTOF10: 5 - MODERATE PAIN

## 2024-09-24 ASSESSMENT — LIFESTYLE VARIABLES
TOTAL SCORE: 0
EVER HAD A DRINK FIRST THING IN THE MORNING TO STEADY YOUR NERVES TO GET RID OF A HANGOVER: NO
HAVE YOU EVER FELT YOU SHOULD CUT DOWN ON YOUR DRINKING: NO
EVER FELT BAD OR GUILTY ABOUT YOUR DRINKING: NO
HAVE PEOPLE ANNOYED YOU BY CRITICIZING YOUR DRINKING: NO

## 2024-09-24 ASSESSMENT — PAIN DESCRIPTION - PROGRESSION: CLINICAL_PROGRESSION: GRADUALLY WORSENING

## 2024-09-24 ASSESSMENT — PAIN - FUNCTIONAL ASSESSMENT: PAIN_FUNCTIONAL_ASSESSMENT: 0-10

## 2024-09-24 NOTE — ED PROVIDER NOTES
HPI   Chief Complaint   Patient presents with    Hyperglycemia     Pt states she was at home and was receiving phone calls from a random number. The number then left a message stating that her blood sugar was elevated and that she needed to go to the emergency department. PT endorses intermittent lower abd pain with nausea. Also endorses weakness. PT states she was recently diagnosed with pancreatic CA.        68-year-old female with recently diagnosed pancreatic cancer states she received a call from her provider that she was hyperglycemic and needed to go to the emergency department for evaluation.  She states she has had epigastric cramping similar to her baseline for the past week, relatively unchanged.  She reports that she has been having some worsening nausea with intermittent episodes of chills.  She endorses some polydipsia over the past day.  She states she was prescribed pain medications but has been reluctant to take them as she experiences significant constipation with any narcotics, does not have a bowel regimen prescribed.  She is currently on metformin for glycemic control, states has been taking her medications regularly.  She is scheduled for her first appointment with her oncologist this Thursday, also has a EGD and port placement scheduled this week and anticipates starting chemotherapy shortly thereafter.  She denies fevers, chest pain, shortness of breath, vomiting, diarrhea, hematochezia, melena, flank pain, neck or back pain, dysuria, polyuria.              Patient History   Past Medical History:   Diagnosis Date    Personal history of other diseases of the circulatory system 09/15/2017    History of hypertension    Personal history of other endocrine, nutritional and metabolic disease 02/24/2016    History of type 2 diabetes mellitus    Personal history of other endocrine, nutritional and metabolic disease 03/09/2017    History of hypothyroidism    Personal history of other endocrine,  nutritional and metabolic disease     History of hyperlipidemia    Type 2 diabetes mellitus with other skin complications (Multi)     Diabetic dermopathy     Past Surgical History:   Procedure Laterality Date    OTHER SURGICAL HISTORY  09/01/2022    Foot surgery    OTHER SURGICAL HISTORY  09/01/2022    Appendectomy    OTHER SURGICAL HISTORY  09/01/2022    Knee surgery     No family history on file.  Social History     Tobacco Use    Smoking status: Never    Smokeless tobacco: Never   Vaping Use    Vaping status: Never Used   Substance Use Topics    Alcohol use: Never    Drug use: Never       Physical Exam   ED Triage Vitals [09/24/24 0326]   Temperature Heart Rate Respirations BP   36.9 °C (98.4 °F) (!) 114 18 (!) 107/47      Pulse Ox Temp Source Heart Rate Source Patient Position   98 % Oral -- --      BP Location FiO2 (%)     -- --       Physical Exam  Vitals reviewed.   Eyes:      General: Scleral icterus present.      Extraocular Movements: Extraocular movements intact.      Pupils: Pupils are equal, round, and reactive to light.   Cardiovascular:      Rate and Rhythm: Regular rhythm. Tachycardia present.      Pulses: Normal pulses.   Pulmonary:      Effort: Pulmonary effort is normal.      Breath sounds: Normal breath sounds. No stridor. No wheezing, rhonchi or rales.   Abdominal:      Palpations: Abdomen is soft.      Tenderness: There is abdominal tenderness (Suprapubic). There is no right CVA tenderness, left CVA tenderness, guarding or rebound.   Musculoskeletal:         General: Normal range of motion.      Cervical back: Normal range of motion.   Neurological:      General: No focal deficit present.      Mental Status: She is alert and oriented to person, place, and time.           ED Course & MDM   Diagnoses as of 09/24/24 0758   Hyperglycemia                 No data recorded     Rachel Coma Scale Score: 15 (09/24/24 0331 : Desean Zaragoza RN)                           Medical Decision  Making  68-year-old female with recent diagnosis of pancreatic cancer presents for hyperglycemia.  On metformin for glycemic control.  Endorses epigastric cramping and nausea relatively at her baseline, however has not been taking her pain regimen as she is concern for constipation without a prescribed bowel regimen.  On examination, patient tachycardic to 114, otherwise well-appearing.  A&O4.  Abdomen soft, moderate tenderness to palpation of the suprapubic region without rebound rigidity or guarding.  No CVA tenderness.  Dry mucous membranes with evidence for clinical dehydration.  She denies any dysuria, polyuria, flank pain.  Differentials considered include hyperglycemia related to underlying pancreatic dysfunction with known malignancy, HHS, DKA, need for medication adjustment.  Patient glucose noted to be 550 maximally in the emergency department, not acidotic, negative beta hydroxybutyrate.  Patient not currently meeting criteria for HHS.  Will give insulin 10 unit subcutaneous and repeat point-of-care glucose to determine disposition.  Patient is out of narcotic medication, states she was only given a 10-day supply initially, will need a refill of pain medication for her underlying cancer related pain as well as new prescriptions for bowel regimen given patient's concern for constipation while on these medications.        Procedure  Procedures     Teresa Smith MD  09/24/24 6280

## 2024-09-24 NOTE — TELEPHONE ENCOUNTER
I have continued to try to call the patient but she is not picking up, Unsure if asleep or phone is off. Please call first thing as soon as the office is open and keep trying until we are able to get ahold of her.

## 2024-09-24 NOTE — PROGRESS NOTES
Lab calling with critical value on glucose. I have tried calling and [place] x8 times with voicemails left about this critical value and recommendation to report to the ED. There has not been any answers. Forwarding to our office to call in the am as well.

## 2024-09-24 NOTE — PROGRESS NOTES
Emergency Medicine Transition of Care Note.    I received Nanci Colón in signout from Dr. Smith.  Please see the previous ED provider note for all HPI, PE and MDM up to the time of signout. This is in addition to the primary record.    In brief Nanci Colón is an 68 y.o. female presenting for   Chief Complaint   Patient presents with    Hyperglycemia     Pt states she was at home and was receiving phone calls from a random number. The number then left a message stating that her blood sugar was elevated and that she needed to go to the emergency department. PT endorses intermittent lower abd pain with nausea. Also endorses weakness. PT states she was recently diagnosed with pancreatic CA.         Diagnoses as of 09/24/24 0804   Hyperglycemia       Medical Decision Making  Patient was signed out to me pending repeat blood sugar.  Is down to 348.  It is trending in the right direction.  There is no evidence of ketoacidosis.  At this time I feel the patient can be discharged to home.  She is following up with her doctor in 2 days and they are going to discuss her diabetes regimen then.  I will give her oxycodone for pain control as well as a bowel regimen.  She will follow-up as described.        Final diagnoses:   [R73.9] Hyperglycemia           Procedure  Procedures    Spencer Crane MD

## 2024-09-24 NOTE — DOCUMENTATION CLARIFICATION NOTE
"    PATIENT:               FARSHAD MURRY  ACCT #:                  [id number]  MRN:                       [id number]  :                       1955  ADMIT DATE:       2024 1:28 AM  DISCH DATE:        9/15/2024 5:24 PM  RESPONDING PROVIDER #:        [id number]          PROVIDER RESPONSE TEXT:    I concur with the pathology report findings of \"Minute marked atypical cells/glands, consistent with adenocarcinoma...Malignant cells are present\" and they are clinically significant    CDI QUERY TEXT:    Clarification    Instruction:    Based on your assessment of the patient and the clinical information, please provide the requested documentation by clicking on the appropriate radio button and enter any additional information if prompted.    Question: Please document whether you concur or do not concur with the pathology report findings    When answering this query, please exercise your independent professional judgment. The fact that a question is being asked, does not imply that any particular answer is desired or expected.    The patient's clinical indicators include:  Clinical Information: 68 year old female, BMI 30.07 being treated for MARK with ATN, obstructive jaundice, acute pancreatitis and pancreatic mass.    Clinical Indicators and Pathology Findings:  Fine Needle Aspiration Biopsy; Pancreatic Head (): \"Malignant cells are present...Minute marked atypical cells/glands, consistent with adenocarcinoma...Neoplasm with normal mismatch repair protein expression.  The immunohistochemistry study of DNA mismatch repair protein expression reveals the normal presence of hMLH-1, hMSH2, hMSH6 and hPMS2 in the tumor\"    Treatment:  Per 9/15 DCS: \"PCP, surgical-oncology and nephrology follow up...surgical oncology referral has been created.\"    Fine needle aspiration biopsy procedure, occurring on 24.    Risk Factors: 68 year old female being treated for acute pancreatitis.  Options provided:  -- I concur with " "the pathology report findings of \"Minute marked atypical cells/glands, consistent with adenocarcinoma...Malignant cells are present\" and they are clinically significant  -- I do not concur with the pathology report findings of \"Minute marked atypical cells/glands, consistent with adenocarcinoma...Malignant cells are present\"  -- Other - I will add my own diagnosis  -- Refer to Clinical Documentation Reviewer    Query created by: Danuta Tomlinson on 9/23/2024 12:34 PM      Electronically signed by:  DAI FROST MD 9/24/2024 11:23 AM          "

## 2024-09-25 ENCOUNTER — TELEPHONE (OUTPATIENT)
Dept: SURGERY | Facility: CLINIC | Age: 69
End: 2024-09-25
Payer: MEDICARE

## 2024-09-25 DIAGNOSIS — E11.9 TYPE 2 DIABETES MELLITUS WITHOUT COMPLICATION, WITHOUT LONG-TERM CURRENT USE OF INSULIN (MULTI): ICD-10-CM

## 2024-09-25 DIAGNOSIS — C25.9 MALIGNANT NEOPLASM OF PANCREAS, UNSPECIFIED LOCATION OF MALIGNANCY (MULTI): Primary | ICD-10-CM

## 2024-09-25 PROBLEM — E11.65 TYPE 2 DIABETES MELLITUS WITH HYPERGLYCEMIA, WITHOUT LONG-TERM CURRENT USE OF INSULIN: Chronic | Status: ACTIVE | Noted: 2023-03-09

## 2024-09-25 PROBLEM — E11.65 TYPE 2 DIABETES MELLITUS WITH HYPERGLYCEMIA, WITHOUT LONG-TERM CURRENT USE OF INSULIN: Status: ACTIVE | Noted: 2023-03-09

## 2024-09-25 NOTE — TELEPHONE ENCOUNTER
It was noted earlier today that she did go to the ED for her elevated glucose. I spoke to her this evening after she was discharged from the ED. I instructed her that her lab levels for the CR/BUN and GFR are coming down but still too elevated for a CT scan with contrast. Dr. Mandujano noted labs and she was instructed that he would like an MRI/ MRCP with and a CT chest without contrast. I instructed her that they are scheduled at [facility] this Friday and I instructed her on the NPO. She has her appt on Thursday with Dr. Cruz and her PCP. She told me her PCP is going to start her on Glipizide 10mg starting on Wed for her elevated glucose. She is aware we still are planning her procedure on Monday for the mediport and will follow up after her MRCP /CT chest. She voiced understanding of above.I gave the directions to her .

## 2024-09-25 NOTE — ASSESSMENT & PLAN NOTE
Nursing education session in office today on insulin use  Has home check supplies, comfortable with TID checks  Pharmacy consulted with these new changes   Metformin was discontinued due to MARK  Discussion of glipizide, she is taking and tolerating well but reporting 300+ readings still but given highs and new pancreatic cx diagnosis, insulin may be our best option   She wants to ask her oncologist or see endocrinology, she will let me know about the plan  If she is ok I will start insulin with pharmacy doing frequent check ins

## 2024-09-26 ENCOUNTER — OFFICE VISIT (OUTPATIENT)
Dept: HEMATOLOGY/ONCOLOGY | Facility: HOSPITAL | Age: 69
End: 2024-09-26
Payer: MEDICARE

## 2024-09-26 ENCOUNTER — APPOINTMENT (OUTPATIENT)
Dept: PRIMARY CARE | Facility: CLINIC | Age: 69
End: 2024-09-26
Payer: MEDICARE

## 2024-09-26 VITALS
BODY MASS INDEX: 27.64 KG/M2 | DIASTOLIC BLOOD PRESSURE: 84 MMHG | OXYGEN SATURATION: 97 % | WEIGHT: 172 LBS | HEART RATE: 87 BPM | SYSTOLIC BLOOD PRESSURE: 124 MMHG | HEIGHT: 66 IN

## 2024-09-26 DIAGNOSIS — R73.9 HYPERGLYCEMIA: ICD-10-CM

## 2024-09-26 DIAGNOSIS — Z92.89 HISTORY OF RECENT HOSPITALIZATION: ICD-10-CM

## 2024-09-26 DIAGNOSIS — K59.00 CONSTIPATION, UNSPECIFIED CONSTIPATION TYPE: ICD-10-CM

## 2024-09-26 DIAGNOSIS — E11.65 TYPE 2 DIABETES MELLITUS WITH HYPERGLYCEMIA, WITHOUT LONG-TERM CURRENT USE OF INSULIN: Primary | ICD-10-CM

## 2024-09-26 DIAGNOSIS — C25.9 MALIGNANT NEOPLASM OF PANCREAS, UNSPECIFIED LOCATION OF MALIGNANCY (MULTI): ICD-10-CM

## 2024-09-26 DIAGNOSIS — K83.1 BILIARY OBSTRUCTION DUE TO MALIGNANT NEOPLASM (MULTI): ICD-10-CM

## 2024-09-26 DIAGNOSIS — C80.1 BILIARY OBSTRUCTION DUE TO MALIGNANT NEOPLASM (MULTI): ICD-10-CM

## 2024-09-26 DIAGNOSIS — C25.0 MALIGNANT NEOPLASM OF HEAD OF PANCREAS (MULTI): Primary | ICD-10-CM

## 2024-09-26 PROBLEM — L98.8 DIABETIC DERMOPATHY (MULTI): Status: RESOLVED | Noted: 2024-09-18 | Resolved: 2024-09-26

## 2024-09-26 PROBLEM — E11.628 DIABETIC DERMOPATHY (MULTI): Status: RESOLVED | Noted: 2024-09-18 | Resolved: 2024-09-26

## 2024-09-26 PROCEDURE — 1111F DSCHRG MED/CURRENT MED MERGE: CPT | Performed by: INTERNAL MEDICINE

## 2024-09-26 PROCEDURE — 3008F BODY MASS INDEX DOCD: CPT | Performed by: STUDENT IN AN ORGANIZED HEALTH CARE EDUCATION/TRAINING PROGRAM

## 2024-09-26 PROCEDURE — 3052F HG A1C>EQUAL 8.0%<EQUAL 9.0%: CPT | Performed by: INTERNAL MEDICINE

## 2024-09-26 PROCEDURE — 3048F LDL-C <100 MG/DL: CPT | Performed by: STUDENT IN AN ORGANIZED HEALTH CARE EDUCATION/TRAINING PROGRAM

## 2024-09-26 PROCEDURE — 1125F AMNT PAIN NOTED PAIN PRSNT: CPT | Performed by: INTERNAL MEDICINE

## 2024-09-26 PROCEDURE — 99495 TRANSJ CARE MGMT MOD F2F 14D: CPT | Performed by: STUDENT IN AN ORGANIZED HEALTH CARE EDUCATION/TRAINING PROGRAM

## 2024-09-26 PROCEDURE — 3052F HG A1C>EQUAL 8.0%<EQUAL 9.0%: CPT | Performed by: STUDENT IN AN ORGANIZED HEALTH CARE EDUCATION/TRAINING PROGRAM

## 2024-09-26 PROCEDURE — 99205 OFFICE O/P NEW HI 60 MIN: CPT | Performed by: INTERNAL MEDICINE

## 2024-09-26 PROCEDURE — 1159F MED LIST DOCD IN RCRD: CPT | Performed by: STUDENT IN AN ORGANIZED HEALTH CARE EDUCATION/TRAINING PROGRAM

## 2024-09-26 PROCEDURE — 1160F RVW MEDS BY RX/DR IN RCRD: CPT | Performed by: STUDENT IN AN ORGANIZED HEALTH CARE EDUCATION/TRAINING PROGRAM

## 2024-09-26 PROCEDURE — 1036F TOBACCO NON-USER: CPT | Performed by: STUDENT IN AN ORGANIZED HEALTH CARE EDUCATION/TRAINING PROGRAM

## 2024-09-26 PROCEDURE — 99215 OFFICE O/P EST HI 40 MIN: CPT | Performed by: INTERNAL MEDICINE

## 2024-09-26 PROCEDURE — 3060F POS MICROALBUMINURIA REV: CPT | Performed by: STUDENT IN AN ORGANIZED HEALTH CARE EDUCATION/TRAINING PROGRAM

## 2024-09-26 PROCEDURE — 3060F POS MICROALBUMINURIA REV: CPT | Performed by: INTERNAL MEDICINE

## 2024-09-26 PROCEDURE — 3048F LDL-C <100 MG/DL: CPT | Performed by: INTERNAL MEDICINE

## 2024-09-26 PROCEDURE — 3074F SYST BP LT 130 MM HG: CPT | Performed by: STUDENT IN AN ORGANIZED HEALTH CARE EDUCATION/TRAINING PROGRAM

## 2024-09-26 PROCEDURE — 3079F DIAST BP 80-89 MM HG: CPT | Performed by: STUDENT IN AN ORGANIZED HEALTH CARE EDUCATION/TRAINING PROGRAM

## 2024-09-26 PROCEDURE — 1159F MED LIST DOCD IN RCRD: CPT | Performed by: INTERNAL MEDICINE

## 2024-09-26 PROCEDURE — 1111F DSCHRG MED/CURRENT MED MERGE: CPT | Performed by: STUDENT IN AN ORGANIZED HEALTH CARE EDUCATION/TRAINING PROGRAM

## 2024-09-26 RX ORDER — EPINEPHRINE 0.3 MG/.3ML
0.3 INJECTION SUBCUTANEOUS EVERY 5 MIN PRN
Status: CANCELLED | OUTPATIENT
Start: 2024-10-09

## 2024-09-26 RX ORDER — METFORMIN HYDROCHLORIDE 500 MG/1
500 TABLET ORAL
Qty: 60 TABLET | Refills: 11 | Status: SHIPPED | OUTPATIENT
Start: 2024-09-26 | End: 2025-09-26

## 2024-09-26 RX ORDER — LORAZEPAM 2 MG/ML
1 INJECTION INTRAMUSCULAR AS NEEDED
Status: CANCELLED | OUTPATIENT
Start: 2024-10-09

## 2024-09-26 RX ORDER — DIPHENHYDRAMINE HYDROCHLORIDE 50 MG/ML
50 INJECTION INTRAMUSCULAR; INTRAVENOUS AS NEEDED
Status: CANCELLED | OUTPATIENT
Start: 2024-10-09

## 2024-09-26 RX ORDER — EPINEPHRINE 0.3 MG/.3ML
0.3 INJECTION SUBCUTANEOUS EVERY 5 MIN PRN
Status: CANCELLED | OUTPATIENT
Start: 2024-10-11

## 2024-09-26 RX ORDER — ALBUTEROL SULFATE 0.83 MG/ML
3 SOLUTION RESPIRATORY (INHALATION) AS NEEDED
Status: CANCELLED | OUTPATIENT
Start: 2024-10-11

## 2024-09-26 RX ORDER — LOPERAMIDE HYDROCHLORIDE 2 MG/1
CAPSULE ORAL
Qty: 100 CAPSULE | Refills: 2 | Status: SHIPPED | OUTPATIENT
Start: 2024-09-26

## 2024-09-26 RX ORDER — ONDANSETRON HYDROCHLORIDE 8 MG/1
8 TABLET, FILM COATED ORAL EVERY 8 HOURS PRN
Qty: 30 TABLET | Refills: 5 | Status: SHIPPED | OUTPATIENT
Start: 2024-09-26

## 2024-09-26 RX ORDER — BLOOD-GLUCOSE METER
100 EACH MISCELLANEOUS
Qty: 100 STRIP | Refills: 3 | Status: SHIPPED
Start: 2024-09-26 | End: 2024-09-26 | Stop reason: WASHOUT

## 2024-09-26 RX ORDER — ALBUTEROL SULFATE 0.83 MG/ML
3 SOLUTION RESPIRATORY (INHALATION) AS NEEDED
Status: CANCELLED | OUTPATIENT
Start: 2024-10-09

## 2024-09-26 RX ORDER — FAMOTIDINE 10 MG/ML
20 INJECTION INTRAVENOUS ONCE AS NEEDED
Status: CANCELLED | OUTPATIENT
Start: 2024-10-11

## 2024-09-26 RX ORDER — LACTULOSE 10 G/15ML
10 SOLUTION ORAL 3 TIMES DAILY PRN
Qty: 960 ML | Refills: 5 | Status: SHIPPED | OUTPATIENT
Start: 2024-09-26

## 2024-09-26 RX ORDER — LANCETS
EACH MISCELLANEOUS
Qty: 100 EACH | Refills: 3 | Status: SHIPPED
Start: 2024-09-26 | End: 2024-09-26 | Stop reason: WASHOUT

## 2024-09-26 RX ORDER — DEXTROSE 4 G
TABLET,CHEWABLE ORAL
Qty: 1 EACH | Refills: 0 | Status: SHIPPED
Start: 2024-09-26 | End: 2024-09-26 | Stop reason: WASHOUT

## 2024-09-26 RX ORDER — PROCHLORPERAZINE MALEATE 10 MG
10 TABLET ORAL EVERY 6 HOURS PRN
Status: CANCELLED | OUTPATIENT
Start: 2024-10-09

## 2024-09-26 RX ORDER — FAMOTIDINE 10 MG/ML
20 INJECTION INTRAVENOUS ONCE AS NEEDED
Status: CANCELLED | OUTPATIENT
Start: 2024-10-09

## 2024-09-26 RX ORDER — ATROPINE SULFATE 0.4 MG/ML
0.4 INJECTION, SOLUTION ENDOTRACHEAL; INTRAMEDULLARY; INTRAMUSCULAR; INTRAVENOUS; SUBCUTANEOUS
Status: CANCELLED | OUTPATIENT
Start: 2024-10-09

## 2024-09-26 RX ORDER — PROCHLORPERAZINE MALEATE 10 MG
10 TABLET ORAL EVERY 6 HOURS PRN
Qty: 30 TABLET | Refills: 5 | Status: SHIPPED | OUTPATIENT
Start: 2024-09-26

## 2024-09-26 RX ORDER — DIPHENHYDRAMINE HYDROCHLORIDE 50 MG/ML
50 INJECTION INTRAMUSCULAR; INTRAVENOUS AS NEEDED
Status: CANCELLED | OUTPATIENT
Start: 2024-10-11

## 2024-09-26 RX ORDER — PROCHLORPERAZINE EDISYLATE 5 MG/ML
10 INJECTION INTRAMUSCULAR; INTRAVENOUS EVERY 6 HOURS PRN
Status: CANCELLED | OUTPATIENT
Start: 2024-10-09

## 2024-09-26 RX ORDER — METFORMIN HYDROCHLORIDE 500 MG/1
TABLET ORAL ONCE
Status: CANCELLED | OUTPATIENT
Start: 2024-09-26 | End: 2024-09-26

## 2024-09-26 RX ORDER — PALONOSETRON 0.05 MG/ML
0.25 INJECTION, SOLUTION INTRAVENOUS ONCE
Status: CANCELLED | OUTPATIENT
Start: 2024-10-09

## 2024-09-26 ASSESSMENT — ENCOUNTER SYMPTOMS
NERVOUS/ANXIOUS: 0
WHEEZING: 0
OCCASIONAL FEELINGS OF UNSTEADINESS: 1
LOSS OF SENSATION IN FEET: 0
COUGH: 0
FATIGUE: 1
DEPRESSION: 0
SLEEP DISTURBANCE: 0
ARTHRALGIAS: 1
FEVER: 0
DYSPHORIC MOOD: 0
SHORTNESS OF BREATH: 0

## 2024-09-26 ASSESSMENT — PATIENT HEALTH QUESTIONNAIRE - PHQ9
SUM OF ALL RESPONSES TO PHQ9 QUESTIONS 1 AND 2: 0
1. LITTLE INTEREST OR PLEASURE IN DOING THINGS: NOT AT ALL
2. FEELING DOWN, DEPRESSED OR HOPELESS: NOT AT ALL

## 2024-09-26 ASSESSMENT — COLUMBIA-SUICIDE SEVERITY RATING SCALE - C-SSRS
1. IN THE PAST MONTH, HAVE YOU WISHED YOU WERE DEAD OR WISHED YOU COULD GO TO SLEEP AND NOT WAKE UP?: NO
2. HAVE YOU ACTUALLY HAD ANY THOUGHTS OF KILLING YOURSELF?: NO
6. HAVE YOU EVER DONE ANYTHING, STARTED TO DO ANYTHING, OR PREPARED TO DO ANYTHING TO END YOUR LIFE?: NO

## 2024-09-26 ASSESSMENT — PAIN SCALES - GENERAL: PAINLEVEL: 4

## 2024-09-26 NOTE — PROGRESS NOTES
"Patient: Nanci Colón  : 1955  PCP: Diana Blunt DO  MRN: [id number]  Program: Transitional Care Management  Status: Enrolled  Effective Dates: 2024 - present  Responsible Staff: Tomasz Dick RN  Social Determinants to be Addressed: No information to display    Nanci Colón is a 68 y.o. female presenting today for follow-up after being discharged from the hospital 10 days ago. The main problem requiring admission was MARK, pancreatic mass. The discharge summary and/or Transitional Care Management documentation was reviewed. Medication reconciliation was performed as indicated via the \"Alfredo as Reviewed\" timestamp.     Nanci Colón was contacted by Transitional Care Management services two days after her discharge. This encounter and supporting documentation was reviewed.    Hospitalized 24-9/15/24 and And  for BG 550s  Diagnosed with pancreatic cancer   Metformin was discontinued due to MARK  Discussion of glipizide but given highs and new pancreatic cx diagnosis, insulin may be our best option   Given 10 units in the ED for correction    Review of Systems   Constitutional:  Positive for fatigue. Negative for fever.   Respiratory:  Negative for cough, shortness of breath and wheezing.    Cardiovascular:  Negative for chest pain.   Musculoskeletal:  Positive for arthralgias.   Allergic/Immunologic: Positive for immunocompromised state.   Psychiatric/Behavioral:  Negative for dysphoric mood and sleep disturbance. The patient is not nervous/anxious.      /84 (BP Location: Left arm, Patient Position: Sitting)   Pulse 87   Ht 1.676 m (5' 6\")   Wt 78 kg (172 lb)   SpO2 97%   BMI 27.76 kg/m²     Physical Exam  Constitutional:       Appearance: Normal appearance.   Cardiovascular:      Rate and Rhythm: Normal rate and regular rhythm.   Pulmonary:      Effort: Pulmonary effort is normal. No respiratory distress.   Abdominal:      Comments: RUQ and epigastric tenderness  "   Neurological:      General: No focal deficit present.      Mental Status: She is alert and oriented to person, place, and time.   Psychiatric:         Behavior: Behavior normal.     The complexity of medical decision making for this patient's transitional care is high.    Assessment/Plan   Problem List Items Addressed This Visit             ICD-10-CM    Type 2 diabetes mellitus with hyperglycemia, without long-term current use of insulin (Multi) - Primary (Chronic) E11.65     Nursing education session in office today on insulin use  Has home check supplies, comfortable with TID checks  Pharmacy consulted with these new changes   Metformin was discontinued due to MARK  Discussion of glipizide, she is taking and tolerating well but reporting 300+ readings still but given highs and new pancreatic cx diagnosis, insulin may be our best option   She wants to ask her oncologist or see endocrinology, she will let me know about the plan  If she is ok I will start insulin with pharmacy doing frequent check ins         Relevant Orders    Follow Up In [facility] - PCP - Established     Other Visit Diagnoses         Codes    Malignant neoplasm of pancreas, unspecified location of malignancy (Multi)     C25.9    History of recent hospitalization     Z92.89    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

## 2024-09-26 NOTE — PROGRESS NOTES
Patient ID: Nanci Colón is a 68 y.o. female from [place], [place].     Referring Physician: Td Mandujano MD  [id number]  Department of Surgery-[place] [address]    Primary Care Provider: Diana Blunt DO    Diagnosis:   Pancreatic cancer 9/2024    Primary Oncologic Surgeon:   Nazia    Primary Medical Oncologist:  Anthony    Primary Radiation Oncologist:  KVNG    Current Therapy:  Planned Neoadjuvant Chemo    Oncologic Surgery History:  None    Oncologic Therapy History:  Planned mFOLFIRINOX     Molecular Genetics:  Mmr-P    Current Sites of Disease:  Pancreatic head    Oncologic Problem List:  Pancreatic cancer  DM2    Oncologic Narrative:  68 y.o. woman who initially presented in mid September 2024 with abdominal pain jaundice MARK and pancreatitis.  She had an ultrasound that showed extrahepatic bile duct dilation and pancreatic duct dilation.  Both of these seem to emanate from the pancreatic head.  A CT scan on September 9, 2024 was concerning for a mass.  MRCP on September 27, 2024 showed an ill-defined mass in the pancreatic head and uncinate process measuring approximately 3.5 cm.  Biopsy of the pancreatic mass from the September 11, 2024 endoscopic ultrasound revealed adenocarcinoma.  She met me for initial consultation regarding treatment planning on 9/26/2024.       Past Medical History: Nanci has a past medical history of Hyperlipidemia, Hypertension, Hypothyroidism, Personal history of other diseases of the circulatory system (09/15/2017), Personal history of other endocrine, nutritional and metabolic disease (02/24/2016), Personal history of other endocrine, nutritional and metabolic disease (03/09/2017), Personal history of other endocrine, nutritional and metabolic disease, Type 2 diabetes mellitus with other skin complications (Multi), and Vision loss.  Surgical History:  Nanci has a past surgical history that includes Other surgical history (09/01/2022); Other  surgical history (09/01/2022); and Other surgical history (09/01/2022).  Social History:  Nanci reports that she has never smoked. She has never used smokeless tobacco. She reports that she does not drink alcohol and does not use drugs.  Family History:  No family history on file.  Family Oncology History:  Cancer-related family history is not on file.      SUBJECTIVE:    History of Present Illness:  Nanci Colón is a 68 y.o. female who was referred by Td Mandujano MD and presents with No chief complaint on file.    She has some pain residual from pancreatitis and likely underlying cancer.    She has some constipation requiring a bowel regimen.  May be partially related to opioids vs underlying disease.      A complete 10 point ROS is otherwise negative in detail.        OBJECTIVE:    VS / Pain:  There were no vitals taken for this visit.  BSA: There is no height or weight on file to calculate BSA.   Pain Scale: 0    Daily Weight  09/30/24 : 77.4 kg (170 lb 10.2 oz)  09/27/24 : 78 kg (172 lb)  09/26/24 : 78 kg (172 lb)      Physical Exam  Constitutional:       Appearance: She is normal weight.   HENT:      Nose: Nose normal.      Mouth/Throat:      Mouth: Mucous membranes are moist.      Pharynx: Oropharynx is clear.   Eyes:      Extraocular Movements: Extraocular movements intact.      Conjunctiva/sclera: Conjunctivae normal.   Cardiovascular:      Rate and Rhythm: Normal rate.   Pulmonary:      Effort: Pulmonary effort is normal.      Breath sounds: Normal breath sounds.   Abdominal:      General: Abdomen is flat. Bowel sounds are normal.   Musculoskeletal:         General: Normal range of motion.   Skin:     General: Skin is warm.   Neurological:      General: No focal deficit present.      Mental Status: She is alert. Mental status is at baseline.   Psychiatric:         Mood and Affect: Mood normal.         Thought Content: Thought content normal.         Judgment: Judgment normal.          Performance Status:   ECOG 1    Diagnostic Results         WBC   Date/Time Value Ref Range Status   09/24/2024 04:28 AM 10.6 4.4 - 11.3 x10*3/uL Final   09/17/2024 09:03 AM 7.4 4.4 - 11.3 x10*3/uL Final   09/15/2024 08:10 AM 6.5 4.4 - 11.3 x10*3/uL Final     Hemoglobin   Date Value Ref Range Status   09/24/2024 10.7 (L) 12.0 - 16.0 g/dL Final   09/17/2024 10.4 (L) 12.0 - 16.0 g/dL Final   09/15/2024 10.4 (L) 12.0 - 16.0 g/dL Final     MCV   Date/Time Value Ref Range Status   09/24/2024 04:28 AM 91 80 - 100 fL Final   09/17/2024 09:03 AM 95 80 - 100 fL Final   09/15/2024 08:10 AM 91 80 - 100 fL Final     Platelets   Date/Time Value Ref Range Status   09/24/2024 04:28  150 - 450 x10*3/uL Final   09/17/2024 09:03  150 - 450 x10*3/uL Final   09/15/2024 08:10  (L) 150 - 450 x10*3/uL Final     Neutrophils Absolute   Date/Time Value Ref Range Status   09/24/2024 04:28 AM 8.30 (H) 1.20 - 7.70 x10*3/uL Final     Comment:     Percent differential counts (%) should be interpreted in the context of the absolute cell counts (cells/uL).   09/11/2024 02:09 AM 4.59 1.20 - 7.70 x10*3/uL Final     Comment:     Percent differential counts (%) should be interpreted in the context of the absolute cell counts (cells/uL).   09/09/2024 06:26 PM 4.39 1.20 - 7.70 x10*3/uL Final     Comment:     Percent differential counts (%) should be interpreted in the context of the absolute cell counts (cells/uL).     Bilirubin, Total   Date/Time Value Ref Range Status   09/24/2024 04:28 AM 2.7 (H) 0.0 - 1.2 mg/dL Final   09/23/2024 10:40 AM 2.4 (H) 0.0 - 1.2 mg/dL Final   09/17/2024 09:03 AM 3.4 (H) 0.0 - 1.2 mg/dL Final     AST   Date/Time Value Ref Range Status   09/24/2024 04:28  (H) 9 - 39 U/L Final   09/23/2024 10:40  (H) 9 - 39 U/L Final   09/17/2024 09:03  (H) 9 - 39 U/L Final     ALT   Date/Time Value Ref Range Status   09/24/2024 04:28  (H) 7 - 45 U/L Final     Comment:     Patients treated with  "Sulfasalazine may generate falsely decreased results for ALT.   09/23/2024 10:40  (H) 7 - 45 U/L Final     Comment:     Patients treated with Sulfasalazine may generate falsely decreased results for ALT.   09/17/2024 09:03  (H) 7 - 45 U/L Final     Comment:     Patients treated with Sulfasalazine may generate falsely decreased results for ALT.     Creatinine   Date/Time Value Ref Range Status   09/24/2024 04:28 AM 1.49 (H) 0.50 - 1.05 mg/dL Final   09/23/2024 10:40 AM 1.58 (H) 0.50 - 1.05 mg/dL Final   09/17/2024 09:03 AM 2.41 (H) 0.50 - 1.05 mg/dL Final     Urea Nitrogen   Date/Time Value Ref Range Status   09/24/2024 04:28 AM 38 (H) 6 - 23 mg/dL Final   09/23/2024 10:40 AM 39 (H) 6 - 23 mg/dL Final   09/17/2024 09:03 AM 43 (H) 6 - 23 mg/dL Final     Albumin   Date/Time Value Ref Range Status   09/24/2024 04:28 AM 3.7 3.4 - 5.0 g/dL Final   09/23/2024 10:40 AM 3.6 3.4 - 5.0 g/dL Final   09/17/2024 09:03 AM 3.7 3.4 - 5.0 g/dL Final     Cancer AG 19-9   Date/Time Value Ref Range Status   09/23/2024 10:40 AM 3,639.86 (H) <35.00 U/mL Final   09/11/2024 05:40 AM 2,960.13 (H) <35.00 U/mL Final     No results found for: \"CEA\"    === 09/27/24 ===    CT CHEST WO IV CONTRAST    - Impression -  No evidence of metastatic disease.    Bilateral small masses along the neural foramina. These presumably  represent benign neural tumors. These were noted on MRI of the same  date.    Signed by: Zoie Ramirez 10/1/2024 3:47 PM  Dictation workstation:   [id number]    === 09/27/24 ===    MR MRCP WITH PANCREAS WO AND W CONTRAST    - Impression -  1.  Ill-defined pancreatic head and uncinate process hypoenhancing mass measuring 3.5 cm. There is abrupt cutoff of the main pancreatic duct at the level of the pancreatic neck with dilatation of the  distal pancreatic duct. There is less than 90 degrees of abutment of the portal mesenteric confluence by the mass. The hepatic arteries and SMA are not involved by the mass.   2. No " lymphadenopathy.  3. Mild intrahepatic biliary dilatation with a common bile duct stent in place and expected pneumobilia.  4. Layering contents within the gallbladder lumen which may represent gallbladder sludge.    I personally reviewed the images/study and I agree with the findings  as stated by resident physician Dr. Nuris Orellana. This  study was interpreted at [facility], [place], [place].    MACRO:  None    Signed by: Robi Houser 9/27/2024 6:29 PM  Dictation workstation:   [id number]      Assessment/Plan   68-year-old woman with a resectable pancreatic cancer but elevated CA 19-9 and recent pancreatitis so we will plan for neoadjuvant chemotherapy with modified FOLFIRINOX.  Her bilirubin is trending down, and she is feeling okay.      Cancer related pain:   -Treated with oxycodone     Cancer related constipation:   - bowel regimen for constipation    DM:   -Continue metformin, however keep close eye on renal function.    - Endocrinology referral.    Francisco Cruz MD     [facility]/ [facility] [place]  Office: [phone]  Fax: [phone]

## 2024-09-26 NOTE — PROGRESS NOTES
Patient is here for NPV with , Brandon.  We thoroughly discussed potential side effects of FOLFIRINOX regimen. She was a bit overwhelmed but still provided teach back and verbalized understanding.   Her  is a great support for her.  They were provided with disease binder, red chemo bag, and office contact information.  Plan is for mediport 9/30 and I will call them with the rest of their schedule at [place] [facility] [place].

## 2024-09-27 ENCOUNTER — HOSPITAL ENCOUNTER (OUTPATIENT)
Dept: RADIOLOGY | Facility: CLINIC | Age: 69
Discharge: HOME | End: 2024-09-27
Payer: MEDICARE

## 2024-09-27 ENCOUNTER — PATIENT OUTREACH (OUTPATIENT)
Dept: PRIMARY CARE | Facility: CLINIC | Age: 69
End: 2024-09-27

## 2024-09-27 VITALS — BODY MASS INDEX: 27.76 KG/M2 | WEIGHT: 172 LBS

## 2024-09-27 DIAGNOSIS — E11.9 TYPE 2 DIABETES MELLITUS WITHOUT COMPLICATION, WITHOUT LONG-TERM CURRENT USE OF INSULIN (MULTI): Chronic | ICD-10-CM

## 2024-09-27 DIAGNOSIS — C25.9 MALIGNANT NEOPLASM OF PANCREAS, UNSPECIFIED LOCATION OF MALIGNANCY (MULTI): ICD-10-CM

## 2024-09-27 PROCEDURE — 74183 MRI ABD W/O CNTR FLWD CNTR: CPT

## 2024-09-27 PROCEDURE — 71250 CT THORAX DX C-: CPT

## 2024-09-27 PROCEDURE — 2550000001 HC RX 255 CONTRASTS: Performed by: SURGERY

## 2024-09-27 PROCEDURE — A9575 INJ GADOTERATE MEGLUMI 0.1ML: HCPCS | Performed by: SURGERY

## 2024-09-27 RX ORDER — GADOTERATE MEGLUMINE 376.9 MG/ML
16 INJECTION INTRAVENOUS
Status: COMPLETED | OUTPATIENT
Start: 2024-09-27 | End: 2024-09-27

## 2024-09-28 RX ORDER — OXYCODONE HYDROCHLORIDE 5 MG/1
5 TABLET ORAL EVERY 6 HOURS PRN
Qty: 120 TABLET | Refills: 0 | Status: SHIPPED | OUTPATIENT
Start: 2024-09-28 | End: 2024-10-28

## 2024-09-29 ENCOUNTER — ANESTHESIA EVENT (OUTPATIENT)
Dept: OPERATING ROOM | Facility: HOSPITAL | Age: 69
End: 2024-09-29
Payer: MEDICARE

## 2024-09-30 ENCOUNTER — HOSPITAL ENCOUNTER (OUTPATIENT)
Facility: HOSPITAL | Age: 69
Setting detail: OUTPATIENT SURGERY
Discharge: HOME | End: 2024-09-30
Attending: SURGERY | Admitting: SURGERY
Payer: MEDICARE

## 2024-09-30 ENCOUNTER — APPOINTMENT (OUTPATIENT)
Dept: RADIOLOGY | Facility: HOSPITAL | Age: 69
End: 2024-09-30
Payer: MEDICARE

## 2024-09-30 ENCOUNTER — APPOINTMENT (OUTPATIENT)
Dept: PHARMACY | Facility: HOSPITAL | Age: 69
End: 2024-09-30
Payer: MEDICARE

## 2024-09-30 ENCOUNTER — ANESTHESIA (OUTPATIENT)
Dept: OPERATING ROOM | Facility: HOSPITAL | Age: 69
End: 2024-09-30
Payer: MEDICARE

## 2024-09-30 VITALS
HEIGHT: 66 IN | TEMPERATURE: 98.2 F | HEART RATE: 84 BPM | WEIGHT: 170.64 LBS | RESPIRATION RATE: 17 BRPM | OXYGEN SATURATION: 99 % | SYSTOLIC BLOOD PRESSURE: 119 MMHG | BODY MASS INDEX: 27.42 KG/M2 | DIASTOLIC BLOOD PRESSURE: 77 MMHG

## 2024-09-30 DIAGNOSIS — C25.0 MALIGNANT NEOPLASM OF HEAD OF PANCREAS (MULTI): ICD-10-CM

## 2024-09-30 LAB
GLUCOSE BLD MANUAL STRIP-MCNC: 209 MG/DL (ref 74–99)
GLUCOSE BLD MANUAL STRIP-MCNC: 247 MG/DL (ref 74–99)

## 2024-09-30 PROCEDURE — 71045 X-RAY EXAM CHEST 1 VIEW: CPT

## 2024-09-30 PROCEDURE — 77001 FLUOROGUIDE FOR VEIN DEVICE: CPT | Performed by: SURGERY

## 2024-09-30 PROCEDURE — 2500000005 HC RX 250 GENERAL PHARMACY W/O HCPCS

## 2024-09-30 PROCEDURE — 2500000004 HC RX 250 GENERAL PHARMACY W/ HCPCS (ALT 636 FOR OP/ED)

## 2024-09-30 PROCEDURE — 7100000010 HC PHASE TWO TIME - EACH INCREMENTAL 1 MINUTE: Performed by: SURGERY

## 2024-09-30 PROCEDURE — 49321 LAPAROSCOPY BIOPSY: CPT | Performed by: SURGERY

## 2024-09-30 PROCEDURE — 2780000003 HC OR 278 NO HCPCS: Performed by: SURGERY

## 2024-09-30 PROCEDURE — 3700000001 HC GENERAL ANESTHESIA TIME - INITIAL BASE CHARGE: Performed by: SURGERY

## 2024-09-30 PROCEDURE — 2720000007 HC OR 272 NO HCPCS: Performed by: SURGERY

## 2024-09-30 PROCEDURE — 3700000002 HC GENERAL ANESTHESIA TIME - EACH INCREMENTAL 1 MINUTE: Performed by: SURGERY

## 2024-09-30 PROCEDURE — 82947 ASSAY GLUCOSE BLOOD QUANT: CPT

## 2024-09-30 PROCEDURE — 71045 X-RAY EXAM CHEST 1 VIEW: CPT | Performed by: RADIOLOGY

## 2024-09-30 PROCEDURE — C1894 INTRO/SHEATH, NON-LASER: HCPCS | Performed by: SURGERY

## 2024-09-30 PROCEDURE — 2500000001 HC RX 250 WO HCPCS SELF ADMINISTERED DRUGS (ALT 637 FOR MEDICARE OP)

## 2024-09-30 PROCEDURE — C1788 PORT, INDWELLING, IMP: HCPCS | Performed by: SURGERY

## 2024-09-30 PROCEDURE — A49321 PR LAP,DX SURGICAL ABD W/BIOPSY: Performed by: STUDENT IN AN ORGANIZED HEALTH CARE EDUCATION/TRAINING PROGRAM

## 2024-09-30 PROCEDURE — 7100000002 HC RECOVERY ROOM TIME - EACH INCREMENTAL 1 MINUTE: Performed by: SURGERY

## 2024-09-30 PROCEDURE — 2500000004 HC RX 250 GENERAL PHARMACY W/ HCPCS (ALT 636 FOR OP/ED): Performed by: SURGERY

## 2024-09-30 PROCEDURE — 7100000001 HC RECOVERY ROOM TIME - INITIAL BASE CHARGE: Performed by: SURGERY

## 2024-09-30 PROCEDURE — 7100000009 HC PHASE TWO TIME - INITIAL BASE CHARGE: Performed by: SURGERY

## 2024-09-30 PROCEDURE — 3600000003 HC OR TIME - INITIAL BASE CHARGE - PROCEDURE LEVEL THREE: Performed by: SURGERY

## 2024-09-30 PROCEDURE — 36561 INSERT TUNNELED CV CATH: CPT | Performed by: SURGERY

## 2024-09-30 PROCEDURE — 3600000008 HC OR TIME - EACH INCREMENTAL 1 MINUTE - PROCEDURE LEVEL THREE: Performed by: SURGERY

## 2024-09-30 PROCEDURE — 2500000005 HC RX 250 GENERAL PHARMACY W/O HCPCS: Performed by: SURGERY

## 2024-09-30 PROCEDURE — 88305 TISSUE EXAM BY PATHOLOGIST: CPT | Mod: TC,SUR | Performed by: SURGERY

## 2024-09-30 DEVICE — POWERPORT SLIM IMPLANTABLE PORT WITH ATTACHABLE 6F CHRONOFLEX OPEN-ENDED SINGLE-LUMEN VENOUS CATHETER INTERMEDIATE KIT (WITHOUT SUTURE PLUGS)
Type: IMPLANTABLE DEVICE | Site: CHEST | Status: FUNCTIONAL
Brand: POWERPORT, CHRONOFLEX

## 2024-09-30 RX ORDER — CEFAZOLIN 1 G/1
INJECTION, POWDER, FOR SOLUTION INTRAVENOUS AS NEEDED
Status: DISCONTINUED | OUTPATIENT
Start: 2024-09-30 | End: 2024-09-30

## 2024-09-30 RX ORDER — TRAMADOL HYDROCHLORIDE 50 MG/1
50 TABLET ORAL EVERY 8 HOURS PRN
Qty: 10 TABLET | Refills: 0 | Status: SHIPPED | OUTPATIENT
Start: 2024-09-30

## 2024-09-30 RX ORDER — LIDOCAINE HCL/PF 100 MG/5ML
SYRINGE (ML) INTRAVENOUS AS NEEDED
Status: DISCONTINUED | OUTPATIENT
Start: 2024-09-30 | End: 2024-09-30

## 2024-09-30 RX ORDER — OXYCODONE HYDROCHLORIDE 5 MG/1
10 TABLET ORAL EVERY 4 HOURS PRN
Status: DISCONTINUED | OUTPATIENT
Start: 2024-09-30 | End: 2024-09-30 | Stop reason: HOSPADM

## 2024-09-30 RX ORDER — ROCURONIUM BROMIDE 10 MG/ML
INJECTION, SOLUTION INTRAVENOUS AS NEEDED
Status: DISCONTINUED | OUTPATIENT
Start: 2024-09-30 | End: 2024-09-30

## 2024-09-30 RX ORDER — ACETAMINOPHEN 325 MG/1
650 TABLET ORAL EVERY 4 HOURS PRN
Status: DISCONTINUED | OUTPATIENT
Start: 2024-09-30 | End: 2024-09-30 | Stop reason: HOSPADM

## 2024-09-30 RX ORDER — OXYCODONE HYDROCHLORIDE 5 MG/1
5 TABLET ORAL EVERY 4 HOURS PRN
Status: DISCONTINUED | OUTPATIENT
Start: 2024-09-30 | End: 2024-09-30 | Stop reason: HOSPADM

## 2024-09-30 RX ORDER — MIDAZOLAM HYDROCHLORIDE 1 MG/ML
INJECTION INTRAMUSCULAR; INTRAVENOUS AS NEEDED
Status: DISCONTINUED | OUTPATIENT
Start: 2024-09-30 | End: 2024-09-30

## 2024-09-30 RX ORDER — SODIUM CHLORIDE 0.9 G/100ML
IRRIGANT IRRIGATION AS NEEDED
Status: DISCONTINUED | OUTPATIENT
Start: 2024-09-30 | End: 2024-09-30 | Stop reason: HOSPADM

## 2024-09-30 RX ORDER — FENTANYL CITRATE 50 UG/ML
INJECTION, SOLUTION INTRAMUSCULAR; INTRAVENOUS AS NEEDED
Status: DISCONTINUED | OUTPATIENT
Start: 2024-09-30 | End: 2024-09-30

## 2024-09-30 RX ORDER — HYDROMORPHONE HYDROCHLORIDE 1 MG/ML
0.2 INJECTION, SOLUTION INTRAMUSCULAR; INTRAVENOUS; SUBCUTANEOUS EVERY 5 MIN PRN
Status: DISCONTINUED | OUTPATIENT
Start: 2024-09-30 | End: 2024-09-30 | Stop reason: HOSPADM

## 2024-09-30 RX ORDER — PROPOFOL 10 MG/ML
INJECTION, EMULSION INTRAVENOUS AS NEEDED
Status: DISCONTINUED | OUTPATIENT
Start: 2024-09-30 | End: 2024-09-30

## 2024-09-30 RX ORDER — HYDROMORPHONE HYDROCHLORIDE 1 MG/ML
0.5 INJECTION, SOLUTION INTRAMUSCULAR; INTRAVENOUS; SUBCUTANEOUS EVERY 5 MIN PRN
Status: DISCONTINUED | OUTPATIENT
Start: 2024-09-30 | End: 2024-09-30 | Stop reason: HOSPADM

## 2024-09-30 RX ORDER — GLYCOPYRROLATE 0.2 MG/ML
INJECTION INTRAMUSCULAR; INTRAVENOUS AS NEEDED
Status: DISCONTINUED | OUTPATIENT
Start: 2024-09-30 | End: 2024-09-30

## 2024-09-30 RX ORDER — PHENYLEPHRINE HCL IN 0.9% NACL 0.4MG/10ML
SYRINGE (ML) INTRAVENOUS AS NEEDED
Status: DISCONTINUED | OUTPATIENT
Start: 2024-09-30 | End: 2024-09-30

## 2024-09-30 RX ORDER — SODIUM CHLORIDE, SODIUM LACTATE, POTASSIUM CHLORIDE, CALCIUM CHLORIDE 600; 310; 30; 20 MG/100ML; MG/100ML; MG/100ML; MG/100ML
100 INJECTION, SOLUTION INTRAVENOUS CONTINUOUS
Status: DISCONTINUED | OUTPATIENT
Start: 2024-09-30 | End: 2024-09-30 | Stop reason: HOSPADM

## 2024-09-30 RX ORDER — HYDROMORPHONE HYDROCHLORIDE 1 MG/ML
INJECTION, SOLUTION INTRAMUSCULAR; INTRAVENOUS; SUBCUTANEOUS AS NEEDED
Status: DISCONTINUED | OUTPATIENT
Start: 2024-09-30 | End: 2024-09-30

## 2024-09-30 RX ORDER — DROPERIDOL 2.5 MG/ML
0.62 INJECTION, SOLUTION INTRAMUSCULAR; INTRAVENOUS ONCE AS NEEDED
Status: DISCONTINUED | OUTPATIENT
Start: 2024-09-30 | End: 2024-09-30 | Stop reason: HOSPADM

## 2024-09-30 RX ORDER — ONDANSETRON HYDROCHLORIDE 2 MG/ML
INJECTION, SOLUTION INTRAVENOUS AS NEEDED
Status: DISCONTINUED | OUTPATIENT
Start: 2024-09-30 | End: 2024-09-30

## 2024-09-30 ASSESSMENT — PAIN - FUNCTIONAL ASSESSMENT
PAIN_FUNCTIONAL_ASSESSMENT: UNABLE TO SELF-REPORT
PAIN_FUNCTIONAL_ASSESSMENT: 0-10
PAIN_FUNCTIONAL_ASSESSMENT: UNABLE TO SELF-REPORT
PAIN_FUNCTIONAL_ASSESSMENT: 0-10
PAIN_FUNCTIONAL_ASSESSMENT: UNABLE TO SELF-REPORT
PAIN_FUNCTIONAL_ASSESSMENT: 0-10

## 2024-09-30 ASSESSMENT — PAIN SCALES - GENERAL
PAINLEVEL_OUTOF10: 5 - MODERATE PAIN
PAINLEVEL_OUTOF10: 0 - NO PAIN
PAIN_LEVEL: 2
PAINLEVEL_OUTOF10: 0 - NO PAIN
PAINLEVEL_OUTOF10: 3
PAINLEVEL_OUTOF10: 5 - MODERATE PAIN
PAINLEVEL_OUTOF10: 0 - NO PAIN

## 2024-09-30 ASSESSMENT — COLUMBIA-SUICIDE SEVERITY RATING SCALE - C-SSRS
6. HAVE YOU EVER DONE ANYTHING, STARTED TO DO ANYTHING, OR PREPARED TO DO ANYTHING TO END YOUR LIFE?: NO
2. HAVE YOU ACTUALLY HAD ANY THOUGHTS OF KILLING YOURSELF?: NO
1. IN THE PAST MONTH, HAVE YOU WISHED YOU WERE DEAD OR WISHED YOU COULD GO TO SLEEP AND NOT WAKE UP?: NO

## 2024-09-30 NOTE — SIGNIFICANT EVENT
Patient is a 67 yo female with newly diagnosed pancreatic cancer, now s/p diagnostic laparoscopy and Right Internal Jugular Mediport Placement.     Chest X-ray was obtained in PACU. Tip of Mediport appears to appropriately terminate at the upper aspect of the right atrium. No evidence of pneumothorax.     Sukumar Hull PGY2  Surgical Oncology [id number]

## 2024-09-30 NOTE — ANESTHESIA PREPROCEDURE EVALUATION
Patient: Nanci Colón    Procedure Information       Date/Time: 09/30/24 0945    Procedures:       Diagnostic Laparoscopy      Mediport    Location: [facility] OR 12 / [facility] OR    Surgeons: Td Mandujano MD            Relevant Problems   Cardiac   (+) Benign hypertension   (+) Essential hypertension   (+) Mixed hyperlipidemia      Liver   (+) Malignant neoplasm of head of pancreas (Multi)      Endocrine   (+) Hypothyroid   (+) Hypothyroidism   (+) Post-infectious hypothyroidism   (+) Type 2 diabetes mellitus with hyperglycemia, without long-term current use of insulin      Musculoskeletal   (+) Generalized osteoarthritis   (+) Idiopathic osteoarthritis   (+) Lumbar and sacral osteoarthritis      HEENT   (+) Sensorineural hearing loss, asymmetrical      Nervous   (+) Chiari malformation type I (Multi)       Clinical information reviewed:   Tobacco  Allergies  Meds   Med Hx  Surg Hx  OB Status  Fam Hx  Soc   Hx        NPO Detail:  NPO/Void Status  Carbohydrate Drink Given Prior to Surgery? : N  Date of Last Liquid: 09/30/24  Time of Last Liquid: 0100  Date of Last Solid: 09/29/24  Time of Last Solid: 2100  Last Intake Type: Clear fluids  Time of Last Void: 0824         Physical Exam    Airway  Mallampati: III  TM distance: >3 FB  Neck ROM: limited     Cardiovascular    Dental - normal exam     Pulmonary    Abdominal            Anesthesia Plan    History of general anesthesia?: yes  History of complications of general anesthesia?: no    ASA 3     general     intravenous induction   Postoperative administration of opioids is intended.  Trial extubation is planned.  Anesthetic plan and risks discussed with patient.  Use of blood products discussed with patient who consented to blood products.    Plan discussed with resident.

## 2024-09-30 NOTE — INTERVAL H&P NOTE
H&P reviewed. The patient was examined and there are no changes to the H&P.    No recent changes to state of health. Patient here today for diagnostic lap and mediport placement.

## 2024-09-30 NOTE — OP NOTE
Diagnostic Laparoscopy, Mediport Operative Note     Date: 2024  OR Location: [facility] OR    Name: Nanci Colón, : 1955, Age: 68 y.o., MRN: [id number], Sex: female    Diagnosis  Pre-op Diagnosis      * Malignant neoplasm of head of pancreas (Multi) [C25.0] Post-op Diagnosis     * Malignant neoplasm of head of pancreas (Multi) [C25.0]     Procedures  Diagnostic Laparoscopy with Peritoneal Biopsy  6 Fr Bard PowerPort RIJV with US/Fluoro Guidance    Surgeons      * Td Mandujano - Primary    Resident/Fellow/Other Assistant:  Surgeons and Role:  * No surgeons found with a matching role *    Procedure Summary  Anesthesia: Anesthesia type not filed in the log.  ASA: III  Anesthesia Staff: Anesthesiologist: Rachel Tomlinson MD  Anesthesia Resident: Bhavana West MD  Estimated Blood Loss: 0 mL  Intra-op Medications:   Administrations occurring from 0945 to 1205 on 24:   Medication Name Total Dose   sodium chloride 0.9 % irrigation solution 1,000 mL   heparin (porcine) 25,000 Units in sodium chloride 0.9% 250 mL (100 Units/mL) infusion 1,331.28 Units   BUPivacaine HCl (Marcaine) 0.5 % (5 mg/mL) 30 mL, lidocaine (Xylocaine) 30 mL syringe 13 mL              Anesthesia Record               Intraprocedure I/O Totals       None           Specimen:   ID Type Source Tests Collected by Time   1 : Peritoneal biopsy number 1 Tissue PERITONEUM BIOPSY SURGICAL PATHOLOGY EXAM Td Mandujano MD 2024 1003   2 : Peritoneal biopsy number 2 Tissue PERITONEUM BIOPSY SURGICAL PATHOLOGY EXAM Td Mandujano MD 2024 1005        Staff:   Circulator: Preeti Ritchieub Person: Alexander  Circulator: Kareen  Scrub Person: Danuta      Implants:  Implants       Type Name Action Serial No.      Implant POWER PORT, SLIM, TI DEVICE W/6FR - [id number] Implanted               Findings: few whitish peritoneal plaques, sent for permanent    Indications: Nanci Colón is an 68 y.o. female who is having  surgery for Malignant neoplasm of head of pancreas (Multi) [C25.0].     The patient was seen in the preoperative area. The risks, benefits, complications, treatment options, non-operative alternatives, expected recovery and outcomes were discussed with the patient. The possibilities of reaction to medication, pulmonary aspiration, injury to surrounding structures, bleeding, recurrent infection, the need for additional procedures, failure to diagnose a condition, and creating a complication requiring transfusion or operation were discussed with the patient. The patient concurred with the proposed plan, giving informed consent.  The site of surgery was properly noted/marked if necessary per policy. The patient has been actively warmed in preoperative area. Preoperative antibiotics have been ordered and given within 1 hours of incision. Venous thrombosis prophylaxis have been ordered including bilateral sequential compression devices    Procedure Details:     This patient is 68 years old.  She has pancreatic cancer and is brought to the operating room for diagnostic laparoscopy and Mediport.    The patient was brought to the operating room and placed on the operating table in supine position.  A timeout was performed.  After the induction of general endotracheal anesthesia, she was given intravenous antibiotics.  She had sequential compression devices placed.  Her abdomen and neck and chest were prepped and draped in standard sterile fashion using a noniodinated Ioban.    We started with the laparoscopy.  We made a small supraumbilical cutdown safely gaining access to the peritoneal cavity.  We inserted our balloontipped trocar and insufflated to 15 mmHg.  We inserted our 10 mm 30 degree scope and saw no evidence of ascites or liver lesions.  We did insert another 5 mm port on the patient's right side to aid in our laparoscopy.  The only thing of note was there were a few whitish peritoneal plaques on the anterior  abdominal wall surface.  I removed 2 of those and sent them each for permanent analysis.  I have a low suspicion that they represent anything but I felt they should be sampled.    With the laparoscopy done we desufflated the abdomen and removed the ports.  We closed the fascia at the camera site using 0 Vicryl suture.  Local anesthetic was infiltrated.  The incisions were closed with 4-0 Vicryl subcuticular.    We next turned our attention to the Mediport.  Under ultrasound the right internal jugular vein was compressible and a suitable access vessel.  Sterilely utilizing ultrasound we gained access to the right internal jugular vein using the micropuncture needle.  We inserted the micropuncture wire and confirmed that we are in the appropriate position fluoroscopically.  We changed over to the wire included in the PowerPort kit and again confirmed that we are in the appropriate position fluoroscopically.    We picked a spot in the right chest wall for the subcutaneous pocket.  We anesthetized, made the incision, and dissected out the pocket.  We secured the port to the chest wall using a series of three 3-0 Prolene sutures.  We tunneled the catheter from the subcutaneous pocket to the vein entry site.    We then cut the catheter length under fluoroscopic guidance such that the tip would be in the distal SVC or right atrium.  We then deployed the catheter using the peel-away sheath dilator that was placed over wire under fluoroscopic guidance.  At the completion of the deployment of the catheter the catheter tip was in the distal SVC and right atrium as confirmed by the tip of the suture scissors.    The port withdrew blood easily and flushed easily.  He was given a final flush of 4 5 mL of our heparin solution.  The subcutaneous pocket was closed with interrupted 3-0 Vicryl and a 4-0 Vicryl subcuticular.  The vein entry site was closed with 4-0 Vicryl subcuticular.  Mastisol Steri-Strips dry gauze and clear  dressings were applied to all incisions.    At the time of my departure from the operating room the plan was for extubation and transport to the recovery room.  A postprocedural chest x-ray is planned for the recovery room and will be checked by the house officer.    This note has been dictated with voice recognition software and has not been reviewed for grammar or content errors.      Complications:  None; patient tolerated the procedure well.    Disposition: PACU - hemodynamically stable.  Condition: stable     Attending Attestation: I was present and scrubbed for the entire procedure.    Td Mandujano  Phone Number: [phone]

## 2024-09-30 NOTE — ANESTHESIA PROCEDURE NOTES
Airway  Date/Time: 9/30/2024 9:34 AM  Urgency: elective    Airway not difficult    Staffing  Performed: resident   Authorized by: Rachel Tomlinson MD    Performed by: Bhavana West MD  Patient location during procedure: OR    Indications and Patient Condition  Indications for airway management: anesthesia  Spontaneous ventilation: present  Sedation level: deep  Preoxygenated: yes  Patient position: sniffing  Mask difficulty assessment: 1 - vent by mask  Planned trial extubation    Final Airway Details  Final airway type: endotracheal airway      Successful airway: ETT  Cuffed: yes   Successful intubation technique: direct laryngoscopy  Facilitating devices/methods: intubating stylet and anterior pressure/BURP  Endotracheal tube insertion site: oral  Blade: Monserrat  Blade size: #3  ETT size (mm): 7.0  Cormack-Lehane Classification: grade I - full view of glottis  Placement verified by: capnometry   Measured from: teeth  Number of attempts at approach: 1

## 2024-09-30 NOTE — ANESTHESIA POSTPROCEDURE EVALUATION
Patient: Nanci Colón    Procedure Summary       Date: 09/30/24 Room / Location: [facility] OR 12 / [facility] OR    Anesthesia Start: 0924 Anesthesia Stop: 1100    Procedures:       Diagnostic Laparoscopy      Mediport Diagnosis:       Malignant neoplasm of head of pancreas (Multi)      (Malignant neoplasm of head of pancreas (Multi) [C25.0])    Surgeons: Td Mandujano MD Responsible Provider: Rachel Tomlinson MD    Anesthesia Type: general ASA Status: 3            Anesthesia Type: general    Vitals Value Taken Time   /59 09/30/24 1057   Temp 36.3 09/30/24 1102   Pulse 89 09/30/24 1059   Resp 10 09/30/24 1059   SpO2 98 % 09/30/24 1059   Vitals shown include unfiled device data.    Anesthesia Post Evaluation    Patient location during evaluation: PACU  Patient participation: complete - patient participated  Level of consciousness: awake and alert  Pain score: 2  Pain management: adequate  Airway patency: patent  Two or more strategies used to mitigate risk of obstructive sleep apnea  Cardiovascular status: acceptable and blood pressure returned to baseline  Respiratory status: acceptable, airway suctioned, face mask and spontaneous ventilation  Hydration status: acceptable  Postoperative Nausea and Vomiting: none        No notable events documented.

## 2024-10-01 ENCOUNTER — TELEPHONE (OUTPATIENT)
Dept: HEMATOLOGY/ONCOLOGY | Facility: HOSPITAL | Age: 69
End: 2024-10-01
Payer: MEDICARE

## 2024-10-01 NOTE — TELEPHONE ENCOUNTER
Contacted patient to confirm 10/9 infusion @ 0830.  We discussed the chemo schedule and I let her know someone will contact her to schedule the rest of her appointments.

## 2024-10-08 LAB
LABORATORY COMMENT REPORT: NORMAL
PATH REPORT.FINAL DX SPEC: NORMAL
PATH REPORT.GROSS SPEC: NORMAL
PATH REPORT.RELEVANT HX SPEC: NORMAL
PATH REPORT.TOTAL CANCER: NORMAL

## 2024-10-09 ENCOUNTER — TELEPHONE (OUTPATIENT)
Dept: HEMATOLOGY/ONCOLOGY | Facility: CLINIC | Age: 69
End: 2024-10-09

## 2024-10-09 ENCOUNTER — INFUSION (OUTPATIENT)
Dept: HEMATOLOGY/ONCOLOGY | Facility: CLINIC | Age: 69
End: 2024-10-09
Payer: MEDICARE

## 2024-10-09 VITALS
OXYGEN SATURATION: 98 % | RESPIRATION RATE: 16 BRPM | SYSTOLIC BLOOD PRESSURE: 125 MMHG | BODY MASS INDEX: 27.51 KG/M2 | WEIGHT: 171.2 LBS | TEMPERATURE: 97.3 F | HEART RATE: 92 BPM | DIASTOLIC BLOOD PRESSURE: 78 MMHG

## 2024-10-09 DIAGNOSIS — C25.0 MALIGNANT NEOPLASM OF HEAD OF PANCREAS (MULTI): ICD-10-CM

## 2024-10-09 LAB
ALBUMIN SERPL BCP-MCNC: 3.5 G/DL (ref 3.4–5)
ALP SERPL-CCNC: 331 U/L (ref 33–136)
ALT SERPL W P-5'-P-CCNC: 31 U/L (ref 7–45)
ANION GAP SERPL CALC-SCNC: 13 MMOL/L (ref 10–20)
AST SERPL W P-5'-P-CCNC: 59 U/L (ref 9–39)
BASOPHILS # BLD AUTO: 0.01 X10*3/UL (ref 0–0.1)
BASOPHILS NFR BLD AUTO: 0.1 %
BILIRUB SERPL-MCNC: 1.2 MG/DL (ref 0–1.2)
BUN SERPL-MCNC: 19 MG/DL (ref 6–23)
CALCIUM SERPL-MCNC: 9 MG/DL (ref 8.6–10.3)
CANCER AG19-9 SERPL-ACNC: 2737.55 U/ML
CHLORIDE SERPL-SCNC: 97 MMOL/L (ref 98–107)
CO2 SERPL-SCNC: 27 MMOL/L (ref 21–32)
CREAT SERPL-MCNC: 0.99 MG/DL (ref 0.5–1.05)
EGFRCR SERPLBLD CKD-EPI 2021: 63 ML/MIN/1.73M*2
EOSINOPHIL # BLD AUTO: 0.13 X10*3/UL (ref 0–0.7)
EOSINOPHIL NFR BLD AUTO: 1.4 %
ERYTHROCYTE [DISTWIDTH] IN BLOOD BY AUTOMATED COUNT: 12.8 % (ref 11.5–14.5)
GLUCOSE SERPL-MCNC: 284 MG/DL (ref 74–99)
HBV CORE AB SER QL: NONREACTIVE
HBV SURFACE AB SER-ACNC: 3.7 MIU/ML
HBV SURFACE AG SERPL QL IA: NONREACTIVE
HCT VFR BLD AUTO: 29.3 % (ref 36–46)
HGB BLD-MCNC: 9.7 G/DL (ref 12–16)
IMM GRANULOCYTES # BLD AUTO: 0.02 X10*3/UL (ref 0–0.7)
IMM GRANULOCYTES NFR BLD AUTO: 0.2 % (ref 0–0.9)
LYMPHOCYTES # BLD AUTO: 1.81 X10*3/UL (ref 1.2–4.8)
LYMPHOCYTES NFR BLD AUTO: 20.1 %
MCH RBC QN AUTO: 31.8 PG (ref 26–34)
MCHC RBC AUTO-ENTMCNC: 33.1 G/DL (ref 32–36)
MCV RBC AUTO: 96 FL (ref 80–100)
MONOCYTES # BLD AUTO: 0.68 X10*3/UL (ref 0.1–1)
MONOCYTES NFR BLD AUTO: 7.5 %
NEUTROPHILS # BLD AUTO: 6.36 X10*3/UL (ref 1.2–7.7)
NEUTROPHILS NFR BLD AUTO: 70.7 %
PLATELET # BLD AUTO: 204 X10*3/UL (ref 150–450)
POTASSIUM SERPL-SCNC: 3.9 MMOL/L (ref 3.5–5.3)
PROT SERPL-MCNC: 6.6 G/DL (ref 6.4–8.2)
RBC # BLD AUTO: 3.05 X10*6/UL (ref 4–5.2)
SODIUM SERPL-SCNC: 133 MMOL/L (ref 136–145)
WBC # BLD AUTO: 9 X10*3/UL (ref 4.4–11.3)

## 2024-10-09 PROCEDURE — 86301 IMMUNOASSAY TUMOR CA 19-9: CPT | Mod: PORLAB

## 2024-10-09 PROCEDURE — 85025 COMPLETE CBC W/AUTO DIFF WBC: CPT

## 2024-10-09 PROCEDURE — 96413 CHEMO IV INFUSION 1 HR: CPT

## 2024-10-09 PROCEDURE — 86706 HEP B SURFACE ANTIBODY: CPT | Mod: PORLAB

## 2024-10-09 PROCEDURE — 2500000004 HC RX 250 GENERAL PHARMACY W/ HCPCS (ALT 636 FOR OP/ED): Performed by: INTERNAL MEDICINE

## 2024-10-09 PROCEDURE — 96415 CHEMO IV INFUSION ADDL HR: CPT

## 2024-10-09 PROCEDURE — 80053 COMPREHEN METABOLIC PANEL: CPT

## 2024-10-09 PROCEDURE — 96416 CHEMO PROLONG INFUSE W/PUMP: CPT

## 2024-10-09 PROCEDURE — 87340 HEPATITIS B SURFACE AG IA: CPT | Mod: PORLAB

## 2024-10-09 PROCEDURE — 96368 THER/DIAG CONCURRENT INF: CPT

## 2024-10-09 PROCEDURE — 96417 CHEMO IV INFUS EACH ADDL SEQ: CPT

## 2024-10-09 PROCEDURE — 86704 HEP B CORE ANTIBODY TOTAL: CPT | Mod: PORLAB

## 2024-10-09 PROCEDURE — 96375 TX/PRO/DX INJ NEW DRUG ADDON: CPT | Mod: INF

## 2024-10-09 RX ORDER — DIPHENHYDRAMINE HYDROCHLORIDE 50 MG/ML
50 INJECTION INTRAMUSCULAR; INTRAVENOUS AS NEEDED
Status: DISCONTINUED | OUTPATIENT
Start: 2024-10-09 | End: 2024-10-09 | Stop reason: HOSPADM

## 2024-10-09 RX ORDER — HEPARIN SODIUM,PORCINE/PF 10 UNIT/ML
50 SYRINGE (ML) INTRAVENOUS AS NEEDED
Status: CANCELLED | OUTPATIENT
Start: 2024-10-09

## 2024-10-09 RX ORDER — PROCHLORPERAZINE EDISYLATE 5 MG/ML
10 INJECTION INTRAMUSCULAR; INTRAVENOUS EVERY 6 HOURS PRN
Status: DISCONTINUED | OUTPATIENT
Start: 2024-10-09 | End: 2024-10-09 | Stop reason: HOSPADM

## 2024-10-09 RX ORDER — PROCHLORPERAZINE MALEATE 10 MG
10 TABLET ORAL EVERY 6 HOURS PRN
Status: DISCONTINUED | OUTPATIENT
Start: 2024-10-09 | End: 2024-10-09 | Stop reason: HOSPADM

## 2024-10-09 RX ORDER — LORAZEPAM 2 MG/ML
1 INJECTION INTRAMUSCULAR AS NEEDED
Status: DISCONTINUED | OUTPATIENT
Start: 2024-10-09 | End: 2024-10-09 | Stop reason: HOSPADM

## 2024-10-09 RX ORDER — HEPARIN 100 UNIT/ML
500 SYRINGE INTRAVENOUS AS NEEDED
Status: CANCELLED | OUTPATIENT
Start: 2024-10-09

## 2024-10-09 RX ORDER — ATROPINE SULFATE 0.4 MG/ML
0.4 INJECTION, SOLUTION ENDOTRACHEAL; INTRAMEDULLARY; INTRAMUSCULAR; INTRAVENOUS; SUBCUTANEOUS
Status: COMPLETED | OUTPATIENT
Start: 2024-10-09 | End: 2024-10-09

## 2024-10-09 RX ORDER — EPINEPHRINE 0.3 MG/.3ML
0.3 INJECTION SUBCUTANEOUS EVERY 5 MIN PRN
Status: DISCONTINUED | OUTPATIENT
Start: 2024-10-09 | End: 2024-10-09 | Stop reason: HOSPADM

## 2024-10-09 RX ORDER — FAMOTIDINE 10 MG/ML
20 INJECTION INTRAVENOUS ONCE AS NEEDED
Status: DISCONTINUED | OUTPATIENT
Start: 2024-10-09 | End: 2024-10-09 | Stop reason: HOSPADM

## 2024-10-09 RX ORDER — ALBUTEROL SULFATE 0.83 MG/ML
3 SOLUTION RESPIRATORY (INHALATION) AS NEEDED
Status: DISCONTINUED | OUTPATIENT
Start: 2024-10-09 | End: 2024-10-09 | Stop reason: HOSPADM

## 2024-10-09 RX ORDER — PALONOSETRON 0.05 MG/ML
0.25 INJECTION, SOLUTION INTRAVENOUS ONCE
Status: COMPLETED | OUTPATIENT
Start: 2024-10-09 | End: 2024-10-09

## 2024-10-09 ASSESSMENT — PAIN SCALES - GENERAL: PAINLEVEL: 0-NO PAIN

## 2024-10-09 NOTE — PROGRESS NOTES
Pt arrived awake, alert, oriented, no apparent distress at this time Pt reports feeling well today without s/sx of illness. Pt here for day 1, cycle 1. Pt medi port without s/sx of infection/infiltration, steri strip remains intact, flushes easily with + blood return. Blood samples obtained and sent per order. Pt received teaching on new medications, home infusion pump use/safety, chemo safety at home. Pt verbalizes understanding of when/who to call with concerns, and when to seek higher level of care for emergencies. Pt with noted 284 blood glucose today with CMP, Dr Cruz notified. Dr Cruz advised for pt to check BG frequently and if it is over 400, to increase fluids and call his office- as they may need to increase her glipized. Pt verbalized understanding.

## 2024-10-09 NOTE — SIGNIFICANT EVENT
10/09/24 0940   Prechemo Checklist   Has the patient been in the hospital, ED, or urgent care since last date of service No   Chemo/Immuno Consent Completed and Signed Yes   Protocol/Indications Verified Yes   Confirmed to previous date/time of medication N/A  (first dose)   Compared to previous dose N/A  (first dose)   All medications are dated accurately Yes   Pregnancy Test Negative Not applicable   Parameters Met Yes   BSA/Weight-Height Verified Yes   Dose Calculations Verified (current, total, cumulative) Yes

## 2024-10-11 ENCOUNTER — APPOINTMENT (OUTPATIENT)
Dept: HEMATOLOGY/ONCOLOGY | Facility: HOSPITAL | Age: 69
End: 2024-10-11
Payer: MEDICARE

## 2024-10-11 ENCOUNTER — INFUSION (OUTPATIENT)
Dept: HEMATOLOGY/ONCOLOGY | Facility: CLINIC | Age: 69
End: 2024-10-11
Payer: MEDICARE

## 2024-10-11 ENCOUNTER — APPOINTMENT (OUTPATIENT)
Dept: HEMATOLOGY/ONCOLOGY | Facility: CLINIC | Age: 69
End: 2024-10-11
Payer: MEDICARE

## 2024-10-11 VITALS
OXYGEN SATURATION: 94 % | RESPIRATION RATE: 16 BRPM | SYSTOLIC BLOOD PRESSURE: 131 MMHG | DIASTOLIC BLOOD PRESSURE: 80 MMHG | TEMPERATURE: 97.2 F | BODY MASS INDEX: 27.51 KG/M2 | HEIGHT: 66 IN | HEART RATE: 89 BPM

## 2024-10-11 DIAGNOSIS — C25.0 MALIGNANT NEOPLASM OF HEAD OF PANCREAS (MULTI): ICD-10-CM

## 2024-10-11 PROCEDURE — 2500000004 HC RX 250 GENERAL PHARMACY W/ HCPCS (ALT 636 FOR OP/ED): Performed by: INTERNAL MEDICINE

## 2024-10-11 PROCEDURE — 96523 IRRIG DRUG DELIVERY DEVICE: CPT

## 2024-10-11 RX ORDER — HEPARIN 100 UNIT/ML
500 SYRINGE INTRAVENOUS AS NEEDED
OUTPATIENT
Start: 2024-10-11

## 2024-10-11 RX ORDER — EPINEPHRINE 0.3 MG/.3ML
0.3 INJECTION SUBCUTANEOUS EVERY 5 MIN PRN
Status: DISCONTINUED | OUTPATIENT
Start: 2024-10-11 | End: 2024-10-11 | Stop reason: HOSPADM

## 2024-10-11 RX ORDER — DIPHENHYDRAMINE HYDROCHLORIDE 50 MG/ML
50 INJECTION INTRAMUSCULAR; INTRAVENOUS AS NEEDED
Status: DISCONTINUED | OUTPATIENT
Start: 2024-10-11 | End: 2024-10-11 | Stop reason: HOSPADM

## 2024-10-11 RX ORDER — ALBUTEROL SULFATE 0.83 MG/ML
3 SOLUTION RESPIRATORY (INHALATION) AS NEEDED
Status: DISCONTINUED | OUTPATIENT
Start: 2024-10-11 | End: 2024-10-11 | Stop reason: HOSPADM

## 2024-10-11 RX ORDER — HEPARIN SODIUM,PORCINE/PF 10 UNIT/ML
50 SYRINGE (ML) INTRAVENOUS AS NEEDED
OUTPATIENT
Start: 2024-10-11

## 2024-10-11 RX ORDER — FAMOTIDINE 10 MG/ML
20 INJECTION INTRAVENOUS ONCE AS NEEDED
Status: DISCONTINUED | OUTPATIENT
Start: 2024-10-11 | End: 2024-10-11 | Stop reason: HOSPADM

## 2024-10-11 RX ORDER — HEPARIN 100 UNIT/ML
500 SYRINGE INTRAVENOUS AS NEEDED
Status: DISCONTINUED | OUTPATIENT
Start: 2024-10-11 | End: 2024-10-11 | Stop reason: HOSPADM

## 2024-10-11 ASSESSMENT — PAIN SCALES - GENERAL: PAINLEVEL: 0-NO PAIN

## 2024-10-11 NOTE — PROGRESS NOTES
Patient identified by name & . Patient endorses +cold sensitivity and mild nausea- encouraged to use anti-emetics- Verbalizes understanding. Patient denies acute distress or concerns- none noted at this time. Patient discharged home in stable condition, ambulatory with use of walker.

## 2024-10-14 ENCOUNTER — PATIENT OUTREACH (OUTPATIENT)
Dept: PRIMARY CARE | Facility: CLINIC | Age: 69
End: 2024-10-14

## 2024-10-15 LAB
DPYD GENE MUT ANL BLD/T: NORMAL
ELECTRONICALLY SIGNED BY: NORMAL

## 2024-10-16 ENCOUNTER — APPOINTMENT (OUTPATIENT)
Dept: SURGICAL ONCOLOGY | Facility: CLINIC | Age: 69
End: 2024-10-16
Payer: MEDICARE

## 2024-10-22 ENCOUNTER — LAB (OUTPATIENT)
Dept: HEMATOLOGY/ONCOLOGY | Facility: CLINIC | Age: 69
End: 2024-10-22
Payer: MEDICARE

## 2024-10-22 ENCOUNTER — OFFICE VISIT (OUTPATIENT)
Dept: HEMATOLOGY/ONCOLOGY | Facility: CLINIC | Age: 69
End: 2024-10-22
Payer: MEDICARE

## 2024-10-22 ENCOUNTER — APPOINTMENT (OUTPATIENT)
Dept: HEMATOLOGY/ONCOLOGY | Facility: CLINIC | Age: 69
End: 2024-10-22
Payer: MEDICARE

## 2024-10-22 VITALS
TEMPERATURE: 99.1 F | DIASTOLIC BLOOD PRESSURE: 78 MMHG | SYSTOLIC BLOOD PRESSURE: 116 MMHG | HEART RATE: 63 BPM | RESPIRATION RATE: 18 BRPM | OXYGEN SATURATION: 98 %

## 2024-10-22 DIAGNOSIS — C25.0 MALIGNANT NEOPLASM OF HEAD OF PANCREAS (MULTI): ICD-10-CM

## 2024-10-22 DIAGNOSIS — C25.0 MALIGNANT NEOPLASM OF HEAD OF PANCREAS (MULTI): Primary | ICD-10-CM

## 2024-10-22 LAB
ALBUMIN SERPL BCP-MCNC: 3.6 G/DL (ref 3.4–5)
ALP SERPL-CCNC: 143 U/L (ref 33–136)
ALT SERPL W P-5'-P-CCNC: 12 U/L (ref 7–45)
ANION GAP SERPL CALC-SCNC: 12 MMOL/L (ref 10–20)
AST SERPL W P-5'-P-CCNC: 16 U/L (ref 9–39)
BASOPHILS # BLD AUTO: 0.01 X10*3/UL (ref 0–0.1)
BASOPHILS NFR BLD AUTO: 0.2 %
BILIRUB SERPL-MCNC: 0.8 MG/DL (ref 0–1.2)
BUN SERPL-MCNC: 20 MG/DL (ref 6–23)
CALCIUM SERPL-MCNC: 9.3 MG/DL (ref 8.6–10.6)
CHLORIDE SERPL-SCNC: 96 MMOL/L (ref 98–107)
CO2 SERPL-SCNC: 31 MMOL/L (ref 21–32)
CREAT SERPL-MCNC: 1.01 MG/DL (ref 0.5–1.05)
EGFRCR SERPLBLD CKD-EPI 2021: 62 ML/MIN/1.73M*2
EOSINOPHIL # BLD AUTO: 0.19 X10*3/UL (ref 0–0.7)
EOSINOPHIL NFR BLD AUTO: 3.6 %
ERYTHROCYTE [DISTWIDTH] IN BLOOD BY AUTOMATED COUNT: 13.4 % (ref 11.5–14.5)
GLUCOSE SERPL-MCNC: 251 MG/DL (ref 74–99)
HCT VFR BLD AUTO: 29.1 % (ref 36–46)
HGB BLD-MCNC: 9.8 G/DL (ref 12–16)
IMM GRANULOCYTES # BLD AUTO: 0 X10*3/UL (ref 0–0.7)
IMM GRANULOCYTES NFR BLD AUTO: 0 % (ref 0–0.9)
LYMPHOCYTES # BLD AUTO: 1.9 X10*3/UL (ref 1.2–4.8)
LYMPHOCYTES NFR BLD AUTO: 36.4 %
MCH RBC QN AUTO: 31.5 PG (ref 26–34)
MCHC RBC AUTO-ENTMCNC: 33.7 G/DL (ref 32–36)
MCV RBC AUTO: 94 FL (ref 80–100)
MONOCYTES # BLD AUTO: 0.58 X10*3/UL (ref 0.1–1)
MONOCYTES NFR BLD AUTO: 11.1 %
NEUTROPHILS # BLD AUTO: 2.54 X10*3/UL (ref 1.2–7.7)
NEUTROPHILS NFR BLD AUTO: 48.7 %
NRBC BLD-RTO: ABNORMAL /100{WBCS}
PLATELET # BLD AUTO: 203 X10*3/UL (ref 150–450)
POTASSIUM SERPL-SCNC: 4 MMOL/L (ref 3.5–5.3)
PROT SERPL-MCNC: 6.1 G/DL (ref 6.4–8.2)
RBC # BLD AUTO: 3.11 X10*6/UL (ref 4–5.2)
SODIUM SERPL-SCNC: 135 MMOL/L (ref 136–145)
WBC # BLD AUTO: 5.2 X10*3/UL (ref 4.4–11.3)

## 2024-10-22 PROCEDURE — 3052F HG A1C>EQUAL 8.0%<EQUAL 9.0%: CPT | Performed by: NURSE PRACTITIONER

## 2024-10-22 PROCEDURE — 99215 OFFICE O/P EST HI 40 MIN: CPT | Performed by: NURSE PRACTITIONER

## 2024-10-22 PROCEDURE — 36591 DRAW BLOOD OFF VENOUS DEVICE: CPT

## 2024-10-22 PROCEDURE — 2500000004 HC RX 250 GENERAL PHARMACY W/ HCPCS (ALT 636 FOR OP/ED): Performed by: INTERNAL MEDICINE

## 2024-10-22 PROCEDURE — 3060F POS MICROALBUMINURIA REV: CPT | Performed by: NURSE PRACTITIONER

## 2024-10-22 PROCEDURE — 85025 COMPLETE CBC W/AUTO DIFF WBC: CPT

## 2024-10-22 PROCEDURE — 80053 COMPREHEN METABOLIC PANEL: CPT

## 2024-10-22 PROCEDURE — 3048F LDL-C <100 MG/DL: CPT | Performed by: NURSE PRACTITIONER

## 2024-10-22 RX ORDER — ALBUTEROL SULFATE 0.83 MG/ML
3 SOLUTION RESPIRATORY (INHALATION) AS NEEDED
Status: CANCELLED | OUTPATIENT
Start: 2024-10-23

## 2024-10-22 RX ORDER — PALONOSETRON 0.05 MG/ML
0.25 INJECTION, SOLUTION INTRAVENOUS ONCE
Status: CANCELLED | OUTPATIENT
Start: 2024-10-23

## 2024-10-22 RX ORDER — EPINEPHRINE 0.3 MG/.3ML
0.3 INJECTION SUBCUTANEOUS EVERY 5 MIN PRN
OUTPATIENT
Start: 2024-11-06

## 2024-10-22 RX ORDER — DIPHENHYDRAMINE HYDROCHLORIDE 50 MG/ML
50 INJECTION INTRAMUSCULAR; INTRAVENOUS AS NEEDED
Status: CANCELLED | OUTPATIENT
Start: 2024-10-25

## 2024-10-22 RX ORDER — DIPHENHYDRAMINE HYDROCHLORIDE 50 MG/ML
50 INJECTION INTRAMUSCULAR; INTRAVENOUS AS NEEDED
OUTPATIENT
Start: 2024-11-06

## 2024-10-22 RX ORDER — FAMOTIDINE 10 MG/ML
20 INJECTION INTRAVENOUS ONCE AS NEEDED
Status: CANCELLED | OUTPATIENT
Start: 2024-10-23

## 2024-10-22 RX ORDER — ALBUTEROL SULFATE 0.83 MG/ML
3 SOLUTION RESPIRATORY (INHALATION) AS NEEDED
OUTPATIENT
Start: 2024-11-06

## 2024-10-22 RX ORDER — FAMOTIDINE 10 MG/ML
20 INJECTION INTRAVENOUS ONCE AS NEEDED
Status: CANCELLED | OUTPATIENT
Start: 2024-10-25

## 2024-10-22 RX ORDER — PROCHLORPERAZINE EDISYLATE 5 MG/ML
10 INJECTION INTRAMUSCULAR; INTRAVENOUS EVERY 6 HOURS PRN
OUTPATIENT
Start: 2024-11-06

## 2024-10-22 RX ORDER — EPINEPHRINE 0.3 MG/.3ML
0.3 INJECTION SUBCUTANEOUS EVERY 5 MIN PRN
OUTPATIENT
Start: 2024-11-08

## 2024-10-22 RX ORDER — HEPARIN 100 UNIT/ML
500 SYRINGE INTRAVENOUS AS NEEDED
Status: DISCONTINUED | OUTPATIENT
Start: 2024-10-22 | End: 2024-10-22 | Stop reason: HOSPADM

## 2024-10-22 RX ORDER — DIPHENHYDRAMINE HYDROCHLORIDE 50 MG/ML
50 INJECTION INTRAMUSCULAR; INTRAVENOUS AS NEEDED
Status: CANCELLED | OUTPATIENT
Start: 2024-10-23

## 2024-10-22 RX ORDER — LORAZEPAM 2 MG/ML
1 INJECTION INTRAMUSCULAR AS NEEDED
OUTPATIENT
Start: 2024-11-06

## 2024-10-22 RX ORDER — HEPARIN SODIUM,PORCINE/PF 10 UNIT/ML
50 SYRINGE (ML) INTRAVENOUS AS NEEDED
Status: CANCELLED | OUTPATIENT
Start: 2024-10-22

## 2024-10-22 RX ORDER — EPINEPHRINE 0.3 MG/.3ML
0.3 INJECTION SUBCUTANEOUS EVERY 5 MIN PRN
Status: CANCELLED | OUTPATIENT
Start: 2024-10-23

## 2024-10-22 RX ORDER — EPINEPHRINE 0.3 MG/.3ML
0.3 INJECTION SUBCUTANEOUS EVERY 5 MIN PRN
Status: CANCELLED | OUTPATIENT
Start: 2024-10-25

## 2024-10-22 RX ORDER — ALBUTEROL SULFATE 0.83 MG/ML
3 SOLUTION RESPIRATORY (INHALATION) AS NEEDED
Status: CANCELLED | OUTPATIENT
Start: 2024-10-25

## 2024-10-22 RX ORDER — HEPARIN 100 UNIT/ML
500 SYRINGE INTRAVENOUS AS NEEDED
Status: CANCELLED | OUTPATIENT
Start: 2024-10-22

## 2024-10-22 RX ORDER — PALONOSETRON 0.05 MG/ML
0.25 INJECTION, SOLUTION INTRAVENOUS ONCE
OUTPATIENT
Start: 2024-11-06

## 2024-10-22 RX ORDER — ALBUTEROL SULFATE 0.83 MG/ML
3 SOLUTION RESPIRATORY (INHALATION) AS NEEDED
OUTPATIENT
Start: 2024-11-08

## 2024-10-22 RX ORDER — FAMOTIDINE 10 MG/ML
20 INJECTION INTRAVENOUS ONCE AS NEEDED
OUTPATIENT
Start: 2024-11-08

## 2024-10-22 RX ORDER — ATROPINE SULFATE 0.4 MG/ML
0.4 INJECTION, SOLUTION ENDOTRACHEAL; INTRAMEDULLARY; INTRAMUSCULAR; INTRAVENOUS; SUBCUTANEOUS
OUTPATIENT
Start: 2024-11-06

## 2024-10-22 RX ORDER — DIPHENHYDRAMINE HYDROCHLORIDE 50 MG/ML
50 INJECTION INTRAMUSCULAR; INTRAVENOUS AS NEEDED
OUTPATIENT
Start: 2024-11-08

## 2024-10-22 RX ORDER — PROCHLORPERAZINE EDISYLATE 5 MG/ML
10 INJECTION INTRAMUSCULAR; INTRAVENOUS EVERY 6 HOURS PRN
Status: CANCELLED | OUTPATIENT
Start: 2024-10-23

## 2024-10-22 RX ORDER — PROCHLORPERAZINE MALEATE 10 MG
10 TABLET ORAL EVERY 6 HOURS PRN
Status: CANCELLED | OUTPATIENT
Start: 2024-10-23

## 2024-10-22 RX ORDER — ATROPINE SULFATE 0.4 MG/ML
0.4 INJECTION, SOLUTION ENDOTRACHEAL; INTRAMEDULLARY; INTRAMUSCULAR; INTRAVENOUS; SUBCUTANEOUS
Status: CANCELLED | OUTPATIENT
Start: 2024-10-23

## 2024-10-22 RX ORDER — HEPARIN SODIUM,PORCINE/PF 10 UNIT/ML
50 SYRINGE (ML) INTRAVENOUS AS NEEDED
Status: DISCONTINUED | OUTPATIENT
Start: 2024-10-22 | End: 2024-10-22 | Stop reason: HOSPADM

## 2024-10-22 RX ORDER — LORAZEPAM 2 MG/ML
1 INJECTION INTRAMUSCULAR AS NEEDED
Status: CANCELLED | OUTPATIENT
Start: 2024-10-23

## 2024-10-22 RX ORDER — PROCHLORPERAZINE MALEATE 10 MG
10 TABLET ORAL EVERY 6 HOURS PRN
OUTPATIENT
Start: 2024-11-06

## 2024-10-22 RX ORDER — FAMOTIDINE 10 MG/ML
20 INJECTION INTRAVENOUS ONCE AS NEEDED
OUTPATIENT
Start: 2024-11-06

## 2024-10-22 ASSESSMENT — PAIN SCALES - GENERAL: PAINLEVEL_OUTOF10: 0-NO PAIN

## 2024-10-22 NOTE — PROGRESS NOTES
Patient ID: Nanci Cloón is a 68 y.o. female from [place], [place].     Referring Physician: Francisco Cruz MD  [id number] [place] [address]    Primary Care Provider: Diana Blunt DO    Diagnosis:   Pancreatic cancer 9/2024    Primary Oncologic Surgeon:   Nazia    Primary Medical Oncologist:  Anthony    Primary Radiation Oncologist:  KVNG    Current Therapy:  Planned Neoadjuvant Chemo    Oncologic Surgery History:  None    Oncologic Therapy History:  10/9/24 -  mFOLFIRINOX     Molecular Genetics:  Mmr-P    Current Sites of Disease:  Pancreatic head    Oncologic Problem List:  Pancreatic cancer  DM2    Oncologic Narrative:  68 y.o. woman who initially presented in mid September 2024 with abdominal pain jaundice MARK and pancreatitis.  She had an ultrasound that showed extrahepatic bile duct dilation and pancreatic duct dilation.  Both of these seem to emanate from the pancreatic head.  A CT scan on September 9, 2024 was concerning for a mass.  MRCP on September 27, 2024 showed an ill-defined mass in the pancreatic head and uncinate process measuring approximately 3.5 cm.  Biopsy of the pancreatic mass from the September 11, 2024 endoscopic ultrasound revealed adenocarcinoma.  She met me for initial consultation regarding treatment planning on 9/26/2024.       Past Medical History: Nanci has a past medical history of Hyperlipidemia, Hypertension, Hypothyroidism, Personal history of other diseases of the circulatory system (09/15/2017), Personal history of other endocrine, nutritional and metabolic disease (02/24/2016), Personal history of other endocrine, nutritional and metabolic disease (03/09/2017), Personal history of other endocrine, nutritional and metabolic disease, Type 2 diabetes mellitus with other skin complications (Multi), and Vision loss.  Surgical History:  Nanci has a past surgical history that includes Other surgical history (09/01/2022); Other surgical history (09/01/2022); and Other  surgical history (09/01/2022).  Social History:  Nanci reports that she has never smoked. She has never used smokeless tobacco. She reports that she does not drink alcohol and does not use drugs.  Family History:  No family history on file.  Family Oncology History:  Cancer-related family history is not on file.      SUBJECTIVE:    History of Present Illness:  Nanci Colón is a 68 y.o. female who was referred by Francisco Cruz MD and presents with No chief complaint on file.    9/30 - diagnostic lap with port placement  - no evidence of disease.  Two peritoneal bx were negative for cancer   10/9 - C1 mFOLFIRINOX    - c/o more sob with activity - going upstairs has MOREIRA   + fatigue - trouble sleeping due to pain, usually only takes 5mg at night   -pain at night when lying down   + nausea, no vomiting - reports baseline nausea prior to chemo unchanged      - only tried zofran not compazine   -miralax for constipation is helpful, no diarrhea   -poor appetite   No fevers or chills.   + mostly resting during the day, feels deconditioned, needs to rest while doing cooking   + cold sensitivity  - no neuropathy         OBJECTIVE:    VS / Pain:  There were no vitals taken for this visit.  BSA: There is no height or weight on file to calculate BSA.   Pain Scale: 0    Daily Weight  10/09/24 : 77.7 kg (171 lb 3.2 oz)  09/30/24 : 77.4 kg (170 lb 10.2 oz)  09/27/24 : 78 kg (172 lb)      Physical Exam  Constitutional:       Appearance: She is normal weight.   HENT:      Nose: Nose normal.      Mouth/Throat:      Mouth: Mucous membranes are moist.      Pharynx: Oropharynx is clear.   Eyes:      Extraocular Movements: Extraocular movements intact.      Conjunctiva/sclera: Conjunctivae normal.   Cardiovascular:      Rate and Rhythm: Normal rate.   Pulmonary:      Effort: Pulmonary effort is normal.      Breath sounds: Normal breath sounds.   Abdominal:      General: Abdomen is flat. Bowel sounds are normal.   Musculoskeletal:          General: Normal range of motion.   Skin:     General: Skin is warm.   Neurological:      General: No focal deficit present.      Mental Status: She is alert. Mental status is at baseline.   Psychiatric:         Mood and Affect: Mood normal.         Thought Content: Thought content normal.         Judgment: Judgment normal.         Performance Status:   ECOG 1    Diagnostic Results         WBC   Date/Time Value Ref Range Status   10/22/2024 07:34 AM 5.2 4.4 - 11.3 x10*3/uL Final   10/09/2024 08:53 AM 9.0 4.4 - 11.3 x10*3/uL Final   09/24/2024 04:28 AM 10.6 4.4 - 11.3 x10*3/uL Final     Hemoglobin   Date Value Ref Range Status   10/22/2024 9.8 (L) 12.0 - 16.0 g/dL Final   10/09/2024 9.7 (L) 12.0 - 16.0 g/dL Final   09/24/2024 10.7 (L) 12.0 - 16.0 g/dL Final     MCV   Date/Time Value Ref Range Status   10/22/2024 07:34 AM 94 80 - 100 fL Final   10/09/2024 08:53 AM 96 80 - 100 fL Final   09/24/2024 04:28 AM 91 80 - 100 fL Final     Platelets   Date/Time Value Ref Range Status   10/22/2024 07:34  150 - 450 x10*3/uL Final   10/09/2024 08:53  150 - 450 x10*3/uL Final   09/24/2024 04:28  150 - 450 x10*3/uL Final     Neutrophils Absolute   Date/Time Value Ref Range Status   10/22/2024 07:34 AM 2.54 1.20 - 7.70 x10*3/uL Final     Comment:     Percent differential counts (%) should be interpreted in the context of the absolute cell counts (cells/uL).   10/09/2024 08:53 AM 6.36 1.20 - 7.70 x10*3/uL Final     Comment:     Percent differential counts (%) should be interpreted in the context of the absolute cell counts (cells/uL).   09/24/2024 04:28 AM 8.30 (H) 1.20 - 7.70 x10*3/uL Final     Comment:     Percent differential counts (%) should be interpreted in the context of the absolute cell counts (cells/uL).     Bilirubin, Total   Date/Time Value Ref Range Status   10/09/2024 08:53 AM 1.2 0.0 - 1.2 mg/dL Final   09/24/2024 04:28 AM 2.7 (H) 0.0 - 1.2 mg/dL Final   09/23/2024 10:40 AM 2.4 (H) 0.0 - 1.2  "mg/dL Final     AST   Date/Time Value Ref Range Status   10/09/2024 08:53 AM 59 (H) 9 - 39 U/L Final   09/24/2024 04:28  (H) 9 - 39 U/L Final   09/23/2024 10:40  (H) 9 - 39 U/L Final     ALT   Date/Time Value Ref Range Status   10/09/2024 08:53 AM 31 7 - 45 U/L Final     Comment:     Patients treated with Sulfasalazine may generate falsely decreased results for ALT.   09/24/2024 04:28  (H) 7 - 45 U/L Final     Comment:     Patients treated with Sulfasalazine may generate falsely decreased results for ALT.   09/23/2024 10:40  (H) 7 - 45 U/L Final     Comment:     Patients treated with Sulfasalazine may generate falsely decreased results for ALT.     Creatinine   Date/Time Value Ref Range Status   10/09/2024 08:53 AM 0.99 0.50 - 1.05 mg/dL Final   09/24/2024 04:28 AM 1.49 (H) 0.50 - 1.05 mg/dL Final   09/23/2024 10:40 AM 1.58 (H) 0.50 - 1.05 mg/dL Final     Urea Nitrogen   Date/Time Value Ref Range Status   10/09/2024 08:53 AM 19 6 - 23 mg/dL Final   09/24/2024 04:28 AM 38 (H) 6 - 23 mg/dL Final   09/23/2024 10:40 AM 39 (H) 6 - 23 mg/dL Final     Albumin   Date/Time Value Ref Range Status   10/09/2024 08:53 AM 3.5 3.4 - 5.0 g/dL Final   09/24/2024 04:28 AM 3.7 3.4 - 5.0 g/dL Final   09/23/2024 10:40 AM 3.6 3.4 - 5.0 g/dL Final     Cancer AG 19-9   Date/Time Value Ref Range Status   10/09/2024 08:53 AM 2,737.55 (H) <35.00 U/mL Final   09/23/2024 10:40 AM 3,639.86 (H) <35.00 U/mL Final   09/11/2024 05:40 AM 2,960.13 (H) <35.00 U/mL Final     No results found for: \"CEA\"    === 09/27/24 ===    CT CHEST WO IV CONTRAST    - Impression -  No evidence of metastatic disease.    Bilateral small masses along the neural foramina. These presumably  represent benign neural tumors. These were noted on MRI of the same  date.    Signed by: Zoie Ramirez 10/1/2024 3:47 PM  Dictation workstation:   [id number]    === 09/27/24 ===    MR MRCP WITH PANCREAS WO AND W CONTRAST    - Impression -  1.  Ill-defined " pancreatic head and uncinate process hypoenhancing mass measuring 3.5 cm. There is abrupt cutoff of the main pancreatic duct at the level of the pancreatic neck with dilatation of the  distal pancreatic duct. There is less than 90 degrees of abutment of the portal mesenteric confluence by the mass. The hepatic arteries and SMA are not involved by the mass.   2. No lymphadenopathy.  3. Mild intrahepatic biliary dilatation with a common bile duct stent in place and expected pneumobilia.  4. Layering contents within the gallbladder lumen which may represent gallbladder sludge.      Assessment/Plan   68-year-old woman with a resectable pancreatic cancer but elevated CA 19-9 and recent pancreatitis so we will plan for neoadjuvant chemotherapy with modified FOLFIRINOX.   9/30/24 - diagnostic lab negative   10/6/24 - 1st dose mFOLFIRINOX  - tolerated with fatigue, nausea   Will add fosaprepitant to C2   Rec increase activity to help with fatigue/ stamina - offered PT referral which she declined     Cancer related pain:   -Treated with oxycodone  - will increase to 2 tabs at bedtime     Cancer related constipation:   - miralax regimen for constipation helping     DM:   -Continue metformin, however keep close eye on renal function.    - Endocrinology referral.    Plan for follow up prior to C4 & scans before C5   Follow ca19-9 level       WENDIE [facility]/ [facility]  Office: [phone]  Fax: [phone]

## 2024-10-23 ENCOUNTER — INFUSION (OUTPATIENT)
Dept: HEMATOLOGY/ONCOLOGY | Facility: CLINIC | Age: 69
End: 2024-10-23
Payer: MEDICARE

## 2024-10-23 ENCOUNTER — APPOINTMENT (OUTPATIENT)
Dept: HEMATOLOGY/ONCOLOGY | Facility: CLINIC | Age: 69
End: 2024-10-23
Payer: MEDICARE

## 2024-10-23 ENCOUNTER — SOCIAL WORK (OUTPATIENT)
Dept: HEMATOLOGY/ONCOLOGY | Facility: CLINIC | Age: 69
End: 2024-10-23
Payer: MEDICARE

## 2024-10-23 ENCOUNTER — NUTRITION (OUTPATIENT)
Dept: HEMATOLOGY/ONCOLOGY | Facility: CLINIC | Age: 69
End: 2024-10-23

## 2024-10-23 VITALS
OXYGEN SATURATION: 98 % | BODY MASS INDEX: 26.78 KG/M2 | HEART RATE: 64 BPM | TEMPERATURE: 97.2 F | RESPIRATION RATE: 18 BRPM | SYSTOLIC BLOOD PRESSURE: 106 MMHG | HEIGHT: 66 IN | WEIGHT: 166.6 LBS | DIASTOLIC BLOOD PRESSURE: 65 MMHG

## 2024-10-23 VITALS — HEIGHT: 66 IN | BODY MASS INDEX: 26.78 KG/M2 | WEIGHT: 166.6 LBS

## 2024-10-23 DIAGNOSIS — C25.0 MALIGNANT NEOPLASM OF HEAD OF PANCREAS (MULTI): ICD-10-CM

## 2024-10-23 PROCEDURE — 96415 CHEMO IV INFUSION ADDL HR: CPT

## 2024-10-23 PROCEDURE — 2500000004 HC RX 250 GENERAL PHARMACY W/ HCPCS (ALT 636 FOR OP/ED): Performed by: INTERNAL MEDICINE

## 2024-10-23 PROCEDURE — 2500000004 HC RX 250 GENERAL PHARMACY W/ HCPCS (ALT 636 FOR OP/ED): Performed by: NURSE PRACTITIONER

## 2024-10-23 PROCEDURE — 96417 CHEMO IV INFUS EACH ADDL SEQ: CPT

## 2024-10-23 PROCEDURE — 96375 TX/PRO/DX INJ NEW DRUG ADDON: CPT | Mod: INF

## 2024-10-23 PROCEDURE — 96367 TX/PROPH/DG ADDL SEQ IV INF: CPT

## 2024-10-23 PROCEDURE — 96416 CHEMO PROLONG INFUSE W/PUMP: CPT

## 2024-10-23 PROCEDURE — 96413 CHEMO IV INFUSION 1 HR: CPT

## 2024-10-23 RX ORDER — ATROPINE SULFATE 0.4 MG/ML
0.4 INJECTION, SOLUTION ENDOTRACHEAL; INTRAMEDULLARY; INTRAMUSCULAR; INTRAVENOUS; SUBCUTANEOUS
Status: COMPLETED | OUTPATIENT
Start: 2024-10-23 | End: 2024-10-23

## 2024-10-23 RX ORDER — PROCHLORPERAZINE EDISYLATE 5 MG/ML
10 INJECTION INTRAMUSCULAR; INTRAVENOUS EVERY 6 HOURS PRN
Status: DISCONTINUED | OUTPATIENT
Start: 2024-10-23 | End: 2024-10-23 | Stop reason: HOSPADM

## 2024-10-23 RX ORDER — FAMOTIDINE 10 MG/ML
20 INJECTION INTRAVENOUS ONCE AS NEEDED
Status: DISCONTINUED | OUTPATIENT
Start: 2024-10-23 | End: 2024-10-23 | Stop reason: HOSPADM

## 2024-10-23 RX ORDER — ALBUTEROL SULFATE 0.83 MG/ML
3 SOLUTION RESPIRATORY (INHALATION) AS NEEDED
Status: DISCONTINUED | OUTPATIENT
Start: 2024-10-23 | End: 2024-10-23 | Stop reason: HOSPADM

## 2024-10-23 RX ORDER — HEPARIN SODIUM,PORCINE/PF 10 UNIT/ML
50 SYRINGE (ML) INTRAVENOUS AS NEEDED
Status: CANCELLED | OUTPATIENT
Start: 2024-10-23

## 2024-10-23 RX ORDER — LORAZEPAM 2 MG/ML
1 INJECTION INTRAMUSCULAR AS NEEDED
Status: DISCONTINUED | OUTPATIENT
Start: 2024-10-23 | End: 2024-10-23 | Stop reason: HOSPADM

## 2024-10-23 RX ORDER — DIPHENHYDRAMINE HYDROCHLORIDE 50 MG/ML
50 INJECTION INTRAMUSCULAR; INTRAVENOUS AS NEEDED
Status: DISCONTINUED | OUTPATIENT
Start: 2024-10-23 | End: 2024-10-23 | Stop reason: HOSPADM

## 2024-10-23 RX ORDER — EPINEPHRINE 0.3 MG/.3ML
0.3 INJECTION SUBCUTANEOUS EVERY 5 MIN PRN
Status: DISCONTINUED | OUTPATIENT
Start: 2024-10-23 | End: 2024-10-23 | Stop reason: HOSPADM

## 2024-10-23 RX ORDER — DIPHENHYDRAMINE HYDROCHLORIDE 50 MG/ML
12.5 INJECTION INTRAMUSCULAR; INTRAVENOUS ONCE
Status: COMPLETED | OUTPATIENT
Start: 2024-10-23 | End: 2024-10-23

## 2024-10-23 RX ORDER — PROCHLORPERAZINE MALEATE 10 MG
10 TABLET ORAL EVERY 6 HOURS PRN
Status: DISCONTINUED | OUTPATIENT
Start: 2024-10-23 | End: 2024-10-23 | Stop reason: HOSPADM

## 2024-10-23 RX ORDER — DIPHENHYDRAMINE HYDROCHLORIDE 50 MG/ML
12.5 INJECTION INTRAMUSCULAR; INTRAVENOUS ONCE
Status: CANCELLED | OUTPATIENT
Start: 2024-10-23 | End: 2024-10-23

## 2024-10-23 RX ORDER — PALONOSETRON 0.05 MG/ML
0.25 INJECTION, SOLUTION INTRAVENOUS ONCE
Status: COMPLETED | OUTPATIENT
Start: 2024-10-23 | End: 2024-10-23

## 2024-10-23 RX ORDER — DEXAMETHASONE 6 MG/1
12 TABLET ORAL ONCE
Status: COMPLETED | OUTPATIENT
Start: 2024-10-23 | End: 2024-10-23

## 2024-10-23 RX ORDER — HEPARIN 100 UNIT/ML
500 SYRINGE INTRAVENOUS AS NEEDED
Status: CANCELLED | OUTPATIENT
Start: 2024-10-23

## 2024-10-23 ASSESSMENT — PAIN SCALES - GENERAL: PAINLEVEL_OUTOF10: 0-NO PAIN

## 2024-10-23 NOTE — PROGRESS NOTES
Pt arrived awake, alert, oriented, no apparent distress with unlabored breaths. Pt reports feeling well today without s/sx of illness. Pt here for day, cycle 2.   Emend was added to the pt treatment plan with first dose started today. Several minutes into infusion, pt c/o  SOB. Infusion stopped, NS , 12.5 mg Benadry, and 8 mg of dexamethasone IV given. Pt symptoms quickly resolved with stable VS. Dr Cruz notified, agreeable with plan and to go ahead with today's treatment. Pt received treatment and tolerated well as of thus far. Pt to go home with infusion pump infusing fluorouracil over the next 46 hrs. Pt to return on 10/25/24 for pump disconnect at approx [phone]. Pt without further questions or concerns, discharged in stable condition

## 2024-10-23 NOTE — PROGRESS NOTES
(SW) met with patient today to assess needs and offer support.  During conversation patient stated that she was not feeling good and felt that she was having a reaction.  SW was able to get her infusion nurse right away.  Patient stated that she was having a hard time with breathing but felt better after she got some rescue medication.  SW spoke briefly with patient afterwards and provided How a  Can Help and business card if she would need anything.  Patient stated that she has a good support system and resides with her .  SW will be out on medical leave and stated that a  is available if she would need anything.  Patient was appreciative.      Al Hayward MSW, LSW

## 2024-10-23 NOTE — SIGNIFICANT EVENT

## 2024-10-23 NOTE — PROGRESS NOTES
"NUTRITION Assessment NOTE    Nutrition Assessment     Reason for Visit:  Nanci Colón is a 68 y.o. female with resectable Pancreatic cancer 9/2024     She presented in mid September 2024 with abdominal pain, jaundice, MARK and pancreatitis.   She had an ultrasound that showed extrahepatic bile duct dilation and pancreatic duct dilation   MRCP on September 27, 2024 showed an ill-defined mass in the pancreatic head and uncinate process measuring approximately 3.5 cm.  A biliary stent was placed.     Current Therapy:  Neoadjuvant Chemo- 10/9/24 -  mFOLFIRINOX     Current Sites of Disease:  Pancreatic head     Oncologic Problem List:  Pancreatic cancer  DM2    Referred to this service for high risk dx and assessed today during infusion in the presence of her .    PMH noted: Hyperlipidemia, Hypertension, DM2, Hypothyroidism     Comprehensive metabolic panel              Component  Ref Range & Units 2 wk ago  (10/22/24)      1 mo ago  (9/15/24)   Glucose  74 - 99 mg/dL 251 High       127 High    Sodium  136 - 145 mmol/L 135 Low       136   Potassium  3.5 - 5.3 mmol/L 4.0      3.9 CM   Chloride  98 - 107 mmol/L 96 Low       103   Bicarbonate  21 - 32 mmol/L 31      23   Anion Gap  10 - 20 mmol/L 12      14   Urea Nitrogen  6 - 23 mg/dL 20      38 High    Creatinine  0.50 - 1.05 mg/dL 1.01      2.70 High    eGFR  >60 mL/min/1.73m*2 62      19 Low  CM      Calcium  8.6 - 10.6 mg/dL 9.3      8.8 R   Albumin  3.4 - 5.0 g/dL 3.6      3.2 Low    Alkaline Phosphatase  33 - 136 U/L 143 High       1,190 High    Total Protein  6.4 - 8.2 g/dL 6.1 Low       6.5   AST  9 - 39 U/L 16      244 High  CM   Bilirubin, Total  0.0 - 1.2 mg/dL 0.8      3.7 High    ALT  7 - 45 U/L 12      180 High  CM      Resulting Agency [facility]          No results found for: \"VITD25\"    Anthropometrics:  Anthropometrics  Height: 168 cm (5' 6.14\")  Weight: 75.6 kg (166 lb 9.6 oz)  BMI (Calculated): 26.77  IBW/kg (Dietitian Calculated): 59.3 " kg  Percent of IBW: 127.4 %  Weight Change  Weight History / % Weight Change: Loss of 10.8% in < 6 months  Significant Weight Loss: Yes  Interpretation of Weight Loss: >10% in 6 months  Pt not adjusted d/t significant wt loss    Wt Readings    10/23/24 75.6 kg (166 lb 9.6 oz)   10/09/24 77.7 kg (171 lb 3.2 oz)   09/30/24 77.4 kg (170 lb 10.2 oz)   09/27/24 78 kg (172 lb)   09/26/24 78 kg (172 lb)   09/24/24 77.1 kg (170 lb)   09/18/24 79.8 kg (175 lb 14.8 oz)   09/11/24 84.5 kg (186 lb 3.2 oz)   09/09/24 80.7 kg (178 lb)   09/09/24 81.6 kg (179 lb 12.8 oz)   08/21/24 82.5 kg (181 lb 12.8 oz)   05/01/24 84.8 kg (187 lb)     11/01/23 86.2 kg (190 lb)   07/31/23 85.7 kg (189 lb)   01/30/23 88.5 kg (195 lb)            Food And Nutrient Intake:  Food and Nutrient History  Food and Nutrient History: Poor appetite.  She is not eating a lot of meat- which she used to enjoy.  She can still eat yogurt, cottage cheese, PB and regular cheeses.  Notes that she takes a couple of bites of food and then cannot eat anymore- feels like she may get sick.  Normally she tries not too eat too many CHO's or concentrated sweets.  States she used to check her BS twice daily and has been checking it in the a.m.  Today it was 180.  Energy Intake: Poor < 50 %  Fluid Intake: Tries to keep a water bottle by her side- she does not want to dehydrate  GI Symptoms: nausea, constipation (no vomiting)  GI Symptoms greater than 2 weeks: yes  Oral Problems: dysgeusia (mild and intermittent)  She had only tried zofran- not compazine before today per NP.  Today she reports she took compazine before breakfast and chemo and it was more effective.  Ate a good breakfast as per below.  Denies diarrhea at the onset of ca symptoms and prior to stent placement.  States she had 1 soft stool since last treatment  She takes miralax daily in her tea for constipation and it keeps her regular- 1 normal BM in the a.m.      Food Intake  Meal 1: 3 scrambled eggs with  "onions and ham this a.m.  Meal 2: PB & J sandwich yesterday  Meal 3: Chicken noodle soup last evening  Snacks: She has been craving fresh fruit (Had a cutie last night that tasted good)                                                 Medication and Complementary/Alternative Medicine Use  Prescription Medication Use: No PPI noted      Nutrition Focused Physical Exam Findings:      Subcutaneous Fat Loss  Orbital Fat Pads: Mild-Moderate (slight dark circles and slight hollowing) (mild)  Buccal Fat Pads: Well nourished (full, rounded cheeks)  Triceps: Defer  Ribs: Defer    Muscle Wasting  Temporalis: Mild-Moderate (slight depression) (mild)  Pectoralis (Clavicular Region): Defer  Deltoid/Trapezius: Defer  Interosseous: Defer  Trapezius/Infraspinatus/Supraspinatus (Scapular Region): Defer  Quadriceps: Defer  Gastrocnemius: Defer              Energy Needs  Calculated Energy Needs Using Equations  Height: 168 cm (5' 6.14\")  Estimated Energy Needs  Total Energy Estimated Needs (kCal): 2117 kCal  Total Estimated Energy Need per Day (kCal/kg): 28 kCal/kg  Method for Estimating Needs: Actual BW  Estimated Fluid Needs  Total Fluid Estimated Needs (mL): 2117 mL  Total Fluid Estimated Needs (mL/kg): 28 mL/kg  Method for Estimating Needs: Actual BW  Estimated Protein Needs  Total Protein Estimated Needs (g): 90 g  Total Protein Estimated Needs (g/kg): 1.2 g/kg  Method for Estimating Needs: Actual BW        Nutrition Diagnosis   Malnutrition Diagnosis  Patient has Malnutrition Diagnosis: Yes  Diagnosis Status: New  Malnutrition Diagnosis: Moderate malnutrition related to chronic disease or condition  As Evidenced by: intake of < 75% of  estimated energy  requirement for  > 1 month with significant wt loss as documented  Additional Assessment Information: Pt at risk for significant EPI and without s/s at this time (Monitor need for PERT)         Nutrition Interventions/Recommendations   Nutrition Prescription  Individualized " Nutrition Prescription Provided for : Regular CHO modified YOLANDA    Food and Nutrition Delivery  Food and Nutrition Delivery  Meals & Snacks: Modify schedule of foods/fluids, Modify Composition of Meals/Snacks, Fiber-modified diet  Goals: 5-6 small frequent meals and snacks with concentrated calories and protein- all to include a protein (Low fiber as needed wtih malabsorption from chemo)  Medical Food Supplement:  (Boost high protein)  Goals: Trial for acceptance/tolerance for use daily  Other:: fluids  Goals: Meet hydration needs as above    Nutrition Education  Nutrition Education  Nutrition Education Content: Content related nutrition education  Goals: Manage malabsorption from EPI and from chemo    Coordination of Care       Patient Instructions      Using Pancreatic Enzymes (SCC)  Fiber-Restricted (13 grams) Nutrition Therapy (AND)    Nutrition Monitoring and Evaluation   Food/Nutrient Related History Monitoring  Monitoring and Evaluation Plan: Fluid intake, Amount of food, Meal/snack pattern, Fiber intake  Fluid Intake: Estimated fluid intake  Criteria: as above  Amount of Food: Estimated amout of food  Criteria: Meet energy and protein needs  Meal/Snack Pattern: Estimated meal and snack pattern  Criteria: as above  Estimated fiber intake: Estimated fiber intake  Criteria: < or = to 13 g as needed with malabsorption on chemo  Body Composition/Growth/Weight History  Monitoring and Evaluation Plan: Weight  Weight: Weight change  Criteria: Maintenance  Biochemical Data, Medical Tests and Procedures  Monitoring and Evaluation Plan: Electrolyte/renal panel, Glucose/endocrine profile  Criteria: WNL  Glucose/Endocrine Profile: Glucose, casual, Glucose, fasting  Criteria: < 250; < 160  Nutrition Focused Physical Findings  Monitoring and Evaluation Plan: Adipose, Digestive System, Muscles  Adipose: Loss of subcutaneous fat  Criteria: No further loss  Digestive System: Constipation, Nausea  Criteria: BM every 1-2 days  and by the 3rd day; manage N with proactive use of medication per MD  Muscles: Muscle atrophy  Criteria: No further losses

## 2024-10-23 NOTE — PROGRESS NOTES
Adverse Event Note     Name:Nanci Colón  : 1955  MRN: [id number]      Adverse Event:reaction  Medication: emend  Administered Date/Time: 945  Reactions/Symptoms Started Time: 952   Symptoms: (check all that apply)   [] Back Pain   [] Erythema Face     [] Hypotension     [] Rash/Rigors [x] Other   [] Bleeding    [] Erythema Hands  [] Itching  [] Swelling/Edema [] Unknown   [] Chest Pain [] Hives/Urticaria     [] Low platelet Ct  [] Syncope   [] Cytopenia  [] Hypertension       [] Neutropenia    Severity: Mild   Provider Notified: Yes   Medications Given(Add all medications to the chart via , or treatment plan)   [] Acetaminophen-Tylenol       [] Famotidine-Pepcid  [] Nitroglycerine-NTG   [] Albuterol                               [] Hydrocortisone       [] Ondansetron-Zofran   [x] Diphenhydramine-Benadryl  [] Lorazepam-Ativan  [] Naloxone-Narcan   [] Epinephrine-Epi                     [] Methylprednisone   Additional Details/ Comments: dexamethasone   Pt was several minutes in to a first time dose of Emend infusion, alerted staff to SOB. Pt given 12.5 mg Benadryl IVP and 8 mg of dexamethasone IVP

## 2024-10-25 ENCOUNTER — INFUSION (OUTPATIENT)
Dept: HEMATOLOGY/ONCOLOGY | Facility: CLINIC | Age: 69
End: 2024-10-25
Payer: MEDICARE

## 2024-10-25 VITALS
HEART RATE: 66 BPM | WEIGHT: 166 LBS | RESPIRATION RATE: 16 BRPM | OXYGEN SATURATION: 98 % | SYSTOLIC BLOOD PRESSURE: 115 MMHG | BODY MASS INDEX: 26.68 KG/M2 | TEMPERATURE: 97.7 F | DIASTOLIC BLOOD PRESSURE: 78 MMHG | HEIGHT: 66 IN

## 2024-10-25 DIAGNOSIS — C25.0 MALIGNANT NEOPLASM OF HEAD OF PANCREAS (MULTI): ICD-10-CM

## 2024-10-25 PROCEDURE — 2500000004 HC RX 250 GENERAL PHARMACY W/ HCPCS (ALT 636 FOR OP/ED): Performed by: INTERNAL MEDICINE

## 2024-10-25 PROCEDURE — 96523 IRRIG DRUG DELIVERY DEVICE: CPT

## 2024-10-25 RX ORDER — HEPARIN SODIUM,PORCINE/PF 10 UNIT/ML
50 SYRINGE (ML) INTRAVENOUS AS NEEDED
OUTPATIENT
Start: 2024-10-25

## 2024-10-25 RX ORDER — HEPARIN 100 UNIT/ML
500 SYRINGE INTRAVENOUS AS NEEDED
Status: DISCONTINUED | OUTPATIENT
Start: 2024-10-25 | End: 2024-10-25 | Stop reason: HOSPADM

## 2024-10-25 RX ORDER — HEPARIN 100 UNIT/ML
500 SYRINGE INTRAVENOUS AS NEEDED
OUTPATIENT
Start: 2024-10-25

## 2024-10-25 ASSESSMENT — PAIN SCALES - GENERAL: PAINLEVEL_OUTOF10: 0-NO PAIN

## 2024-10-25 NOTE — PROGRESS NOTES
Patient here for pump disconnect, tolerated well. Patient to return 11/6 for treatment. Patient denies any questions at this time. Patient discharged in stable condition.

## 2024-11-05 ENCOUNTER — DOCUMENTATION (OUTPATIENT)
Dept: HEMATOLOGY/ONCOLOGY | Facility: HOSPITAL | Age: 69
End: 2024-11-05
Payer: MEDICARE

## 2024-11-05 ENCOUNTER — LAB (OUTPATIENT)
Dept: LAB | Facility: HOSPITAL | Age: 69
End: 2024-11-05
Payer: MEDICARE

## 2024-11-05 DIAGNOSIS — C25.0 MALIGNANT NEOPLASM OF HEAD OF PANCREAS (MULTI): Primary | ICD-10-CM

## 2024-11-05 DIAGNOSIS — C25.0 MALIGNANT NEOPLASM OF HEAD OF PANCREAS (MULTI): ICD-10-CM

## 2024-11-05 LAB
ALBUMIN SERPL BCP-MCNC: 3.3 G/DL (ref 3.4–5)
ALP SERPL-CCNC: 87 U/L (ref 33–136)
ALT SERPL W P-5'-P-CCNC: 12 U/L (ref 7–45)
ANION GAP SERPL CALC-SCNC: 15 MMOL/L (ref 10–20)
AST SERPL W P-5'-P-CCNC: 9 U/L (ref 9–39)
BASOPHILS # BLD MANUAL: 0 X10*3/UL (ref 0–0.1)
BASOPHILS NFR BLD MANUAL: 0 %
BILIRUB SERPL-MCNC: 0.9 MG/DL (ref 0–1.2)
BUN SERPL-MCNC: 17 MG/DL (ref 6–23)
BURR CELLS BLD QL SMEAR: ABNORMAL
CALCIUM SERPL-MCNC: 8.5 MG/DL (ref 8.6–10.3)
CANCER AG19-9 SERPL-ACNC: 258.87 U/ML
CHLORIDE SERPL-SCNC: 93 MMOL/L (ref 98–107)
CO2 SERPL-SCNC: 24 MMOL/L (ref 21–32)
CREAT SERPL-MCNC: 0.94 MG/DL (ref 0.5–1.05)
EGFRCR SERPLBLD CKD-EPI 2021: 67 ML/MIN/1.73M*2
EOSINOPHIL # BLD MANUAL: 0 X10*3/UL (ref 0–0.7)
EOSINOPHIL NFR BLD MANUAL: 0 %
ERYTHROCYTE [DISTWIDTH] IN BLOOD BY AUTOMATED COUNT: 14 % (ref 11.5–14.5)
GLUCOSE SERPL-MCNC: 309 MG/DL (ref 74–99)
HCT VFR BLD AUTO: 30.4 % (ref 36–46)
HGB BLD-MCNC: 10.5 G/DL (ref 12–16)
IMM GRANULOCYTES # BLD AUTO: 0.02 X10*3/UL (ref 0–0.7)
IMM GRANULOCYTES NFR BLD AUTO: 0.7 % (ref 0–0.9)
LYMPHOCYTES # BLD MANUAL: 1.09 X10*3/UL (ref 1.2–4.8)
LYMPHOCYTES NFR BLD MANUAL: 39 %
MCH RBC QN AUTO: 31.1 PG (ref 26–34)
MCHC RBC AUTO-ENTMCNC: 34.5 G/DL (ref 32–36)
MCV RBC AUTO: 90 FL (ref 80–100)
METAMYELOCYTES # BLD MANUAL: 0.03 X10*3/UL
METAMYELOCYTES NFR BLD MANUAL: 1 %
MONOCYTES # BLD MANUAL: 0.78 X10*3/UL (ref 0.1–1)
MONOCYTES NFR BLD MANUAL: 28 %
NEUTROPHILS # BLD MANUAL: 0.89 X10*3/UL (ref 1.2–7.7)
NEUTS BAND # BLD MANUAL: 0.39 X10*3/UL (ref 0–0.7)
NEUTS BAND NFR BLD MANUAL: 14 %
NEUTS SEG # BLD MANUAL: 0.5 X10*3/UL (ref 1.2–7)
NEUTS SEG NFR BLD MANUAL: 18 %
NRBC BLD-RTO: ABNORMAL /100{WBCS}
PLATELET # BLD AUTO: 155 X10*3/UL (ref 150–450)
POTASSIUM SERPL-SCNC: 2.8 MMOL/L (ref 3.5–5.3)
PROT SERPL-MCNC: 6.3 G/DL (ref 6.4–8.2)
RBC # BLD AUTO: 3.38 X10*6/UL (ref 4–5.2)
RBC MORPH BLD: ABNORMAL
SODIUM SERPL-SCNC: 129 MMOL/L (ref 136–145)
TOTAL CELLS COUNTED BLD: 100
TOXIC GRANULES BLD QL SMEAR: PRESENT
WBC # BLD AUTO: 2.8 X10*3/UL (ref 4.4–11.3)

## 2024-11-05 PROCEDURE — 85007 BL SMEAR W/DIFF WBC COUNT: CPT

## 2024-11-05 PROCEDURE — 86301 IMMUNOASSAY TUMOR CA 19-9: CPT | Mod: PORLAB

## 2024-11-05 PROCEDURE — 84075 ASSAY ALKALINE PHOSPHATASE: CPT

## 2024-11-05 PROCEDURE — 85027 COMPLETE CBC AUTOMATED: CPT

## 2024-11-05 RX ORDER — POTASSIUM CHLORIDE 29.8 MG/ML
40 INJECTION INTRAVENOUS ONCE
Status: CANCELLED | OUTPATIENT
Start: 2024-11-06

## 2024-11-05 RX ORDER — POTASSIUM CHLORIDE 750 MG/1
20 TABLET, FILM COATED, EXTENDED RELEASE ORAL ONCE
Status: CANCELLED | OUTPATIENT
Start: 2024-11-06

## 2024-11-05 NOTE — PROGRESS NOTES
Called patient & left message that labs showed anc of 500 - will need to delay chemo by 1 week, but needs to come into infusion tomorrow for IV potassium

## 2024-11-06 ENCOUNTER — TELEPHONE (OUTPATIENT)
Dept: HEMATOLOGY/ONCOLOGY | Facility: CLINIC | Age: 69
End: 2024-11-06

## 2024-11-06 ENCOUNTER — APPOINTMENT (OUTPATIENT)
Dept: HEMATOLOGY/ONCOLOGY | Facility: CLINIC | Age: 69
End: 2024-11-06
Payer: MEDICARE

## 2024-11-06 ENCOUNTER — INFUSION (OUTPATIENT)
Dept: HEMATOLOGY/ONCOLOGY | Facility: CLINIC | Age: 69
End: 2024-11-06
Payer: MEDICARE

## 2024-11-06 ENCOUNTER — NUTRITION (OUTPATIENT)
Dept: HEMATOLOGY/ONCOLOGY | Facility: CLINIC | Age: 69
End: 2024-11-06

## 2024-11-06 VITALS
WEIGHT: 162.3 LBS | HEART RATE: 109 BPM | BODY MASS INDEX: 26.08 KG/M2 | TEMPERATURE: 97.2 F | DIASTOLIC BLOOD PRESSURE: 67 MMHG | HEIGHT: 66 IN | RESPIRATION RATE: 16 BRPM | OXYGEN SATURATION: 99 % | SYSTOLIC BLOOD PRESSURE: 125 MMHG

## 2024-11-06 VITALS — WEIGHT: 162.3 LBS | HEIGHT: 66 IN | BODY MASS INDEX: 26.08 KG/M2

## 2024-11-06 DIAGNOSIS — C25.0 MALIGNANT NEOPLASM OF HEAD OF PANCREAS (MULTI): Primary | ICD-10-CM

## 2024-11-06 DIAGNOSIS — C25.0 MALIGNANT NEOPLASM OF HEAD OF PANCREAS (MULTI): ICD-10-CM

## 2024-11-06 PROCEDURE — 96366 THER/PROPH/DIAG IV INF ADDON: CPT | Mod: INF

## 2024-11-06 PROCEDURE — 2500000004 HC RX 250 GENERAL PHARMACY W/ HCPCS (ALT 636 FOR OP/ED): Performed by: INTERNAL MEDICINE

## 2024-11-06 PROCEDURE — 96365 THER/PROPH/DIAG IV INF INIT: CPT | Mod: INF

## 2024-11-06 PROCEDURE — 2500000002 HC RX 250 W HCPCS SELF ADMINISTERED DRUGS (ALT 637 FOR MEDICARE OP, ALT 636 FOR OP/ED): Performed by: NURSE PRACTITIONER

## 2024-11-06 PROCEDURE — 2500000004 HC RX 250 GENERAL PHARMACY W/ HCPCS (ALT 636 FOR OP/ED): Performed by: NURSE PRACTITIONER

## 2024-11-06 RX ORDER — POTASSIUM CHLORIDE 750 MG/1
20 TABLET, FILM COATED, EXTENDED RELEASE ORAL ONCE
Status: COMPLETED | OUTPATIENT
Start: 2024-11-06 | End: 2024-11-06

## 2024-11-06 RX ORDER — HEPARIN 100 UNIT/ML
500 SYRINGE INTRAVENOUS AS NEEDED
OUTPATIENT
Start: 2024-11-06

## 2024-11-06 RX ORDER — POTASSIUM CHLORIDE 29.8 MG/ML
40 INJECTION INTRAVENOUS ONCE
Status: COMPLETED | OUTPATIENT
Start: 2024-11-06 | End: 2024-11-06

## 2024-11-06 RX ORDER — HEPARIN SODIUM,PORCINE/PF 10 UNIT/ML
50 SYRINGE (ML) INTRAVENOUS AS NEEDED
OUTPATIENT
Start: 2024-11-06

## 2024-11-06 RX ORDER — HEPARIN 100 UNIT/ML
500 SYRINGE INTRAVENOUS AS NEEDED
Status: DISCONTINUED | OUTPATIENT
Start: 2024-11-06 | End: 2024-11-06 | Stop reason: HOSPADM

## 2024-11-06 RX ORDER — POTASSIUM CHLORIDE 20 MEQ/1
20 TABLET, EXTENDED RELEASE ORAL DAILY
Qty: 30 TABLET | Refills: 2 | Status: SHIPPED | OUTPATIENT
Start: 2024-11-06

## 2024-11-06 ASSESSMENT — PAIN SCALES - GENERAL: PAINLEVEL_OUTOF10: 5

## 2024-11-06 NOTE — PROGRESS NOTES
Pt arrived awake, alert, oriented. Pt with stated weakness, SOB with minimal activity, dyspepsia, flatus, hiccups, and bouts of watery diarrhea x 1 week. Pt reports that she is having difficulty eating as she has no appetite and food/drinks seem to burn. Pt is here for what would have been day 1, cycle 3. Pt did not receive the planned treatment due to ANC of 0.89. NP Opal Mendez to hold treatment x 1 week. Pt was ordered replacement potassium while she is here. Pt received 20 mEq PO, and 40 mEq through her port. Ivonne Perez, dietician, at  for assessment. Per Opal Mendez NP, pt to be rescheduled at alternate site (not [place])for treatment next week. Pt was also prescribed PO K, verbalized understanding that she is to  prescription and begin taking.

## 2024-11-06 NOTE — PROGRESS NOTES
"NUTRITION Follow Up NOTE    Nutrition Assessment     Reason for Visit:  Nanci Colón is a 68 y.o. female with resectable Pancreatic cancer 9/2024     She presented in mid September 2024 with abdominal pain, jaundice, MARK and pancreatitis.   She had an ultrasound that showed extrahepatic bile duct dilation and pancreatic duct dilation   MRCP on September 27, 2024 showed an ill-defined mass in the pancreatic head and uncinate process measuring approximately 3.5 cm.  A biliary stent was placed.     Current Therapy:  Neoadjuvant Chemo- 10/9/24 -  mFOLFIRINOX     Current Sites of Disease:  Pancreatic head     Oncologic Problem List:  Pancreatic cancer  DM2    Referred to this service for high risk dx and assessed 10/23/24.  Seen today during infusion in the presence of her .    Pt's tx held d/t Low ANC.  Receiving IV potassium.      PMH noted: Hyperlipidemia, Hypertension, Hypothyroidism, Type II DM    Lab Results   Component Value Date/Time    GLUCOSE 309 (H) 11/05/2024 1425     (L) 11/05/2024 1425    K 2.8 (LL) 11/05/2024 1425    CL 93 (L) 11/05/2024 1425    CO2 24 11/05/2024 1425    ANIONGAP 15 11/05/2024 1425    BUN 17 11/05/2024 1425    CREATININE 0.94 11/05/2024 1425    EGFR 67 11/05/2024 1425    CALCIUM 8.5 (L) 11/05/2024 1425    ALBUMIN 3.3 (L) 11/05/2024 1425    ALKPHOS 87 11/05/2024 1425    PROT 6.3 (L) 11/05/2024 1425    AST 9 11/05/2024 1425    BILITOT 0.9 11/05/2024 1425    ALT 12 11/05/2024 1425   Pt states she had a waffle with PB & sugar free jelly in the morning around 11:00 maybe.  She had nothing else.  She had the Frosty and some soup after her lab draw.    No results found for: \"VITD25\"    Anthropometrics:  Anthropometrics  Height: 168 cm (5' 6.14\")  Weight: 73.6 kg (162 lb 4.8 oz)  BMI (Calculated): 26.08  IBW/kg (Dietitian Calculated): 59.3 kg  Weight Change  Weight History / % Weight Change: Additional loss of 2 kg (2.6%) in 2 weeks  Significant Weight Loss: Yes    Wt Readings  " "  11/6/24 73.6 kg   10/23/24 75.6 kg (166 lb 9.6 oz)- Loss of 10.8% in < 6 months - significant   10/09/24 77.7 kg (171 lb 3.2 oz)   09/30/24 77.4 kg (170 lb 10.2 oz)   09/27/24 78 kg (172 lb)   09/26/24 78 kg (172 lb)   09/24/24 77.1 kg (170 lb)   09/18/24 79.8 kg (175 lb 14.8 oz)   09/11/24 84.5 kg (186 lb 3.2 oz)   09/09/24 80.7 kg (178 lb)   09/09/24 81.6 kg (179 lb 12.8 oz)   08/21/24 82.5 kg (181 lb 12.8 oz)   05/01/24 84.8 kg (187 lb)     11/01/23 86.2 kg (190 lb)   07/31/23 85.7 kg (189 lb)   01/30/23 88.5 kg (195 lb)            Food And Nutrient Intake:  Food and Nutrient History  Food and Nutrient History: Eating slowly- is afraid to eat as she is not sure what will happen with her bowels.  Nothing tastes or sounds good. When she does eat, she has poor taste after 2 bites. Has no desire for meats. Had a [employer] frosty, since she felt her BS was low and could fit a treat into her diet.  Is checking her BS.  States she had it down to 180 fasting, but it did go up to > 200.  Energy Intake: Poor < 50 %  Fluid Intake: Trying to drink a lot of water  GI Symptoms: early satiety, reflux, increased gas, diarrhea  Oral Problems: dysgeusia  Sleep Duration/Quality:  (Pt is having trouble sleeping- wondering if she cannot \"unwind\".)  She c/o a lot of upper and lower gas.  Acid reflux with some dysphagia and burning.  She was sleeping sitting up and belching significantly when moving.  Diarrhea started a few days ago with explosive gas.  Reports foamy stools or liquid.  She did not have diarrhea after either chemo infusion.    Recall pt previously reported she took compazine before her breakfast on chemo day and it was more effective than zofran.        Food Intake  Snacks: Has not been snacking d//t fear of reflux with lying down at bedtime                                                 Medication and Complementary/Alternative Medicine Use  Prescription Medication Use: She is taking both her DM meds as " "directed      Nutrition Focused Physical Exam Findings:  pt under several blankets    Subcutaneous Fat Loss  Orbital Fat Pads: Mild-Moderate (slight dark circles and slight hollowing) (mild)  Buccal Fat Pads: Well nourished (full, rounded cheeks)  Triceps: Defer  Ribs: Defer    Muscle Wasting  Temporalis: Mild-Moderate (slight depression) (mild)  Pectoralis (Clavicular Region): Defer  Deltoid/Trapezius: Defer  Interosseous: Defer  Trapezius/Infraspinatus/Supraspinatus (Scapular Region): Defer  Quadriceps: Defer  Gastrocnemius: Defer              Energy Needs  Calculated Energy Needs Using Equations  Height: 168 cm (5' 6.14\")  Estimated Energy Needs  Total Energy Estimated Needs (kCal): 2117 kCal  Total Estimated Energy Need per Day (kCal/kg): 28 kCal/kg  Method for Estimating Needs: Actual BW  Estimated Fluid Needs  Total Fluid Estimated Needs (mL): 2117 mL  Total Fluid Estimated Needs (mL/kg): 28 mL/kg  Method for Estimating Needs: Actual BW  Estimated Protein Needs  Total Protein Estimated Needs (g): 90 g  Total Protein Estimated Needs (g/kg): 1.2 g/kg  Method for Estimating Needs: Actual BW        Nutrition Diagnosis   Malnutrition Diagnosis  Patient has Malnutrition Diagnosis: Yes  Diagnosis Status: Ongoing  Malnutrition Diagnosis: Moderate malnutrition related to chronic disease or condition  As Evidenced by: intake of < 75% of  estimated energy  requirement for  > 1 month with significant wt loss as documented  Additional Assessment Information: Pt with s/s of EPI and would benefit from PERT.  Also needs a PPI to help manage symptoms of reflux.  Pt with elevated BS 3 hours post prandial on DM meds as directed.  Question need for SSI.       Nutrition Interventions/Recommendations   Nutrition Prescription  Individualized Nutrition Prescription Provided for : Regular CHO modified YOLANDA    Food and Nutrition Delivery  Food and Nutrition Delivery  Meals & Snacks: Carbohydrate-modified diet, Modify schedule of " foods/fluids, Modify Composition of Meals/Snacks  Goals: 5-6 small frequent meals and snacks with concentrated calories and protein- all to include a protein  Medical Food Supplement:  (follow up at next visit after assessing BS control)  Feeding Assistance: Mouth care  Goals: Bicarb and salt rinses 4-6 times daily (fresh lemon/citrus or juice in water)  Other:: fluids  Goals: Meet hydration needs as above    Nutrition Education  Nutrition Education  Nutrition Education Content: Content related nutrition education  Goals: Manage malabsorption from EPI and from chemo    Coordination of Care  Coordination of Nutrition Care by a Nutrition Professional  Collaboration and referral of nutrition care: Collaboration by nutrition professional with other providers  Goals: Medication management- PERT and PPI    Patient Instructions      Using Pancreatic Enzymes (SCC)     Taste and Smell Changes (AND)    Nutrition Monitoring and Evaluation   Food/Nutrient Related History Monitoring  Monitoring and Evaluation Plan: Fluid intake, Amount of food, Meal/snack pattern, Fiber intake  Fluid Intake: Estimated fluid intake  Criteria: as above  Amount of Food: Estimated amout of food  Criteria: Meet energy and protein needs  Meal/Snack Pattern: Estimated meal and snack pattern  Criteria: as above  Estimated fiber intake: Estimated fiber intake  Criteria: < or = to 13 g as needed with malabsorption on chemo  Body Composition/Growth/Weight History  Monitoring and Evaluation Plan: Weight  Weight: Weight change  Criteria: Maintenance  Biochemical Data, Medical Tests and Procedures  Monitoring and Evaluation Plan: Electrolyte/renal panel, Glucose/endocrine profile  Criteria: WNL  Glucose/Endocrine Profile: Glucose, casual, Glucose, fasting  Criteria: < 250; < 160  Nutrition Focused Physical Findings  Monitoring and Evaluation Plan: Adipose, Digestive System, Muscles  Adipose: Loss of subcutaneous fat  Criteria: No further loss  Digestive  System: Diarrhea, Early satiety (reflux and increased gas)  Criteria: PPI daily; Creon 24,000 units of lipase- 2 each capsules with meals and 1 each with snacks for a total of 8 daily (Anti N medication as needed per MD)  Muscles: Muscle atrophy  Criteria: No further losses          Time Spent  Prep time on day of patient encounter: 5 minutes  Time spent directly with patient, family or caregiver: 35 minutes  Additional Time Spent on Patient Care Activities: 5 minutes  Documentation Time: 20 minutes  Other Time Spent: 0 minutes  Total: 65 minutes

## 2024-11-08 ENCOUNTER — APPOINTMENT (OUTPATIENT)
Dept: HEMATOLOGY/ONCOLOGY | Facility: CLINIC | Age: 69
End: 2024-11-08
Payer: MEDICARE

## 2024-11-11 DIAGNOSIS — C25.0 MALIGNANT NEOPLASM OF HEAD OF PANCREAS (MULTI): Primary | ICD-10-CM

## 2024-11-12 RX ORDER — PANCRELIPASE 24000; 76000; 120000 [USP'U]/1; [USP'U]/1; [USP'U]/1
2 CAPSULE, DELAYED RELEASE PELLETS ORAL
Qty: 240 CAPSULE | Refills: 3 | Status: SHIPPED | OUTPATIENT
Start: 2024-11-12

## 2024-11-12 RX ORDER — PANTOPRAZOLE SODIUM 40 MG/1
40 TABLET, DELAYED RELEASE ORAL DAILY
Qty: 30 TABLET | Refills: 5 | Status: SHIPPED | OUTPATIENT
Start: 2024-11-12 | End: 2025-05-11

## 2024-11-19 ENCOUNTER — LAB (OUTPATIENT)
Dept: HEMATOLOGY/ONCOLOGY | Facility: CLINIC | Age: 69
End: 2024-11-19
Payer: MEDICARE

## 2024-11-19 ENCOUNTER — OFFICE VISIT (OUTPATIENT)
Dept: HEMATOLOGY/ONCOLOGY | Facility: CLINIC | Age: 69
End: 2024-11-19
Payer: MEDICARE

## 2024-11-19 VITALS
HEART RATE: 75 BPM | OXYGEN SATURATION: 98 % | DIASTOLIC BLOOD PRESSURE: 72 MMHG | SYSTOLIC BLOOD PRESSURE: 116 MMHG | RESPIRATION RATE: 18 BRPM | TEMPERATURE: 97.2 F

## 2024-11-19 VITALS — WEIGHT: 160.72 LBS | BODY MASS INDEX: 25.83 KG/M2

## 2024-11-19 DIAGNOSIS — C25.0 MALIGNANT NEOPLASM OF HEAD OF PANCREAS (MULTI): Primary | ICD-10-CM

## 2024-11-19 DIAGNOSIS — C25.0 MALIGNANT NEOPLASM OF HEAD OF PANCREAS (MULTI): ICD-10-CM

## 2024-11-19 LAB
ALBUMIN SERPL BCP-MCNC: 2.9 G/DL (ref 3.4–5)
ALP SERPL-CCNC: 163 U/L (ref 33–136)
ALT SERPL W P-5'-P-CCNC: 17 U/L (ref 7–45)
ANION GAP SERPL CALC-SCNC: 14 MMOL/L (ref 10–20)
AST SERPL W P-5'-P-CCNC: 15 U/L (ref 9–39)
BASOPHILS # BLD AUTO: 0.04 X10*3/UL (ref 0–0.1)
BASOPHILS NFR BLD AUTO: 0.8 %
BILIRUB SERPL-MCNC: 0.4 MG/DL (ref 0–1.2)
BUN SERPL-MCNC: 12 MG/DL (ref 6–23)
CALCIUM SERPL-MCNC: 8.5 MG/DL (ref 8.6–10.6)
CANCER AG19-9 SERPL-ACNC: 672.54 U/ML
CHLORIDE SERPL-SCNC: 101 MMOL/L (ref 98–107)
CO2 SERPL-SCNC: 26 MMOL/L (ref 21–32)
CREAT SERPL-MCNC: 0.88 MG/DL (ref 0.5–1.05)
EGFRCR SERPLBLD CKD-EPI 2021: 72 ML/MIN/1.73M*2
EOSINOPHIL # BLD AUTO: 0.01 X10*3/UL (ref 0–0.7)
EOSINOPHIL NFR BLD AUTO: 0.2 %
ERYTHROCYTE [DISTWIDTH] IN BLOOD BY AUTOMATED COUNT: 15.3 % (ref 11.5–14.5)
GLUCOSE SERPL-MCNC: 269 MG/DL (ref 74–99)
HCT VFR BLD AUTO: 26.1 % (ref 36–46)
HGB BLD-MCNC: 8.3 G/DL (ref 12–16)
IMM GRANULOCYTES # BLD AUTO: 0.02 X10*3/UL (ref 0–0.7)
IMM GRANULOCYTES NFR BLD AUTO: 0.4 % (ref 0–0.9)
LYMPHOCYTES # BLD AUTO: 1.9 X10*3/UL (ref 1.2–4.8)
LYMPHOCYTES NFR BLD AUTO: 37.4 %
MCH RBC QN AUTO: 31.2 PG (ref 26–34)
MCHC RBC AUTO-ENTMCNC: 31.8 G/DL (ref 32–36)
MCV RBC AUTO: 98 FL (ref 80–100)
MONOCYTES # BLD AUTO: 0.52 X10*3/UL (ref 0.1–1)
MONOCYTES NFR BLD AUTO: 10.2 %
NEUTROPHILS # BLD AUTO: 2.59 X10*3/UL (ref 1.2–7.7)
NEUTROPHILS NFR BLD AUTO: 51 %
NRBC BLD-RTO: ABNORMAL /100{WBCS}
PLATELET # BLD AUTO: 333 X10*3/UL (ref 150–450)
POTASSIUM SERPL-SCNC: 4.8 MMOL/L (ref 3.5–5.3)
PROT SERPL-MCNC: 5.3 G/DL (ref 6.4–8.2)
RBC # BLD AUTO: 2.66 X10*6/UL (ref 4–5.2)
SODIUM SERPL-SCNC: 136 MMOL/L (ref 136–145)
WBC # BLD AUTO: 5.1 X10*3/UL (ref 4.4–11.3)

## 2024-11-19 PROCEDURE — 85025 COMPLETE CBC W/AUTO DIFF WBC: CPT

## 2024-11-19 PROCEDURE — 3060F POS MICROALBUMINURIA REV: CPT | Performed by: NURSE PRACTITIONER

## 2024-11-19 PROCEDURE — 99215 OFFICE O/P EST HI 40 MIN: CPT | Performed by: NURSE PRACTITIONER

## 2024-11-19 PROCEDURE — 3052F HG A1C>EQUAL 8.0%<EQUAL 9.0%: CPT | Performed by: NURSE PRACTITIONER

## 2024-11-19 PROCEDURE — 2500000004 HC RX 250 GENERAL PHARMACY W/ HCPCS (ALT 636 FOR OP/ED): Performed by: INTERNAL MEDICINE

## 2024-11-19 PROCEDURE — 3048F LDL-C <100 MG/DL: CPT | Performed by: NURSE PRACTITIONER

## 2024-11-19 PROCEDURE — 1036F TOBACCO NON-USER: CPT | Performed by: NURSE PRACTITIONER

## 2024-11-19 PROCEDURE — 86301 IMMUNOASSAY TUMOR CA 19-9: CPT

## 2024-11-19 PROCEDURE — 80053 COMPREHEN METABOLIC PANEL: CPT

## 2024-11-19 PROCEDURE — 36591 DRAW BLOOD OFF VENOUS DEVICE: CPT

## 2024-11-19 RX ORDER — HEPARIN 100 UNIT/ML
500 SYRINGE INTRAVENOUS AS NEEDED
Status: CANCELLED | OUTPATIENT
Start: 2024-11-19

## 2024-11-19 RX ORDER — OXYCODONE HYDROCHLORIDE 5 MG/1
5-10 TABLET ORAL EVERY 4 HOURS PRN
Qty: 120 TABLET | Refills: 0 | Status: SHIPPED | OUTPATIENT
Start: 2024-11-19 | End: 2024-12-19

## 2024-11-19 RX ORDER — HEPARIN SODIUM,PORCINE/PF 10 UNIT/ML
50 SYRINGE (ML) INTRAVENOUS AS NEEDED
Status: CANCELLED | OUTPATIENT
Start: 2024-11-19

## 2024-11-19 RX ORDER — HEPARIN SODIUM,PORCINE/PF 10 UNIT/ML
50 SYRINGE (ML) INTRAVENOUS AS NEEDED
Status: DISCONTINUED | OUTPATIENT
Start: 2024-11-19 | End: 2024-11-19 | Stop reason: HOSPADM

## 2024-11-19 RX ORDER — HEPARIN 100 UNIT/ML
500 SYRINGE INTRAVENOUS AS NEEDED
Status: DISCONTINUED | OUTPATIENT
Start: 2024-11-19 | End: 2024-11-19 | Stop reason: HOSPADM

## 2024-11-19 ASSESSMENT — PAIN SCALES - GENERAL: PAINLEVEL_OUTOF10: 0-NO PAIN

## 2024-11-19 NOTE — PROGRESS NOTES
Patient ID: Nanci Colón is a 69 y.o. female from [place], [place].     Referring Physician: Francisco Cruz MD  [address] [place] [address]    Primary Care Provider: Diana Blunt DO    Diagnosis:   Pancreatic cancer 9/2024    Primary Oncologic Surgeon:   Nazia    Primary Medical Oncologist:  Atnhony    Primary Radiation Oncologist:  KVNG    Current Therapy:  10/4/24 -  Neoadjuvant Chemo - FOLFIRINOX    Oncologic Surgery History:  None    Oncologic Therapy History:  10/9/24 -  mFOLFIRINOX     Molecular Genetics:  Mmr-P    Current Sites of Disease:  Pancreatic head    Oncologic Problem List:  Pancreatic cancer  DM2    Oncologic Narrative:  69 y.o. woman who initially presented in mid September 2024 with abdominal pain jaundice MARK and pancreatitis.  She had an ultrasound that showed extrahepatic bile duct dilation and pancreatic duct dilation.  Both of these seem to emanate from the pancreatic head.  A CT scan on September 9, 2024 was concerning for a mass.  MRCP on September 27, 2024 showed an ill-defined mass in the pancreatic head and uncinate process measuring approximately 3.5 cm.  Biopsy of the pancreatic mass from the September 11, 2024 endoscopic ultrasound revealed adenocarcinoma.  She met me for initial consultation regarding treatment planning on 9/26/2024.       Past Medical History: Nanci has a past medical history of Hyperlipidemia, Hypertension, Hypothyroidism, Personal history of other diseases of the circulatory system (09/15/2017), Personal history of other endocrine, nutritional and metabolic disease (02/24/2016), Personal history of other endocrine, nutritional and metabolic disease (03/09/2017), Personal history of other endocrine, nutritional and metabolic disease, Type 2 diabetes mellitus with other skin complications, and Vision loss.  Surgical History:  Nanci has a past surgical history that includes Other surgical history (09/01/2022); Other surgical history (09/01/2022); and  Other surgical history (09/01/2022).  Social History:  Nanci reports that she has never smoked. She has never used smokeless tobacco. She reports that she does not drink alcohol and does not use drugs.  Family History:  No family history on file.  Family Oncology History:  Cancer-related family history is not on file.      SUBJECTIVE:    History of Present Illness:  Nanci Colón is a 69 y.o. female who was referred by Francisco Cruz MD and presents with chemotherapy follow up    Nanci is seen in follow up  S/p 2 cycles of FOLFIRINOX   Recall C3 was delayed due to   Scheduled for C3 tomorrow with neulasta     Feels better with extra week off of chemo.   Feels better with addition of pancreas enzymes & PPI   Diarrhea improved   BS very erratic - pcp following   Had rxn to fosaprepitant - rash / sob     Needs refill of oxycodone - taking mostly at night for pain     OBJECTIVE:    VS / Pain:  There were no vitals taken for this visit.  BSA: There is no height or weight on file to calculate BSA.   Pain Scale: 0    Daily Weight  11/06/24 : 73.6 kg (162 lb 4.8 oz)  11/06/24 : 73.6 kg (162 lb 4.8 oz)  10/25/24 : 75.3 kg (166 lb)      Physical Exam  Constitutional:       Appearance: She is normal weight.   HENT:      Nose: Nose normal.      Mouth/Throat:      Mouth: Mucous membranes are moist.      Pharynx: Oropharynx is clear.   Eyes:      Extraocular Movements: Extraocular movements intact.      Conjunctiva/sclera: Conjunctivae normal.   Cardiovascular:      Rate and Rhythm: Normal rate.   Pulmonary:      Effort: Pulmonary effort is normal.      Breath sounds: Normal breath sounds.   Abdominal:      General: Abdomen is flat. Bowel sounds are normal.   Musculoskeletal:         General: Normal range of motion.   Skin:     General: Skin is warm.   Neurological:      General: No focal deficit present.      Mental Status: She is alert. Mental status is at baseline.   Psychiatric:         Mood and Affect: Mood  normal.         Thought Content: Thought content normal.         Judgment: Judgment normal.       Performance Status:   ECOG 1    Diagnostic Results         WBC   Date/Time Value Ref Range Status   11/05/2024 02:25 PM 2.8 (L) 4.4 - 11.3 x10*3/uL Final   10/22/2024 07:34 AM 5.2 4.4 - 11.3 x10*3/uL Final   10/09/2024 08:53 AM 9.0 4.4 - 11.3 x10*3/uL Final     Hemoglobin   Date Value Ref Range Status   11/05/2024 10.5 (L) 12.0 - 16.0 g/dL Final   10/22/2024 9.8 (L) 12.0 - 16.0 g/dL Final   10/09/2024 9.7 (L) 12.0 - 16.0 g/dL Final     MCV   Date/Time Value Ref Range Status   11/05/2024 02:25 PM 90 80 - 100 fL Final   10/22/2024 07:34 AM 94 80 - 100 fL Final   10/09/2024 08:53 AM 96 80 - 100 fL Final     Platelets   Date/Time Value Ref Range Status   11/05/2024 02:25  150 - 450 x10*3/uL Final   10/22/2024 07:34  150 - 450 x10*3/uL Final   10/09/2024 08:53  150 - 450 x10*3/uL Final     Neutrophils Absolute   Date/Time Value Ref Range Status   10/22/2024 07:34 AM 2.54 1.20 - 7.70 x10*3/uL Final     Comment:     Percent differential counts (%) should be interpreted in the context of the absolute cell counts (cells/uL).   10/09/2024 08:53 AM 6.36 1.20 - 7.70 x10*3/uL Final     Comment:     Percent differential counts (%) should be interpreted in the context of the absolute cell counts (cells/uL).   09/24/2024 04:28 AM 8.30 (H) 1.20 - 7.70 x10*3/uL Final     Comment:     Percent differential counts (%) should be interpreted in the context of the absolute cell counts (cells/uL).     Bilirubin, Total   Date/Time Value Ref Range Status   11/05/2024 02:25 PM 0.9 0.0 - 1.2 mg/dL Final   10/22/2024 07:34 AM 0.8 0.0 - 1.2 mg/dL Final   10/09/2024 08:53 AM 1.2 0.0 - 1.2 mg/dL Final     AST   Date/Time Value Ref Range Status   11/05/2024 02:25 PM 9 9 - 39 U/L Final   10/22/2024 07:34 AM 16 9 - 39 U/L Final   10/09/2024 08:53 AM 59 (H) 9 - 39 U/L Final     ALT   Date/Time Value Ref Range Status   11/05/2024 02:25 PM  "12 7 - 45 U/L Final     Comment:     Patients treated with Sulfasalazine may generate falsely decreased results for ALT.   10/22/2024 07:34 AM 12 7 - 45 U/L Final     Comment:     Patients treated with Sulfasalazine may generate falsely decreased results for ALT.   10/09/2024 08:53 AM 31 7 - 45 U/L Final     Comment:     Patients treated with Sulfasalazine may generate falsely decreased results for ALT.     Creatinine   Date/Time Value Ref Range Status   11/05/2024 02:25 PM 0.94 0.50 - 1.05 mg/dL Final   10/22/2024 07:34 AM 1.01 0.50 - 1.05 mg/dL Final   10/09/2024 08:53 AM 0.99 0.50 - 1.05 mg/dL Final     Urea Nitrogen   Date/Time Value Ref Range Status   11/05/2024 02:25 PM 17 6 - 23 mg/dL Final   10/22/2024 07:34 AM 20 6 - 23 mg/dL Final   10/09/2024 08:53 AM 19 6 - 23 mg/dL Final     Albumin   Date/Time Value Ref Range Status   11/05/2024 02:25 PM 3.3 (L) 3.4 - 5.0 g/dL Final   10/22/2024 07:34 AM 3.6 3.4 - 5.0 g/dL Final   10/09/2024 08:53 AM 3.5 3.4 - 5.0 g/dL Final     Cancer AG 19-9   Date/Time Value Ref Range Status   11/05/2024 02:25 .87 (H) <35.00 U/mL Final   10/09/2024 08:53 AM 2,737.55 (H) <35.00 U/mL Final   09/23/2024 10:40 AM 3,639.86 (H) <35.00 U/mL Final     No results found for: \"CEA\"    === 09/27/24 ===    CT CHEST WO IV CONTRAST    - Impression -  No evidence of metastatic disease.    Bilateral small masses along the neural foramina. These presumably  represent benign neural tumors. These were noted on MRI of the same  date.    Signed by: Zoie Ramirez 10/1/2024 3:47 PM  Dictation workstation:   [id number]    === 09/27/24 ===    MR MRCP WITH PANCREAS WO AND W CONTRAST    - Impression -  1.  Ill-defined pancreatic head and uncinate process hypoenhancing mass measuring 3.5 cm. There is abrupt cutoff of the main pancreatic duct at the level of the pancreatic neck with dilatation of the  distal pancreatic duct. There is less than 90 degrees of abutment of the portal mesenteric confluence by " the mass. The hepatic arteries and SMA are not involved by the mass.   2. No lymphadenopathy.  3. Mild intrahepatic biliary dilatation with a common bile duct stent in place and expected pneumobilia.  4. Layering contents within the gallbladder lumen which may represent gallbladder sludge.      Assessment/Plan   68-year-old woman with a resectable pancreatic cancer but elevated CA 19-9 and recent pancreatitis so we will plan for neoadjuvant chemotherapy with modified FOLFIRINOX.   9/30/24 - diagnostic lab negative   10/6/24 - 1st dose mFOLFIRINOX  - tolerated with fatigue, nausea   Will add fosaprepitant to C2 - Allergic rxn to fosaprepitant - discontinue  C3 held due to ANC of 500    - plan for C3 tomorrow with addition of neulasta   C4 in 2 weeks   Scans & see Dr. Cruz after C4   Follow ca19-9 level which is decreasing nicely       Cancer related pain:   -continue oxycodone - new prescription sent     DM:   -Continue metformin, however keep close eye on renal function.    - Endocrinology referral.      WENDIE Oleary-[facility]/ [facility]  Office: [phone]  Fax: [phone]

## 2024-11-20 ENCOUNTER — NUTRITION (OUTPATIENT)
Dept: HEMATOLOGY/ONCOLOGY | Facility: CLINIC | Age: 69
End: 2024-11-20
Payer: MEDICARE

## 2024-11-20 ENCOUNTER — APPOINTMENT (OUTPATIENT)
Dept: PRIMARY CARE | Facility: CLINIC | Age: 69
End: 2024-11-20
Payer: MEDICARE

## 2024-11-20 ENCOUNTER — INFUSION (OUTPATIENT)
Dept: HEMATOLOGY/ONCOLOGY | Facility: CLINIC | Age: 69
End: 2024-11-20
Payer: MEDICARE

## 2024-11-20 VITALS
OXYGEN SATURATION: 97 % | DIASTOLIC BLOOD PRESSURE: 74 MMHG | RESPIRATION RATE: 16 BRPM | TEMPERATURE: 97.2 F | BODY MASS INDEX: 26 KG/M2 | WEIGHT: 161.8 LBS | HEART RATE: 74 BPM | HEIGHT: 66 IN | SYSTOLIC BLOOD PRESSURE: 111 MMHG

## 2024-11-20 VITALS — HEIGHT: 66 IN | BODY MASS INDEX: 26 KG/M2 | WEIGHT: 161.8 LBS

## 2024-11-20 DIAGNOSIS — C25.0 MALIGNANT NEOPLASM OF HEAD OF PANCREAS (MULTI): ICD-10-CM

## 2024-11-20 PROCEDURE — 96413 CHEMO IV INFUSION 1 HR: CPT

## 2024-11-20 PROCEDURE — 96417 CHEMO IV INFUS EACH ADDL SEQ: CPT

## 2024-11-20 PROCEDURE — 2500000004 HC RX 250 GENERAL PHARMACY W/ HCPCS (ALT 636 FOR OP/ED): Performed by: NURSE PRACTITIONER

## 2024-11-20 PROCEDURE — 96416 CHEMO PROLONG INFUSE W/PUMP: CPT

## 2024-11-20 PROCEDURE — 96415 CHEMO IV INFUSION ADDL HR: CPT

## 2024-11-20 PROCEDURE — 96375 TX/PRO/DX INJ NEW DRUG ADDON: CPT | Mod: INF

## 2024-11-20 RX ORDER — LORAZEPAM 2 MG/ML
1 INJECTION INTRAMUSCULAR AS NEEDED
Status: DISCONTINUED | OUTPATIENT
Start: 2024-11-20 | End: 2024-11-20 | Stop reason: HOSPADM

## 2024-11-20 RX ORDER — PROCHLORPERAZINE EDISYLATE 5 MG/ML
10 INJECTION INTRAMUSCULAR; INTRAVENOUS EVERY 6 HOURS PRN
Status: DISCONTINUED | OUTPATIENT
Start: 2024-11-20 | End: 2024-11-20 | Stop reason: HOSPADM

## 2024-11-20 RX ORDER — ATROPINE SULFATE 0.4 MG/ML
0.4 INJECTION, SOLUTION ENDOTRACHEAL; INTRAMEDULLARY; INTRAMUSCULAR; INTRAVENOUS; SUBCUTANEOUS
Status: COMPLETED | OUTPATIENT
Start: 2024-11-20 | End: 2024-11-20

## 2024-11-20 RX ORDER — DIPHENHYDRAMINE HYDROCHLORIDE 50 MG/ML
50 INJECTION INTRAMUSCULAR; INTRAVENOUS AS NEEDED
Status: DISCONTINUED | OUTPATIENT
Start: 2024-11-20 | End: 2024-11-20 | Stop reason: HOSPADM

## 2024-11-20 RX ORDER — DEXAMETHASONE 6 MG/1
12 TABLET ORAL ONCE
Status: COMPLETED | OUTPATIENT
Start: 2024-11-20 | End: 2024-11-20

## 2024-11-20 RX ORDER — HEPARIN SODIUM,PORCINE/PF 10 UNIT/ML
50 SYRINGE (ML) INTRAVENOUS AS NEEDED
Status: CANCELLED | OUTPATIENT
Start: 2024-11-20

## 2024-11-20 RX ORDER — ALBUTEROL SULFATE 0.83 MG/ML
3 SOLUTION RESPIRATORY (INHALATION) AS NEEDED
Status: DISCONTINUED | OUTPATIENT
Start: 2024-11-20 | End: 2024-11-20 | Stop reason: HOSPADM

## 2024-11-20 RX ORDER — HEPARIN 100 UNIT/ML
500 SYRINGE INTRAVENOUS AS NEEDED
Status: CANCELLED | OUTPATIENT
Start: 2024-11-20

## 2024-11-20 RX ORDER — EPINEPHRINE 0.3 MG/.3ML
0.3 INJECTION SUBCUTANEOUS EVERY 5 MIN PRN
Status: DISCONTINUED | OUTPATIENT
Start: 2024-11-20 | End: 2024-11-20 | Stop reason: HOSPADM

## 2024-11-20 RX ORDER — FAMOTIDINE 10 MG/ML
20 INJECTION INTRAVENOUS ONCE AS NEEDED
Status: DISCONTINUED | OUTPATIENT
Start: 2024-11-20 | End: 2024-11-20 | Stop reason: HOSPADM

## 2024-11-20 RX ORDER — HEPARIN 100 UNIT/ML
500 SYRINGE INTRAVENOUS AS NEEDED
Status: DISCONTINUED | OUTPATIENT
Start: 2024-11-20 | End: 2024-11-20 | Stop reason: HOSPADM

## 2024-11-20 RX ORDER — PROCHLORPERAZINE MALEATE 10 MG
10 TABLET ORAL EVERY 6 HOURS PRN
Status: DISCONTINUED | OUTPATIENT
Start: 2024-11-20 | End: 2024-11-20 | Stop reason: HOSPADM

## 2024-11-20 RX ORDER — PALONOSETRON 0.05 MG/ML
0.25 INJECTION, SOLUTION INTRAVENOUS ONCE
Status: COMPLETED | OUTPATIENT
Start: 2024-11-20 | End: 2024-11-20

## 2024-11-20 ASSESSMENT — PAIN SCALES - GENERAL: PAINLEVEL_OUTOF10: 4

## 2024-11-20 NOTE — PROGRESS NOTES
Pt here for C3 folfirinox. Tolerated infusion well. She is aware of plan of care, pt connected to cadd pump. Will return Friday at 12:30 for pump disconnect.

## 2024-11-20 NOTE — PROGRESS NOTES
"NUTRITION Follow Up NOTE    Nutrition Assessment     Reason for Visit:  Nanci Colón is a 69 y.o. female with resectable Pancreatic cancer 9/2024     She presented in mid September 2024 with abdominal pain, jaundice, MARK and pancreatitis.   She had an ultrasound that showed extrahepatic bile duct dilation and pancreatic duct dilation   MRCP on September 27, 2024 showed an ill-defined mass in the pancreatic head and uncinate process measuring approximately 3.5 cm.  A biliary stent was placed.     Current Therapy:  Neoadjuvant Chemo- 10/9/24 -  mFOLFIRINOX     Current Sites of Disease:  Pancreatic head     Oncologic Problem List:  Pancreatic cancer  DM2    Referred to this service for high risk dx and assessed 10/23/24.  Seen again today during infusion in the presence of her .    Pt's tx held d/t Low ANC.  Receiving IV potassium.      Referred to Endocrine for BS management during tx.    PMH noted: Hyperlipidemia, Hypertension, Hypothyroidism, Type II DM    Lab Results   Component Value Date/Time    GLUCOSE 269 (H) 11/19/2024 1132     11/19/2024 1132    K 4.8 11/19/2024 1132     11/19/2024 1132    CO2 26 11/19/2024 1132    ANIONGAP 14 11/19/2024 1132    BUN 12 11/19/2024 1132    CREATININE 0.88 11/19/2024 1132    EGFR 72 11/19/2024 1132    CALCIUM 8.5 (L) 11/19/2024 1132    ALBUMIN 2.9 (L) 11/19/2024 1132    ALKPHOS 163 (H) 11/19/2024 1132    PROT 5.3 (L) 11/19/2024 1132    AST 15 11/19/2024 1132    BILITOT 0.4 11/19/2024 1132    ALT 17 11/19/2024 1132       No results found for: \"VITD25\"    Anthropometrics:  Anthropometrics  Height: 168 cm (5' 6.14\")  Weight: 73.4 kg (161 lb 12.8 oz)  BMI (Calculated): 26  IBW/kg (Dietitian Calculated): 59.3 kg  Weight Change  Weight History / % Weight Change: Stable wt over 2 weeks after significant loss    Wt Readings    11/20/24 73.4 kg   11/6/24 73.6 kg- Additional loss of 2 kg (2.6%) in 2 weeks- significant   10/23/24 75.6 kg (166 lb 9.6 oz)- Loss of 10.8% " "in < 6 months - significant   10/09/24 77.7 kg (171 lb 3.2 oz)   09/30/24 77.4 kg (170 lb 10.2 oz)   09/27/24 78 kg (172 lb)   09/26/24 78 kg (172 lb)   09/24/24 77.1 kg (170 lb)   09/18/24 79.8 kg (175 lb 14.8 oz)   09/11/24 84.5 kg (186 lb 3.2 oz)   09/09/24 80.7 kg (178 lb)   09/09/24 81.6 kg (179 lb 12.8 oz)   08/21/24 82.5 kg (181 lb 12.8 oz)   05/01/24 84.8 kg (187 lb)     11/01/23 86.2 kg (190 lb)   07/31/23 85.7 kg (189 lb)   01/30/23 88.5 kg (195 lb)            Food And Nutrient Intake:  Food and Nutrient History  Food and Nutrient History: Still having difficulty eating d/t taste issues. She is trying to eat more often.  Wants to eat healthier foods but nothing sounds good.  Eating both canned and fresh fruits- snacks on at night. BS is lower but she has not had chemo and steroids d/t a break in chemo schedule.      She c/o a lot of upper and lower gas.  Acid reflux with some dysphagia and burning.  She was sleeping sitting up and belching significantly when moving.  Diarrhea started a few days ago with explosive gas.  Reports foamy stools or liquid.  She did not have diarrhea after either chemo infusion.    Recall pt previously reported she took compazine before her breakfast on chemo day and it was more effective than zofran.                                                          Medication and Complementary/Alternative Medicine Use  Prescription Medication Use: She is taking her PPI every morning.  Also taking the Creon.      Nutrition Focused Physical Exam Findings:  pt under several blankets                        Energy Needs  Calculated Energy Needs Using Equations  Height: 168 cm (5' 6.14\")  Estimated Energy Needs  Total Energy Estimated Needs (kCal): 2117 kCal  Total Estimated Energy Need per Day (kCal/kg): 28 kCal/kg  Method for Estimating Needs: Actual BW  Estimated Fluid Needs  Total Fluid Estimated Needs (mL): 2117 mL  Total Fluid Estimated Needs (mL/kg): 28 mL/kg  Method for Estimating " Needs: Actual BW  Estimated Protein Needs  Total Protein Estimated Needs (g): 90 g  Total Protein Estimated Needs (g/kg): 1.2 g/kg  Method for Estimating Needs: Actual BW        Nutrition Diagnosis   Malnutrition Diagnosis  Patient has Malnutrition Diagnosis: Yes  Diagnosis Status: Ongoing  Malnutrition Diagnosis: Moderate malnutrition related to chronic disease or condition  As Evidenced by: intake of < 75% of  estimated energy  requirement for  > 1 month with significant wt loss as documented      Pt with elevated BS 3 hours post prandial on DM meds as directed.  Question need for SSI.       Nutrition Interventions/Recommendations   Nutrition Prescription       Food and Nutrition Delivery       Nutrition Education       Coordination of Care       There are no Patient Instructions on file for this visit.    Nutrition Monitoring and Evaluation

## 2024-11-20 NOTE — SIGNIFICANT EVENT
11/20/24 0906   Prechemo Checklist   Has the patient been in the hospital, ED, or urgent care since last date of service No   Chemo/Immuno Consent Completed and Signed Yes   Protocol/Indications Verified Yes   Confirmed to previous date/time of medication Yes  (Cycle held previously due to counts)   Compared to previous dose Yes   All medications are dated accurately Yes   Pregnancy Test Negative Not applicable   Parameters Met Yes   BSA/Weight-Height Verified Yes   Dose Calculations Verified (current, total, cumulative) Yes

## 2024-11-22 ENCOUNTER — INFUSION (OUTPATIENT)
Dept: HEMATOLOGY/ONCOLOGY | Facility: CLINIC | Age: 69
End: 2024-11-22
Payer: MEDICARE

## 2024-11-22 VITALS
HEIGHT: 66 IN | OXYGEN SATURATION: 97 % | BODY MASS INDEX: 25.88 KG/M2 | WEIGHT: 161 LBS | HEART RATE: 76 BPM | TEMPERATURE: 97.3 F | RESPIRATION RATE: 16 BRPM | DIASTOLIC BLOOD PRESSURE: 66 MMHG | SYSTOLIC BLOOD PRESSURE: 107 MMHG

## 2024-11-22 DIAGNOSIS — C25.0 MALIGNANT NEOPLASM OF HEAD OF PANCREAS (MULTI): ICD-10-CM

## 2024-11-22 PROCEDURE — 96372 THER/PROPH/DIAG INJ SC/IM: CPT

## 2024-11-22 PROCEDURE — 2500000004 HC RX 250 GENERAL PHARMACY W/ HCPCS (ALT 636 FOR OP/ED): Performed by: INTERNAL MEDICINE

## 2024-11-22 PROCEDURE — 2500000004 HC RX 250 GENERAL PHARMACY W/ HCPCS (ALT 636 FOR OP/ED): Mod: JZ,JG | Performed by: NURSE PRACTITIONER

## 2024-11-22 RX ORDER — ALBUTEROL SULFATE 0.83 MG/ML
3 SOLUTION RESPIRATORY (INHALATION) AS NEEDED
Status: ACTIVE | OUTPATIENT
Start: 2024-11-22

## 2024-11-22 RX ORDER — HEPARIN 100 UNIT/ML
500 SYRINGE INTRAVENOUS AS NEEDED
OUTPATIENT
Start: 2024-11-22

## 2024-11-22 RX ORDER — EPINEPHRINE 0.3 MG/.3ML
0.3 INJECTION SUBCUTANEOUS EVERY 5 MIN PRN
Status: ACTIVE | OUTPATIENT
Start: 2024-11-22

## 2024-11-22 RX ORDER — HEPARIN 100 UNIT/ML
500 SYRINGE INTRAVENOUS AS NEEDED
Status: DISPENSED | OUTPATIENT
Start: 2024-11-22

## 2024-11-22 RX ORDER — DIPHENHYDRAMINE HYDROCHLORIDE 50 MG/ML
50 INJECTION INTRAMUSCULAR; INTRAVENOUS AS NEEDED
Status: ACTIVE | OUTPATIENT
Start: 2024-11-22

## 2024-11-22 RX ORDER — FAMOTIDINE 10 MG/ML
20 INJECTION INTRAVENOUS ONCE AS NEEDED
Status: ACTIVE | OUTPATIENT
Start: 2024-11-22

## 2024-11-22 RX ORDER — HEPARIN SODIUM,PORCINE/PF 10 UNIT/ML
50 SYRINGE (ML) INTRAVENOUS AS NEEDED
OUTPATIENT
Start: 2024-11-22

## 2024-11-22 RX ADMIN — PEGFILGRASTIM-CBQV 6 MG: 6 INJECTION, SOLUTION SUBCUTANEOUS at 12:49

## 2024-11-22 RX ADMIN — Medication 500 UNITS: at 12:49

## 2024-11-22 ASSESSMENT — PAIN SCALES - GENERAL: PAINLEVEL_OUTOF10: 0-NO PAIN

## 2024-11-22 NOTE — PROGRESS NOTES
Pt here for pump disconnect and first dose of udenyca. Tolerated well. She is aware of plan of care, will call with further questions or concerns. Will return in 2 weeks for next infusion

## 2024-11-25 ENCOUNTER — LAB (OUTPATIENT)
Dept: LAB | Facility: LAB | Age: 69
End: 2024-11-25
Payer: MEDICARE

## 2024-11-25 DIAGNOSIS — C25.0 MALIGNANT NEOPLASM OF HEAD OF PANCREAS (MULTI): ICD-10-CM

## 2024-11-25 LAB
ALBUMIN SERPL BCP-MCNC: 3.3 G/DL (ref 3.4–5)
ALP SERPL-CCNC: 381 U/L (ref 33–136)
ALT SERPL W P-5'-P-CCNC: 66 U/L (ref 7–45)
ANION GAP SERPL CALC-SCNC: 14 MMOL/L (ref 10–20)
AST SERPL W P-5'-P-CCNC: 102 U/L (ref 9–39)
BASOPHILS # BLD MANUAL: 0 X10*3/UL (ref 0–0.1)
BASOPHILS NFR BLD MANUAL: 0 %
BILIRUB SERPL-MCNC: 0.8 MG/DL (ref 0–1.2)
BUN SERPL-MCNC: 15 MG/DL (ref 6–23)
CALCIUM SERPL-MCNC: 8.7 MG/DL (ref 8.6–10.3)
CANCER AG19-9 SERPL-ACNC: 624.63 U/ML
CHLORIDE SERPL-SCNC: 97 MMOL/L (ref 98–107)
CO2 SERPL-SCNC: 28 MMOL/L (ref 21–32)
CREAT SERPL-MCNC: 0.83 MG/DL (ref 0.5–1.05)
DOHLE BOD BLD QL SMEAR: PRESENT
EGFRCR SERPLBLD CKD-EPI 2021: 77 ML/MIN/1.73M*2
EOSINOPHIL # BLD MANUAL: 0 X10*3/UL (ref 0–0.7)
EOSINOPHIL NFR BLD MANUAL: 0 %
ERYTHROCYTE [DISTWIDTH] IN BLOOD BY AUTOMATED COUNT: 14.7 % (ref 11.5–14.5)
GLUCOSE SERPL-MCNC: 206 MG/DL (ref 74–99)
HCT VFR BLD AUTO: 29.7 % (ref 36–46)
HGB BLD-MCNC: 9.6 G/DL (ref 12–16)
HYPOCHROMIA BLD QL SMEAR: ABNORMAL
IMM GRANULOCYTES # BLD AUTO: 0.82 X10*3/UL (ref 0–0.7)
IMM GRANULOCYTES NFR BLD AUTO: 9.2 % (ref 0–0.9)
LYMPHOCYTES # BLD MANUAL: 1.07 X10*3/UL (ref 1.2–4.8)
LYMPHOCYTES NFR BLD MANUAL: 12 %
MCH RBC QN AUTO: 32 PG (ref 26–34)
MCHC RBC AUTO-ENTMCNC: 32.3 G/DL (ref 32–36)
MCV RBC AUTO: 99 FL (ref 80–100)
MONOCYTES # BLD MANUAL: 0.36 X10*3/UL (ref 0.1–1)
MONOCYTES NFR BLD MANUAL: 4 %
NEUTROPHILS # BLD MANUAL: 7.48 X10*3/UL (ref 1.2–7.7)
NEUTS BAND # BLD MANUAL: 0.27 X10*3/UL (ref 0–0.7)
NEUTS BAND NFR BLD MANUAL: 3 %
NEUTS SEG # BLD MANUAL: 7.21 X10*3/UL (ref 1.2–7)
NEUTS SEG NFR BLD MANUAL: 81 %
NEUTS VAC BLD QL SMEAR: PRESENT
NRBC BLD-RTO: 0 /100 WBCS (ref 0–0)
OVALOCYTES BLD QL SMEAR: ABNORMAL
PLATELET # BLD AUTO: 135 X10*3/UL (ref 150–450)
POTASSIUM SERPL-SCNC: 3.9 MMOL/L (ref 3.5–5.3)
PROT SERPL-MCNC: 5.7 G/DL (ref 6.4–8.2)
RBC # BLD AUTO: 3 X10*6/UL (ref 4–5.2)
RBC MORPH BLD: ABNORMAL
SODIUM SERPL-SCNC: 135 MMOL/L (ref 136–145)
TOTAL CELLS COUNTED BLD: 100
TOXIC GRANULES BLD QL SMEAR: PRESENT
WBC # BLD AUTO: 8.9 X10*3/UL (ref 4.4–11.3)

## 2024-11-25 PROCEDURE — 85007 BL SMEAR W/DIFF WBC COUNT: CPT

## 2024-11-25 PROCEDURE — 85027 COMPLETE CBC AUTOMATED: CPT

## 2024-11-25 PROCEDURE — 80053 COMPREHEN METABOLIC PANEL: CPT

## 2024-11-25 PROCEDURE — 86301 IMMUNOASSAY TUMOR CA 19-9: CPT

## 2024-12-02 ENCOUNTER — LAB (OUTPATIENT)
Dept: LAB | Facility: HOSPITAL | Age: 69
End: 2024-12-02
Payer: MEDICARE

## 2024-12-02 ENCOUNTER — APPOINTMENT (OUTPATIENT)
Dept: PRIMARY CARE | Facility: CLINIC | Age: 69
End: 2024-12-02
Payer: MEDICARE

## 2024-12-02 VITALS
OXYGEN SATURATION: 97 % | DIASTOLIC BLOOD PRESSURE: 72 MMHG | BODY MASS INDEX: 25.55 KG/M2 | WEIGHT: 159 LBS | HEART RATE: 70 BPM | HEIGHT: 66 IN | SYSTOLIC BLOOD PRESSURE: 120 MMHG

## 2024-12-02 DIAGNOSIS — E03.9 HYPOTHYROIDISM, UNSPECIFIED TYPE: ICD-10-CM

## 2024-12-02 DIAGNOSIS — E11.65 TYPE 2 DIABETES MELLITUS WITH HYPERGLYCEMIA, WITHOUT LONG-TERM CURRENT USE OF INSULIN: Chronic | ICD-10-CM

## 2024-12-02 DIAGNOSIS — Z00.00 MEDICARE ANNUAL WELLNESS VISIT, SUBSEQUENT: Primary | ICD-10-CM

## 2024-12-02 DIAGNOSIS — C25.0 MALIGNANT NEOPLASM OF HEAD OF PANCREAS (MULTI): ICD-10-CM

## 2024-12-02 DIAGNOSIS — E78.49 OTHER HYPERLIPIDEMIA: ICD-10-CM

## 2024-12-02 DIAGNOSIS — I10 BENIGN HYPERTENSION: Chronic | ICD-10-CM

## 2024-12-02 LAB
ALBUMIN SERPL BCP-MCNC: 3.5 G/DL (ref 3.4–5)
ALP SERPL-CCNC: 412 U/L (ref 33–136)
ALT SERPL W P-5'-P-CCNC: 91 U/L (ref 7–45)
ANION GAP SERPL CALC-SCNC: 14 MMOL/L (ref 10–20)
AST SERPL W P-5'-P-CCNC: 103 U/L (ref 9–39)
BASOPHILS # BLD AUTO: 0.02 X10*3/UL (ref 0–0.1)
BASOPHILS NFR BLD AUTO: 0.2 %
BILIRUB SERPL-MCNC: 0.4 MG/DL (ref 0–1.2)
BUN SERPL-MCNC: 10 MG/DL (ref 6–23)
CALCIUM SERPL-MCNC: 8.6 MG/DL (ref 8.6–10.3)
CANCER AG19-9 SERPL-ACNC: 674.06 U/ML
CHLORIDE SERPL-SCNC: 99 MMOL/L (ref 98–107)
CO2 SERPL-SCNC: 24 MMOL/L (ref 21–32)
CREAT SERPL-MCNC: 0.94 MG/DL (ref 0.5–1.05)
EGFRCR SERPLBLD CKD-EPI 2021: 67 ML/MIN/1.73M*2
EOSINOPHIL # BLD AUTO: 0.06 X10*3/UL (ref 0–0.7)
EOSINOPHIL NFR BLD AUTO: 0.6 %
ERYTHROCYTE [DISTWIDTH] IN BLOOD BY AUTOMATED COUNT: 15.7 % (ref 11.5–14.5)
GLUCOSE SERPL-MCNC: 373 MG/DL (ref 74–99)
HCT VFR BLD AUTO: 30 % (ref 36–46)
HGB BLD-MCNC: 9.4 G/DL (ref 12–16)
IMM GRANULOCYTES # BLD AUTO: 0.13 X10*3/UL (ref 0–0.7)
IMM GRANULOCYTES NFR BLD AUTO: 1.4 % (ref 0–0.9)
LYMPHOCYTES # BLD AUTO: 1.46 X10*3/UL (ref 1.2–4.8)
LYMPHOCYTES NFR BLD AUTO: 15.4 %
MCH RBC QN AUTO: 30.9 PG (ref 26–34)
MCHC RBC AUTO-ENTMCNC: 31.3 G/DL (ref 32–36)
MCV RBC AUTO: 99 FL (ref 80–100)
MONOCYTES # BLD AUTO: 0.98 X10*3/UL (ref 0.1–1)
MONOCYTES NFR BLD AUTO: 10.4 %
NEUTROPHILS # BLD AUTO: 6.81 X10*3/UL (ref 1.2–7.7)
NEUTROPHILS NFR BLD AUTO: 72 %
PLATELET # BLD AUTO: 168 X10*3/UL (ref 150–450)
POTASSIUM SERPL-SCNC: 4 MMOL/L (ref 3.5–5.3)
PROT SERPL-MCNC: 6.3 G/DL (ref 6.4–8.2)
RBC # BLD AUTO: 3.04 X10*6/UL (ref 4–5.2)
SODIUM SERPL-SCNC: 133 MMOL/L (ref 136–145)
WBC # BLD AUTO: 9.5 X10*3/UL (ref 4.4–11.3)

## 2024-12-02 PROCEDURE — 3008F BODY MASS INDEX DOCD: CPT | Performed by: STUDENT IN AN ORGANIZED HEALTH CARE EDUCATION/TRAINING PROGRAM

## 2024-12-02 PROCEDURE — 3060F POS MICROALBUMINURIA REV: CPT | Performed by: STUDENT IN AN ORGANIZED HEALTH CARE EDUCATION/TRAINING PROGRAM

## 2024-12-02 PROCEDURE — 85025 COMPLETE CBC W/AUTO DIFF WBC: CPT

## 2024-12-02 PROCEDURE — 99214 OFFICE O/P EST MOD 30 MIN: CPT | Performed by: STUDENT IN AN ORGANIZED HEALTH CARE EDUCATION/TRAINING PROGRAM

## 2024-12-02 PROCEDURE — 3078F DIAST BP <80 MM HG: CPT | Performed by: STUDENT IN AN ORGANIZED HEALTH CARE EDUCATION/TRAINING PROGRAM

## 2024-12-02 PROCEDURE — 1036F TOBACCO NON-USER: CPT | Performed by: STUDENT IN AN ORGANIZED HEALTH CARE EDUCATION/TRAINING PROGRAM

## 2024-12-02 PROCEDURE — 1159F MED LIST DOCD IN RCRD: CPT | Performed by: STUDENT IN AN ORGANIZED HEALTH CARE EDUCATION/TRAINING PROGRAM

## 2024-12-02 PROCEDURE — 99397 PER PM REEVAL EST PAT 65+ YR: CPT | Performed by: STUDENT IN AN ORGANIZED HEALTH CARE EDUCATION/TRAINING PROGRAM

## 2024-12-02 PROCEDURE — 3052F HG A1C>EQUAL 8.0%<EQUAL 9.0%: CPT | Performed by: STUDENT IN AN ORGANIZED HEALTH CARE EDUCATION/TRAINING PROGRAM

## 2024-12-02 PROCEDURE — 1160F RVW MEDS BY RX/DR IN RCRD: CPT | Performed by: STUDENT IN AN ORGANIZED HEALTH CARE EDUCATION/TRAINING PROGRAM

## 2024-12-02 PROCEDURE — 3074F SYST BP LT 130 MM HG: CPT | Performed by: STUDENT IN AN ORGANIZED HEALTH CARE EDUCATION/TRAINING PROGRAM

## 2024-12-02 PROCEDURE — 86301 IMMUNOASSAY TUMOR CA 19-9: CPT | Mod: PORLAB

## 2024-12-02 PROCEDURE — 3048F LDL-C <100 MG/DL: CPT | Performed by: STUDENT IN AN ORGANIZED HEALTH CARE EDUCATION/TRAINING PROGRAM

## 2024-12-02 PROCEDURE — G0439 PPPS, SUBSEQ VISIT: HCPCS | Performed by: STUDENT IN AN ORGANIZED HEALTH CARE EDUCATION/TRAINING PROGRAM

## 2024-12-02 PROCEDURE — 84075 ASSAY ALKALINE PHOSPHATASE: CPT

## 2024-12-02 PROCEDURE — 1170F FXNL STATUS ASSESSED: CPT | Performed by: STUDENT IN AN ORGANIZED HEALTH CARE EDUCATION/TRAINING PROGRAM

## 2024-12-02 RX ORDER — ATORVASTATIN CALCIUM 40 MG/1
40 TABLET, FILM COATED ORAL EVERY OTHER DAY
Qty: 90 TABLET | Refills: 3 | Status: SHIPPED | OUTPATIENT
Start: 2024-12-02

## 2024-12-02 RX ORDER — METOPROLOL SUCCINATE 50 MG/1
50 TABLET, EXTENDED RELEASE ORAL DAILY
Qty: 90 TABLET | Refills: 3 | Status: SHIPPED | OUTPATIENT
Start: 2024-12-02

## 2024-12-02 RX ORDER — GLIPIZIDE 10 MG/1
10 TABLET ORAL DAILY
Qty: 90 TABLET | Refills: 3 | Status: SHIPPED | OUTPATIENT
Start: 2024-12-02 | End: 2025-12-02

## 2024-12-02 RX ORDER — ATORVASTATIN CALCIUM 40 MG/1
40 TABLET, FILM COATED ORAL DAILY
Qty: 90 TABLET | Refills: 3 | Status: CANCELLED | OUTPATIENT
Start: 2024-12-02

## 2024-12-02 RX ORDER — LEVOTHYROXINE SODIUM 75 UG/1
75 TABLET ORAL DAILY
Qty: 90 TABLET | Refills: 3 | Status: SHIPPED | OUTPATIENT
Start: 2024-12-02

## 2024-12-02 ASSESSMENT — ENCOUNTER SYMPTOMS
CONFUSION: 0
LOSS OF SENSATION IN FEET: 0
FATIGUE: 1
SHORTNESS OF BREATH: 0
FEVER: 0
DEPRESSION: 0
OCCASIONAL FEELINGS OF UNSTEADINESS: 0
DECREASED CONCENTRATION: 0
ENDOCRINE COMMENTS: HAIR LOSS

## 2024-12-02 ASSESSMENT — ACTIVITIES OF DAILY LIVING (ADL)
BATHING: INDEPENDENT
MANAGING_FINANCES: INDEPENDENT
DOING_HOUSEWORK: INDEPENDENT
GROCERY_SHOPPING: INDEPENDENT
TAKING_MEDICATION: INDEPENDENT
DRESSING: INDEPENDENT

## 2024-12-02 ASSESSMENT — PATIENT HEALTH QUESTIONNAIRE - PHQ9
1. LITTLE INTEREST OR PLEASURE IN DOING THINGS: NOT AT ALL
SUM OF ALL RESPONSES TO PHQ9 QUESTIONS 1 AND 2: 0
2. FEELING DOWN, DEPRESSED OR HOPELESS: NOT AT ALL

## 2024-12-02 NOTE — ASSESSMENT & PLAN NOTE
PNEUMONIA vaccine-   SHINGLES vaccine-   INFLUENZA vaccine-    For vaccines she is waiting currently undergoing chemotherapy treatments    Screening tests:  Colon cancer screening--> UTD next due 2031, year plan   Breast Cancer screening--> Declines at this time  Cervical Cancer Screening--> Declines at this time   DXA screening--> Declines at this time     During the course of the visit the patient was educated and counseled about age appropriate screening and preventive services. Completed preventive screenings were documented in the chart and orders were placed for outstanding screenings/procedures as documented in the Assessment and Plan.    Patient Instructions (the written plan) was given to the patient at check out that include any community based lifestyle interventions.    Other risk factors and conditions for which interventions are recommended are addressed as the other tagged diagnoses in this encounter.   Orders:    Follow Up In [facility] - PCP - Established; Future

## 2024-12-02 NOTE — ASSESSMENT & PLAN NOTE
Seeing Dr. Mendez   resectable pancreatic cancer but elevated CA 19-9 and recent pancreatitis-->neoadjuvant chemotherapy with modified FOLFIRINOX  Number 4 treatment this week   Pain managed with oxycodone   Creon helping significantly with eating

## 2024-12-02 NOTE — ASSESSMENT & PLAN NOTE
I had stopped metformin(MARK) and started glipizide, it looks like oncology restarted with referral to endo  Has home check supplies, comfortable with TID checks   Orders:    Follow Up [facility] - PCP - Established    Follow [facility] - PCP - Established    glipiZIDE (Glucotrol) 10 mg tablet; Take 1 tablet (10 mg) by mouth once daily.

## 2024-12-02 NOTE — ASSESSMENT & PLAN NOTE
Orders:    metoprolol succinate XL (Toprol-XL) 50 mg 24 hr tablet; Take 1 tablet (50 mg) by mouth once daily.

## 2024-12-02 NOTE — PROGRESS NOTES
Subjective   Reason for Visit: Nanci Colón is an 69 y.o. female here for a Medicare Wellness visit.     Past Medical, Surgical, and Family History reviewed and updated in chart.    Reviewed all medications by prescribing practitioner or clinical pharmacist (such as prescriptions, OTCs, herbal therapies and supplements) and documented in the medical record.    HPI    68 yo female presents for follow up and medicare wellness  Planning to est with endo during chemotherapy treatments to monitor DM II   Feels she is coping well, has support system     Patient Care Team:  Diana Blunt DO as PCP - General (Family Medicine)  Diana Blunt DO as PCP - [employer]age PCP  Oneil Vargas MD as On Call Attending Physician (Neurology)  Brandon Kohli MD as Referring Physician (Orthopaedic Surgery)  Tomasz Dick RN as Care Manager (Case Management)  Yazmin Singh RN as Nurse Navigator (Hematology and Oncology)  Francisco Cruz MD as Consulting Physician (Hematology and Oncology)  Saeed Mireles MD as Consulting Physician (Hematology and Oncology)     Past Medical History:   Diagnosis Date    Hyperlipidemia     Hypertension     Hypothyroidism     Personal history of other diseases of the circulatory system 09/15/2017    History of hypertension    Personal history of other endocrine, nutritional and metabolic disease 02/24/2016    History of type 2 diabetes mellitus    Personal history of other endocrine, nutritional and metabolic disease 03/09/2017    History of hypothyroidism    Personal history of other endocrine, nutritional and metabolic disease     History of hyperlipidemia    Type 2 diabetes mellitus with other skin complications     Diabetic dermopathy    Vision loss      Past Surgical History:   Procedure Laterality Date    OTHER SURGICAL HISTORY  09/01/2022    Foot surgery    OTHER SURGICAL HISTORY  09/01/2022    Appendectomy    OTHER SURGICAL HISTORY  09/01/2022    Knee surgery     Family History  "  Problem Relation Name Age of Onset    Cancer Mother      Heart disease Father         Body mass index is 25.66 kg/m².    Tobacco/Alcohol/Opioid use, as well as Illicit Drug Use was screened for/reviewed and documented in Social History section and medication list as appropriate    Medications and Supplements  prescribed by me and other practitioners or clinical pharmacist (such as prescriptions, OTC's, herbal therapies and supplements) were reviewed and documented in the medical record.    Tobacco/Alcohol/Opioid use, as well as Illicit Drug Use was screened for/reviewed and documented in Social History section and medication list as appropriate    Review of Systems   Constitutional:  Positive for fatigue. Negative for fever.   Respiratory:  Negative for shortness of breath.    Cardiovascular:  Negative for chest pain.   Endocrine:        Hair loss   Allergic/Immunologic: Positive for immunocompromised state.   Psychiatric/Behavioral:  Negative for confusion and decreased concentration.      Objective   Vitals:  /72 (BP Location: Left arm, Patient Position: Sitting)   Pulse 70   Ht 1.676 m (5' 6\")   Wt 72.1 kg (159 lb)   SpO2 97%   BMI 25.66 kg/m²       Physical Exam  Constitutional:       Appearance: Normal appearance.   HENT:      Head: Normocephalic and atraumatic.      Right Ear: Tympanic membrane normal.      Left Ear: There is impacted cerumen.   Cardiovascular:      Rate and Rhythm: Normal rate and regular rhythm.   Pulmonary:      Effort: Pulmonary effort is normal. No respiratory distress.      Breath sounds: No wheezing.   Skin:     Coloration: Skin is not jaundiced or pale.      Comments: Hair thinning    Neurological:      Mental Status: She is alert and oriented to person, place, and time.   Psychiatric:         Mood and Affect: Mood normal.         Behavior: Behavior normal.       Assessment & Plan  Medicare annual wellness visit, subsequent  PNEUMONIA vaccine-   SHINGLES vaccine- "   INFLUENZA vaccine-    For vaccines she is waiting currently undergoing chemotherapy treatments    Screening tests:  Colon cancer screening--> UTD next due 2031, year plan   Breast Cancer screening--> Declines at this time  Cervical Cancer Screening--> Declines at this time   DXA screening--> Declines at this time     During the course of the visit the patient was educated and counseled about age appropriate screening and preventive services. Completed preventive screenings were documented in the chart and orders were placed for outstanding screenings/procedures as documented in the Assessment and Plan.    Patient Instructions (the written plan) was given to the patient at check out that include any community based lifestyle interventions.    Other risk factors and conditions for which interventions are recommended are addressed as the other tagged diagnoses in this encounter.   Orders:    Follow Up [facility] - PCP - Established; Future    Type 2 diabetes mellitus with hyperglycemia, without long-term current use of insulin  I had stopped metformin(MARK) and started glipizide, it looks like oncology restarted with referral to endo  Has home check supplies, comfortable with TID checks   Orders:    Follow [facility] - PCP - Established    Follow [facility] - PCP - Established    glipiZIDE (Glucotrol) 10 mg tablet; Take 1 tablet (10 mg) by mouth once daily.    Malignant neoplasm of head of pancreas (Multi)  Seeing Dr. Mendez   resectable pancreatic cancer but elevated CA 19-9 and recent pancreatitis-->neoadjuvant chemotherapy with modified FOLFIRINOX  Number 4 treatment this week   Pain managed with oxycodone   Creon helping significantly with eating        Other hyperlipidemia  Orders:    atorvastatin (Lipitor) 40 mg tablet; Take 1 tablet (40 mg) by mouth every other day.  Last LDL 59, she would like to come off some of the medication   We discussed every other  day  Liver function has been elevated     Hypothyroidism, unspecified type  Orders:    levothyroxine (Synthroid) 75 mcg tablet; Take 1 tablet (75 mcg) by mouth once daily.    Benign hypertension  Orders:    metoprolol succinate XL (Toprol-XL) 50 mg 24 hr tablet; Take 1 tablet (50 mg) by mouth once daily.

## 2024-12-03 ENCOUNTER — APPOINTMENT (OUTPATIENT)
Dept: HEMATOLOGY/ONCOLOGY | Facility: CLINIC | Age: 69
End: 2024-12-03
Payer: MEDICARE

## 2024-12-03 RX ORDER — EPINEPHRINE 0.3 MG/.3ML
0.3 INJECTION SUBCUTANEOUS EVERY 5 MIN PRN
Status: CANCELLED | OUTPATIENT
Start: 2024-12-06

## 2024-12-03 RX ORDER — FAMOTIDINE 10 MG/ML
20 INJECTION INTRAVENOUS ONCE AS NEEDED
Status: CANCELLED | OUTPATIENT
Start: 2024-12-06

## 2024-12-03 RX ORDER — DIPHENHYDRAMINE HYDROCHLORIDE 50 MG/ML
50 INJECTION INTRAMUSCULAR; INTRAVENOUS AS NEEDED
Status: CANCELLED | OUTPATIENT
Start: 2024-12-06

## 2024-12-03 RX ORDER — ALBUTEROL SULFATE 0.83 MG/ML
3 SOLUTION RESPIRATORY (INHALATION) AS NEEDED
Status: CANCELLED | OUTPATIENT
Start: 2024-12-06

## 2024-12-04 ENCOUNTER — INFUSION (OUTPATIENT)
Dept: HEMATOLOGY/ONCOLOGY | Facility: CLINIC | Age: 69
End: 2024-12-04
Payer: MEDICARE

## 2024-12-04 VITALS
DIASTOLIC BLOOD PRESSURE: 64 MMHG | HEART RATE: 107 BPM | WEIGHT: 159.1 LBS | HEIGHT: 66 IN | RESPIRATION RATE: 16 BRPM | TEMPERATURE: 97.3 F | BODY MASS INDEX: 25.57 KG/M2 | OXYGEN SATURATION: 97 % | SYSTOLIC BLOOD PRESSURE: 93 MMHG

## 2024-12-04 DIAGNOSIS — C25.0 MALIGNANT NEOPLASM OF HEAD OF PANCREAS (MULTI): ICD-10-CM

## 2024-12-04 PROCEDURE — 96416 CHEMO PROLONG INFUSE W/PUMP: CPT

## 2024-12-04 PROCEDURE — 2500000004 HC RX 250 GENERAL PHARMACY W/ HCPCS (ALT 636 FOR OP/ED): Performed by: NURSE PRACTITIONER

## 2024-12-04 PROCEDURE — 96413 CHEMO IV INFUSION 1 HR: CPT

## 2024-12-04 PROCEDURE — 2500000004 HC RX 250 GENERAL PHARMACY W/ HCPCS (ALT 636 FOR OP/ED): Performed by: INTERNAL MEDICINE

## 2024-12-04 PROCEDURE — 96415 CHEMO IV INFUSION ADDL HR: CPT

## 2024-12-04 PROCEDURE — 96417 CHEMO IV INFUS EACH ADDL SEQ: CPT

## 2024-12-04 PROCEDURE — 96375 TX/PRO/DX INJ NEW DRUG ADDON: CPT | Mod: INF

## 2024-12-04 RX ORDER — EPINEPHRINE 0.3 MG/.3ML
0.3 INJECTION SUBCUTANEOUS EVERY 5 MIN PRN
Status: DISCONTINUED | OUTPATIENT
Start: 2024-12-04 | End: 2024-12-04 | Stop reason: HOSPADM

## 2024-12-04 RX ORDER — PROCHLORPERAZINE EDISYLATE 5 MG/ML
10 INJECTION INTRAMUSCULAR; INTRAVENOUS EVERY 6 HOURS PRN
Status: DISCONTINUED | OUTPATIENT
Start: 2024-12-04 | End: 2024-12-04 | Stop reason: HOSPADM

## 2024-12-04 RX ORDER — PROCHLORPERAZINE MALEATE 10 MG
10 TABLET ORAL EVERY 6 HOURS PRN
Status: DISCONTINUED | OUTPATIENT
Start: 2024-12-04 | End: 2024-12-04 | Stop reason: HOSPADM

## 2024-12-04 RX ORDER — ALBUTEROL SULFATE 0.83 MG/ML
3 SOLUTION RESPIRATORY (INHALATION) AS NEEDED
Status: DISCONTINUED | OUTPATIENT
Start: 2024-12-04 | End: 2024-12-04 | Stop reason: HOSPADM

## 2024-12-04 RX ORDER — FAMOTIDINE 10 MG/ML
20 INJECTION INTRAVENOUS ONCE AS NEEDED
Status: DISCONTINUED | OUTPATIENT
Start: 2024-12-04 | End: 2024-12-04 | Stop reason: HOSPADM

## 2024-12-04 RX ORDER — LORAZEPAM 2 MG/ML
1 INJECTION INTRAMUSCULAR AS NEEDED
Status: COMPLETED | OUTPATIENT
Start: 2024-12-04 | End: 2024-12-04

## 2024-12-04 RX ORDER — PALONOSETRON 0.05 MG/ML
0.25 INJECTION, SOLUTION INTRAVENOUS ONCE
Status: COMPLETED | OUTPATIENT
Start: 2024-12-04 | End: 2024-12-04

## 2024-12-04 RX ORDER — ATROPINE SULFATE 0.4 MG/ML
0.4 INJECTION, SOLUTION ENDOTRACHEAL; INTRAMEDULLARY; INTRAMUSCULAR; INTRAVENOUS; SUBCUTANEOUS
Status: COMPLETED | OUTPATIENT
Start: 2024-12-04 | End: 2024-12-04

## 2024-12-04 RX ORDER — DIPHENHYDRAMINE HYDROCHLORIDE 50 MG/ML
50 INJECTION INTRAMUSCULAR; INTRAVENOUS AS NEEDED
Status: DISCONTINUED | OUTPATIENT
Start: 2024-12-04 | End: 2024-12-04 | Stop reason: HOSPADM

## 2024-12-04 RX ORDER — HEPARIN 100 UNIT/ML
500 SYRINGE INTRAVENOUS AS NEEDED
Status: CANCELLED | OUTPATIENT
Start: 2024-12-04

## 2024-12-04 RX ORDER — HEPARIN SODIUM,PORCINE/PF 10 UNIT/ML
50 SYRINGE (ML) INTRAVENOUS AS NEEDED
Status: CANCELLED | OUTPATIENT
Start: 2024-12-04

## 2024-12-04 RX ORDER — DEXAMETHASONE 6 MG/1
12 TABLET ORAL ONCE
Status: COMPLETED | OUTPATIENT
Start: 2024-12-04 | End: 2024-12-04

## 2024-12-04 ASSESSMENT — PAIN SCALES - GENERAL: PAINLEVEL_OUTOF10: 8

## 2024-12-04 NOTE — SIGNIFICANT EVENT

## 2024-12-04 NOTE — PROGRESS NOTES
Pt arrived awake, alert, oriented, no apparent distress with unlabored breaths. Pt reports feeling well today without s/sx of illness. Pt here for day 1, cycle 4, in which she received. Pt with stated symptoms of difficulty in swallowing and talking, approx 1 hour into irinotecan infusion. Pt reports that this occurred last treatment and seemed worse this time. Pt given additional dose of atropine and PRN ativan with decreased stated symptoms.  Pt going home with infusion pump infusing fluorouracil over the next 46 hrs. Pt to return on 12/6/24 at approx. 1330  for pump disconnect. Pt without further questions or concerns, discharged in stable condition.

## 2024-12-05 ENCOUNTER — TELEPHONE (OUTPATIENT)
Dept: HEMATOLOGY/ONCOLOGY | Facility: HOSPITAL | Age: 69
End: 2024-12-05
Payer: MEDICARE

## 2024-12-06 ENCOUNTER — INFUSION (OUTPATIENT)
Dept: HEMATOLOGY/ONCOLOGY | Facility: CLINIC | Age: 69
End: 2024-12-06
Payer: MEDICARE

## 2024-12-06 ENCOUNTER — TELEPHONE (OUTPATIENT)
Dept: PRIMARY CARE | Facility: CLINIC | Age: 69
End: 2024-12-06
Payer: MEDICARE

## 2024-12-06 VITALS
SYSTOLIC BLOOD PRESSURE: 94 MMHG | TEMPERATURE: 96.1 F | DIASTOLIC BLOOD PRESSURE: 57 MMHG | OXYGEN SATURATION: 97 % | HEART RATE: 84 BPM | RESPIRATION RATE: 16 BRPM

## 2024-12-06 DIAGNOSIS — C25.0 MALIGNANT NEOPLASM OF HEAD OF PANCREAS (MULTI): ICD-10-CM

## 2024-12-06 PROCEDURE — 96372 THER/PROPH/DIAG INJ SC/IM: CPT

## 2024-12-06 PROCEDURE — 2500000004 HC RX 250 GENERAL PHARMACY W/ HCPCS (ALT 636 FOR OP/ED): Performed by: INTERNAL MEDICINE

## 2024-12-06 PROCEDURE — 2500000004 HC RX 250 GENERAL PHARMACY W/ HCPCS (ALT 636 FOR OP/ED): Mod: JZ,JG | Performed by: NURSE PRACTITIONER

## 2024-12-06 RX ORDER — ALBUTEROL SULFATE 0.83 MG/ML
3 SOLUTION RESPIRATORY (INHALATION) AS NEEDED
Status: DISCONTINUED | OUTPATIENT
Start: 2024-12-06 | End: 2024-12-06 | Stop reason: HOSPADM

## 2024-12-06 RX ORDER — DIPHENHYDRAMINE HYDROCHLORIDE 50 MG/ML
50 INJECTION INTRAMUSCULAR; INTRAVENOUS AS NEEDED
Status: DISCONTINUED | OUTPATIENT
Start: 2024-12-06 | End: 2024-12-06 | Stop reason: HOSPADM

## 2024-12-06 RX ORDER — EPINEPHRINE 0.3 MG/.3ML
0.3 INJECTION SUBCUTANEOUS EVERY 5 MIN PRN
Status: DISCONTINUED | OUTPATIENT
Start: 2024-12-06 | End: 2024-12-06 | Stop reason: HOSPADM

## 2024-12-06 RX ORDER — HEPARIN SODIUM,PORCINE/PF 10 UNIT/ML
50 SYRINGE (ML) INTRAVENOUS AS NEEDED
OUTPATIENT
Start: 2024-12-06

## 2024-12-06 RX ORDER — FAMOTIDINE 10 MG/ML
20 INJECTION INTRAVENOUS ONCE AS NEEDED
Status: DISCONTINUED | OUTPATIENT
Start: 2024-12-06 | End: 2024-12-06 | Stop reason: HOSPADM

## 2024-12-06 RX ORDER — HEPARIN 100 UNIT/ML
500 SYRINGE INTRAVENOUS AS NEEDED
OUTPATIENT
Start: 2024-12-06

## 2024-12-06 RX ORDER — HEPARIN 100 UNIT/ML
500 SYRINGE INTRAVENOUS AS NEEDED
Status: DISCONTINUED | OUTPATIENT
Start: 2024-12-06 | End: 2024-12-06 | Stop reason: HOSPADM

## 2024-12-06 ASSESSMENT — PAIN SCALES - GENERAL: PAINLEVEL_OUTOF10: 0-NO PAIN

## 2024-12-06 NOTE — PROGRESS NOTES
Pt here for pump disconnect and udneyca injection. Tolerated well. She is aware of plan of care, will call with further questions or concerns. Will return in 2 weeks for next infusion

## 2024-12-06 NOTE — TELEPHONE ENCOUNTER
She called to let you know she came from chemo and her BP is low 100/50 and wants to know should she stop her BP meds please advise and call her at [phone]

## 2024-12-09 NOTE — TELEPHONE ENCOUNTER
Agree with recommendation to cut back on metoprolol medication, keep log and alert us to further lows (90/60 and below) or symptoms of presyncope. Be sure to work on hydration. I would also keep an eye on Bgs.

## 2024-12-12 ENCOUNTER — HOSPITAL ENCOUNTER (OUTPATIENT)
Dept: RADIOLOGY | Facility: HOSPITAL | Age: 69
Discharge: HOME | End: 2024-12-12
Payer: MEDICARE

## 2024-12-12 ENCOUNTER — PATIENT OUTREACH (OUTPATIENT)
Dept: PRIMARY CARE | Facility: CLINIC | Age: 69
End: 2024-12-12
Payer: MEDICARE

## 2024-12-12 ENCOUNTER — TELEPHONE (OUTPATIENT)
Dept: PRIMARY CARE | Facility: CLINIC | Age: 69
End: 2024-12-12
Payer: MEDICARE

## 2024-12-12 DIAGNOSIS — E03.9 HYPOTHYROIDISM, UNSPECIFIED TYPE: ICD-10-CM

## 2024-12-12 DIAGNOSIS — C25.0 MALIGNANT NEOPLASM OF HEAD OF PANCREAS (MULTI): ICD-10-CM

## 2024-12-12 PROCEDURE — 2550000001 HC RX 255 CONTRASTS: Performed by: NURSE PRACTITIONER

## 2024-12-12 PROCEDURE — 74177 CT ABD & PELVIS W/CONTRAST: CPT | Performed by: RADIOLOGY

## 2024-12-12 PROCEDURE — 71260 CT THORAX DX C+: CPT

## 2024-12-12 PROCEDURE — 71260 CT THORAX DX C+: CPT | Performed by: RADIOLOGY

## 2024-12-12 RX ORDER — LEVOTHYROXINE SODIUM 75 UG/1
75 TABLET ORAL DAILY
Qty: 90 TABLET | Refills: 3 | Status: SHIPPED | OUTPATIENT
Start: 2024-12-12

## 2024-12-13 ENCOUNTER — NURSE TRIAGE (OUTPATIENT)
Dept: ADMISSION | Facility: HOSPITAL | Age: 69
End: 2024-12-13
Payer: MEDICARE

## 2024-12-13 NOTE — TELEPHONE ENCOUNTER
I called Nanci and let her know Dr. Cruz's response and she said she just had a Gatorade and drinking water also. She denies any dizziness or having issues keeping fluids down now. I told her if any new or worsening symptoms start she will need to call nurse line back.  I said she may need IV fluids if any dizziness starts or not keeping fluids down. She verbalized understanding and said she will talk to Dr. Cruz nest week about the chemo dose for next cycle. Nothing further needed as of now. She will continue also with the Imodium.

## 2024-12-13 NOTE — TELEPHONE ENCOUNTER
Nanci called and said she has had diarrhea for about 4 days off and on. Last infusion 12/6. She has had about 3 episodes in last 24 hours. She has tried Imodium as instructed on package and said it has helped a bit but wanted to know what else she can do? She denies any blood or mucous in stools. Light brown, all liquid stools. She gets cramping prior to B/M but gone after B/M . Denies any current abdominal pain or discomfort. She said her BP was 109/84  which HR is higher than her usual when she took it this morning. She has some SOB upon exertion. Denies any chest pain, fever, chills, dizziness, cough or any other symptoms not noted.  She said the SOB is off and on and had this in the past and usually just goes away. She is urinating normally. Drinking and eating normally. Next infusion is 12/19 and appointment is 12/17 with Dr. Cruz. Her pharmacy if needed is [facility] in [place]. Messaged and secured chat team.       Additional Information   How often are you passing urine and what color is it?     Urinating normally.   Have you taken any anti-diarrhea medicines such as Imodium?     Taking as directed   Negative: In the last 24 hours, have you had chills or a fever of 100.4°F (38°C) or higher?     She catches breath easily when sits then and denies any dizziness or other symptoms.   Negative: Do you have new or worsening problems breathing?     She had in past on and off then when she talked to Opal about it said her lungs sounded good. Nanci said comes and goes only on exertion.   Negative: Is coughing constant?     No cough   Negative: Are you coughing more often?     No cough   Commented on: Review EMR for h/o immunotherapy. If positive, ask if patient is having these symptoms (they may be signs of immune-related hepatitis or colitis):     Pt. Tired since doesn't sleep well at night. Not more fatigued than normal   Commented on: If yes to belly pain or tenderness, on a scale of 0 to 10 (0 being no  pain and 10 being the worst possible) how would  you rate your pain?     Cramping on occasion prior to the B/M then cramping is gone.   Commented on: How many bowel movements have you had in the last 24 hours?     4 days   Commented on: What color are your bowel movements?     Light brown. Gaurav any bleeding.   Commented on: Do you see any mucous or blood in your bowel movements?     Denies both of these   Commented on: Are you having any of these problems?     BP was normal this morning. BP was 109/84 Pulse 112. Feels increased SOB during activity. This started few days also   Commented on: When did these problems start?     4 days   Commented on: What helps these problems?     Imodium- taking as directed.   Commented on: What makes these problems worse?     Nothing making it worse or better. When she eats a few hours later has diarrhea.   Commented on: What have you been drinking in the last 24 hours?     Drinking normal amount of fluids. She will try gatorade sugar free she has at home since diabetic.   Commented on: Have you recently had well water? Undercooked meat or raw seafood?     Denies all of these   Commented on: Review EMR for h/o immunotherapy. If positive, ask if patient is having new or worsening cough - symptoms may be related to immune-related pneumonitis     No immunotherapy. She is getting infusions last Friday . Denies any radiation   Commented on: Do you have blood in your mucous when coughing?     Denies cough   Commented on: If yes: How much?     Denies any cough or mucous.   Commented on: What color is it?     Denies mucous   Commented on: Are there streaks of blood in the mucous or are you coughing up blood? Are there clots? Bubbles?     Denies all these symptoms   Commented on: Has amount of mucous changed? (describe)?     No mucous.   Commented on: When did these problems start?     On and off per amberly but noticed more SOB on exertion last few days.   Commented on: How long have you  been having this problem?     SoB   Commented on: What helps these problems?     Sitting helps SOB and imodium helping a bit.   Commented on: What makes these problems worse?     Exertion    Protocols used: Diarrhea, Dyspena (Shortness of Breath)

## 2024-12-13 NOTE — TELEPHONE ENCOUNTER
Received secure chat back from Dr. Cruz below:    Try to increase fluid intake continue Imodium. We may have to reduce the dose of her chemo next cycle. If she is feeling dizzy or can't keep fluids will need to bring her in for IV fluids.    2 mins  DB

## 2024-12-15 NOTE — PROGRESS NOTES
Reason for visit:  FUV / Review Imaging   MO = PranavkalyanGary abramsin    HPI:  70 yo woman I met in Sept 2024 with what we felt was a rads resectable PDAC.  Initial 19-9 3640.  Has received 4 cycles FFX with last cycle 12/4 - 12/6.    19-9s    9/23:   3640  11/5:  259  12/2:  674 (TB is normal with AP slightly up)    CT 12/12:  ABDOMEN-PELVIS:  1.  A known pancreatic head mass with upstream dilatation of the  pancreatic duct and pancreatic atrophy similar to the prior  examination.  2. Approximately 2 x 1.2 cm masslike structure to the left of the  proximal superior mesenteric artery suspicious of tumor implants. (In looking back at the Sept noncon CT and plus con MRI I see this area, and it looks unchanged)  3. Internal biliary stent. Pneumobilia. No suspicious focal hepatic  lesions. No free fluid.  4. Decompressed sigmoid colon with suspected submucosal edema raising  the possibility of sigmoid colitis. Clinical correlation is needed.    PE:    Impression / Plan:

## 2024-12-17 ENCOUNTER — OFFICE VISIT (OUTPATIENT)
Dept: HEMATOLOGY/ONCOLOGY | Facility: CLINIC | Age: 69
End: 2024-12-17
Payer: MEDICARE

## 2024-12-17 ENCOUNTER — DOCUMENTATION (OUTPATIENT)
Dept: HEMATOLOGY/ONCOLOGY | Facility: CLINIC | Age: 69
End: 2024-12-17

## 2024-12-17 ENCOUNTER — APPOINTMENT (OUTPATIENT)
Dept: SURGERY | Facility: CLINIC | Age: 69
End: 2024-12-17
Payer: MEDICARE

## 2024-12-17 ENCOUNTER — INFUSION (OUTPATIENT)
Dept: HEMATOLOGY/ONCOLOGY | Facility: CLINIC | Age: 69
End: 2024-12-17
Payer: MEDICARE

## 2024-12-17 VITALS
HEART RATE: 82 BPM | TEMPERATURE: 96.5 F | WEIGHT: 150 LBS | SYSTOLIC BLOOD PRESSURE: 118 MMHG | OXYGEN SATURATION: 100 % | DIASTOLIC BLOOD PRESSURE: 77 MMHG | RESPIRATION RATE: 18 BRPM | BODY MASS INDEX: 24.22 KG/M2

## 2024-12-17 DIAGNOSIS — C25.0 MALIGNANT NEOPLASM OF HEAD OF PANCREAS (MULTI): Primary | ICD-10-CM

## 2024-12-17 DIAGNOSIS — C25.9 PANCREATIC ADENOCARCINOMA (MULTI): ICD-10-CM

## 2024-12-17 DIAGNOSIS — C25.9 PANCREATIC ADENOCARCINOMA (MULTI): Primary | ICD-10-CM

## 2024-12-17 DIAGNOSIS — C25.0 MALIGNANT NEOPLASM OF HEAD OF PANCREAS (MULTI): ICD-10-CM

## 2024-12-17 LAB
BASOPHILS # BLD MANUAL: 0 X10*3/UL (ref 0–0.1)
BASOPHILS NFR BLD MANUAL: 0 %
BURR CELLS BLD QL SMEAR: ABNORMAL
DOHLE BOD BLD QL SMEAR: PRESENT
EOSINOPHIL # BLD MANUAL: 0 X10*3/UL (ref 0–0.7)
EOSINOPHIL NFR BLD MANUAL: 0 %
ERYTHROCYTE [DISTWIDTH] IN BLOOD BY AUTOMATED COUNT: 16 % (ref 11.5–14.5)
GIANT PLATELETS BLD QL SMEAR: ABNORMAL
HCT VFR BLD AUTO: 25.8 % (ref 36–46)
HGB BLD-MCNC: 8.6 G/DL (ref 12–16)
IMM GRANULOCYTES # BLD AUTO: 0.23 X10*3/UL (ref 0–0.7)
IMM GRANULOCYTES NFR BLD AUTO: 1.6 % (ref 0–0.9)
LYMPHOCYTES # BLD MANUAL: 2.57 X10*3/UL (ref 1.2–4.8)
LYMPHOCYTES NFR BLD MANUAL: 18 %
MCH RBC QN AUTO: 32.3 PG (ref 26–34)
MCHC RBC AUTO-ENTMCNC: 33.3 G/DL (ref 32–36)
MCV RBC AUTO: 97 FL (ref 80–100)
MONOCYTES # BLD MANUAL: 1.43 X10*3/UL (ref 0.1–1)
MONOCYTES NFR BLD MANUAL: 10 %
MYELOCYTES # BLD MANUAL: 0.14 X10*3/UL
MYELOCYTES NFR BLD MANUAL: 1 %
NEUTROPHILS # BLD MANUAL: 10.16 X10*3/UL (ref 1.2–7.7)
NEUTS BAND # BLD MANUAL: 0.86 X10*3/UL (ref 0–0.7)
NEUTS BAND NFR BLD MANUAL: 6 %
NEUTS SEG # BLD MANUAL: 9.3 X10*3/UL (ref 1.2–7)
NEUTS SEG NFR BLD MANUAL: 65 %
NRBC BLD-RTO: ABNORMAL /100{WBCS}
OVALOCYTES BLD QL SMEAR: ABNORMAL
PLATELET # BLD AUTO: 136 X10*3/UL (ref 150–450)
POLYCHROMASIA BLD QL SMEAR: ABNORMAL
RBC # BLD AUTO: 2.66 X10*6/UL (ref 4–5.2)
RBC MORPH BLD: ABNORMAL
TOTAL CELLS COUNTED BLD: 100
TOXIC GRANULES BLD QL SMEAR: PRESENT
WBC # BLD AUTO: 14.3 X10*3/UL (ref 4.4–11.3)

## 2024-12-17 PROCEDURE — 3074F SYST BP LT 130 MM HG: CPT | Performed by: INTERNAL MEDICINE

## 2024-12-17 PROCEDURE — 85007 BL SMEAR W/DIFF WBC COUNT: CPT

## 2024-12-17 PROCEDURE — 36591 DRAW BLOOD OFF VENOUS DEVICE: CPT

## 2024-12-17 PROCEDURE — 1159F MED LIST DOCD IN RCRD: CPT | Performed by: INTERNAL MEDICINE

## 2024-12-17 PROCEDURE — 3048F LDL-C <100 MG/DL: CPT | Performed by: INTERNAL MEDICINE

## 2024-12-17 PROCEDURE — 99214 OFFICE O/P EST MOD 30 MIN: CPT | Performed by: INTERNAL MEDICINE

## 2024-12-17 PROCEDURE — 3052F HG A1C>EQUAL 8.0%<EQUAL 9.0%: CPT | Performed by: INTERNAL MEDICINE

## 2024-12-17 PROCEDURE — 86301 IMMUNOASSAY TUMOR CA 19-9: CPT

## 2024-12-17 PROCEDURE — 3060F POS MICROALBUMINURIA REV: CPT | Performed by: INTERNAL MEDICINE

## 2024-12-17 PROCEDURE — 1126F AMNT PAIN NOTED NONE PRSNT: CPT | Performed by: INTERNAL MEDICINE

## 2024-12-17 PROCEDURE — 80053 COMPREHEN METABOLIC PANEL: CPT

## 2024-12-17 PROCEDURE — 85027 COMPLETE CBC AUTOMATED: CPT

## 2024-12-17 PROCEDURE — 3078F DIAST BP <80 MM HG: CPT | Performed by: INTERNAL MEDICINE

## 2024-12-17 RX ORDER — PROCHLORPERAZINE MALEATE 10 MG
10 TABLET ORAL ONCE
Status: CANCELLED | OUTPATIENT
Start: 2024-12-31

## 2024-12-17 RX ORDER — PROCHLORPERAZINE EDISYLATE 5 MG/ML
10 INJECTION INTRAMUSCULAR; INTRAVENOUS EVERY 6 HOURS PRN
Status: CANCELLED | OUTPATIENT
Start: 2024-12-31

## 2024-12-17 RX ORDER — PALONOSETRON 0.05 MG/ML
0.25 INJECTION, SOLUTION INTRAVENOUS ONCE
Status: CANCELLED | OUTPATIENT
Start: 2024-12-31

## 2024-12-17 RX ORDER — HEPARIN 100 UNIT/ML
500 SYRINGE INTRAVENOUS AS NEEDED
OUTPATIENT
Start: 2024-12-17

## 2024-12-17 RX ORDER — FAMOTIDINE 10 MG/ML
20 INJECTION INTRAVENOUS ONCE AS NEEDED
Status: CANCELLED | OUTPATIENT
Start: 2024-12-31

## 2024-12-17 RX ORDER — LORAZEPAM 2 MG/ML
1 INJECTION INTRAMUSCULAR AS NEEDED
Status: CANCELLED | OUTPATIENT
Start: 2024-12-31

## 2024-12-17 RX ORDER — HEPARIN 100 UNIT/ML
500 SYRINGE INTRAVENOUS AS NEEDED
Status: DISCONTINUED | OUTPATIENT
Start: 2024-12-17 | End: 2024-12-17 | Stop reason: HOSPADM

## 2024-12-17 RX ORDER — ATROPINE SULFATE 0.4 MG/ML
0.4 INJECTION, SOLUTION ENDOTRACHEAL; INTRAMEDULLARY; INTRAMUSCULAR; INTRAVENOUS; SUBCUTANEOUS
Status: CANCELLED | OUTPATIENT
Start: 2024-12-31

## 2024-12-17 RX ORDER — EPINEPHRINE 0.3 MG/.3ML
0.3 INJECTION SUBCUTANEOUS EVERY 5 MIN PRN
Status: CANCELLED | OUTPATIENT
Start: 2024-12-31

## 2024-12-17 RX ORDER — DIPHENHYDRAMINE HYDROCHLORIDE 50 MG/ML
50 INJECTION INTRAMUSCULAR; INTRAVENOUS AS NEEDED
Status: CANCELLED | OUTPATIENT
Start: 2024-12-31

## 2024-12-17 RX ORDER — HEPARIN SODIUM,PORCINE/PF 10 UNIT/ML
50 SYRINGE (ML) INTRAVENOUS AS NEEDED
OUTPATIENT
Start: 2024-12-17

## 2024-12-17 RX ORDER — ALBUTEROL SULFATE 0.83 MG/ML
3 SOLUTION RESPIRATORY (INHALATION) AS NEEDED
Status: CANCELLED | OUTPATIENT
Start: 2024-12-31

## 2024-12-17 ASSESSMENT — PAIN SCALES - GENERAL: PAINLEVEL_OUTOF10: 0-NO PAIN

## 2024-12-17 NOTE — PROGRESS NOTES
Patient ID: Nanci Colón is a 69 y.o. female from [place], [place].     Referring Physician: Francisco Cruz MD  [id number] [place] [address]    Primary Care Provider: Diana Blunt DO    Diagnosis:   Pancreatic cancer 9/2024    Primary Oncologic Surgeon:   Nazia    Primary Medical Oncologist:  Anthony    Primary Radiation Oncologist:  KVNG    Current Therapy:  10/4/24 -  Neoadjuvant Chemo - FOLFIRINOX    Oncologic Surgery History:  None    Oncologic Therapy History:  10/9/24 -  mFOLFIRINOX     Molecular Genetics:  Mmr-P    Current Sites of Disease:  Pancreatic head    Oncologic Problem List:  Pancreatic cancer  DM2    Oncologic Narrative:  69 y.o. woman who initially presented in mid September 2024 with abdominal pain jaundice MARK and pancreatitis.  She had an ultrasound that showed extrahepatic bile duct dilation and pancreatic duct dilation.  Both of these seem to emanate from the pancreatic head.  A CT scan on September 9, 2024 was concerning for a mass.  MRCP on September 27, 2024 showed an ill-defined mass in the pancreatic head and uncinate process measuring approximately 3.5 cm.  Biopsy of the pancreatic mass from the September 11, 2024 endoscopic ultrasound revealed adenocarcinoma.  She met me for initial consultation regarding treatment planning on 9/26/2024.       Past Medical History: Nanci has a past medical history of Hyperlipidemia, Hypertension, Hypothyroidism, Personal history of other diseases of the circulatory system (09/15/2017), Personal history of other endocrine, nutritional and metabolic disease (02/24/2016), Personal history of other endocrine, nutritional and metabolic disease (03/09/2017), Personal history of other endocrine, nutritional and metabolic disease, Type 2 diabetes mellitus with other skin complications, and Vision loss.  Surgical History:  Nanci has a past surgical history that includes Other surgical history (09/01/2022); Other surgical history (09/01/2022); and  Other surgical history (09/01/2022).  Social History:  Nanci reports that she has never smoked. She has never used smokeless tobacco. She reports that she does not drink alcohol and does not use drugs.  Family History:    Family History   Problem Relation Name Age of Onset    Cancer Mother      Heart disease Father       Family Oncology History:  Cancer-related family history includes Cancer in her mother.      SUBJECTIVE:    History of Present Illness:  Nanci Colón is a 69 y.o. female who was referred by Francisco Cruz MD and presents with chemotherapy follow up    Nanci is seen in follow up  S/p 2 cycles of FOLFIRINOX   Recall C3 was delayed due to   Scheduled for C3 tomorrow with neulasta     Feels better with extra week off of chemo.   Feels better with addition of pancreas enzymes & PPI   Diarrhea improved   BS very erratic - pcp following   Had rxn to fosaprepitant - rash / sob     Needs refill of oxycodone - taking mostly at night for pain     OBJECTIVE:    VS / Pain:  /77 (BP Location: Left arm, Patient Position: Sitting, BP Cuff Size: Adult)   Pulse 82   Temp 35.8 °C (96.5 °F) (Core)   Resp 18   Wt 68 kg (150 lb)   SpO2 100%   BMI 24.22 kg/m²   BSA: 1.78 meters squared   Pain Scale: 0    Daily Weight  12/17/24 : 68 kg (150 lb)  12/04/24 : 72.2 kg (159 lb 1.6 oz)  12/02/24 : 72.1 kg (159 lb)      Physical Exam  Constitutional:       Appearance: She is normal weight.   HENT:      Nose: Nose normal.      Mouth/Throat:      Mouth: Mucous membranes are moist.      Pharynx: Oropharynx is clear.   Eyes:      Extraocular Movements: Extraocular movements intact.      Conjunctiva/sclera: Conjunctivae normal.   Cardiovascular:      Rate and Rhythm: Normal rate.   Pulmonary:      Effort: Pulmonary effort is normal.      Breath sounds: Normal breath sounds.   Abdominal:      General: Abdomen is flat. Bowel sounds are normal.   Musculoskeletal:         General: Normal range of motion.    Skin:     General: Skin is warm.   Neurological:      General: No focal deficit present.      Mental Status: She is alert. Mental status is at baseline.   Psychiatric:         Mood and Affect: Mood normal.         Thought Content: Thought content normal.         Judgment: Judgment normal.       Performance Status:   ECOG 1    Diagnostic Results         WBC   Date/Time Value Ref Range Status   12/02/2024 03:00 PM 9.5 4.4 - 11.3 x10*3/uL Final   11/25/2024 10:33 AM 8.9 4.4 - 11.3 x10*3/uL Final   11/19/2024 11:32 AM 5.1 4.4 - 11.3 x10*3/uL Final     Hemoglobin   Date Value Ref Range Status   12/02/2024 9.4 (L) 12.0 - 16.0 g/dL Final   11/25/2024 9.6 (L) 12.0 - 16.0 g/dL Final   11/19/2024 8.3 (L) 12.0 - 16.0 g/dL Final     MCV   Date/Time Value Ref Range Status   12/02/2024 03:00 PM 99 80 - 100 fL Final   11/25/2024 10:33 AM 99 80 - 100 fL Final   11/19/2024 11:32 AM 98 80 - 100 fL Final     Platelets   Date/Time Value Ref Range Status   12/02/2024 03:00  150 - 450 x10*3/uL Final   11/25/2024 10:33  (L) 150 - 450 x10*3/uL Final     Comment:     Platelet count verified by smear review.   11/19/2024 11:32  150 - 450 x10*3/uL Final     Neutrophils Absolute   Date/Time Value Ref Range Status   12/02/2024 03:00 PM 6.81 1.20 - 7.70 x10*3/uL Final     Comment:     Percent differential counts (%) should be interpreted in the context of the absolute cell counts (cells/uL).   11/19/2024 11:32 AM 2.59 1.20 - 7.70 x10*3/uL Final     Comment:     Percent differential counts (%) should be interpreted in the context of the absolute cell counts (cells/uL).   10/22/2024 07:34 AM 2.54 1.20 - 7.70 x10*3/uL Final     Comment:     Percent differential counts (%) should be interpreted in the context of the absolute cell counts (cells/uL).     Bilirubin, Total   Date/Time Value Ref Range Status   12/02/2024 03:00 PM 0.4 0.0 - 1.2 mg/dL Final   11/25/2024 10:33 AM 0.8 0.0 - 1.2 mg/dL Final   11/19/2024 11:32 AM 0.4 0.0 -  "1.2 mg/dL Final     AST   Date/Time Value Ref Range Status   12/02/2024 03:00  (H) 9 - 39 U/L Final   11/25/2024 10:33  (H) 9 - 39 U/L Final   11/19/2024 11:32 AM 15 9 - 39 U/L Final     ALT   Date/Time Value Ref Range Status   12/02/2024 03:00 PM 91 (H) 7 - 45 U/L Final     Comment:     Patients treated with Sulfasalazine may generate falsely decreased results for ALT.   11/25/2024 10:33 AM 66 (H) 7 - 45 U/L Final     Comment:     Patients treated with Sulfasalazine may generate falsely decreased results for ALT.   11/19/2024 11:32 AM 17 7 - 45 U/L Final     Comment:     Patients treated with Sulfasalazine may generate falsely decreased results for ALT.     Creatinine   Date/Time Value Ref Range Status   12/02/2024 03:00 PM 0.94 0.50 - 1.05 mg/dL Final   11/25/2024 10:33 AM 0.83 0.50 - 1.05 mg/dL Final   11/19/2024 11:32 AM 0.88 0.50 - 1.05 mg/dL Final     Urea Nitrogen   Date/Time Value Ref Range Status   12/02/2024 03:00 PM 10 6 - 23 mg/dL Final   11/25/2024 10:33 AM 15 6 - 23 mg/dL Final   11/19/2024 11:32 AM 12 6 - 23 mg/dL Final     Albumin   Date/Time Value Ref Range Status   12/02/2024 03:00 PM 3.5 3.4 - 5.0 g/dL Final   11/25/2024 10:33 AM 3.3 (L) 3.4 - 5.0 g/dL Final   11/19/2024 11:32 AM 2.9 (L) 3.4 - 5.0 g/dL Final     Cancer AG 19-9   Date/Time Value Ref Range Status   12/02/2024 03:00 .06 (H) <35.00 U/mL Final   11/25/2024 10:33 .63 (H) <35.00 U/mL Final   11/19/2024 11:32 .54 (H) <35.00 U/mL Final     No results found for: \"CEA\"    === 12/12/24 ===    CT CHEST ABDOMEN PELVIS W IV CONTRAST    - Impression -  CHEST:  1.  No evidence of metastatic disease. A negative CT scan of the  chest.    ABDOMEN-PELVIS:  1.  A known pancreatic head mass with upstream dilatation of the pancreatic duct and pancreatic atrophy similar to the priorexamination.  2. Approximately 2 x 1.2 cm masslike structure to the left of the proximal superior mesenteric artery suspicious of tumor " implants.  3. Internal biliary stent. Pneumobilia. No suspicious focal hepatic  lesions. No free fluid.  4. Decompressed sigmoid colon with suspected submucosal edema raising  the possibility of sigmoid colitis. Clinical correlation is needed.      MACRO:  None    Signed by: Ni Carpio 12/13/2024 9:52 PM  Dictation workstation:   [id number]        Assessment/Plan   68-year-old woman with a resectable pancreatic cancer but elevated CA 19-9 and recent pancreatitis so we will plan for neoadjuvant chemotherapy with modified FOLFIRINOX.   9/30/24 - diagnostic lab negative   10/6/24 - 1st dose mFOLFIRINOX  - tolerated with fatigue, nausea   Will add fosaprepitant to C2 - Allergic rxn to fosaprepitant - discontinue  C3 held due to ANC of 500    - plan for C3 tomorrow with addition of neulasta   C4 in 2 weeks   Scans & see Dr. Cruz after C4   Follow ca19-9 level which is decreasing nicely   Will hold C5 while pt recovers from toxicity.  Resume with dose reductions.        Cancer related pain:   -continue oxycodone - new prescription sent     DM:   -Continue metformin, however keep close eye on renal function.    - Endocrinology referral.      Francisco Cruz MD    [facility]/ [facility]  Office: [phone]  Fax: [phone]

## 2024-12-18 LAB
ALBUMIN SERPL BCP-MCNC: 3.5 G/DL (ref 3.4–5)
ALP SERPL-CCNC: 204 U/L (ref 33–136)
ALT SERPL W P-5'-P-CCNC: 13 U/L (ref 7–45)
ANION GAP SERPL CALC-SCNC: 15 MMOL/L (ref 10–20)
AST SERPL W P-5'-P-CCNC: 12 U/L (ref 9–39)
BILIRUB SERPL-MCNC: 0.4 MG/DL (ref 0–1.2)
BUN SERPL-MCNC: 12 MG/DL (ref 6–23)
CALCIUM SERPL-MCNC: 8.5 MG/DL (ref 8.6–10.6)
CANCER AG19-9 SERPL-ACNC: 346.09 U/ML
CHLORIDE SERPL-SCNC: 99 MMOL/L (ref 98–107)
CO2 SERPL-SCNC: 22 MMOL/L (ref 21–32)
CREAT SERPL-MCNC: 1.21 MG/DL (ref 0.5–1.05)
EGFRCR SERPLBLD CKD-EPI 2021: 49 ML/MIN/1.73M*2
GLUCOSE SERPL-MCNC: 176 MG/DL (ref 74–99)
POTASSIUM SERPL-SCNC: 3.8 MMOL/L (ref 3.5–5.3)
PROT SERPL-MCNC: 5.9 G/DL (ref 6.4–8.2)
SODIUM SERPL-SCNC: 132 MMOL/L (ref 136–145)

## 2024-12-19 ENCOUNTER — TELEPHONE (OUTPATIENT)
Dept: ADMISSION | Facility: HOSPITAL | Age: 69
End: 2024-12-19
Payer: MEDICARE

## 2024-12-19 ENCOUNTER — APPOINTMENT (OUTPATIENT)
Dept: HEMATOLOGY/ONCOLOGY | Facility: CLINIC | Age: 69
End: 2024-12-19
Payer: MEDICARE

## 2024-12-19 DIAGNOSIS — C25.0 MALIGNANT NEOPLASM OF HEAD OF PANCREAS (MULTI): ICD-10-CM

## 2024-12-19 RX ORDER — POTASSIUM CHLORIDE 750 MG/1
20 TABLET, FILM COATED, EXTENDED RELEASE ORAL DAILY
Qty: 60 TABLET | Refills: 2 | Status: SHIPPED | OUTPATIENT
Start: 2024-12-19 | End: 2025-03-19

## 2024-12-19 RX ORDER — OXYCODONE HYDROCHLORIDE 5 MG/1
5-10 TABLET ORAL EVERY 4 HOURS PRN
Qty: 120 TABLET | Refills: 0 | Status: SHIPPED | OUTPATIENT
Start: 2024-12-19 | End: 2025-01-18

## 2024-12-19 NOTE — TELEPHONE ENCOUNTER
Pt called to follow up on prescriptions discussed at her appt 12/17. She was expecting a new prescription for a smaller potassium tablet as well as refill of oxycodone 5mg tabs q4 prn, #120.    Preferred pharmacy is [facility] in [place].

## 2024-12-23 DIAGNOSIS — E11.65 TYPE 2 DIABETES MELLITUS WITH HYPERGLYCEMIA, WITHOUT LONG-TERM CURRENT USE OF INSULIN: Chronic | ICD-10-CM

## 2024-12-23 RX ORDER — GLIPIZIDE 10 MG/1
10 TABLET ORAL DAILY
Qty: 90 TABLET | Refills: 3 | Status: SHIPPED | OUTPATIENT
Start: 2024-12-23

## 2024-12-30 RX ORDER — DIPHENHYDRAMINE HYDROCHLORIDE 50 MG/ML
50 INJECTION INTRAMUSCULAR; INTRAVENOUS AS NEEDED
OUTPATIENT
Start: 2025-01-03

## 2024-12-30 RX ORDER — PROCHLORPERAZINE MALEATE 10 MG
10 TABLET ORAL ONCE
OUTPATIENT
Start: 2025-01-03

## 2024-12-30 RX ORDER — DIPHENHYDRAMINE HYDROCHLORIDE 50 MG/ML
50 INJECTION INTRAMUSCULAR; INTRAVENOUS AS NEEDED
OUTPATIENT
Start: 2025-01-05

## 2024-12-30 RX ORDER — PROCHLORPERAZINE EDISYLATE 5 MG/ML
10 INJECTION INTRAMUSCULAR; INTRAVENOUS EVERY 6 HOURS PRN
OUTPATIENT
Start: 2025-01-03

## 2024-12-30 RX ORDER — EPINEPHRINE 0.3 MG/.3ML
0.3 INJECTION SUBCUTANEOUS EVERY 5 MIN PRN
OUTPATIENT
Start: 2025-01-03

## 2024-12-30 RX ORDER — EPINEPHRINE 0.3 MG/.3ML
0.3 INJECTION SUBCUTANEOUS EVERY 5 MIN PRN
OUTPATIENT
Start: 2025-01-05

## 2024-12-30 RX ORDER — ALBUTEROL SULFATE 0.83 MG/ML
3 SOLUTION RESPIRATORY (INHALATION) AS NEEDED
OUTPATIENT
Start: 2025-01-03

## 2024-12-30 RX ORDER — LORAZEPAM 2 MG/ML
1 INJECTION INTRAMUSCULAR AS NEEDED
OUTPATIENT
Start: 2025-01-03

## 2024-12-30 RX ORDER — PALONOSETRON 0.05 MG/ML
0.25 INJECTION, SOLUTION INTRAVENOUS ONCE
OUTPATIENT
Start: 2025-01-03

## 2024-12-30 RX ORDER — FAMOTIDINE 10 MG/ML
20 INJECTION INTRAVENOUS ONCE AS NEEDED
OUTPATIENT
Start: 2025-01-05

## 2024-12-30 RX ORDER — ATROPINE SULFATE 0.4 MG/ML
0.4 INJECTION, SOLUTION ENDOTRACHEAL; INTRAMEDULLARY; INTRAMUSCULAR; INTRAVENOUS; SUBCUTANEOUS
OUTPATIENT
Start: 2025-01-03

## 2024-12-30 RX ORDER — FAMOTIDINE 10 MG/ML
20 INJECTION INTRAVENOUS ONCE AS NEEDED
OUTPATIENT
Start: 2025-01-03

## 2024-12-30 RX ORDER — ALBUTEROL SULFATE 0.83 MG/ML
3 SOLUTION RESPIRATORY (INHALATION) AS NEEDED
OUTPATIENT
Start: 2025-01-05

## 2024-12-31 ENCOUNTER — LAB (OUTPATIENT)
Dept: HEMATOLOGY/ONCOLOGY | Facility: CLINIC | Age: 69
End: 2024-12-31
Payer: MEDICARE

## 2024-12-31 VITALS
WEIGHT: 150 LBS | HEART RATE: 80 BPM | DIASTOLIC BLOOD PRESSURE: 60 MMHG | OXYGEN SATURATION: 98 % | SYSTOLIC BLOOD PRESSURE: 107 MMHG | TEMPERATURE: 97.2 F | RESPIRATION RATE: 16 BRPM | BODY MASS INDEX: 24.11 KG/M2 | HEIGHT: 66 IN

## 2024-12-31 DIAGNOSIS — C25.0 MALIGNANT NEOPLASM OF HEAD OF PANCREAS (MULTI): ICD-10-CM

## 2024-12-31 LAB
ALBUMIN SERPL BCP-MCNC: 3 G/DL (ref 3.4–5)
ALP SERPL-CCNC: 132 U/L (ref 33–136)
ALT SERPL W P-5'-P-CCNC: 14 U/L (ref 7–45)
ANION GAP SERPL CALC-SCNC: 10 MMOL/L (ref 10–20)
AST SERPL W P-5'-P-CCNC: 18 U/L (ref 9–39)
BASOPHILS # BLD AUTO: 0.04 X10*3/UL (ref 0–0.1)
BASOPHILS NFR BLD AUTO: 0.5 %
BILIRUB SERPL-MCNC: 0.3 MG/DL (ref 0–1.2)
BUN SERPL-MCNC: 16 MG/DL (ref 6–23)
CALCIUM SERPL-MCNC: 8.3 MG/DL (ref 8.6–10.3)
CANCER AG19-9 SERPL-ACNC: 1391.3 U/ML
CHLORIDE SERPL-SCNC: 105 MMOL/L (ref 98–107)
CO2 SERPL-SCNC: 22 MMOL/L (ref 21–32)
CREAT SERPL-MCNC: 0.91 MG/DL (ref 0.5–1.05)
EGFRCR SERPLBLD CKD-EPI 2021: 69 ML/MIN/1.73M*2
EOSINOPHIL # BLD AUTO: 0.1 X10*3/UL (ref 0–0.7)
EOSINOPHIL NFR BLD AUTO: 1.2 %
ERYTHROCYTE [DISTWIDTH] IN BLOOD BY AUTOMATED COUNT: 17.9 % (ref 11.5–14.5)
GLUCOSE SERPL-MCNC: 176 MG/DL (ref 74–99)
HCT VFR BLD AUTO: 26.5 % (ref 36–46)
HGB BLD-MCNC: 8.3 G/DL (ref 12–16)
HYPOCHROMIA BLD QL SMEAR: NORMAL
IMM GRANULOCYTES # BLD AUTO: 0.04 X10*3/UL (ref 0–0.7)
IMM GRANULOCYTES NFR BLD AUTO: 0.5 % (ref 0–0.9)
LYMPHOCYTES # BLD AUTO: 1.83 X10*3/UL (ref 1.2–4.8)
LYMPHOCYTES NFR BLD AUTO: 22.3 %
MCH RBC QN AUTO: 32 PG (ref 26–34)
MCHC RBC AUTO-ENTMCNC: 31.3 G/DL (ref 32–36)
MCV RBC AUTO: 102 FL (ref 80–100)
MONOCYTES # BLD AUTO: 1.08 X10*3/UL (ref 0.1–1)
MONOCYTES NFR BLD AUTO: 13.2 %
NEUTROPHILS # BLD AUTO: 5.12 X10*3/UL (ref 1.2–7.7)
NEUTROPHILS NFR BLD AUTO: 62.3 %
NRBC BLD-RTO: ABNORMAL /100{WBCS}
PLATELET # BLD AUTO: 239 X10*3/UL (ref 150–450)
POTASSIUM SERPL-SCNC: 4.4 MMOL/L (ref 3.5–5.3)
PROT SERPL-MCNC: 5.4 G/DL (ref 6.4–8.2)
RBC # BLD AUTO: 2.59 X10*6/UL (ref 4–5.2)
RBC MORPH BLD: NORMAL
SODIUM SERPL-SCNC: 133 MMOL/L (ref 136–145)
WBC # BLD AUTO: 8.2 X10*3/UL (ref 4.4–11.3)

## 2024-12-31 PROCEDURE — 36591 DRAW BLOOD OFF VENOUS DEVICE: CPT

## 2024-12-31 PROCEDURE — 86301 IMMUNOASSAY TUMOR CA 19-9: CPT | Mod: PORLAB

## 2024-12-31 PROCEDURE — 85025 COMPLETE CBC W/AUTO DIFF WBC: CPT

## 2024-12-31 PROCEDURE — 2500000004 HC RX 250 GENERAL PHARMACY W/ HCPCS (ALT 636 FOR OP/ED): Performed by: INTERNAL MEDICINE

## 2024-12-31 PROCEDURE — 80053 COMPREHEN METABOLIC PANEL: CPT

## 2024-12-31 RX ORDER — HEPARIN 100 UNIT/ML
500 SYRINGE INTRAVENOUS AS NEEDED
Status: DISCONTINUED | OUTPATIENT
Start: 2024-12-31 | End: 2024-12-31 | Stop reason: HOSPADM

## 2024-12-31 RX ORDER — HEPARIN SODIUM,PORCINE/PF 10 UNIT/ML
50 SYRINGE (ML) INTRAVENOUS AS NEEDED
OUTPATIENT
Start: 2024-12-31

## 2024-12-31 RX ORDER — HEPARIN 100 UNIT/ML
500 SYRINGE INTRAVENOUS AS NEEDED
OUTPATIENT
Start: 2024-12-31

## 2024-12-31 RX ADMIN — HEPARIN 500 UNITS: 100 SYRINGE at 13:21

## 2024-12-31 ASSESSMENT — PAIN SCALES - GENERAL: PAINLEVEL_OUTOF10: 0-NO PAIN

## 2024-12-31 NOTE — PROGRESS NOTES
Pt here for port labs. Pt scheduled for this Friday for treatment. Pt was not aware, she was not asked if she was able to travel  to main [place] for pump disconnect. Pt states she cannot go to main [place]. Pt wants to cancel Fridays infusion. Pt rescheduled for following Monday. Pt is aware of plan of care, is agreeable. Pt tolerated PL. Will call with further questions or concerns. Will return next Monday for tx.

## 2025-01-02 ENCOUNTER — TELEPHONE (OUTPATIENT)
Dept: HEMATOLOGY/ONCOLOGY | Facility: CLINIC | Age: 70
End: 2025-01-02
Payer: MEDICARE

## 2025-01-02 NOTE — TELEPHONE ENCOUNTER
Pt called. She is unable to come to her appointments on 01/20, 01/21 and 01/23 she does not have a ride. She does not want help with transportation and/or social work to be involved. I explained that rather than cancel these, we should reschedule so she doesn't get lost or have to miss a cycle. She agreed. Pt wished to talk to Dr. Blount team at [facility] regarding her rescheduling. Thank you

## 2025-01-03 ENCOUNTER — APPOINTMENT (OUTPATIENT)
Dept: HEMATOLOGY/ONCOLOGY | Facility: CLINIC | Age: 70
End: 2025-01-03
Payer: MEDICARE

## 2025-01-06 ENCOUNTER — INFUSION (OUTPATIENT)
Dept: HEMATOLOGY/ONCOLOGY | Facility: CLINIC | Age: 70
End: 2025-01-06
Payer: MEDICARE

## 2025-01-06 ENCOUNTER — SOCIAL WORK (OUTPATIENT)
Dept: HEMATOLOGY/ONCOLOGY | Facility: CLINIC | Age: 70
End: 2025-01-06
Payer: MEDICARE

## 2025-01-06 VITALS
SYSTOLIC BLOOD PRESSURE: 110 MMHG | HEIGHT: 66 IN | RESPIRATION RATE: 16 BRPM | DIASTOLIC BLOOD PRESSURE: 71 MMHG | OXYGEN SATURATION: 96 % | TEMPERATURE: 97.9 F | BODY MASS INDEX: 24.67 KG/M2 | HEART RATE: 78 BPM | WEIGHT: 153.5 LBS

## 2025-01-06 DIAGNOSIS — C25.0 MALIGNANT NEOPLASM OF HEAD OF PANCREAS (MULTI): ICD-10-CM

## 2025-01-06 PROCEDURE — 2500000004 HC RX 250 GENERAL PHARMACY W/ HCPCS (ALT 636 FOR OP/ED): Performed by: INTERNAL MEDICINE

## 2025-01-06 PROCEDURE — 96416 CHEMO PROLONG INFUSE W/PUMP: CPT

## 2025-01-06 PROCEDURE — 96413 CHEMO IV INFUSION 1 HR: CPT

## 2025-01-06 PROCEDURE — 96417 CHEMO IV INFUS EACH ADDL SEQ: CPT

## 2025-01-06 PROCEDURE — 96375 TX/PRO/DX INJ NEW DRUG ADDON: CPT | Mod: INF

## 2025-01-06 PROCEDURE — 96376 TX/PRO/DX INJ SAME DRUG ADON: CPT

## 2025-01-06 PROCEDURE — 96415 CHEMO IV INFUSION ADDL HR: CPT

## 2025-01-06 RX ORDER — PALONOSETRON 0.05 MG/ML
0.25 INJECTION, SOLUTION INTRAVENOUS ONCE
Status: COMPLETED | OUTPATIENT
Start: 2025-01-06 | End: 2025-01-06

## 2025-01-06 RX ORDER — DEXAMETHASONE 6 MG/1
12 TABLET ORAL ONCE
Status: COMPLETED | OUTPATIENT
Start: 2025-01-06 | End: 2025-01-06

## 2025-01-06 RX ORDER — FAMOTIDINE 10 MG/ML
20 INJECTION INTRAVENOUS ONCE AS NEEDED
Status: DISCONTINUED | OUTPATIENT
Start: 2025-01-06 | End: 2025-01-06 | Stop reason: HOSPADM

## 2025-01-06 RX ORDER — ATROPINE SULFATE 0.4 MG/ML
0.4 INJECTION, SOLUTION ENDOTRACHEAL; INTRAMEDULLARY; INTRAMUSCULAR; INTRAVENOUS; SUBCUTANEOUS
Status: COMPLETED | OUTPATIENT
Start: 2025-01-06 | End: 2025-01-06

## 2025-01-06 RX ORDER — LORAZEPAM 2 MG/ML
1 INJECTION INTRAMUSCULAR AS NEEDED
Status: COMPLETED | OUTPATIENT
Start: 2025-01-06 | End: 2025-01-06

## 2025-01-06 RX ORDER — EPINEPHRINE 0.3 MG/.3ML
0.3 INJECTION SUBCUTANEOUS EVERY 5 MIN PRN
Status: DISCONTINUED | OUTPATIENT
Start: 2025-01-06 | End: 2025-01-06 | Stop reason: HOSPADM

## 2025-01-06 RX ORDER — PROCHLORPERAZINE MALEATE 10 MG
10 TABLET ORAL ONCE
Status: DISCONTINUED | OUTPATIENT
Start: 2025-01-06 | End: 2025-01-06 | Stop reason: HOSPADM

## 2025-01-06 RX ORDER — PROCHLORPERAZINE EDISYLATE 5 MG/ML
10 INJECTION INTRAMUSCULAR; INTRAVENOUS EVERY 6 HOURS PRN
Status: DISCONTINUED | OUTPATIENT
Start: 2025-01-06 | End: 2025-01-06 | Stop reason: HOSPADM

## 2025-01-06 RX ORDER — DIPHENHYDRAMINE HYDROCHLORIDE 50 MG/ML
50 INJECTION INTRAMUSCULAR; INTRAVENOUS AS NEEDED
Status: DISCONTINUED | OUTPATIENT
Start: 2025-01-06 | End: 2025-01-06 | Stop reason: HOSPADM

## 2025-01-06 RX ORDER — ALBUTEROL SULFATE 0.83 MG/ML
3 SOLUTION RESPIRATORY (INHALATION) AS NEEDED
Status: DISCONTINUED | OUTPATIENT
Start: 2025-01-06 | End: 2025-01-06 | Stop reason: HOSPADM

## 2025-01-06 RX ADMIN — DEXTROSE 300 MG: 5 SOLUTION INTRAVENOUS at 12:30

## 2025-01-06 RX ADMIN — DEXAMETHASONE 12 MG: 6 TABLET ORAL at 09:18

## 2025-01-06 RX ADMIN — FLUOROURACIL 4600 MG: 50 INJECTION, SOLUTION INTRAVENOUS at 14:12

## 2025-01-06 RX ADMIN — OXALIPLATIN 130 MG: 5 INJECTION, SOLUTION INTRAVENOUS at 10:02

## 2025-01-06 RX ADMIN — ATROPINE SULFATE 0.4 MG: 0.4 INJECTION, SOLUTION INTRAVENOUS at 12:28

## 2025-01-06 RX ADMIN — ATROPINE SULFATE 0.4 MG: 0.4 INJECTION, SOLUTION INTRAVENOUS at 14:09

## 2025-01-06 RX ADMIN — LORAZEPAM 1 MG: 2 INJECTION INTRAMUSCULAR; INTRAVENOUS at 09:56

## 2025-01-06 RX ADMIN — PALONOSETRON HYDROCHLORIDE 250 MCG: 0.25 INJECTION INTRAVENOUS at 09:18

## 2025-01-06 ASSESSMENT — PAIN SCALES - GENERAL: PAINLEVEL_OUTOF10: 0-NO PAIN

## 2025-01-06 NOTE — SIGNIFICANT EVENT

## 2025-01-06 NOTE — PROGRESS NOTES
(SW) met with patient and her  today to assess needs and offer support.  Patient was A&Ox3 with appropriate and congruent mood and affect. SW asked patient how she was doing.  Patient stated that she was doing better than the last time she was in.  Patient stated that she had really bad diarrhea.  Patient stated that she doesn't always like to eat because the food does not taste good.  Patient stated that she knows she needs to eat.  Patient asked about her bills.  Patient stated that she does not get any bills mailed to her.  SW helped patient in [facility]  Patient was appreciative.  Patient stated that her treatment has been decreasing her cancer.  SW provided support and active listening.  SW can continue to check in with patient.      Al Hayward MSW, LSW

## 2025-01-06 NOTE — PROGRESS NOTES
Patient here for cycle 5 of modified folfirinox. Patient states she feeling better after having a break off of treatment for the holidays. Patient experienced laryngeal spasms during oxaliplatin previous cycle. Ativan given today prior to oxaliplatin and patient tolerated infusion well, VALERIE PRESSLEY made aware. Patient states blood pressure medication was decreased per pcp, advised patient to check blood pressure prior to taking per her script. Patient will return 1/8 at 1230 for pump disconnect and udenyca. AVS given to patient. Discharged in wheelchair with  in stable condition. Patient given scarf and mask.

## 2025-01-08 ENCOUNTER — INFUSION (OUTPATIENT)
Dept: HEMATOLOGY/ONCOLOGY | Facility: CLINIC | Age: 70
End: 2025-01-08
Payer: MEDICARE

## 2025-01-08 VITALS
WEIGHT: 153.44 LBS | HEART RATE: 84 BPM | RESPIRATION RATE: 16 BRPM | DIASTOLIC BLOOD PRESSURE: 65 MMHG | BODY MASS INDEX: 24.66 KG/M2 | OXYGEN SATURATION: 100 % | TEMPERATURE: 97.2 F | HEIGHT: 66 IN | SYSTOLIC BLOOD PRESSURE: 107 MMHG

## 2025-01-08 DIAGNOSIS — C25.0 MALIGNANT NEOPLASM OF HEAD OF PANCREAS (MULTI): ICD-10-CM

## 2025-01-08 PROCEDURE — 96372 THER/PROPH/DIAG INJ SC/IM: CPT

## 2025-01-08 PROCEDURE — 2500000004 HC RX 250 GENERAL PHARMACY W/ HCPCS (ALT 636 FOR OP/ED): Performed by: INTERNAL MEDICINE

## 2025-01-08 RX ORDER — HEPARIN 100 UNIT/ML
500 SYRINGE INTRAVENOUS AS NEEDED
OUTPATIENT
Start: 2025-01-08

## 2025-01-08 RX ORDER — ALBUTEROL SULFATE 0.83 MG/ML
3 SOLUTION RESPIRATORY (INHALATION) AS NEEDED
Status: DISCONTINUED | OUTPATIENT
Start: 2025-01-08 | End: 2025-01-08 | Stop reason: HOSPADM

## 2025-01-08 RX ORDER — HEPARIN SODIUM,PORCINE/PF 10 UNIT/ML
50 SYRINGE (ML) INTRAVENOUS AS NEEDED
OUTPATIENT
Start: 2025-01-08

## 2025-01-08 RX ORDER — DIPHENHYDRAMINE HYDROCHLORIDE 50 MG/ML
50 INJECTION INTRAMUSCULAR; INTRAVENOUS AS NEEDED
Status: DISCONTINUED | OUTPATIENT
Start: 2025-01-08 | End: 2025-01-08 | Stop reason: HOSPADM

## 2025-01-08 RX ORDER — FAMOTIDINE 10 MG/ML
20 INJECTION INTRAVENOUS ONCE AS NEEDED
Status: DISCONTINUED | OUTPATIENT
Start: 2025-01-08 | End: 2025-01-08 | Stop reason: HOSPADM

## 2025-01-08 RX ORDER — EPINEPHRINE 0.3 MG/.3ML
0.3 INJECTION SUBCUTANEOUS EVERY 5 MIN PRN
Status: DISCONTINUED | OUTPATIENT
Start: 2025-01-08 | End: 2025-01-08 | Stop reason: HOSPADM

## 2025-01-08 RX ORDER — HEPARIN 100 UNIT/ML
500 SYRINGE INTRAVENOUS AS NEEDED
Status: DISCONTINUED | OUTPATIENT
Start: 2025-01-08 | End: 2025-01-08 | Stop reason: HOSPADM

## 2025-01-08 RX ADMIN — PEGFILGRASTIM-CBQV 6 MG: 6 INJECTION, SOLUTION SUBCUTANEOUS at 12:14

## 2025-01-08 RX ADMIN — HEPARIN 500 UNITS: 100 SYRINGE at 12:14

## 2025-01-08 ASSESSMENT — PAIN SCALES - GENERAL: PAINLEVEL_OUTOF10: 3

## 2025-01-08 NOTE — PROGRESS NOTES
Patient is here for pump disconnect and growth factor injection. Patient tolerated well. Port was flushed and de accessed without difficulty. Patient states that she cannot make her scheduled appointments at [facility] on 1/21 and 1/23. WENDIE Astorga and Rosa Novak RN notified. Patient was scheduled for treatment on 1/22 and disconnect on 1/24 at [facility].

## 2025-01-12 NOTE — PROGRESS NOTES
"Endocrinology  01/15/25      History of Present Illness   Nanci Colón is a 69 y.o. year old female with medical history of pancreatic adenocarcinoma, T2DM, HTN, HLD, and hypothyroidism here for T2DM management.     She follows with Dr. Cruz for oncology care. She presented in mid September with abdominal pain, jaundice, MARK and pancreatitis. She had an ultrasound that demonstrated extrahepatic bile duct dilatoin and pancreatic head dilation. Both seem to emanate from the pancreatic head. 9/9/24 CT scan was c/f a mass. MRCP oerfirned 9/27/24 showed an ill-defined mass in the pancreatic head and uncinate process. Bx was positive for adenocarcinoma. She is currently receiving FOLFIRINOX therapy, on which her diabetes has been uncontrolled. On chemo days she receives 12 mg dexamethasone (last infusion 1/6/25).     Her most recent HbA1c is 8.0% (9/2024). She is currently anemic and so HbA1c is not accurate.    Today the patient states, feeling a little \"spacy and tired.\"    Prior to cancer diagnosis she noted her BG was becoming out of control. Previously controlled with just metformin. Was taken off metformin around the time of her pancreatitis hospitalization due to poor renal function and restarted by her oncologist.     BG recordings brought to clinic (generally fasting):  Date  BG  1/6  61 (received chemo after; 12 mg dex)  1/7  326 -> 222 later  1/8  62  1/9  65  1/10  71  1/11  82  1/12  75  1/13  68  1/14  122 (ice-cream night before)  1/15  110 (not fasting)      Diabetes Summary   Diagnosis Date:   16-17 yrs ago (around 2008)         Glucose Meter: One Touch Flex  Diabetes Tech (CGM or pump):  none            Eye Exam: over a year ago  Foot Exam:    has never had one                  Dental exam:     a couple years              Diet:   suffering from poor appetite due to bad taste  B: an egg and instant oats  L: tends to skip  D:  soups                 Her current regimen is as follows:   Orals: metformin " 500 mg BID and glipizide 10 mg daily           Injectables: none  Insulin: none  Hx of pancreatitis: yes likely 2/2 malignancy  Hx of medullary thyroid cancer: denies  Complications: none  Last episode of DKA or hyperosmolar coma:   denies    BG Evaluation  Home blood glucose log:  Frequency of BG checks: 1x/day  Fasting values:   Non-fasting values: N/A    Hypoglycemia:  Frequency and timing of hypoglycemia: fasting  Time of day hypoglycemia usually occurs: only takes fasting BG  Severity of hypoglycemic episode: mild (dizzy, weak)  Hypoglycemic threshold: 60s  Last call to EMS or hospitalization: no    Review of Systems     Positives in bold  General: Denies fever or chills   Head: Denies headache or vision changes   Neck: Denies tenderness or lumps   Cardiac: Denies chest pain or palpitations   Respiratory: Denies shortness of breath or cough   GI: Denies abdominal pain, nausea, or vomiting   Extremities: Denies swelling   Skin: Denies rash     Medications     Current Outpatient Medications   Medication Instructions    atorvastatin (LIPITOR) 40 mg, oral, Every other day    doxepin (SINEQUAN) 10 mg, oral, Nightly    glipiZIDE (GLUCOTROL) 10 mg, oral, Daily    lactulose 10 g, oral, 3 times daily PRN, Only take as many times as needed for a BM    lipase-protease-amylase (Creon) 24,000-76,000 -120,000 unit capsule 2 capsules, oral, 3 times daily (morning, midday, late afternoon), With meals and 1 with snacks for 8 per day    loperamide (Imodium) 2 mg capsule Take 2 capsules (4 mg) by mouth with the first episode of diarrhea and 1 capsule (2 mg) by mouth with any additional episodes. Maximum 8 capsules (16 mg) per day.    metFORMIN (GLUCOPHAGE) 500 mg, oral, 2 times daily (morning and late afternoon)    metoprolol succinate XL (TOPROL-XL) 50 mg, oral, Daily    ondansetron (ZOFRAN) 8 mg, oral, Every 8 hours PRN    oxyCODONE (ROXICODONE) 5-10 mg, oral, Every 4 hours PRN    pantoprazole (PROTONIX) 40 mg, oral,  "Daily, Do not crush, chew, or split.    potassium chloride CR (Klor-Con) 10 mEq ER tablet 20 mEq, oral, Daily, Do not crush, chew, or split.    potassium chloride CR 20 mEq ER tablet 20 mEq, oral, Daily, Do not crush, chew, or split.    prochlorperazine (COMPAZINE) 10 mg, oral, Every 6 hours PRN    Synthroid 75 mcg, oral, Daily        History     Past Medical History:   Diagnosis Date    Hyperlipidemia     Hypertension     Hypothyroidism     Personal history of other diseases of the circulatory system 09/15/2017    History of hypertension    Personal history of other endocrine, nutritional and metabolic disease 02/24/2016    History of type 2 diabetes mellitus    Personal history of other endocrine, nutritional and metabolic disease 03/09/2017    History of hypothyroidism    Personal history of other endocrine, nutritional and metabolic disease     History of hyperlipidemia    Type 2 diabetes mellitus with other skin complications     Diabetic dermopathy    Vision loss        Past Surgical History:   Procedure Laterality Date    OTHER SURGICAL HISTORY  09/01/2022    Foot surgery    OTHER SURGICAL HISTORY  09/01/2022    Appendectomy    OTHER SURGICAL HISTORY  09/01/2022    Knee surgery       Family History   Problem Relation Name Age of Onset    Cancer Mother      Heart disease Father          Allergies   Allergen Reactions    Emend [Fosaprepitant] Shortness of breath    Shellfish Containing Products Anaphylaxis          Physical Exam   Ht 1.676 m (5' 6\")   Wt 68 kg (150 lb)   BMI 24.21 kg/m²   General: Appears tired and ill, no acute distress  Head and Neck: NCAT  Heart: Normal rate  Lungs: Breathing comfortably on room air   Neuro: alert and oriented  Skin: No rashes    Labs and Imaging     Lab Results   Component Value Date    HGBA1C 8.0 (H) 09/11/2024    HGBA1C 8.1 (H) 09/09/2024    HGBA1C 7.5 (A) 08/21/2024     (L) 12/31/2024    K 4.4 12/31/2024     12/31/2024    CO2 22 12/31/2024    BUN 16 " 12/31/2024    CREATININE 0.91 12/31/2024    CALCIUM 8.3 (L) 12/31/2024    ALBUMIN 3.0 (L) 12/31/2024    PROT 5.4 (L) 12/31/2024    BILITOT 0.3 12/31/2024    ALKPHOS 132 12/31/2024    ALT 14 12/31/2024    AST 18 12/31/2024    GLUCOSE 176 (H) 12/31/2024    CHOL 130 09/09/2024    TRIG 312 (H) 09/09/2024    HDL 9.1 09/09/2024    BHYDRXBUT 0.17 09/24/2024       Assessment and Plan   Nanci Colón is a 69 y.o. year old female with medical history of pancreatic adenocarcinoma, T2DM, HTN, HLD, and hypothyroidism here for T2DM management    #T2DM  - Dx aroudn 2008   - Complications include: none at this time  - Most recent A1c is 7.2%, Hgb 8.3. Goal a1c is 7.5%-8.0% given age and comorbidities  - Health maintenance:  ==> According to the ADA, BP goal is <130/80. Patient is at goal.  ==> Most recent retinal exam showed unknown. Due now but will address when the patient is in the less active stages of treating her pancreatic cancer. She is in agreement with this.  ==> Most recent LDL is 59. Due for repeat in 9/2025. Goal LDL <70, continue statin therapy.  ==> Will obtain urine albumin/Cr ratio.   ==> Foot exam due now, will complete at next visit.  ==> TSH 3.83 (4/2024)  - Medication management:  ==> STOP glipizide given AM hypoglycemia. If BG is >200 the day after chemo she should take 10 mg glipizide  ==> Continue metformin 500 mg BID, can be uptitrated if needed after stopping glipizide  - Asked patient to start checking intermittent 2-hr post-prandial BG as well.   - I informed the patient that after her Wh[place], the nature of her disease will change and therefore I would like her to inform me of her surgical plan when there is one.    #Hypothyroidism  - Long history of hypothyroidism  - Explained Synthroid is a weight based medication, so her needs may have decreased since TSH was last assessed.  - Repeat TSH  - Patient requires brand synthroid  - Current medication: Synthroid 75 mcg daily    #Thyroid nodules  - Thyroid  assessed via US in 5/2024, no change in nodule size in over a decade (6/2014 US)  - No need for further assessment     RTC: 3M, can be virtual given patient's difficulty getting to appointments         Jane Womack MD   of Medicine  Department of Internal Medicine  Division of Endocrinology, Diabetes and Metabolism  [facility]

## 2025-01-15 ENCOUNTER — APPOINTMENT (OUTPATIENT)
Dept: ENDOCRINOLOGY | Facility: CLINIC | Age: 70
End: 2025-01-15
Payer: MEDICARE

## 2025-01-15 VITALS — HEIGHT: 66 IN | BODY MASS INDEX: 24.11 KG/M2 | WEIGHT: 150 LBS

## 2025-01-15 DIAGNOSIS — Z00.00 HEALTH CARE MAINTENANCE: Primary | ICD-10-CM

## 2025-01-15 DIAGNOSIS — E11.9 TYPE 2 DIABETES MELLITUS WITHOUT COMPLICATION, WITHOUT LONG-TERM CURRENT USE OF INSULIN (MULTI): ICD-10-CM

## 2025-01-15 DIAGNOSIS — E03.9 HYPOTHYROIDISM, UNSPECIFIED TYPE: ICD-10-CM

## 2025-01-15 DIAGNOSIS — C25.0 MALIGNANT NEOPLASM OF HEAD OF PANCREAS (MULTI): ICD-10-CM

## 2025-01-15 LAB — POC HEMOGLOBIN A1C: 7.2 % (ref 4.2–6.5)

## 2025-01-15 PROCEDURE — 1159F MED LIST DOCD IN RCRD: CPT | Performed by: STUDENT IN AN ORGANIZED HEALTH CARE EDUCATION/TRAINING PROGRAM

## 2025-01-15 PROCEDURE — 83036 HEMOGLOBIN GLYCOSYLATED A1C: CPT | Performed by: STUDENT IN AN ORGANIZED HEALTH CARE EDUCATION/TRAINING PROGRAM

## 2025-01-15 PROCEDURE — 99205 OFFICE O/P NEW HI 60 MIN: CPT | Performed by: STUDENT IN AN ORGANIZED HEALTH CARE EDUCATION/TRAINING PROGRAM

## 2025-01-15 PROCEDURE — 3008F BODY MASS INDEX DOCD: CPT | Performed by: STUDENT IN AN ORGANIZED HEALTH CARE EDUCATION/TRAINING PROGRAM

## 2025-01-15 PROCEDURE — G2211 COMPLEX E/M VISIT ADD ON: HCPCS | Performed by: STUDENT IN AN ORGANIZED HEALTH CARE EDUCATION/TRAINING PROGRAM

## 2025-01-15 ASSESSMENT — PATIENT HEALTH QUESTIONNAIRE - PHQ9
1. LITTLE INTEREST OR PLEASURE IN DOING THINGS: NOT AT ALL
2. FEELING DOWN, DEPRESSED OR HOPELESS: NOT AT ALL
SUM OF ALL RESPONSES TO PHQ9 QUESTIONS 1 AND 2: 0

## 2025-01-15 ASSESSMENT — ENCOUNTER SYMPTOMS
LOSS OF SENSATION IN FEET: 1
OCCASIONAL FEELINGS OF UNSTEADINESS: 1
DEPRESSION: 0

## 2025-01-15 NOTE — PATIENT INSTRUCTIONS
After Visit Summary  It was great seeing you today!    In summary from our visit today, I would like to remind you of the following:    - Please get thyroid lab done when convenient  - Please let me know your surgical plan when it is decided  - STOP glipizide  - If BG >200 the morning following chemo, can take one pill of glipizide to help bring back to normal  - Continue metformin 500 mg twice a day  - Continue checking blood sugars 1-2x/day for 4 days a week  - Prior to virtual appointments, please send blood sugar log 2-3 days in advance of appointment      Division of Endocrinology   Follow-up appointments:  Please arrive at least 20 minutes prior to your follow up appointments in order to keep visits as close as possible to the scheduled times. If you need to cancel an appointment, please call at least 24 hours in advance.    Please bring your medications or an updated list of medications to each of your visits to help ensure safety in prescribing future medications.    If you are being seen for diabetes, please bring your glucose meter and/or glucose log with 2 weeks of glucose readings to each visit.    Medication Refills: Please request medication refills at the time of your appointment.   - Refills requested by phone or Clear[employer]Pay[employer] in between visits require at least 3 business days to be processed.    Lab Results: Please allow at least 7 business days for lab results to be reviewed.  - Please note, that some specialty testing may take up to at least 7 business to be completed.   - If there are any urgent issues requiring immediate attention, we will contact you at your provided phone numbers.   - Any non-urgent results will be relayed via Pairyt if you have signed up for this service or via a letter through the mail and/or phone call.     Communication with your provider:  - [facility] CrowdTwisthart is highly recommended as the most efficient means to contact your provider. However for urgent concerns  please call.   - For phone calls, please call our office Monday- Friday 8am to 4:30pm and your message will be relayed to your provider. Please allows 1-2 business days for a response.  - After hours messages are left with an answering service and will be handled by the clinic the following business day.      Sincerely,   Jane Womack MD  Division of Endocrinology  [facility]

## 2025-01-20 ENCOUNTER — LAB (OUTPATIENT)
Dept: HEMATOLOGY/ONCOLOGY | Facility: CLINIC | Age: 70
End: 2025-01-20
Payer: MEDICARE

## 2025-01-20 VITALS
SYSTOLIC BLOOD PRESSURE: 108 MMHG | BODY MASS INDEX: 24.11 KG/M2 | OXYGEN SATURATION: 100 % | TEMPERATURE: 97.2 F | HEART RATE: 77 BPM | WEIGHT: 150 LBS | HEIGHT: 66 IN | DIASTOLIC BLOOD PRESSURE: 67 MMHG | RESPIRATION RATE: 16 BRPM

## 2025-01-20 DIAGNOSIS — C25.0 MALIGNANT NEOPLASM OF HEAD OF PANCREAS (MULTI): ICD-10-CM

## 2025-01-20 LAB
ALBUMIN SERPL BCP-MCNC: 3.1 G/DL (ref 3.4–5)
ALP SERPL-CCNC: 185 U/L (ref 33–136)
ALT SERPL W P-5'-P-CCNC: 11 U/L (ref 7–45)
ANION GAP SERPL CALC-SCNC: 13 MMOL/L (ref 10–20)
AST SERPL W P-5'-P-CCNC: 12 U/L (ref 9–39)
BASOPHILS # BLD AUTO: 0.02 X10*3/UL (ref 0–0.1)
BASOPHILS NFR BLD AUTO: 0.3 %
BILIRUB SERPL-MCNC: 0.3 MG/DL (ref 0–1.2)
BUN SERPL-MCNC: 14 MG/DL (ref 6–23)
CALCIUM SERPL-MCNC: 7.3 MG/DL (ref 8.6–10.3)
CANCER AG19-9 SERPL-ACNC: 518.22 U/ML
CHLORIDE SERPL-SCNC: 103 MMOL/L (ref 98–107)
CO2 SERPL-SCNC: 24 MMOL/L (ref 21–32)
CREAT SERPL-MCNC: 0.86 MG/DL (ref 0.5–1.05)
EGFRCR SERPLBLD CKD-EPI 2021: 74 ML/MIN/1.73M*2
EOSINOPHIL # BLD AUTO: 0.03 X10*3/UL (ref 0–0.7)
EOSINOPHIL NFR BLD AUTO: 0.5 %
ERYTHROCYTE [DISTWIDTH] IN BLOOD BY AUTOMATED COUNT: 16.4 % (ref 11.5–14.5)
GLUCOSE SERPL-MCNC: 139 MG/DL (ref 74–99)
HCT VFR BLD AUTO: 24.3 % (ref 36–46)
HGB BLD-MCNC: 7.6 G/DL (ref 12–16)
IMM GRANULOCYTES # BLD AUTO: 0.04 X10*3/UL (ref 0–0.7)
IMM GRANULOCYTES NFR BLD AUTO: 0.6 % (ref 0–0.9)
LYMPHOCYTES # BLD AUTO: 1.35 X10*3/UL (ref 1.2–4.8)
LYMPHOCYTES NFR BLD AUTO: 21.6 %
MCH RBC QN AUTO: 33 PG (ref 26–34)
MCHC RBC AUTO-ENTMCNC: 31.3 G/DL (ref 32–36)
MCV RBC AUTO: 106 FL (ref 80–100)
MONOCYTES # BLD AUTO: 0.64 X10*3/UL (ref 0.1–1)
MONOCYTES NFR BLD AUTO: 10.2 %
NEUTROPHILS # BLD AUTO: 4.17 X10*3/UL (ref 1.2–7.7)
NEUTROPHILS NFR BLD AUTO: 66.8 %
NRBC BLD-RTO: ABNORMAL /100{WBCS}
PLATELET # BLD AUTO: 95 X10*3/UL (ref 150–450)
POTASSIUM SERPL-SCNC: 3.5 MMOL/L (ref 3.5–5.3)
PROT SERPL-MCNC: 5.2 G/DL (ref 6.4–8.2)
RBC # BLD AUTO: 2.3 X10*6/UL (ref 4–5.2)
RBC MORPH BLD: NORMAL
SODIUM SERPL-SCNC: 136 MMOL/L (ref 136–145)
WBC # BLD AUTO: 6.3 X10*3/UL (ref 4.4–11.3)

## 2025-01-20 PROCEDURE — 85025 COMPLETE CBC W/AUTO DIFF WBC: CPT

## 2025-01-20 PROCEDURE — 36591 DRAW BLOOD OFF VENOUS DEVICE: CPT

## 2025-01-20 PROCEDURE — 2500000004 HC RX 250 GENERAL PHARMACY W/ HCPCS (ALT 636 FOR OP/ED): Performed by: INTERNAL MEDICINE

## 2025-01-20 PROCEDURE — 80053 COMPREHEN METABOLIC PANEL: CPT

## 2025-01-20 PROCEDURE — 86301 IMMUNOASSAY TUMOR CA 19-9: CPT | Mod: PORLAB

## 2025-01-20 RX ORDER — HEPARIN 100 UNIT/ML
500 SYRINGE INTRAVENOUS AS NEEDED
Status: CANCELLED | OUTPATIENT
Start: 2025-01-20

## 2025-01-20 RX ORDER — HEPARIN SODIUM,PORCINE/PF 10 UNIT/ML
50 SYRINGE (ML) INTRAVENOUS AS NEEDED
Status: CANCELLED | OUTPATIENT
Start: 2025-01-20

## 2025-01-20 RX ORDER — HEPARIN 100 UNIT/ML
500 SYRINGE INTRAVENOUS AS NEEDED
Status: DISCONTINUED | OUTPATIENT
Start: 2025-01-20 | End: 2025-01-20 | Stop reason: HOSPADM

## 2025-01-20 RX ADMIN — HEPARIN 500 UNITS: 100 SYRINGE at 12:52

## 2025-01-20 ASSESSMENT — PAIN SCALES - GENERAL: PAINLEVEL_OUTOF10: 0-NO PAIN

## 2025-01-20 NOTE — PROGRESS NOTES
Patient identified by name & .  Patient denies acute distress- none noted at this time. Site care given with band aid. Patient discharged home in stable condition- ambulatory with walker.

## 2025-01-21 ENCOUNTER — TELEMEDICINE (OUTPATIENT)
Dept: HEMATOLOGY/ONCOLOGY | Facility: CLINIC | Age: 70
End: 2025-01-21
Payer: MEDICARE

## 2025-01-21 ENCOUNTER — APPOINTMENT (OUTPATIENT)
Dept: HEMATOLOGY/ONCOLOGY | Facility: CLINIC | Age: 70
End: 2025-01-21
Payer: MEDICARE

## 2025-01-21 DIAGNOSIS — C25.0 MALIGNANT NEOPLASM OF HEAD OF PANCREAS (MULTI): ICD-10-CM

## 2025-01-21 PROCEDURE — 99214 OFFICE O/P EST MOD 30 MIN: CPT | Performed by: NURSE PRACTITIONER

## 2025-01-21 RX ORDER — DIPHENHYDRAMINE HYDROCHLORIDE 50 MG/ML
50 INJECTION INTRAMUSCULAR; INTRAVENOUS AS NEEDED
Status: CANCELLED | OUTPATIENT
Start: 2025-01-25

## 2025-01-21 RX ORDER — PROCHLORPERAZINE EDISYLATE 5 MG/ML
10 INJECTION INTRAMUSCULAR; INTRAVENOUS EVERY 6 HOURS PRN
Status: CANCELLED | OUTPATIENT
Start: 2025-01-23

## 2025-01-21 RX ORDER — ALBUTEROL SULFATE 0.83 MG/ML
3 SOLUTION RESPIRATORY (INHALATION) AS NEEDED
Status: CANCELLED | OUTPATIENT
Start: 2025-01-23

## 2025-01-21 RX ORDER — ATROPINE SULFATE 0.4 MG/ML
0.4 INJECTION, SOLUTION ENDOTRACHEAL; INTRAMEDULLARY; INTRAMUSCULAR; INTRAVENOUS; SUBCUTANEOUS
Status: CANCELLED | OUTPATIENT
Start: 2025-01-23

## 2025-01-21 RX ORDER — LORAZEPAM 2 MG/ML
1 INJECTION INTRAMUSCULAR AS NEEDED
Status: CANCELLED | OUTPATIENT
Start: 2025-01-23

## 2025-01-21 RX ORDER — FAMOTIDINE 10 MG/ML
20 INJECTION INTRAVENOUS ONCE AS NEEDED
Status: CANCELLED | OUTPATIENT
Start: 2025-01-23

## 2025-01-21 RX ORDER — FAMOTIDINE 10 MG/ML
20 INJECTION INTRAVENOUS ONCE AS NEEDED
Status: CANCELLED | OUTPATIENT
Start: 2025-01-25

## 2025-01-21 RX ORDER — ALBUTEROL SULFATE 0.83 MG/ML
3 SOLUTION RESPIRATORY (INHALATION) AS NEEDED
Status: CANCELLED | OUTPATIENT
Start: 2025-01-25

## 2025-01-21 RX ORDER — PALONOSETRON 0.05 MG/ML
0.25 INJECTION, SOLUTION INTRAVENOUS ONCE
Status: CANCELLED | OUTPATIENT
Start: 2025-01-23

## 2025-01-21 RX ORDER — EPINEPHRINE 0.3 MG/.3ML
0.3 INJECTION SUBCUTANEOUS EVERY 5 MIN PRN
Status: CANCELLED | OUTPATIENT
Start: 2025-01-25

## 2025-01-21 RX ORDER — PROCHLORPERAZINE MALEATE 10 MG
10 TABLET ORAL ONCE
Status: CANCELLED | OUTPATIENT
Start: 2025-01-23

## 2025-01-21 RX ORDER — EPINEPHRINE 0.3 MG/.3ML
0.3 INJECTION SUBCUTANEOUS EVERY 5 MIN PRN
Status: CANCELLED | OUTPATIENT
Start: 2025-01-23

## 2025-01-21 RX ORDER — OXYCODONE HYDROCHLORIDE 5 MG/1
5-10 TABLET ORAL EVERY 4 HOURS PRN
Qty: 120 TABLET | Refills: 0 | Status: SHIPPED | OUTPATIENT
Start: 2025-01-21 | End: 2025-02-20

## 2025-01-21 RX ORDER — DIPHENHYDRAMINE HYDROCHLORIDE 50 MG/ML
50 INJECTION INTRAMUSCULAR; INTRAVENOUS AS NEEDED
Status: CANCELLED | OUTPATIENT
Start: 2025-01-23

## 2025-01-21 NOTE — PROGRESS NOTES
Patient ID: Nanci Colón is a 69 y.o. female from [place], [place].     Referring Physician: Francisco Cruz MD  [id number] [place] [address]    Primary Care Provider: Diana Blunt DO    Diagnosis:   Pancreatic cancer 9/2024    Primary Oncologic Surgeon:   Nazia    Primary Medical Oncologist:  Anthony    Primary Radiation Oncologist:  KVNG    Current Therapy:  10/4/24 -  Neoadjuvant Chemo - FOLFIRINOX    Oncologic Surgery History:  None    Oncologic Therapy History:  10/9/24 -  mFOLFIRINOX     Molecular Genetics:  Mmr-P    Current Sites of Disease:  Pancreatic head    Oncologic Problem List:  Pancreatic cancer  DM2    Oncologic Narrative:  69 y.o. woman who initially presented in mid September 2024 with abdominal pain jaundice MARK and pancreatitis.  She had an ultrasound that showed extrahepatic bile duct dilation and pancreatic duct dilation.  Both of these seem to emanate from the pancreatic head.  A CT scan on September 9, 2024 was concerning for a mass.  MRCP on September 27, 2024 showed an ill-defined mass in the pancreatic head and uncinate process measuring approximately 3.5 cm.  Biopsy of the pancreatic mass from the September 11, 2024 endoscopic ultrasound revealed adenocarcinoma.  She met me for initial consultation regarding treatment planning on 9/26/2024.       Past Medical History: Nanci has a past medical history of Hyperlipidemia, Hypertension, Hypothyroidism, Personal history of other diseases of the circulatory system (09/15/2017), Personal history of other endocrine, nutritional and metabolic disease (02/24/2016), Personal history of other endocrine, nutritional and metabolic disease (03/09/2017), Personal history of other endocrine, nutritional and metabolic disease, Type 2 diabetes mellitus with other skin complications, and Vision loss.  Surgical History:  Nanci has a past surgical history that includes Other surgical history (09/01/2022); Other surgical history (09/01/2022); and  Other surgical history (09/01/2022).  Social History:  Nanci reports that she has never smoked. She has never used smokeless tobacco. She reports that she does not drink alcohol and does not use drugs.  Family History:    Family History   Problem Relation Name Age of Onset    Cancer Mother      Heart disease Father       Family Oncology History:  Cancer-related family history includes Cancer in her mother.      SUBJECTIVE:    History of Present Illness:  Nanci Colón is a 69 y.o. female who was referred by Francisco Cruz MD and presents with chemotherapy follow up    Nanci is seen in follow up  S/p 5 cycles of FOLFIRINOX    - had dose reductions with C5 - oxali given with 20% dose reduction   Scheduled for C6 tomorrow   Overall felt much better after the last round      - cold sensitivity was improved, lasted few days     - only day of diarrhea was yesterday    Following with endocrinology for diabetes.   Needs refill of oxycodone - taking mostly at night for pain     OBJECTIVE:    VS / Pain:  There were no vitals taken for this visit.  BSA: There is no height or weight on file to calculate BSA.   Pain Scale: 0    Daily Weight  01/20/25 : 68 kg (150 lb)  01/15/25 : 68 kg (150 lb)  01/08/25 : 69.6 kg (153 lb 7 oz)      Physical Exam  Constitutional:       Appearance: She is normal weight.   HENT:      Nose: Nose normal.      Mouth/Throat:      Mouth: Mucous membranes are moist.      Pharynx: Oropharynx is clear.   Eyes:      Extraocular Movements: Extraocular movements intact.      Conjunctiva/sclera: Conjunctivae normal.   Cardiovascular:      Rate and Rhythm: Normal rate.   Pulmonary:      Effort: Pulmonary effort is normal.      Breath sounds: Normal breath sounds.   Abdominal:      General: Abdomen is flat. Bowel sounds are normal.   Musculoskeletal:         General: Normal range of motion.   Skin:     General: Skin is warm.   Neurological:      General: No focal deficit present.      Mental Status: She  is alert. Mental status is at baseline.   Psychiatric:         Mood and Affect: Mood normal.         Thought Content: Thought content normal.         Judgment: Judgment normal.         Performance Status:   ECOG 1    Diagnostic Results         WBC   Date/Time Value Ref Range Status   01/20/2025 12:52 PM 6.3 4.4 - 11.3 x10*3/uL Final   12/31/2024 01:19 PM 8.2 4.4 - 11.3 x10*3/uL Final   12/17/2024 03:04 PM 14.3 (H) 4.4 - 11.3 x10*3/uL Final     Hemoglobin   Date Value Ref Range Status   01/20/2025 7.6 (L) 12.0 - 16.0 g/dL Final   12/31/2024 8.3 (L) 12.0 - 16.0 g/dL Final   12/17/2024 8.6 (L) 12.0 - 16.0 g/dL Final     MCV   Date/Time Value Ref Range Status   01/20/2025 12:52  (H) 80 - 100 fL Final   12/31/2024 01:19  (H) 80 - 100 fL Final   12/17/2024 03:04 PM 97 80 - 100 fL Final     Platelets   Date/Time Value Ref Range Status   01/20/2025 12:52 PM 95 (L) 150 - 450 x10*3/uL Final     Comment:     Platelet count verified by smear review.   12/31/2024 01:19  150 - 450 x10*3/uL Final     Comment:     Platelet count verified by smear review.   12/17/2024 03:04  (L) 150 - 450 x10*3/uL Final     Neutrophils Absolute   Date/Time Value Ref Range Status   01/20/2025 12:52 PM 4.17 1.20 - 7.70 x10*3/uL Final     Comment:     Percent differential counts (%) should be interpreted in the context of the absolute cell counts (cells/uL).   12/31/2024 01:19 PM 5.12 1.20 - 7.70 x10*3/uL Final     Comment:     Percent differential counts (%) should be interpreted in the context of the absolute cell counts (cells/uL).   12/02/2024 03:00 PM 6.81 1.20 - 7.70 x10*3/uL Final     Comment:     Percent differential counts (%) should be interpreted in the context of the absolute cell counts (cells/uL).     Bilirubin, Total   Date/Time Value Ref Range Status   01/20/2025 12:52 PM 0.3 0.0 - 1.2 mg/dL Final   12/31/2024 01:19 PM 0.3 0.0 - 1.2 mg/dL Final   12/17/2024 03:04 PM 0.4 0.0 - 1.2 mg/dL Final     AST   Date/Time  "Value Ref Range Status   01/20/2025 12:52 PM 12 9 - 39 U/L Final   12/31/2024 01:19 PM 18 9 - 39 U/L Final   12/17/2024 03:04 PM 12 9 - 39 U/L Final     ALT   Date/Time Value Ref Range Status   01/20/2025 12:52 PM 11 7 - 45 U/L Final     Comment:     Patients treated with Sulfasalazine may generate falsely decreased results for ALT.   12/31/2024 01:19 PM 14 7 - 45 U/L Final     Comment:     Patients treated with Sulfasalazine may generate falsely decreased results for ALT.   12/17/2024 03:04 PM 13 7 - 45 U/L Final     Comment:     Patients treated with Sulfasalazine may generate falsely decreased results for ALT.     Creatinine   Date/Time Value Ref Range Status   01/20/2025 12:52 PM 0.86 0.50 - 1.05 mg/dL Final   12/31/2024 01:19 PM 0.91 0.50 - 1.05 mg/dL Final   12/17/2024 03:04 PM 1.21 (H) 0.50 - 1.05 mg/dL Final     Urea Nitrogen   Date/Time Value Ref Range Status   01/20/2025 12:52 PM 14 6 - 23 mg/dL Final   12/31/2024 01:19 PM 16 6 - 23 mg/dL Final   12/17/2024 03:04 PM 12 6 - 23 mg/dL Final     Albumin   Date/Time Value Ref Range Status   01/20/2025 12:52 PM 3.1 (L) 3.4 - 5.0 g/dL Final   12/31/2024 01:19 PM 3.0 (L) 3.4 - 5.0 g/dL Final   12/17/2024 03:04 PM 3.5 3.4 - 5.0 g/dL Final     Cancer AG 19-9   Date/Time Value Ref Range Status   01/20/2025 12:52 .22 (H) <35.00 U/mL Final   12/31/2024 01:19 PM 1,391.30 (H) <35.00 U/mL Final   12/17/2024 03:04 .09 (H) <35.00 U/mL Final     No results found for: \"CEA\"    === 12/12/24 ===    CT CHEST ABDOMEN PELVIS W IV CONTRAST    - Impression -  CHEST:  1.  No evidence of metastatic disease. A negative CT scan of the  chest.    ABDOMEN-PELVIS:  1.  A known pancreatic head mass with upstream dilatation of the pancreatic duct and pancreatic atrophy similar to the priorexamination.  2. Approximately 2 x 1.2 cm masslike structure to the left of the proximal superior mesenteric artery suspicious of tumor implants.  3. Internal biliary stent. Pneumobilia. No " suspicious focal hepatic  lesions. No free fluid.  4. Decompressed sigmoid colon with suspected submucosal edema raising  the possibility of sigmoid colitis. Clinical correlation is needed.      Assessment/Plan   68-year-old woman with a resectable pancreatic cancer but elevated CA 19-9 and recent pancreatitis so we will plan for neoadjuvant chemotherapy with modified FOLFIRINOX.   9/30/24 - diagnostic lab negative   10/6/24 - 1st dose mFOLFIRINOX  - tolerated with fatigue, nausea   Will add fosaprepitant to C2 - Allergic rxn to fosaprepitant - discontinue  C3 held due to ANC of 500 - added neulasta   C5 given with 20% dose reduction of oxaliplatin   C6 tomorrow   C7 in 2 weeks   Scans again after c*     - Follow ca19-9 level which is decreasing nicely     Cancer related pain:   -continue oxycodone - new prescription sent   DM:   -Continue metformin, however keep close eye on renal function.    - Established care with endocrinology       WENDIE Oleary-[facility] [place]/ [facility]  Office: [phone]  Fax: [phone]

## 2025-01-22 ENCOUNTER — INFUSION (OUTPATIENT)
Dept: HEMATOLOGY/ONCOLOGY | Facility: CLINIC | Age: 70
End: 2025-01-22
Payer: MEDICARE

## 2025-01-22 VITALS
OXYGEN SATURATION: 100 % | HEART RATE: 77 BPM | TEMPERATURE: 97.5 F | BODY MASS INDEX: 24.19 KG/M2 | RESPIRATION RATE: 16 BRPM | WEIGHT: 150.5 LBS | SYSTOLIC BLOOD PRESSURE: 95 MMHG | HEIGHT: 66 IN | DIASTOLIC BLOOD PRESSURE: 62 MMHG

## 2025-01-22 DIAGNOSIS — C25.0 MALIGNANT NEOPLASM OF HEAD OF PANCREAS (MULTI): Primary | ICD-10-CM

## 2025-01-22 PROCEDURE — 96416 CHEMO PROLONG INFUSE W/PUMP: CPT

## 2025-01-22 PROCEDURE — 2500000004 HC RX 250 GENERAL PHARMACY W/ HCPCS (ALT 636 FOR OP/ED): Performed by: INTERNAL MEDICINE

## 2025-01-22 PROCEDURE — 96417 CHEMO IV INFUS EACH ADDL SEQ: CPT

## 2025-01-22 PROCEDURE — 96413 CHEMO IV INFUSION 1 HR: CPT

## 2025-01-22 PROCEDURE — 96375 TX/PRO/DX INJ NEW DRUG ADDON: CPT | Mod: INF

## 2025-01-22 PROCEDURE — 96415 CHEMO IV INFUSION ADDL HR: CPT

## 2025-01-22 PROCEDURE — 2500000004 HC RX 250 GENERAL PHARMACY W/ HCPCS (ALT 636 FOR OP/ED): Performed by: NURSE PRACTITIONER

## 2025-01-22 RX ORDER — ATROPINE SULFATE 0.4 MG/ML
0.4 INJECTION, SOLUTION ENDOTRACHEAL; INTRAMEDULLARY; INTRAMUSCULAR; INTRAVENOUS; SUBCUTANEOUS
Status: DISCONTINUED | OUTPATIENT
Start: 2025-01-22 | End: 2025-01-22 | Stop reason: HOSPADM

## 2025-01-22 RX ORDER — HEPARIN SODIUM,PORCINE/PF 10 UNIT/ML
50 SYRINGE (ML) INTRAVENOUS AS NEEDED
Status: CANCELLED | OUTPATIENT
Start: 2025-01-22

## 2025-01-22 RX ORDER — HEPARIN 100 UNIT/ML
500 SYRINGE INTRAVENOUS AS NEEDED
Status: CANCELLED | OUTPATIENT
Start: 2025-01-22

## 2025-01-22 RX ORDER — DEXAMETHASONE 6 MG/1
12 TABLET ORAL ONCE
Status: COMPLETED | OUTPATIENT
Start: 2025-01-22 | End: 2025-01-22

## 2025-01-22 RX ORDER — PALONOSETRON 0.05 MG/ML
0.25 INJECTION, SOLUTION INTRAVENOUS ONCE
Status: COMPLETED | OUTPATIENT
Start: 2025-01-22 | End: 2025-01-22

## 2025-01-22 RX ORDER — LORAZEPAM 2 MG/ML
1 INJECTION INTRAMUSCULAR AS NEEDED
Status: COMPLETED | OUTPATIENT
Start: 2025-01-22 | End: 2025-01-22

## 2025-01-22 RX ADMIN — LORAZEPAM 1 MG: 2 INJECTION INTRAMUSCULAR; INTRAVENOUS at 10:34

## 2025-01-22 RX ADMIN — PALONOSETRON HYDROCHLORIDE 250 MCG: 0.25 INJECTION INTRAVENOUS at 10:34

## 2025-01-22 RX ADMIN — ATROPINE SULFATE 0.4 MG: 0.4 INJECTION, SOLUTION INTRAVENOUS at 13:05

## 2025-01-22 RX ADMIN — OXALIPLATIN 120 MG: 5 INJECTION, SOLUTION INTRAVENOUS at 10:56

## 2025-01-22 RX ADMIN — DEXAMETHASONE 12 MG: 6 TABLET ORAL at 10:34

## 2025-01-22 RX ADMIN — IRINOTECAN HYDROCHLORIDE 240 MG: 20 INJECTION, SOLUTION INTRAVENOUS at 13:08

## 2025-01-22 RX ADMIN — SODIUM CHLORIDE 4250 MG: 9 INJECTION INTRAMUSCULAR; INTRAVENOUS; SUBCUTANEOUS at 14:49

## 2025-01-22 ASSESSMENT — PAIN SCALES - GENERAL: PAINLEVEL_OUTOF10: 3

## 2025-01-22 NOTE — PROGRESS NOTES
Patient presents for treatment, has no complaints alert and oriented x 4. CVAD accessed per order, flushes and postive for blood return. Patient meets parameters for treatment. Patient tolerated treatment well, no reaction noted.   Fluorouracil verified independently and 5-fu pump dose, volume, and infusion rate verified with 2nd RN; cadd pump connected to 46-hour continuous infusion, + blood return noted prior to hook-up and verified pump infusing prior to discharge. Patient returns on Friday 1/24/2025 at 1:00 PM for pump disconnect. Patient verbalized understanding of all teaching and education.  Patient feels well and has no complaints, vital signs stable. Patient discharged in stable condition with no further needs.

## 2025-01-23 ENCOUNTER — APPOINTMENT (OUTPATIENT)
Dept: HEMATOLOGY/ONCOLOGY | Facility: CLINIC | Age: 70
End: 2025-01-23
Payer: MEDICARE

## 2025-01-24 ENCOUNTER — INFUSION (OUTPATIENT)
Dept: HEMATOLOGY/ONCOLOGY | Facility: CLINIC | Age: 70
End: 2025-01-24
Payer: MEDICARE

## 2025-01-24 VITALS
SYSTOLIC BLOOD PRESSURE: 109 MMHG | OXYGEN SATURATION: 99 % | HEART RATE: 87 BPM | DIASTOLIC BLOOD PRESSURE: 60 MMHG | RESPIRATION RATE: 16 BRPM | TEMPERATURE: 97.3 F

## 2025-01-24 DIAGNOSIS — C25.0 MALIGNANT NEOPLASM OF HEAD OF PANCREAS (MULTI): ICD-10-CM

## 2025-01-24 PROCEDURE — 96523 IRRIG DRUG DELIVERY DEVICE: CPT

## 2025-01-24 PROCEDURE — 96372 THER/PROPH/DIAG INJ SC/IM: CPT

## 2025-01-24 PROCEDURE — 2500000004 HC RX 250 GENERAL PHARMACY W/ HCPCS (ALT 636 FOR OP/ED): Mod: JZ,TB | Performed by: NURSE PRACTITIONER

## 2025-01-24 PROCEDURE — 2500000004 HC RX 250 GENERAL PHARMACY W/ HCPCS (ALT 636 FOR OP/ED): Performed by: INTERNAL MEDICINE

## 2025-01-24 RX ORDER — HEPARIN 100 UNIT/ML
500 SYRINGE INTRAVENOUS AS NEEDED
Status: DISCONTINUED | OUTPATIENT
Start: 2025-01-24 | End: 2025-01-24 | Stop reason: HOSPADM

## 2025-01-24 RX ORDER — HEPARIN 100 UNIT/ML
500 SYRINGE INTRAVENOUS AS NEEDED
OUTPATIENT
Start: 2025-01-24

## 2025-01-24 RX ORDER — HEPARIN SODIUM,PORCINE/PF 10 UNIT/ML
50 SYRINGE (ML) INTRAVENOUS AS NEEDED
OUTPATIENT
Start: 2025-01-24

## 2025-01-24 RX ADMIN — HEPARIN 500 UNITS: 100 SYRINGE at 13:03

## 2025-01-24 RX ADMIN — PEGFILGRASTIM-CBQV 6 MG: 6 INJECTION, SOLUTION SUBCUTANEOUS at 13:09

## 2025-01-24 ASSESSMENT — PAIN SCALES - GENERAL: PAINLEVEL_OUTOF10: 0-NO PAIN

## 2025-01-24 NOTE — PROGRESS NOTES
Patient presents for treatment, has no complaints alert and oriented x 4. CADD pump disconnected, verified total volume infused.  + blood return noted, flushed with 10cc normal saline and 5cc 10 units heparin.  Murguia needle removed and site covered with bandaid. Patient tolerated procedure well. Patient received Udenyca shot after pump disconnect. Patient feels well and has no complaints, vital signs stable. Patient returns 2/3/25 for port/labs prior to next treatment on 2/5/25. Patient verbalized understanding of all teaching and education. Patient discharged in stable condition with no further needs.

## 2025-01-29 ENCOUNTER — TELEPHONE (OUTPATIENT)
Dept: ADMISSION | Facility: HOSPITAL | Age: 70
End: 2025-01-29
Payer: MEDICARE

## 2025-01-29 NOTE — TELEPHONE ENCOUNTER
Patient reports since Monday she has a running nose, cough with some phlegm production and a headache.  She remains afebrile, denies any c/o chest pain or tightness, eating is poor but is trying to push fluids  She would like to know what OTC cold products she is able to take and ok to take tylenol or ibuprofen for headaches    Forwarding to team to review

## 2025-01-30 NOTE — TELEPHONE ENCOUNTER
Patient called to update on provider's response about tylenol and OTC cold meds. Discussed as per Opal chery for use, but make sure she doesn't have fever. Patient would need to report a fever greater than 100.4  Patient stated she did spike a temp of 101 last night. She took a home covid test and it was negative. She states she is reaching out to PCP for an appt/eval.  Updating provider of new symptom of fever

## 2025-01-31 NOTE — TELEPHONE ENCOUNTER
Patient called to update on status, she did not get in to PCP per pt. takes 2 weeks to get an appt. She reports with use of OTC meds her cough is improved. She is having low grade fever of 99. Secure chat to Provider, continue to monitor. She is scheduled for labs on Monday and FUV on Tuesday.

## 2025-02-03 ENCOUNTER — DOCUMENTATION (OUTPATIENT)
Dept: HEMATOLOGY/ONCOLOGY | Facility: HOSPITAL | Age: 70
End: 2025-02-03
Payer: MEDICARE

## 2025-02-03 ENCOUNTER — LAB (OUTPATIENT)
Dept: HEMATOLOGY/ONCOLOGY | Facility: CLINIC | Age: 70
End: 2025-02-03
Payer: MEDICARE

## 2025-02-03 VITALS
HEIGHT: 66 IN | HEART RATE: 100 BPM | DIASTOLIC BLOOD PRESSURE: 62 MMHG | SYSTOLIC BLOOD PRESSURE: 100 MMHG | TEMPERATURE: 97.3 F | BODY MASS INDEX: 24.11 KG/M2 | WEIGHT: 150 LBS | OXYGEN SATURATION: 98 % | RESPIRATION RATE: 16 BRPM

## 2025-02-03 DIAGNOSIS — C25.0 MALIGNANT NEOPLASM OF HEAD OF PANCREAS (MULTI): ICD-10-CM

## 2025-02-03 LAB
ALBUMIN SERPL BCP-MCNC: 3 G/DL (ref 3.4–5)
ALP SERPL-CCNC: 113 U/L (ref 33–136)
ALT SERPL W P-5'-P-CCNC: 11 U/L (ref 7–45)
ANION GAP SERPL CALC-SCNC: 9 MMOL/L (ref 10–20)
AST SERPL W P-5'-P-CCNC: 27 U/L (ref 9–39)
BASOPHILS # BLD AUTO: 0.01 X10*3/UL (ref 0–0.1)
BASOPHILS NFR BLD AUTO: 0.2 %
BILIRUB SERPL-MCNC: 0.4 MG/DL (ref 0–1.2)
BUN SERPL-MCNC: 8 MG/DL (ref 6–23)
CALCIUM SERPL-MCNC: 7.5 MG/DL (ref 8.6–10.3)
CHLORIDE SERPL-SCNC: 104 MMOL/L (ref 98–107)
CO2 SERPL-SCNC: 22 MMOL/L (ref 21–32)
CREAT SERPL-MCNC: 0.95 MG/DL (ref 0.5–1.05)
EGFRCR SERPLBLD CKD-EPI 2021: 66 ML/MIN/1.73M*2
EOSINOPHIL # BLD AUTO: 0.02 X10*3/UL (ref 0–0.7)
EOSINOPHIL NFR BLD AUTO: 0.3 %
ERYTHROCYTE [DISTWIDTH] IN BLOOD BY AUTOMATED COUNT: 16.7 % (ref 11.5–14.5)
GLUCOSE SERPL-MCNC: 193 MG/DL (ref 74–99)
HCT VFR BLD AUTO: 24.1 % (ref 36–46)
HGB BLD-MCNC: 7.7 G/DL (ref 12–16)
HYPOCHROMIA BLD QL SMEAR: NORMAL
IMM GRANULOCYTES # BLD AUTO: 0.03 X10*3/UL (ref 0–0.7)
IMM GRANULOCYTES NFR BLD AUTO: 0.5 % (ref 0–0.9)
LYMPHOCYTES # BLD AUTO: 1.35 X10*3/UL (ref 1.2–4.8)
LYMPHOCYTES NFR BLD AUTO: 22.7 %
MCH RBC QN AUTO: 33.5 PG (ref 26–34)
MCHC RBC AUTO-ENTMCNC: 32 G/DL (ref 32–36)
MCV RBC AUTO: 105 FL (ref 80–100)
MONOCYTES # BLD AUTO: 0.52 X10*3/UL (ref 0.1–1)
MONOCYTES NFR BLD AUTO: 8.7 %
NEUTROPHILS # BLD AUTO: 4.03 X10*3/UL (ref 1.2–7.7)
NEUTROPHILS NFR BLD AUTO: 67.6 %
NRBC BLD-RTO: ABNORMAL /100{WBCS}
PLATELET # BLD AUTO: 20 X10*3/UL (ref 150–450)
POLYCHROMASIA BLD QL SMEAR: NORMAL
POTASSIUM SERPL-SCNC: 3.4 MMOL/L (ref 3.5–5.3)
PROT SERPL-MCNC: 5.4 G/DL (ref 6.4–8.2)
RBC # BLD AUTO: 2.3 X10*6/UL (ref 4–5.2)
RBC MORPH BLD: NORMAL
SODIUM SERPL-SCNC: 132 MMOL/L (ref 136–145)
WBC # BLD AUTO: 6 X10*3/UL (ref 4.4–11.3)

## 2025-02-03 PROCEDURE — 36591 DRAW BLOOD OFF VENOUS DEVICE: CPT

## 2025-02-03 PROCEDURE — 86301 IMMUNOASSAY TUMOR CA 19-9: CPT | Mod: PORLAB

## 2025-02-03 PROCEDURE — 2500000004 HC RX 250 GENERAL PHARMACY W/ HCPCS (ALT 636 FOR OP/ED): Performed by: INTERNAL MEDICINE

## 2025-02-03 PROCEDURE — 84075 ASSAY ALKALINE PHOSPHATASE: CPT

## 2025-02-03 PROCEDURE — 85025 COMPLETE CBC W/AUTO DIFF WBC: CPT

## 2025-02-03 RX ORDER — HEPARIN 100 UNIT/ML
500 SYRINGE INTRAVENOUS AS NEEDED
Status: DISCONTINUED | OUTPATIENT
Start: 2025-02-03 | End: 2025-02-03 | Stop reason: HOSPADM

## 2025-02-03 RX ORDER — HEPARIN SODIUM,PORCINE/PF 10 UNIT/ML
50 SYRINGE (ML) INTRAVENOUS AS NEEDED
Status: CANCELLED | OUTPATIENT
Start: 2025-02-03

## 2025-02-03 RX ORDER — HEPARIN 100 UNIT/ML
500 SYRINGE INTRAVENOUS AS NEEDED
Status: CANCELLED | OUTPATIENT
Start: 2025-02-03

## 2025-02-03 RX ADMIN — Medication 500 UNITS: at 14:59

## 2025-02-03 ASSESSMENT — PAIN SCALES - GENERAL: PAINLEVEL_OUTOF10: 0-NO PAIN

## 2025-02-03 NOTE — PROGRESS NOTES
[facility] and labs prior to next treatment. Pt reports having some cold/flu-like symptoms over the last few days. Denies any fever for a couple of days. States overall symptoms have been improving but still has significant SOB w/ exertion. Labs obtained, hgb 7.7, platelets 20. Notified MD Cruz and RD Mendez of pt symptoms and lab values. Reviewed results with pt. Advised pt if SOB worsens or any signs of bleeding to report to ER, she verbalized understanding. Discharged in stable condition, aware to discuss schedule at virtual FU with Opal on 2/4.     Message sent to team regarding pt schedule so she can be updated with plan if treatment will be held this week and if she needs additional count check scheduled.

## 2025-02-03 NOTE — PROGRESS NOTES
Notified by Alejandro Rosa in Lab Services that PLT 21. Secure Chat sent to Opal Mendez NP and RN Coordinator at [facility]. Pt has virtual appointment with Opal tomorrow.

## 2025-02-04 ENCOUNTER — TELEMEDICINE (OUTPATIENT)
Dept: HEMATOLOGY/ONCOLOGY | Facility: CLINIC | Age: 70
End: 2025-02-04
Payer: MEDICARE

## 2025-02-04 DIAGNOSIS — C25.0 MALIGNANT NEOPLASM OF HEAD OF PANCREAS (MULTI): Primary | ICD-10-CM

## 2025-02-04 DIAGNOSIS — C25.0 MALIGNANT NEOPLASM OF HEAD OF PANCREAS (MULTI): ICD-10-CM

## 2025-02-04 DIAGNOSIS — R50.9 FEVER, UNSPECIFIED FEVER CAUSE: Primary | ICD-10-CM

## 2025-02-04 LAB
CANCER AG19-9 SERPL-ACNC: 507.66 U/ML
PATH REVIEW-CBC DIFFERENTIAL: NORMAL

## 2025-02-04 PROCEDURE — 99215 OFFICE O/P EST HI 40 MIN: CPT | Performed by: NURSE PRACTITIONER

## 2025-02-04 RX ORDER — POTASSIUM CHLORIDE 14.9 MG/ML
20 INJECTION INTRAVENOUS ONCE
Status: CANCELLED | OUTPATIENT
Start: 2025-02-05

## 2025-02-04 RX ORDER — SODIUM CHLORIDE 9 MG/ML
1000 INJECTION, SOLUTION INTRAVENOUS ONCE
Status: CANCELLED | OUTPATIENT
Start: 2025-02-05

## 2025-02-04 NOTE — PROGRESS NOTES
Patient ID: Nanci Colón is a 69 y.o. female from [place], [place].     Referring Physician: Francisco Cruz MD  [id number] [place] [address]    Primary Care Provider: Diana Blunt DO    Diagnosis:   Pancreatic cancer 9/2024    Primary Oncologic Surgeon:   Nazia    Primary Medical Oncologist:  Anthony    Primary Radiation Oncologist:  KVNG    Current Therapy:  10/4/24 -  Neoadjuvant Chemo - FOLFIRINOX    Oncologic Surgery History:  None    Oncologic Therapy History:  10/9/24 -  mFOLFIRINOX     Molecular Genetics:  Mmr-P    Current Sites of Disease:  Pancreatic head    Oncologic Problem List:  Pancreatic cancer  DM2    Oncologic Narrative:  69 y.o. woman who initially presented in mid September 2024 with abdominal pain jaundice MARK and pancreatitis.  She had an ultrasound that showed extrahepatic bile duct dilation and pancreatic duct dilation.  Both of these seem to emanate from the pancreatic head.  A CT scan on September 9, 2024 was concerning for a mass.  MRCP on September 27, 2024 showed an ill-defined mass in the pancreatic head and uncinate process measuring approximately 3.5 cm.  Biopsy of the pancreatic mass from the September 11, 2024 endoscopic ultrasound revealed adenocarcinoma.  She met me for initial consultation regarding treatment planning on 9/26/2024.       Past Medical History: Nanci has a past medical history of Hyperlipidemia, Hypertension, Hypothyroidism, Personal history of other diseases of the circulatory system (09/15/2017), Personal history of other endocrine, nutritional and metabolic disease (02/24/2016), Personal history of other endocrine, nutritional and metabolic disease (03/09/2017), Personal history of other endocrine, nutritional and metabolic disease, Type 2 diabetes mellitus with other skin complications, and Vision loss.  Surgical History:  Nanci has a past surgical history that includes Other surgical history (09/01/2022); Other surgical history (09/01/2022); and  Other surgical history (09/01/2022).  Social History:  Nanci reports that she has never smoked. She has never used smokeless tobacco. She reports that she does not drink alcohol and does not use drugs.  Family History:    Family History   Problem Relation Name Age of Onset    Cancer Mother      Heart disease Father       Family Oncology History:  Cancer-related family history includes Cancer in her mother.    Virtual or Telephone Consent    An interactive audio and video telecommunication system which permits real time communications between the patient (at the originating site) and provider (at the distant site) was utilized to provide this telehealth service.   Verbal consent was requested and obtained from Nanci Colón on this date, 02/04/25 for a telehealth visit.      SUBJECTIVE:    History of Present Illness:  Nanci Colón is a 69 y.o. female who was referred by Francisco Cruz MD and presents with chemotherapy follow up    Nanci is seen in follow up  S/p 6 cycles of FOLFIRINOX    - had dose reductions with C5 - oxali given with 20% dose reduction   Scheduled for C7 tomorrow     - had cold symptoms after last tx, Patient reports since last Monday she has a running nose, cough with no phlegm production and a headache.  + SOB  denies any chest pain or tightness, eating is poor but is trying to push fluids  - had 1 temp over the weekend with body aches, no temp over last few days    - overall feels poorly  - does not feel well enough for chemotherapy tomorrow.     COVID negative at home   Did not get flu vaccine      Plts returned with critical low of 20,000  - she denies any bruising or bleeding.     OBJECTIVE:    VS / Pain:  There were no vitals taken for this visit.  BSA: There is no height or weight on file to calculate BSA.   Pain Scale: 0    Daily Weight  02/03/25 : 68 kg (150 lb)  01/22/25 : 68.3 kg (150 lb 8 oz)  01/20/25 : 68 kg (150 lb)     Limited PE due to virtual visit   Physical  Exam  Neurological:      General: No focal deficit present.      Mental Status: She is alert. Mental status is at baseline.   Psychiatric:         Mood and Affect: Mood normal.         Thought Content: Thought content normal.         Performance Status:   ECOG 1    Diagnostic Results         WBC   Date/Time Value Ref Range Status   02/03/2025 02:38 PM 6.0 4.4 - 11.3 x10*3/uL Final   01/20/2025 12:52 PM 6.3 4.4 - 11.3 x10*3/uL Final   12/31/2024 01:19 PM 8.2 4.4 - 11.3 x10*3/uL Final     Hemoglobin   Date Value Ref Range Status   02/03/2025 7.7 (L) 12.0 - 16.0 g/dL Final   01/20/2025 7.6 (L) 12.0 - 16.0 g/dL Final   12/31/2024 8.3 (L) 12.0 - 16.0 g/dL Final     MCV   Date/Time Value Ref Range Status   02/03/2025 02:38  (H) 80 - 100 fL Final   01/20/2025 12:52  (H) 80 - 100 fL Final   12/31/2024 01:19  (H) 80 - 100 fL Final     Platelets   Date/Time Value Ref Range Status   02/03/2025 02:38 PM 20 (LL) 150 - 450 x10*3/uL Final   01/20/2025 12:52 PM 95 (L) 150 - 450 x10*3/uL Final     Comment:     Platelet count verified by smear review.   12/31/2024 01:19  150 - 450 x10*3/uL Final     Comment:     Platelet count verified by smear review.     Neutrophils Absolute   Date/Time Value Ref Range Status   02/03/2025 02:38 PM 4.03 1.20 - 7.70 x10*3/uL Final     Comment:     Percent differential counts (%) should be interpreted in the context of the absolute cell counts (cells/uL).   01/20/2025 12:52 PM 4.17 1.20 - 7.70 x10*3/uL Final     Comment:     Percent differential counts (%) should be interpreted in the context of the absolute cell counts (cells/uL).   12/31/2024 01:19 PM 5.12 1.20 - 7.70 x10*3/uL Final     Comment:     Percent differential counts (%) should be interpreted in the context of the absolute cell counts (cells/uL).     Bilirubin, Total   Date/Time Value Ref Range Status   02/03/2025 02:38 PM 0.4 0.0 - 1.2 mg/dL Final   01/20/2025 12:52 PM 0.3 0.0 - 1.2 mg/dL Final   12/31/2024 01:19  "PM 0.3 0.0 - 1.2 mg/dL Final     AST   Date/Time Value Ref Range Status   02/03/2025 02:38 PM 27 9 - 39 U/L Final   01/20/2025 12:52 PM 12 9 - 39 U/L Final   12/31/2024 01:19 PM 18 9 - 39 U/L Final     ALT   Date/Time Value Ref Range Status   02/03/2025 02:38 PM 11 7 - 45 U/L Final     Comment:     Patients treated with Sulfasalazine may generate falsely decreased results for ALT.   01/20/2025 12:52 PM 11 7 - 45 U/L Final     Comment:     Patients treated with Sulfasalazine may generate falsely decreased results for ALT.   12/31/2024 01:19 PM 14 7 - 45 U/L Final     Comment:     Patients treated with Sulfasalazine may generate falsely decreased results for ALT.     Creatinine   Date/Time Value Ref Range Status   02/03/2025 02:38 PM 0.95 0.50 - 1.05 mg/dL Final   01/20/2025 12:52 PM 0.86 0.50 - 1.05 mg/dL Final   12/31/2024 01:19 PM 0.91 0.50 - 1.05 mg/dL Final     Urea Nitrogen   Date/Time Value Ref Range Status   02/03/2025 02:38 PM 8 6 - 23 mg/dL Final   01/20/2025 12:52 PM 14 6 - 23 mg/dL Final   12/31/2024 01:19 PM 16 6 - 23 mg/dL Final     Albumin   Date/Time Value Ref Range Status   02/03/2025 02:38 PM 3.0 (L) 3.4 - 5.0 g/dL Final   01/20/2025 12:52 PM 3.1 (L) 3.4 - 5.0 g/dL Final   12/31/2024 01:19 PM 3.0 (L) 3.4 - 5.0 g/dL Final     Cancer AG 19-9   Date/Time Value Ref Range Status   02/03/2025 02:38 .66 (H) <35.00 U/mL Final   01/20/2025 12:52 .22 (H) <35.00 U/mL Final   12/31/2024 01:19 PM 1,391.30 (H) <35.00 U/mL Final     No results found for: \"CEA\"    === 12/12/24 ===    CT CHEST ABDOMEN PELVIS W IV CONTRAST    - Impression -  CHEST:  1.  No evidence of metastatic disease. A negative CT scan of the  chest.    ABDOMEN-PELVIS:  1.  A known pancreatic head mass with upstream dilatation of the pancreatic duct and pancreatic atrophy similar to the priorexamination.  2. Approximately 2 x 1.2 cm masslike structure to the left of the proximal superior mesenteric artery suspicious of tumor " implants.  3. Internal biliary stent. Pneumobilia. No suspicious focal hepatic  lesions. No free fluid.  4. Decompressed sigmoid colon with suspected submucosal edema raising  the possibility of sigmoid colitis. Clinical correlation is needed.      Assessment/Plan   68-year-old woman with a resectable pancreatic cancer but elevated CA 19-9 and recent pancreatitis so we will plan for neoadjuvant chemotherapy with modified FOLFIRINOX.   9/30/24 - diagnostic lab negative   10/6/24 - 1st dose mFOLFIRINOX  - tolerated with fatigue, nausea   Will add fosaprepitant to C2 - Allergic rxn to fosaprepitant - discontinue  C3 held due to ANC of 500 - added neulasta   C5 given with 20% dose reduction of oxaliplatin   C6 given 1/21    + URI symptoms over last week, feels poorly with fatigue, continued cough, holden, weakness / COVID negative     - will hold C7 tomorrow - blood ctx, CXR,  IVF & IV K replacement     Thrombocytopenia    - plts 20 yesterday - no s/s of bleeding   Will repeat tomorrow, if she has any bleeding or fevers, needs to be seen in ED     Cancer related pain:   -continue oxycodone - new prescription sent   DM:   -Continue metformin, however keep close eye on renal function.    - Established care with endocrinology       WENDIE Oleary-[facility]/ [facility]  Office: [phone]  Fax: [phone]

## 2025-02-05 ENCOUNTER — INFUSION (OUTPATIENT)
Dept: HEMATOLOGY/ONCOLOGY | Facility: CLINIC | Age: 70
End: 2025-02-05
Payer: MEDICARE

## 2025-02-05 ENCOUNTER — DOCUMENTATION (OUTPATIENT)
Dept: HEMATOLOGY/ONCOLOGY | Facility: HOSPITAL | Age: 70
End: 2025-02-05
Payer: MEDICARE

## 2025-02-05 ENCOUNTER — APPOINTMENT (OUTPATIENT)
Dept: HEMATOLOGY/ONCOLOGY | Facility: CLINIC | Age: 70
End: 2025-02-05
Payer: MEDICARE

## 2025-02-05 ENCOUNTER — HOSPITAL ENCOUNTER (OUTPATIENT)
Dept: RADIOLOGY | Facility: HOSPITAL | Age: 70
Discharge: HOME | End: 2025-02-05
Payer: MEDICARE

## 2025-02-05 VITALS
WEIGHT: 150 LBS | SYSTOLIC BLOOD PRESSURE: 93 MMHG | HEIGHT: 66 IN | HEART RATE: 96 BPM | TEMPERATURE: 97.5 F | DIASTOLIC BLOOD PRESSURE: 65 MMHG | BODY MASS INDEX: 24.11 KG/M2 | RESPIRATION RATE: 16 BRPM | OXYGEN SATURATION: 95 %

## 2025-02-05 DIAGNOSIS — C25.0 MALIGNANT NEOPLASM OF HEAD OF PANCREAS (MULTI): ICD-10-CM

## 2025-02-05 DIAGNOSIS — R50.9 FEVER, UNSPECIFIED FEVER CAUSE: ICD-10-CM

## 2025-02-05 LAB
BASOPHILS # BLD AUTO: 0.02 X10*3/UL (ref 0–0.1)
BASOPHILS NFR BLD AUTO: 0.3 %
EOSINOPHIL # BLD AUTO: 0.01 X10*3/UL (ref 0–0.7)
EOSINOPHIL NFR BLD AUTO: 0.1 %
ERYTHROCYTE [DISTWIDTH] IN BLOOD BY AUTOMATED COUNT: 17 % (ref 11.5–14.5)
HCT VFR BLD AUTO: 23.3 % (ref 36–46)
HGB BLD-MCNC: 7.3 G/DL (ref 12–16)
IMM GRANULOCYTES # BLD AUTO: 0.06 X10*3/UL (ref 0–0.7)
IMM GRANULOCYTES NFR BLD AUTO: 0.8 % (ref 0–0.9)
LYMPHOCYTES # BLD AUTO: 0.87 X10*3/UL (ref 1.2–4.8)
LYMPHOCYTES NFR BLD AUTO: 12.3 %
MCH RBC QN AUTO: 33.2 PG (ref 26–34)
MCHC RBC AUTO-ENTMCNC: 31.3 G/DL (ref 32–36)
MCV RBC AUTO: 106 FL (ref 80–100)
MONOCYTES # BLD AUTO: 0.77 X10*3/UL (ref 0.1–1)
MONOCYTES NFR BLD AUTO: 10.9 %
NEUTROPHILS # BLD AUTO: 5.34 X10*3/UL (ref 1.2–7.7)
NEUTROPHILS NFR BLD AUTO: 75.6 %
PLATELET # BLD AUTO: 37 X10*3/UL (ref 150–450)
RBC # BLD AUTO: 2.2 X10*6/UL (ref 4–5.2)
WBC # BLD AUTO: 7.1 X10*3/UL (ref 4.4–11.3)

## 2025-02-05 PROCEDURE — 96365 THER/PROPH/DIAG IV INF INIT: CPT | Mod: INF

## 2025-02-05 PROCEDURE — 96368 THER/DIAG CONCURRENT INF: CPT

## 2025-02-05 PROCEDURE — 85025 COMPLETE CBC W/AUTO DIFF WBC: CPT

## 2025-02-05 PROCEDURE — 2500000004 HC RX 250 GENERAL PHARMACY W/ HCPCS (ALT 636 FOR OP/ED): Performed by: NURSE PRACTITIONER

## 2025-02-05 PROCEDURE — 87075 CULTR BACTERIA EXCEPT BLOOD: CPT | Mod: PORLAB

## 2025-02-05 PROCEDURE — 2500000004 HC RX 250 GENERAL PHARMACY W/ HCPCS (ALT 636 FOR OP/ED): Performed by: INTERNAL MEDICINE

## 2025-02-05 PROCEDURE — 71046 X-RAY EXAM CHEST 2 VIEWS: CPT

## 2025-02-05 RX ORDER — HEPARIN SODIUM,PORCINE/PF 10 UNIT/ML
50 SYRINGE (ML) INTRAVENOUS AS NEEDED
OUTPATIENT
Start: 2025-02-05

## 2025-02-05 RX ORDER — SODIUM CHLORIDE 9 MG/ML
1000 INJECTION, SOLUTION INTRAVENOUS ONCE
Status: COMPLETED | OUTPATIENT
Start: 2025-02-05 | End: 2025-02-05

## 2025-02-05 RX ORDER — HEPARIN 100 UNIT/ML
500 SYRINGE INTRAVENOUS AS NEEDED
Status: DISCONTINUED | OUTPATIENT
Start: 2025-02-05 | End: 2025-02-05 | Stop reason: HOSPADM

## 2025-02-05 RX ORDER — HEPARIN 100 UNIT/ML
500 SYRINGE INTRAVENOUS AS NEEDED
OUTPATIENT
Start: 2025-02-05

## 2025-02-05 RX ORDER — POTASSIUM CHLORIDE 14.9 MG/ML
20 INJECTION INTRAVENOUS ONCE
Status: COMPLETED | OUTPATIENT
Start: 2025-02-05 | End: 2025-02-05

## 2025-02-05 RX ADMIN — POTASSIUM CHLORIDE 20 MEQ: 200 INJECTION, SOLUTION INTRAVENOUS at 10:07

## 2025-02-05 RX ADMIN — Medication 500 UNITS: at 10:07

## 2025-02-05 RX ADMIN — SODIUM CHLORIDE 1000 ML/HR: 9 INJECTION, SOLUTION INTRAVENOUS at 10:07

## 2025-02-05 ASSESSMENT — PAIN SCALES - GENERAL: PAINLEVEL_OUTOF10: 0-NO PAIN

## 2025-02-05 NOTE — PROGRESS NOTES
Patient identified by name & .   Patient denies acute distress- none noted at this time. Patient reports + Cough/SOB with exertion, mild nausea. Labs/blood cultures drawn. IVF and Potassium replaced per order. Patient discharged via wheelchair, escorted by significant other, to CXR in stable condition.

## 2025-02-05 NOTE — PROGRESS NOTES
Notified by Cee in Lab Services that PLT 37. Secure Chat sent to Opal Mendez NP. Pt currently at [facility].

## 2025-02-07 ENCOUNTER — APPOINTMENT (OUTPATIENT)
Dept: HEMATOLOGY/ONCOLOGY | Facility: CLINIC | Age: 70
End: 2025-02-07
Payer: MEDICARE

## 2025-02-07 DIAGNOSIS — C25.0 MALIGNANT NEOPLASM OF HEAD OF PANCREAS (MULTI): Primary | ICD-10-CM

## 2025-02-09 LAB
BACTERIA BLD CULT: NORMAL
BACTERIA BLD CULT: NORMAL

## 2025-02-10 ENCOUNTER — LAB (OUTPATIENT)
Dept: HEMATOLOGY/ONCOLOGY | Facility: CLINIC | Age: 70
End: 2025-02-10
Payer: MEDICARE

## 2025-02-10 VITALS
BODY MASS INDEX: 21.47 KG/M2 | WEIGHT: 150 LBS | TEMPERATURE: 97.7 F | DIASTOLIC BLOOD PRESSURE: 78 MMHG | OXYGEN SATURATION: 97 % | SYSTOLIC BLOOD PRESSURE: 102 MMHG | HEIGHT: 70 IN | HEART RATE: 93 BPM | RESPIRATION RATE: 16 BRPM

## 2025-02-10 DIAGNOSIS — C25.0 MALIGNANT NEOPLASM OF HEAD OF PANCREAS (MULTI): ICD-10-CM

## 2025-02-10 LAB
ALBUMIN SERPL BCP-MCNC: 2.8 G/DL (ref 3.4–5)
ALP SERPL-CCNC: 136 U/L (ref 33–136)
ALT SERPL W P-5'-P-CCNC: 13 U/L (ref 7–45)
ANION GAP SERPL CALC-SCNC: 12 MMOL/L (ref 10–20)
AST SERPL W P-5'-P-CCNC: 23 U/L (ref 9–39)
BASOPHILS # BLD AUTO: 0.01 X10*3/UL (ref 0–0.1)
BASOPHILS NFR BLD AUTO: 0.2 %
BILIRUB SERPL-MCNC: 0.4 MG/DL (ref 0–1.2)
BUN SERPL-MCNC: 10 MG/DL (ref 6–23)
CALCIUM SERPL-MCNC: 7.1 MG/DL (ref 8.6–10.3)
CANCER AG19-9 SERPL-ACNC: 549.27 U/ML
CHLORIDE SERPL-SCNC: 107 MMOL/L (ref 98–107)
CO2 SERPL-SCNC: 21 MMOL/L (ref 21–32)
CREAT SERPL-MCNC: 0.86 MG/DL (ref 0.5–1.05)
DACRYOCYTES BLD QL SMEAR: NORMAL
EGFRCR SERPLBLD CKD-EPI 2021: 74 ML/MIN/1.73M*2
EOSINOPHIL # BLD AUTO: 0.04 X10*3/UL (ref 0–0.7)
EOSINOPHIL NFR BLD AUTO: 0.7 %
ERYTHROCYTE [DISTWIDTH] IN BLOOD BY AUTOMATED COUNT: 18.2 % (ref 11.5–14.5)
GLUCOSE SERPL-MCNC: 187 MG/DL (ref 74–99)
HCT VFR BLD AUTO: 24.8 % (ref 36–46)
HGB BLD-MCNC: 7.8 G/DL (ref 12–16)
HYPOCHROMIA BLD QL SMEAR: NORMAL
IMM GRANULOCYTES # BLD AUTO: 0.04 X10*3/UL (ref 0–0.7)
IMM GRANULOCYTES NFR BLD AUTO: 0.7 % (ref 0–0.9)
LYMPHOCYTES # BLD AUTO: 1.13 X10*3/UL (ref 1.2–4.8)
LYMPHOCYTES NFR BLD AUTO: 19.3 %
MCH RBC QN AUTO: 33.6 PG (ref 26–34)
MCHC RBC AUTO-ENTMCNC: 31.5 G/DL (ref 32–36)
MCV RBC AUTO: 107 FL (ref 80–100)
MONOCYTES # BLD AUTO: 0.76 X10*3/UL (ref 0.1–1)
MONOCYTES NFR BLD AUTO: 13 %
NEUTROPHILS # BLD AUTO: 3.87 X10*3/UL (ref 1.2–7.7)
NEUTROPHILS NFR BLD AUTO: 66.1 %
NRBC BLD-RTO: ABNORMAL /100{WBCS}
OVALOCYTES BLD QL SMEAR: NORMAL
PLATELET # BLD AUTO: 141 X10*3/UL (ref 150–450)
POLYCHROMASIA BLD QL SMEAR: NORMAL
POTASSIUM SERPL-SCNC: 3.4 MMOL/L (ref 3.5–5.3)
PROT SERPL-MCNC: 5.2 G/DL (ref 6.4–8.2)
RBC # BLD AUTO: 2.32 X10*6/UL (ref 4–5.2)
RBC MORPH BLD: NORMAL
SODIUM SERPL-SCNC: 137 MMOL/L (ref 136–145)
WBC # BLD AUTO: 5.9 X10*3/UL (ref 4.4–11.3)

## 2025-02-10 PROCEDURE — 80053 COMPREHEN METABOLIC PANEL: CPT

## 2025-02-10 PROCEDURE — 85025 COMPLETE CBC W/AUTO DIFF WBC: CPT

## 2025-02-10 PROCEDURE — 86301 IMMUNOASSAY TUMOR CA 19-9: CPT | Mod: PORLAB

## 2025-02-10 PROCEDURE — 36591 DRAW BLOOD OFF VENOUS DEVICE: CPT

## 2025-02-10 PROCEDURE — 2500000004 HC RX 250 GENERAL PHARMACY W/ HCPCS (ALT 636 FOR OP/ED): Performed by: INTERNAL MEDICINE

## 2025-02-10 RX ORDER — HEPARIN 100 UNIT/ML
500 SYRINGE INTRAVENOUS AS NEEDED
OUTPATIENT
Start: 2025-02-10

## 2025-02-10 RX ORDER — HEPARIN 100 UNIT/ML
500 SYRINGE INTRAVENOUS AS NEEDED
Status: DISCONTINUED | OUTPATIENT
Start: 2025-02-10 | End: 2025-02-10 | Stop reason: HOSPADM

## 2025-02-10 RX ORDER — HEPARIN SODIUM,PORCINE/PF 10 UNIT/ML
50 SYRINGE (ML) INTRAVENOUS AS NEEDED
OUTPATIENT
Start: 2025-02-10

## 2025-02-10 RX ADMIN — Medication 500 UNITS: at 11:51

## 2025-02-10 ASSESSMENT — PAIN SCALES - GENERAL: PAINLEVEL_OUTOF10: 0-NO PAIN

## 2025-02-10 NOTE — PROGRESS NOTES
Nanci is here for port flush and labs, tolerated procedure well. Reports feeling better since last week, has virtual FUV with Opal SULTANA tomorrow. Discharged in stable condition, no further needs at this time. Has schedule for follow up.

## 2025-02-11 ENCOUNTER — TELEMEDICINE (OUTPATIENT)
Dept: HEMATOLOGY/ONCOLOGY | Facility: CLINIC | Age: 70
End: 2025-02-11
Payer: MEDICARE

## 2025-02-11 DIAGNOSIS — C25.0 MALIGNANT NEOPLASM OF HEAD OF PANCREAS (MULTI): ICD-10-CM

## 2025-02-11 PROCEDURE — 99214 OFFICE O/P EST MOD 30 MIN: CPT | Performed by: NURSE PRACTITIONER

## 2025-02-11 RX ORDER — DIPHENHYDRAMINE HYDROCHLORIDE 50 MG/ML
50 INJECTION INTRAMUSCULAR; INTRAVENOUS AS NEEDED
OUTPATIENT
Start: 2025-02-20

## 2025-02-11 RX ORDER — PROCHLORPERAZINE MALEATE 10 MG
10 TABLET ORAL ONCE
OUTPATIENT
Start: 2025-02-18

## 2025-02-11 RX ORDER — DIPHENHYDRAMINE HYDROCHLORIDE 50 MG/ML
50 INJECTION INTRAMUSCULAR; INTRAVENOUS AS NEEDED
OUTPATIENT
Start: 2025-02-18

## 2025-02-11 RX ORDER — ATROPINE SULFATE 0.4 MG/ML
0.4 INJECTION, SOLUTION ENDOTRACHEAL; INTRAMEDULLARY; INTRAMUSCULAR; INTRAVENOUS; SUBCUTANEOUS
OUTPATIENT
Start: 2025-02-18

## 2025-02-11 RX ORDER — LORAZEPAM 2 MG/ML
1 INJECTION INTRAMUSCULAR AS NEEDED
OUTPATIENT
Start: 2025-02-18

## 2025-02-11 RX ORDER — ALBUTEROL SULFATE 0.83 MG/ML
3 SOLUTION RESPIRATORY (INHALATION) AS NEEDED
OUTPATIENT
Start: 2025-02-20

## 2025-02-11 RX ORDER — ALBUTEROL SULFATE 0.83 MG/ML
3 SOLUTION RESPIRATORY (INHALATION) AS NEEDED
OUTPATIENT
Start: 2025-02-18

## 2025-02-11 RX ORDER — PROCHLORPERAZINE EDISYLATE 5 MG/ML
10 INJECTION INTRAMUSCULAR; INTRAVENOUS EVERY 6 HOURS PRN
OUTPATIENT
Start: 2025-02-18

## 2025-02-11 RX ORDER — EPINEPHRINE 0.3 MG/.3ML
0.3 INJECTION SUBCUTANEOUS EVERY 5 MIN PRN
OUTPATIENT
Start: 2025-02-18

## 2025-02-11 RX ORDER — FAMOTIDINE 10 MG/ML
20 INJECTION INTRAVENOUS ONCE AS NEEDED
OUTPATIENT
Start: 2025-02-18

## 2025-02-11 RX ORDER — PALONOSETRON 0.05 MG/ML
0.25 INJECTION, SOLUTION INTRAVENOUS ONCE
OUTPATIENT
Start: 2025-02-18

## 2025-02-11 RX ORDER — EPINEPHRINE 0.3 MG/.3ML
0.3 INJECTION SUBCUTANEOUS EVERY 5 MIN PRN
OUTPATIENT
Start: 2025-02-20

## 2025-02-11 RX ORDER — FAMOTIDINE 10 MG/ML
20 INJECTION INTRAVENOUS ONCE AS NEEDED
OUTPATIENT
Start: 2025-02-20

## 2025-02-11 NOTE — PROGRESS NOTES
Patient ID: Nanci Colón is a 69 y.o. female from [place], [place].     Referring Physician: Francisco Cruz MD  [id number] [place] [address]    Primary Care Provider: Diana Blunt DO    Diagnosis:   Pancreatic cancer 9/2024    Primary Oncologic Surgeon:   Nazia    Primary Medical Oncologist:  Anthony    Primary Radiation Oncologist:  KVNG    Current Therapy:  10/4/24 -  Neoadjuvant Chemo - FOLFIRINOX    Oncologic Surgery History:  None    Oncologic Therapy History:  10/9/24 -  mFOLFIRINOX     Molecular Genetics:  Mmr-P    Current Sites of Disease:  Pancreatic head    Oncologic Problem List:  Pancreatic cancer  DM2    Oncologic Narrative:  69 y.o. woman who initially presented in mid September 2024 with abdominal pain jaundice MARK and pancreatitis.  She had an ultrasound that showed extrahepatic bile duct dilation and pancreatic duct dilation.  Both of these seem to emanate from the pancreatic head.  A CT scan on September 9, 2024 was concerning for a mass.  MRCP on September 27, 2024 showed an ill-defined mass in the pancreatic head and uncinate process measuring approximately 3.5 cm.  Biopsy of the pancreatic mass from the September 11, 2024 endoscopic ultrasound revealed adenocarcinoma.  She met me for initial consultation regarding treatment planning on 9/26/2024.       Past Medical History: Nanci has a past medical history of Hyperlipidemia, Hypertension, Hypothyroidism, Personal history of other diseases of the circulatory system (09/15/2017), Personal history of other endocrine, nutritional and metabolic disease (02/24/2016), Personal history of other endocrine, nutritional and metabolic disease (03/09/2017), Personal history of other endocrine, nutritional and metabolic disease, Type 2 diabetes mellitus with other skin complications, and Vision loss.  Surgical History:  Nanci has a past surgical history that includes Other surgical history (09/01/2022); Other surgical history (09/01/2022); and  Other surgical history (09/01/2022).  Social History:  Nanci reports that she has never smoked. She has never used smokeless tobacco. She reports that she does not drink alcohol and does not use drugs.  Family History:    Family History   Problem Relation Name Age of Onset    Cancer Mother      Heart disease Father       Family Oncology History:  Cancer-related family history includes Cancer in her mother.    Virtual or Telephone Consent    An interactive audio and video telecommunication system which permits real time communications between the patient (at the originating site) and provider (at the distant site) was utilized to provide this telehealth service.   Verbal consent was requested and obtained from Nanci Colón on this date, 02/11/25 for a telehealth visit.      SUBJECTIVE:    History of Present Illness:  Nanci Colón is a 69 y.o. female who was referred by Francisco Cruz MD and presents with chemotherapy follow up    Nanci is seen in follow up  S/p 6 cycles of FOLFIRINOX    - had dose reductions with C5 - oxali given with 20% dose reduction   Scheduled for C7 last week on 2/4 however plts were low at 20,000.  She also had URI symptoms ( CXR negative / blood cultures negative)   + sick contacts with family members     Reports today that she is starting to feel better.   Still having SOB but cough is improving.   Energy level getting better.  Diarrhea is improving.   Needing wheelchair / walker at home.     Plts have recovered       OBJECTIVE:    VS / Pain:  There were no vitals taken for this visit.  BSA: There is no height or weight on file to calculate BSA.   Pain Scale: 0    Daily Weight  02/10/25 : 68 kg (150 lb)  02/05/25 : 68 kg (150 lb)  02/03/25 : 68 kg (150 lb)     Limited PE due to virtual visit   Physical Exam  Neurological:      General: No focal deficit present.      Mental Status: She is alert. Mental status is at baseline.   Psychiatric:         Mood and Affect: Mood normal.          Thought Content: Thought content normal.         Performance Status:   ECOG 2    Diagnostic Results         WBC   Date/Time Value Ref Range Status   02/10/2025 11:41 AM 5.9 4.4 - 11.3 x10*3/uL Final   02/05/2025 10:06 AM 7.1 4.4 - 11.3 x10*3/uL Final   02/03/2025 02:38 PM 6.0 4.4 - 11.3 x10*3/uL Final     Hemoglobin   Date Value Ref Range Status   02/10/2025 7.8 (L) 12.0 - 16.0 g/dL Final   02/05/2025 7.3 (L) 12.0 - 16.0 g/dL Final   02/03/2025 7.7 (L) 12.0 - 16.0 g/dL Final     MCV   Date/Time Value Ref Range Status   02/10/2025 11:41  (H) 80 - 100 fL Final   02/05/2025 10:06  (H) 80 - 100 fL Final   02/03/2025 02:38  (H) 80 - 100 fL Final     Platelets   Date/Time Value Ref Range Status   02/10/2025 11:41  (L) 150 - 450 x10*3/uL Final     Comment:     Platelet count verified by smear review.   02/05/2025 10:06 AM 37 (LL) 150 - 450 x10*3/uL Final   02/03/2025 02:38 PM 20 (LL) 150 - 450 x10*3/uL Final     Neutrophils Absolute   Date/Time Value Ref Range Status   02/10/2025 11:41 AM 3.87 1.20 - 7.70 x10*3/uL Final     Comment:     Percent differential counts (%) should be interpreted in the context of the absolute cell counts (cells/uL).   02/05/2025 10:06 AM 5.34 1.20 - 7.70 x10*3/uL Final     Comment:     Percent differential counts (%) should be interpreted in the context of the absolute cell counts (cells/uL).   02/03/2025 02:38 PM 4.03 1.20 - 7.70 x10*3/uL Final     Comment:     Percent differential counts (%) should be interpreted in the context of the absolute cell counts (cells/uL).     Bilirubin, Total   Date/Time Value Ref Range Status   02/10/2025 11:41 AM 0.4 0.0 - 1.2 mg/dL Final   02/03/2025 02:38 PM 0.4 0.0 - 1.2 mg/dL Final   01/20/2025 12:52 PM 0.3 0.0 - 1.2 mg/dL Final     AST   Date/Time Value Ref Range Status   02/10/2025 11:41 AM 23 9 - 39 U/L Final   02/03/2025 02:38 PM 27 9 - 39 U/L Final   01/20/2025 12:52 PM 12 9 - 39 U/L Final     ALT   Date/Time Value Ref Range  "Status   02/10/2025 11:41 AM 13 7 - 45 U/L Final     Comment:     Patients treated with Sulfasalazine may generate falsely decreased results for ALT.   02/03/2025 02:38 PM 11 7 - 45 U/L Final     Comment:     Patients treated with Sulfasalazine may generate falsely decreased results for ALT.   01/20/2025 12:52 PM 11 7 - 45 U/L Final     Comment:     Patients treated with Sulfasalazine may generate falsely decreased results for ALT.     Creatinine   Date/Time Value Ref Range Status   02/10/2025 11:41 AM 0.86 0.50 - 1.05 mg/dL Final   02/03/2025 02:38 PM 0.95 0.50 - 1.05 mg/dL Final   01/20/2025 12:52 PM 0.86 0.50 - 1.05 mg/dL Final     Urea Nitrogen   Date/Time Value Ref Range Status   02/10/2025 11:41 AM 10 6 - 23 mg/dL Final   02/03/2025 02:38 PM 8 6 - 23 mg/dL Final   01/20/2025 12:52 PM 14 6 - 23 mg/dL Final     Albumin   Date/Time Value Ref Range Status   02/10/2025 11:41 AM 2.8 (L) 3.4 - 5.0 g/dL Final   02/03/2025 02:38 PM 3.0 (L) 3.4 - 5.0 g/dL Final   01/20/2025 12:52 PM 3.1 (L) 3.4 - 5.0 g/dL Final     Cancer AG 19-9   Date/Time Value Ref Range Status   02/10/2025 11:41 .27 (H) <35.00 U/mL Final   02/03/2025 02:38 .66 (H) <35.00 U/mL Final   01/20/2025 12:52 .22 (H) <35.00 U/mL Final     No results found for: \"CEA\"    === 12/12/24 ===    CT CHEST ABDOMEN PELVIS W IV CONTRAST    - Impression -  CHEST:  1.  No evidence of metastatic disease. A negative CT scan of the  chest.    ABDOMEN-PELVIS:  1.  A known pancreatic head mass with upstream dilatation of the pancreatic duct and pancreatic atrophy similar to the priorexamination.  2. Approximately 2 x 1.2 cm masslike structure to the left of the proximal superior mesenteric artery suspicious of tumor implants.  3. Internal biliary stent. Pneumobilia. No suspicious focal hepatic  lesions. No free fluid.  4. Decompressed sigmoid colon with suspected submucosal edema raising  the possibility of sigmoid colitis. Clinical correlation is " needed.      IMPRESSION:  New 1 cm suspected right mid lung nodule which given the history of  cancer could be metastasis.    Assessment/Plan   68-year-old woman with a resectable pancreatic cancer but elevated CA 19-9 and recent pancreatitis so we will plan for neoadjuvant chemotherapy with modified FOLFIRINOX.   9/30/24 - diagnostic lab negative   10/6/24 - 1st dose mFOLFIRINOX  - tolerated with fatigue, nausea   Will add fosaprepitant to C2 - Allergic rxn to fosaprepitant - discontinue  C3 held due to ANC of 500 - added neulasta   C5 given with 20% dose reduction of oxaliplatin   C6 given 1/21 2/4-  C7 held due to thrombocytopenia (plts 20,000) & URI symptoms.   Plts now recovered, would like to resume tx next week   2/18 - C7    RTC prior to C8 to see Dr. Cruz with scans  - need follow up on new 1cm lung nodule seen on CXR     Thrombocytopenia    -plt marcio 20,000 - now recovered     Cancer related pain:   -continue oxycodone - new prescription sent   DM:   -Continue metformin, however keep close eye on renal function.    - Established care with endocrinology       WENDIE [facility] [place]/ [facility]  Office: [phone]  Fax: [phone]

## 2025-02-18 ENCOUNTER — APPOINTMENT (OUTPATIENT)
Dept: HEMATOLOGY/ONCOLOGY | Facility: CLINIC | Age: 70
End: 2025-02-18
Payer: MEDICARE

## 2025-02-18 ENCOUNTER — LAB (OUTPATIENT)
Dept: HEMATOLOGY/ONCOLOGY | Facility: CLINIC | Age: 70
End: 2025-02-18
Payer: MEDICARE

## 2025-02-18 VITALS
TEMPERATURE: 97.7 F | DIASTOLIC BLOOD PRESSURE: 68 MMHG | SYSTOLIC BLOOD PRESSURE: 104 MMHG | RESPIRATION RATE: 16 BRPM | OXYGEN SATURATION: 97 % | BODY MASS INDEX: 21.79 KG/M2 | HEIGHT: 70 IN | HEART RATE: 106 BPM

## 2025-02-18 DIAGNOSIS — C25.0 MALIGNANT NEOPLASM OF HEAD OF PANCREAS (MULTI): Primary | ICD-10-CM

## 2025-02-18 DIAGNOSIS — C25.0 MALIGNANT NEOPLASM OF HEAD OF PANCREAS (MULTI): ICD-10-CM

## 2025-02-18 LAB
ALBUMIN SERPL BCP-MCNC: 2.8 G/DL (ref 3.4–5)
ALP SERPL-CCNC: 276 U/L (ref 33–136)
ALT SERPL W P-5'-P-CCNC: 48 U/L (ref 7–45)
ANION GAP SERPL CALC-SCNC: 14 MMOL/L (ref 10–20)
AST SERPL W P-5'-P-CCNC: 212 U/L (ref 9–39)
BASOPHILS # BLD AUTO: 0.02 X10*3/UL (ref 0–0.1)
BASOPHILS NFR BLD AUTO: 0.3 %
BILIRUB SERPL-MCNC: 0.5 MG/DL (ref 0–1.2)
BUN SERPL-MCNC: 12 MG/DL (ref 6–23)
CALCIUM SERPL-MCNC: 8.6 MG/DL (ref 8.6–10.3)
CHLORIDE SERPL-SCNC: 102 MMOL/L (ref 98–107)
CO2 SERPL-SCNC: 25 MMOL/L (ref 21–32)
CREAT SERPL-MCNC: 0.82 MG/DL (ref 0.5–1.05)
EGFRCR SERPLBLD CKD-EPI 2021: 79 ML/MIN/1.73M*2
EOSINOPHIL # BLD AUTO: 0.02 X10*3/UL (ref 0–0.7)
EOSINOPHIL NFR BLD AUTO: 0.3 %
ERYTHROCYTE [DISTWIDTH] IN BLOOD BY AUTOMATED COUNT: 16.9 % (ref 11.5–14.5)
GLUCOSE SERPL-MCNC: 229 MG/DL (ref 74–99)
HCT VFR BLD AUTO: 28.9 % (ref 36–46)
HGB BLD-MCNC: 9.1 G/DL (ref 12–16)
IMM GRANULOCYTES # BLD AUTO: 0.01 X10*3/UL (ref 0–0.7)
IMM GRANULOCYTES NFR BLD AUTO: 0.1 % (ref 0–0.9)
LYMPHOCYTES # BLD AUTO: 1.32 X10*3/UL (ref 1.2–4.8)
LYMPHOCYTES NFR BLD AUTO: 19.2 %
MCH RBC QN AUTO: 34.2 PG (ref 26–34)
MCHC RBC AUTO-ENTMCNC: 31.5 G/DL (ref 32–36)
MCV RBC AUTO: 109 FL (ref 80–100)
MONOCYTES # BLD AUTO: 0.69 X10*3/UL (ref 0.1–1)
MONOCYTES NFR BLD AUTO: 10 %
NEUTROPHILS # BLD AUTO: 4.83 X10*3/UL (ref 1.2–7.7)
NEUTROPHILS NFR BLD AUTO: 70.1 %
PLATELET # BLD AUTO: 181 X10*3/UL (ref 150–450)
POTASSIUM SERPL-SCNC: 4.5 MMOL/L (ref 3.5–5.3)
PROT SERPL-MCNC: 5.3 G/DL (ref 6.4–8.2)
RBC # BLD AUTO: 2.66 X10*6/UL (ref 4–5.2)
SODIUM SERPL-SCNC: 136 MMOL/L (ref 136–145)
WBC # BLD AUTO: 6.9 X10*3/UL (ref 4.4–11.3)

## 2025-02-18 PROCEDURE — 2500000004 HC RX 250 GENERAL PHARMACY W/ HCPCS (ALT 636 FOR OP/ED): Performed by: INTERNAL MEDICINE

## 2025-02-18 PROCEDURE — 86301 IMMUNOASSAY TUMOR CA 19-9: CPT | Mod: PORLAB

## 2025-02-18 PROCEDURE — 85025 COMPLETE CBC W/AUTO DIFF WBC: CPT

## 2025-02-18 PROCEDURE — 36591 DRAW BLOOD OFF VENOUS DEVICE: CPT

## 2025-02-18 PROCEDURE — 80053 COMPREHEN METABOLIC PANEL: CPT

## 2025-02-18 RX ORDER — HEPARIN 100 UNIT/ML
500 SYRINGE INTRAVENOUS AS NEEDED
OUTPATIENT
Start: 2025-02-18

## 2025-02-18 RX ORDER — HEPARIN 100 UNIT/ML
500 SYRINGE INTRAVENOUS AS NEEDED
Status: DISCONTINUED | OUTPATIENT
Start: 2025-02-18 | End: 2025-02-18 | Stop reason: HOSPADM

## 2025-02-18 RX ORDER — HEPARIN SODIUM,PORCINE/PF 10 UNIT/ML
50 SYRINGE (ML) INTRAVENOUS AS NEEDED
OUTPATIENT
Start: 2025-02-18

## 2025-02-18 RX ADMIN — Medication 500 UNITS: at 14:04

## 2025-02-18 ASSESSMENT — PAIN SCALES - GENERAL: PAINLEVEL_OUTOF10: 0-NO PAIN

## 2025-02-18 NOTE — PROGRESS NOTES
Patient is here for port labs. Tolerated well. Patient will return tomorrow for treatment. Discharged in stable condition.

## 2025-02-19 ENCOUNTER — HOSPITAL ENCOUNTER (OUTPATIENT)
Dept: RADIOLOGY | Facility: HOSPITAL | Age: 70
Discharge: HOME | End: 2025-02-19
Payer: MEDICARE

## 2025-02-19 ENCOUNTER — APPOINTMENT (OUTPATIENT)
Dept: HEMATOLOGY/ONCOLOGY | Facility: CLINIC | Age: 70
End: 2025-02-19
Payer: MEDICARE

## 2025-02-19 ENCOUNTER — TELEPHONE (OUTPATIENT)
Dept: HEMATOLOGY/ONCOLOGY | Facility: HOSPITAL | Age: 70
End: 2025-02-19

## 2025-02-19 ENCOUNTER — INFUSION (OUTPATIENT)
Dept: HEMATOLOGY/ONCOLOGY | Facility: CLINIC | Age: 70
End: 2025-02-19
Payer: MEDICARE

## 2025-02-19 DIAGNOSIS — C25.9 PANCREATIC ADENOCARCINOMA (MULTI): ICD-10-CM

## 2025-02-19 DIAGNOSIS — C25.0 MALIGNANT NEOPLASM OF HEAD OF PANCREAS (MULTI): ICD-10-CM

## 2025-02-19 DIAGNOSIS — Z79.84 LONG TERM (CURRENT) USE OF ORAL HYPOGLYCEMIC DRUGS: Primary | ICD-10-CM

## 2025-02-19 LAB — CANCER AG19-9 SERPL-ACNC: 620.79 U/ML

## 2025-02-19 PROCEDURE — 74177 CT ABD & PELVIS W/CONTRAST: CPT

## 2025-02-19 PROCEDURE — 2550000001 HC RX 255 CONTRASTS: Performed by: NURSE PRACTITIONER

## 2025-02-19 RX ADMIN — IOHEXOL 75 ML: 350 INJECTION, SOLUTION INTRAVENOUS at 10:44

## 2025-02-19 NOTE — PROGRESS NOTES
No treatment today per Dr. Cruz due to elevated AST. CT scan ordered and scheduled. Patient is aware of plan of care.

## 2025-02-20 ENCOUNTER — APPOINTMENT (OUTPATIENT)
Dept: RADIOLOGY | Facility: CLINIC | Age: 70
End: 2025-02-20
Payer: MEDICARE

## 2025-02-21 ENCOUNTER — APPOINTMENT (OUTPATIENT)
Dept: HEMATOLOGY/ONCOLOGY | Facility: CLINIC | Age: 70
End: 2025-02-21
Payer: MEDICARE

## 2025-02-28 ENCOUNTER — APPOINTMENT (OUTPATIENT)
Dept: RADIOLOGY | Facility: HOSPITAL | Age: 70
End: 2025-02-28
Payer: MEDICARE

## 2025-03-04 ENCOUNTER — TELEPHONE (OUTPATIENT)
Dept: ADMISSION | Facility: HOSPITAL | Age: 70
End: 2025-03-04

## 2025-03-04 ENCOUNTER — APPOINTMENT (OUTPATIENT)
Dept: HEMATOLOGY/ONCOLOGY | Facility: CLINIC | Age: 70
End: 2025-03-04
Payer: MEDICARE

## 2025-03-04 ENCOUNTER — LAB (OUTPATIENT)
Dept: HEMATOLOGY/ONCOLOGY | Facility: CLINIC | Age: 70
End: 2025-03-04
Payer: MEDICARE

## 2025-03-04 ENCOUNTER — OFFICE VISIT (OUTPATIENT)
Dept: HEMATOLOGY/ONCOLOGY | Facility: CLINIC | Age: 70
End: 2025-03-04
Payer: MEDICARE

## 2025-03-04 ENCOUNTER — LAB (OUTPATIENT)
Dept: LAB | Facility: CLINIC | Age: 70
End: 2025-03-04
Payer: MEDICARE

## 2025-03-04 VITALS
DIASTOLIC BLOOD PRESSURE: 72 MMHG | SYSTOLIC BLOOD PRESSURE: 118 MMHG | TEMPERATURE: 97.5 F | RESPIRATION RATE: 16 BRPM | HEART RATE: 81 BPM

## 2025-03-04 DIAGNOSIS — C25.0 MALIGNANT NEOPLASM OF HEAD OF PANCREAS (MULTI): ICD-10-CM

## 2025-03-04 DIAGNOSIS — C25.0 MALIGNANT NEOPLASM OF HEAD OF PANCREAS (MULTI): Primary | ICD-10-CM

## 2025-03-04 LAB
ALBUMIN SERPL BCP-MCNC: 2.8 G/DL (ref 3.4–5)
ALP SERPL-CCNC: 299 U/L (ref 33–136)
ALT SERPL W P-5'-P-CCNC: 31 U/L (ref 7–45)
ANION GAP SERPL CALC-SCNC: 11 MMOL/L (ref 10–20)
AST SERPL W P-5'-P-CCNC: 26 U/L (ref 9–39)
BASOPHILS # BLD AUTO: 0.02 X10*3/UL (ref 0–0.1)
BASOPHILS NFR BLD AUTO: 0.4 %
BILIRUB SERPL-MCNC: 0.4 MG/DL (ref 0–1.2)
BUN SERPL-MCNC: 14 MG/DL (ref 6–23)
CALCIUM SERPL-MCNC: 8.5 MG/DL (ref 8.6–10.6)
CANCER AG19-9 SERPL-ACNC: 2199.55 U/ML
CHLORIDE SERPL-SCNC: 103 MMOL/L (ref 98–107)
CO2 SERPL-SCNC: 26 MMOL/L (ref 21–32)
CREAT SERPL-MCNC: 0.83 MG/DL (ref 0.5–1.05)
EGFRCR SERPLBLD CKD-EPI 2021: 77 ML/MIN/1.73M*2
EOSINOPHIL # BLD AUTO: 0.06 X10*3/UL (ref 0–0.7)
EOSINOPHIL NFR BLD AUTO: 1.1 %
ERYTHROCYTE [DISTWIDTH] IN BLOOD BY AUTOMATED COUNT: 15 % (ref 11.5–14.5)
GLUCOSE SERPL-MCNC: 211 MG/DL (ref 74–99)
HCT VFR BLD AUTO: 28.3 % (ref 36–46)
HGB BLD-MCNC: 9.2 G/DL (ref 12–16)
IMM GRANULOCYTES # BLD AUTO: 0.01 X10*3/UL (ref 0–0.7)
IMM GRANULOCYTES NFR BLD AUTO: 0.2 % (ref 0–0.9)
LYMPHOCYTES # BLD AUTO: 1.5 X10*3/UL (ref 1.2–4.8)
LYMPHOCYTES NFR BLD AUTO: 28.7 %
MCH RBC QN AUTO: 35.2 PG (ref 26–34)
MCHC RBC AUTO-ENTMCNC: 32.5 G/DL (ref 32–36)
MCV RBC AUTO: 108 FL (ref 80–100)
MONOCYTES # BLD AUTO: 0.51 X10*3/UL (ref 0.1–1)
MONOCYTES NFR BLD AUTO: 9.8 %
NEUTROPHILS # BLD AUTO: 3.12 X10*3/UL (ref 1.2–7.7)
NEUTROPHILS NFR BLD AUTO: 59.8 %
NRBC BLD-RTO: ABNORMAL /100{WBCS}
PLATELET # BLD AUTO: 201 X10*3/UL (ref 150–450)
POTASSIUM SERPL-SCNC: 4.4 MMOL/L (ref 3.5–5.3)
PROT SERPL-MCNC: 5.5 G/DL (ref 6.4–8.2)
RBC # BLD AUTO: 2.61 X10*6/UL (ref 4–5.2)
SODIUM SERPL-SCNC: 136 MMOL/L (ref 136–145)
WBC # BLD AUTO: 5.2 X10*3/UL (ref 4.4–11.3)

## 2025-03-04 PROCEDURE — 99215 OFFICE O/P EST HI 40 MIN: CPT | Performed by: INTERNAL MEDICINE

## 2025-03-04 PROCEDURE — 1159F MED LIST DOCD IN RCRD: CPT | Performed by: INTERNAL MEDICINE

## 2025-03-04 PROCEDURE — 2500000004 HC RX 250 GENERAL PHARMACY W/ HCPCS (ALT 636 FOR OP/ED): Performed by: INTERNAL MEDICINE

## 2025-03-04 PROCEDURE — 85025 COMPLETE CBC W/AUTO DIFF WBC: CPT

## 2025-03-04 PROCEDURE — 80053 COMPREHEN METABOLIC PANEL: CPT

## 2025-03-04 PROCEDURE — 36591 DRAW BLOOD OFF VENOUS DEVICE: CPT

## 2025-03-04 PROCEDURE — 1036F TOBACCO NON-USER: CPT | Performed by: INTERNAL MEDICINE

## 2025-03-04 PROCEDURE — 86301 IMMUNOASSAY TUMOR CA 19-9: CPT

## 2025-03-04 RX ORDER — LIDOCAINE AND PRILOCAINE 25; 25 MG/G; MG/G
CREAM TOPICAL
Qty: 30 G | Refills: 3 | Status: SHIPPED | OUTPATIENT
Start: 2025-03-04 | End: 2025-03-04

## 2025-03-04 RX ORDER — OXYCODONE HYDROCHLORIDE 5 MG/1
15 TABLET ORAL 2 TIMES DAILY
Qty: 42 TABLET | Refills: 0 | Status: SHIPPED
Start: 2025-03-04 | End: 2025-03-04 | Stop reason: ENTERED-IN-ERROR

## 2025-03-04 RX ORDER — HEPARIN 100 UNIT/ML
500 SYRINGE INTRAVENOUS AS NEEDED
OUTPATIENT
Start: 2025-03-04

## 2025-03-04 RX ORDER — HEPARIN SODIUM,PORCINE/PF 10 UNIT/ML
50 SYRINGE (ML) INTRAVENOUS AS NEEDED
OUTPATIENT
Start: 2025-03-04

## 2025-03-04 RX ORDER — OXYCODONE HYDROCHLORIDE 5 MG/1
5-10 TABLET ORAL EVERY 4 HOURS PRN
Qty: 120 TABLET | Refills: 0 | Status: SHIPPED | OUTPATIENT
Start: 2025-03-04 | End: 2025-04-03

## 2025-03-04 RX ORDER — HEPARIN 100 UNIT/ML
500 SYRINGE INTRAVENOUS AS NEEDED
Status: DISCONTINUED | OUTPATIENT
Start: 2025-03-04 | End: 2025-03-04 | Stop reason: HOSPADM

## 2025-03-04 RX ORDER — HEPARIN SODIUM,PORCINE/PF 10 UNIT/ML
50 SYRINGE (ML) INTRAVENOUS AS NEEDED
Status: DISCONTINUED | OUTPATIENT
Start: 2025-03-04 | End: 2025-03-04 | Stop reason: HOSPADM

## 2025-03-04 RX ADMIN — HEPARIN 500 UNITS: 100 SYRINGE at 12:20

## 2025-03-04 ASSESSMENT — PAIN SCALES - GENERAL: PAINLEVEL_OUTOF10: 0-NO PAIN

## 2025-03-04 NOTE — PROGRESS NOTES
Patient in clinic today for. She reports that her diarrhea has resolved, has a residual cough from prior illness. Labs completed prior to her clinic visit. Treatment to canceled for tomorrow, PET scans ordered and next clinic FUV scheduled.

## 2025-03-04 NOTE — PROGRESS NOTES
Patient ID: Nanci Colón is a 69 y.o. female from [place], [place].     Referring Physician: Francisco Cruz MD  [address] [place] [address]    Primary Care Provider: Diana Blunt DO    Diagnosis:   Pancreatic cancer 9/2024    Primary Oncologic Surgeon:   Nazia    Primary Medical Oncologist:  Anthony    Primary Radiation Oncologist:  KVNG    Current Therapy:  10/4/24 -  Neoadjuvant Chemo - FOLFIRINOX    Oncologic Surgery History:  None    Oncologic Therapy History:  10/9/24 -  mFOLFIRINOX     Molecular Genetics:  Mmr-P    Current Sites of Disease:  Pancreatic head    Oncologic Problem List:  Pancreatic cancer  DM2    Oncologic Narrative:  69 y.o. woman who initially presented in mid September 2024 with abdominal pain jaundice MARK and pancreatitis.  She had an ultrasound that showed extrahepatic bile duct dilation and pancreatic duct dilation.  Both of these seem to emanate from the pancreatic head.  A CT scan on September 9, 2024 was concerning for a mass.  MRCP on September 27, 2024 showed an ill-defined mass in the pancreatic head and uncinate process measuring approximately 3.5 cm.  Biopsy of the pancreatic mass from the September 11, 2024 endoscopic ultrasound revealed adenocarcinoma.  She met me for initial consultation regarding treatment planning on 9/26/2024.       Past Medical History: Nanci has a past medical history of Hyperlipidemia, Hypertension, Hypothyroidism, Personal history of other diseases of the circulatory system (09/15/2017), Personal history of other endocrine, nutritional and metabolic disease (02/24/2016), Personal history of other endocrine, nutritional and metabolic disease (03/09/2017), Personal history of other endocrine, nutritional and metabolic disease, Type 2 diabetes mellitus with other skin complications, and Vision loss.  Surgical History:  Nanci has a past surgical history that includes Other surgical history (09/01/2022); Other surgical history (09/01/2022); and  Other surgical history (09/01/2022).  Social History:  Nanci reports that she has never smoked. She has never used smokeless tobacco. She reports that she does not drink alcohol and does not use drugs.  Family History:    Family History   Problem Relation Name Age of Onset    Cancer Mother      Heart disease Father       Family Oncology History:  Cancer-related family history includes Cancer in her mother.      SUBJECTIVE:    History of Present Illness:  Nanci Colón is a 69 y.o. female who was referred by Francisco Cruz MD and presents with chemotherapy follow up.     She is s/p 7 cycles of FOLFIRINOX. She is scheduled for C8 tomorrow. She notes that she recently had a respiratory illness that she describes as similar to influenza. She notes a lingering cough. Reports worsening back pain for which she is using oxyocodone for. Mainly at night.       OBJECTIVE:    VS / Pain:  There were no vitals taken for this visit.  BSA: There is no height or weight on file to calculate BSA.   Pain Scale: 0    Daily Weight  02/10/25 : 68 kg (150 lb)  02/05/25 : 68 kg (150 lb)  02/03/25 : 68 kg (150 lb)      Physical Exam  Constitutional:       Appearance: She is normal weight.   HENT:      Nose: Nose normal.      Mouth/Throat:      Mouth: Mucous membranes are moist.      Pharynx: Oropharynx is clear.   Eyes:      Extraocular Movements: Extraocular movements intact.      Conjunctiva/sclera: Conjunctivae normal.   Cardiovascular:      Rate and Rhythm: Normal rate.   Pulmonary:      Effort: Pulmonary effort is normal.      Breath sounds: Normal breath sounds.   Abdominal:      General: Abdomen is flat. Bowel sounds are normal.   Musculoskeletal:         General: Normal range of motion.   Skin:     General: Skin is warm.   Neurological:      General: No focal deficit present.      Mental Status: She is alert. Mental status is at baseline.   Psychiatric:         Mood and Affect: Mood normal.         Thought Content: Thought  content normal.         Judgment: Judgment normal.       Performance Status:   ECOG 1    Diagnostic Results         WBC   Date/Time Value Ref Range Status   02/18/2025 02:01 PM 6.9 4.4 - 11.3 x10*3/uL Final   02/10/2025 11:41 AM 5.9 4.4 - 11.3 x10*3/uL Final   02/05/2025 10:06 AM 7.1 4.4 - 11.3 x10*3/uL Final     Hemoglobin   Date Value Ref Range Status   02/18/2025 9.1 (L) 12.0 - 16.0 g/dL Final   02/10/2025 7.8 (L) 12.0 - 16.0 g/dL Final   02/05/2025 7.3 (L) 12.0 - 16.0 g/dL Final     MCV   Date/Time Value Ref Range Status   02/18/2025 02:01  (H) 80 - 100 fL Final   02/10/2025 11:41  (H) 80 - 100 fL Final   02/05/2025 10:06  (H) 80 - 100 fL Final     Platelets   Date/Time Value Ref Range Status   02/18/2025 02:01  150 - 450 x10*3/uL Final   02/10/2025 11:41  (L) 150 - 450 x10*3/uL Final     Comment:     Platelet count verified by smear review.   02/05/2025 10:06 AM 37 (LL) 150 - 450 x10*3/uL Final     Neutrophils Absolute   Date/Time Value Ref Range Status   02/18/2025 02:01 PM 4.83 1.20 - 7.70 x10*3/uL Final     Comment:     Percent differential counts (%) should be interpreted in the context of the absolute cell counts (cells/uL).   02/10/2025 11:41 AM 3.87 1.20 - 7.70 x10*3/uL Final     Comment:     Percent differential counts (%) should be interpreted in the context of the absolute cell counts (cells/uL).   02/05/2025 10:06 AM 5.34 1.20 - 7.70 x10*3/uL Final     Comment:     Percent differential counts (%) should be interpreted in the context of the absolute cell counts (cells/uL).     Bilirubin, Total   Date/Time Value Ref Range Status   02/18/2025 02:01 PM 0.5 0.0 - 1.2 mg/dL Final   02/10/2025 11:41 AM 0.4 0.0 - 1.2 mg/dL Final   02/03/2025 02:38 PM 0.4 0.0 - 1.2 mg/dL Final     AST   Date/Time Value Ref Range Status   02/18/2025 02:01  (H) 9 - 39 U/L Final   02/10/2025 11:41 AM 23 9 - 39 U/L Final   02/03/2025 02:38 PM 27 9 - 39 U/L Final     ALT   Date/Time Value Ref  "Range Status   02/18/2025 02:01 PM 48 (H) 7 - 45 U/L Final     Comment:     Patients treated with Sulfasalazine may generate falsely decreased results for ALT.   02/10/2025 11:41 AM 13 7 - 45 U/L Final     Comment:     Patients treated with Sulfasalazine may generate falsely decreased results for ALT.   02/03/2025 02:38 PM 11 7 - 45 U/L Final     Comment:     Patients treated with Sulfasalazine may generate falsely decreased results for ALT.     Creatinine   Date/Time Value Ref Range Status   02/18/2025 02:01 PM 0.82 0.50 - 1.05 mg/dL Final   02/10/2025 11:41 AM 0.86 0.50 - 1.05 mg/dL Final   02/03/2025 02:38 PM 0.95 0.50 - 1.05 mg/dL Final     Urea Nitrogen   Date/Time Value Ref Range Status   02/18/2025 02:01 PM 12 6 - 23 mg/dL Final   02/10/2025 11:41 AM 10 6 - 23 mg/dL Final   02/03/2025 02:38 PM 8 6 - 23 mg/dL Final     Albumin   Date/Time Value Ref Range Status   02/18/2025 02:01 PM 2.8 (L) 3.4 - 5.0 g/dL Final   02/10/2025 11:41 AM 2.8 (L) 3.4 - 5.0 g/dL Final   02/03/2025 02:38 PM 3.0 (L) 3.4 - 5.0 g/dL Final     Cancer AG 19-9   Date/Time Value Ref Range Status   02/18/2025 02:01 .79 (H) <35.00 U/mL Final   02/10/2025 11:41 .27 (H) <35.00 U/mL Final   02/03/2025 02:38 .66 (H) <35.00 U/mL Final   01/20/2025 12:52 .22 (H) <35.00 U/mL Final   12/31/2024 01:19 PM 1,391.30 (H) <35.00 U/mL Final   12/17/2024 03:04 .09 (H) <35.00 U/mL Final   12/02/2024 03:00 .06 (H) <35.00 U/mL Final   11/25/2024 10:33 .63 (H) <35.00 U/mL Final   11/19/2024 11:32 .54 (H) <35.00 U/mL Final   11/05/2024 02:25 .87 (H) <35.00 U/mL Final     No results found for: \"CEA\"    === 02/19/25 ===    CT CHEST ABDOMEN PELVIS W IV CONTRAST    - Impression -  Pancreatic cancer restaging scan with evidence of worsening  intrathoracic metastatic disease burden.    CHEST:  1.  Multiple new bilateral spiculated pulmonary nodules which, in the setting of rising CA 19-9 levels, raises concern for " pulmonary  metastasis.  2. No thoracic lymphadenopathy.    ABDOMEN-PELVIS:  1. Indeterminate ill-defined hepatic segment 4B hypodensity, likely reflect a focal marked hepatic steatosis. However, in the setting of rising CA 19-9 levels, consider MRI liver for complete characterization.  2. Similar size and appearance of the primary pancreatic  head/uncinate process mass with satellite peripancreatic soft tissue deposit adjacent to the superior mesenteric vessels.  3. Interval development of diffuse hepatic steatosis, likely  treatment related given ongoing chemotherapy.  4. No enlarged or suspicious abdominopelvic lymphadenopathy.  5. Additional findings as above.    I personally reviewed the images/study and I agree with the findings  as stated by Radiology resident.    MACRO:  None    Signed by: Areli Marcano 2/21/2025 4:36 PM  Dictation workstation:   [id number]      Assessment/Plan   69 y.o.  woman with a resectable pancreatic cancer but elevated CA 19-9 and recent pancreatitis so we will plan for neoadjuvant chemotherapy with modified FOLFIRINOX.   9/30/24 - diagnostic lab negative   10/6/24 - 1st dose mFOLFIRINOX  - tolerated with fatigue, nausea   Added fosaprepitant to C2 - Allergic rxn to fosaprepitant - discontinue  C3 held due to ANC of 500    - plan for C3 tomorrow with addition of neulasta   C4   S/p 7 cycles:   - Ca19-9 is uptrending 507>549>621, Ca19-9 from today is pending  - CT from 2/19/25 is read as showing multiple new pulmonary nodules (and an ill-defined hepatic segment 4B hypodensity that likely reflects a focal hepatic steatosis) In conjunction with the uptrend in the Ca19-9 there is concern for disease progression, however, patient did have a recent respiratory illness and her disease is stable in the pancreas and surrounding area. Today, we reviewed stopping her mFOLFIRINOX given her elevated LFTs and hepatic steatosis are likely a direct consequence of her chemotherapy and lack of response.    - PET-CT to further characterize disease  - Discuss CT findings in tumor board   - send [id number] testing  - return to clinic in 2 weeks to review PET-CT      Cancer related pain:   -increase oxycodone from 10mg to 15mg   -Supportive oncology following     DM:   -Continue metformin, however keep close eye on renal function.    - Endocrinology scheduled for April      Francisco Cruz MD    [facility]/ [facility] [place]  Office: [phone]  Fax: [phone]

## 2025-03-04 NOTE — TELEPHONE ENCOUNTER
Pharmacy requesting clarification on oxycodone script.   2 sets of directions:   Take 3 tablets (15 mg) by mouth 2 times a day for 7 days. 1 tablet every 4 hours as needed for cancer related pain (G89.3)

## 2025-03-04 NOTE — TELEPHONE ENCOUNTER
The instructions should be:  Three tablets 2 times a day as needed for cancer related pain for 7 days.

## 2025-03-05 ENCOUNTER — APPOINTMENT (OUTPATIENT)
Dept: HEMATOLOGY/ONCOLOGY | Facility: CLINIC | Age: 70
End: 2025-03-05
Payer: MEDICARE

## 2025-03-06 ENCOUNTER — APPOINTMENT (OUTPATIENT)
Dept: ENDOCRINOLOGY | Facility: CLINIC | Age: 70
End: 2025-03-06
Payer: MEDICARE

## 2025-03-07 ENCOUNTER — APPOINTMENT (OUTPATIENT)
Dept: HEMATOLOGY/ONCOLOGY | Facility: CLINIC | Age: 70
End: 2025-03-07
Payer: MEDICARE

## 2025-03-11 ENCOUNTER — TELEPHONE (OUTPATIENT)
Dept: HEMATOLOGY/ONCOLOGY | Facility: HOSPITAL | Age: 70
End: 2025-03-11
Payer: MEDICARE

## 2025-03-11 NOTE — TELEPHONE ENCOUNTER
Nanci calls back to give Dr. Cruz an update on her pain, She states that he instructed her to take Oxycodone 15mg by mouth BID for 7 days and update him if the pain is any better, She states that it has been one week and the Oxycodone is not really helping. The pain in her mid-back radiates to the right side and at its worst is an 8 or 9/10. States that it maybe goes down to a 7 after taking the Oxycodone. She doesn't like taking it during the day and at night it is not relieving the pain so that she can get a decent night of sleep. She is requesting to try something else. Preferred pharmacy is [facility] in [place].    Message sent to team.

## 2025-03-12 DIAGNOSIS — C25.0 MALIGNANT NEOPLASM OF HEAD OF PANCREAS (MULTI): Primary | ICD-10-CM

## 2025-03-12 RX ORDER — MORPHINE SULFATE 15 MG/1
15-30 TABLET ORAL EVERY 6 HOURS PRN
Qty: 80 TABLET | Refills: 0 | Status: SHIPPED | OUTPATIENT
Start: 2025-03-12 | End: 2025-03-22

## 2025-03-12 NOTE — TELEPHONE ENCOUNTER
Per Dr. Cruz:    New rx for morphine sent.  Try this.  Stop oxycodone.  If helping we can rx more.  If not helping we can try other meds.    She has supportive onc apt later in the month.     Called Nanci back to relay message as above. Understanding verbalized with teach back. Pt expressed appreciation for team and quick response.

## 2025-03-13 LAB
COMMENTS - MP RESULT TYPE: NORMAL
SCAN RESULT: NORMAL

## 2025-03-18 ENCOUNTER — TELEPHONE (OUTPATIENT)
Dept: HEMATOLOGY/ONCOLOGY | Facility: HOSPITAL | Age: 70
End: 2025-03-18
Payer: MEDICARE

## 2025-03-20 ENCOUNTER — TELEPHONE (OUTPATIENT)
Dept: PALLIATIVE MEDICINE | Facility: CLINIC | Age: 70
End: 2025-03-20
Payer: MEDICARE

## 2025-03-20 ENCOUNTER — OFFICE VISIT (OUTPATIENT)
Dept: PALLIATIVE MEDICINE | Facility: CLINIC | Age: 70
End: 2025-03-20
Payer: MEDICARE

## 2025-03-20 VITALS
TEMPERATURE: 96.8 F | WEIGHT: 143.96 LBS | HEART RATE: 91 BPM | RESPIRATION RATE: 16 BRPM | OXYGEN SATURATION: 97 % | BODY MASS INDEX: 20.91 KG/M2 | DIASTOLIC BLOOD PRESSURE: 84 MMHG | SYSTOLIC BLOOD PRESSURE: 154 MMHG

## 2025-03-20 DIAGNOSIS — C25.0 MALIGNANT NEOPLASM OF HEAD OF PANCREAS (MULTI): ICD-10-CM

## 2025-03-20 DIAGNOSIS — Z51.81 ENCOUNTER FOR MONITORING OPIOID MAINTENANCE THERAPY: ICD-10-CM

## 2025-03-20 DIAGNOSIS — M79.2 NEUROPATHIC PAIN: ICD-10-CM

## 2025-03-20 DIAGNOSIS — Z79.891 ENCOUNTER FOR MONITORING OPIOID MAINTENANCE THERAPY: ICD-10-CM

## 2025-03-20 DIAGNOSIS — T45.1X5A CHEMOTHERAPY INDUCED NAUSEA AND VOMITING: ICD-10-CM

## 2025-03-20 DIAGNOSIS — K59.03 THERAPEUTIC OPIOID INDUCED CONSTIPATION: ICD-10-CM

## 2025-03-20 DIAGNOSIS — T40.2X5A THERAPEUTIC OPIOID INDUCED CONSTIPATION: ICD-10-CM

## 2025-03-20 DIAGNOSIS — G89.3 CANCER RELATED PAIN: ICD-10-CM

## 2025-03-20 DIAGNOSIS — R11.2 CHEMOTHERAPY INDUCED NAUSEA AND VOMITING: ICD-10-CM

## 2025-03-20 DIAGNOSIS — G62.9 NEUROPATHY: ICD-10-CM

## 2025-03-20 DIAGNOSIS — Z71.89 GOALS OF CARE, COUNSELING/DISCUSSION: ICD-10-CM

## 2025-03-20 DIAGNOSIS — Z51.5 PALLIATIVE CARE ENCOUNTER: Primary | ICD-10-CM

## 2025-03-20 PROCEDURE — 3077F SYST BP >= 140 MM HG: CPT | Performed by: NURSE PRACTITIONER

## 2025-03-20 PROCEDURE — 99215 OFFICE O/P EST HI 40 MIN: CPT | Performed by: NURSE PRACTITIONER

## 2025-03-20 PROCEDURE — 3079F DIAST BP 80-89 MM HG: CPT | Performed by: NURSE PRACTITIONER

## 2025-03-20 PROCEDURE — 1125F AMNT PAIN NOTED PAIN PRSNT: CPT | Performed by: NURSE PRACTITIONER

## 2025-03-20 RX ORDER — MORPHINE SULFATE 15 MG/1
15 TABLET ORAL EVERY 4 HOURS PRN
Qty: 180 TABLET | Refills: 0 | Status: SHIPPED | OUTPATIENT
Start: 2025-03-20 | End: 2025-04-19

## 2025-03-20 RX ORDER — MORPHINE SULFATE 15 MG/1
15 TABLET, FILM COATED, EXTENDED RELEASE ORAL 2 TIMES DAILY
Qty: 60 TABLET | Refills: 0 | Status: SHIPPED | OUTPATIENT
Start: 2025-03-20 | End: 2025-04-19

## 2025-03-20 ASSESSMENT — PAIN SCALES - GENERAL: PAINLEVEL_OUTOF10: 4

## 2025-03-20 NOTE — TELEPHONE ENCOUNTER
Phone call to pharmacy to see if Pa needed for MSER. No PA needed, copay is $3.70 and pharmacy confirms medication in stock and can fill today. MSIR is dated for tomorrow but pharmacy says insurance won't cover until 3/22/25. I called patient and left VM with these updates. I told her if she didn't have enough MSIR to last until Saturday to let our office know.

## 2025-03-20 NOTE — PROGRESS NOTES
SUPPORTIVE AND PALLIATIVE ONCOLOGY CONSULT - OUTPATIENT      SERVICE DATE: 3/20/2025    Referred by:  Dr. Cruz  Medical Oncologist: MD Saeed Ramirez MD   Radiation Oncologist: No care team member to display  Primary Physician: Diana Blunt  [phone]    REASON FOR CONSULT/CHIEF CONSULT COMPLAINT: pain management, other symptom control: nausea, poor appetite, Introduction to Supportive and Palliative Oncology Services, and goals of care discussion    Cancer History   Pancreatic CA  -s/p 7 cycles FOLFIRINOX    Onc: Anthony     Subjective   HISTORY OF PRESENT ILLNESS: Nanci Colón is a 69 y.o. female with Pmhx of HTN, hypothyroidism, DMII, diabetic neuropathy, Chiari malformation (type 1), and pancreatic CA.     She presents to supportive oncology for pain and symptom management.     Pt presents for NPV accompanied by her , Brandon. She c/o L epigastric/ lower abdominal pain that radiates into lower back, up through the spine, and to the R side. Describes this pain as constant, can't get comfortable. No OTCs, also using heating pad. Taking MSIR 15mg, sometimes 2 tabs at a time, about twice a day, but always at night time. Has numbness in b/l feet, R>L. Moving bowels q1-2 days, drinks miralax in her tea daily. Has intermittent nausea, no vomiting, takes compazine PRN, doesn't use zofran as often d/t risk of constipation. Feels hungry, but nothing tastes right, has lost about 30 lbs in 6 months, eating protein bars because liquid supplements make her sick. Mood I stable, but she is understandably frustrated by her diagnosis. Not sleeping well at night, able to fall asleep okay, but wakes often, gets up to use restroom and is in pain. Feels that her legs are weak, using walker because she's afraid to fall. She does not have advance directives, but is interested in filling them out. We had extensive conversation regarding code status, she is currently full code, would like a copy of [place] DNR form  to review.     Pain Assessment:  Pain Score:   4  Location: Back  Education:  Discussed starting long acting pain medication for more consistent pain control.       Symptom Assessment:  Pain:somewhat  Headache: none  Dizziness:none  Lack of energy: very much  Difficulty sleeping: very much  Worrying: none  Anxiety: none  Depression: none  Pain in mouth/swallowing: none  Dry mouth: none  Taste changes: very much  Shortness of breath: none  Lack of appetite: somewhat   Nausea: a little  Vomiting: none  Constipation: none  Diarrhea: none  Sore muscles: a little  Numbness or tingling in hands/feet/other: somewhat  Weight loss: very much      Information obtained from: chart review, interview of patient, and interview of family  ______________________________________________________________________     Oncology History   Malignant neoplasm of head of pancreas (Multi)   9/18/2024 Initial Diagnosis    Malignant neoplasm of head of pancreas (Multi)     10/4/2024 Cancer Staged    Staging form: Exocrine Pancreas, AJCC 8th Edition, Clinical stage from 10/4/2024: Stage IB (cT2, cN0, cM0) - Signed by Francisco Cruz MD on 10/4/2024     10/9/2024 -  Chemotherapy    mFOLFIRINOX (Fluorouracil Continuous Infusion / Leucovorin / Irinotecan / Oxaliplatin), 14 Day Cycles         Past Medical History:   Diagnosis Date    Hyperlipidemia     Hypertension     Hypothyroidism     Personal history of other diseases of the circulatory system 09/15/2017    History of hypertension    Personal history of other endocrine, nutritional and metabolic disease 02/24/2016    History of type 2 diabetes mellitus    Personal history of other endocrine, nutritional and metabolic disease 03/09/2017    History of hypothyroidism    Personal history of other endocrine, nutritional and metabolic disease     History of hyperlipidemia    Type 2 diabetes mellitus with other skin complications     Diabetic dermopathy    Vision loss      Past Surgical History:    Procedure Laterality Date    OTHER SURGICAL HISTORY  09/01/2022    Foot surgery    OTHER SURGICAL HISTORY  09/01/2022    Appendectomy    OTHER SURGICAL HISTORY  09/01/2022    Knee surgery     Family History   Problem Relation Name Age of Onset    Cancer Mother      Heart disease Father          SOCIAL HISTORY  -lives in a house with her . Has 2 adult sons, one lives nextdoor.   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       Objective     Palliative Performance Scale % (PPS)       Current Outpatient Medications   Medication Instructions    atorvastatin (LIPITOR) 40 mg, oral, Every other day    lactulose 10 g, oral, 3 times daily PRN, Only take as many times as needed for a BM    lipase-protease-amylase (Creon) 24,000-76,000 -120,000 unit capsule 2 capsules, oral, 3 times daily (morning, midday, late afternoon), With meals and 1 with snacks for 8 per day    loperamide (Imodium) 2 mg capsule Take 2 capsules (4 mg) by mouth with the first episode of diarrhea and 1 capsule (2 mg) by mouth with any additional episodes. Maximum 8 capsules (16 mg) per day.    metFORMIN (GLUCOPHAGE) 500 mg, oral, 2 times daily (morning and late afternoon)    metoprolol succinate XL (TOPROL-XL) 50 mg, oral, Daily    morphine (MSIR) 15-30 mg, oral, Every 6 hours PRN    ondansetron (ZOFRAN) 8 mg, oral, Every 8 hours PRN    oxyCODONE (ROXICODONE) 5-10 mg, oral, Every 4 hours PRN    pantoprazole (PROTONIX) 40 mg, oral, Daily, Do not crush, chew, or split.    prochlorperazine (COMPAZINE) 10 mg, oral, Every 6 hours PRN    Synthroid 75 mcg, oral, Daily       Allergies:   Allergies   Allergen Reactions    Emend [Fosaprepitant] Shortness of breath    Shellfish Containing Products Anaphylaxis                PHYSICAL  EXAMINATION  Vital Signs:   Vital signs reviewed  Vitals:    03/20/25 1420   BP: 154/84   Pulse: 91   Resp: 16   Temp: 36 °C (96.8 °F)   SpO2: 97%     Pain Score:   4         Physical Exam  Vitals reviewed.   Constitutional:       Appearance: Normal appearance.   HENT:      Head: Normocephalic.      Comments: Temporal wasting   Cardiovascular:      Rate and Rhythm: Normal rate.   Pulmonary:      Effort: Pulmonary effort is normal.   Abdominal:      Palpations: Abdomen is soft.   Musculoskeletal:         General: Normal range of motion.      Comments: Walks with walker    Skin:     General: Skin is warm and dry.   Neurological:      General: No focal deficit present.      Mental Status: She is alert and oriented to person, place, and time.   Psychiatric:         Mood and Affect: Mood normal.         Judgment: Judgment normal.         ASSESSMENT/PLAN    Pain  Pain is: cancer related pain  Type: visceral and neuropathic  Pain control: sub-optimally controlled  Home regimen:   -start MSER 15mg bid. Rx sent today.   -increase MSIR 15mg to q4hrs PRN. Rx sent for fill on 3/21.   -continue heating pad PRN  Intolerances/previously tried:   -tramadol: ineffective  -oxycodone: ineffective     Opioid Use  Medication Management:   - OARRS report reviewed with no aberrant behavior; consistent with  prescriptions/records and patient history  - MED 86.  Overdose Risk Score 90.   This has been discussed with patient.   - We will continue to closely monitor the patient for signs of prescription misuse including UDS, OARRS review and subjective reports at each visit.  - no concurrent benzodiazepine use   - I am a provider who either is or has consulted and collaborated with a provider certified in Hospice and Palliative Medicine and have conducted a face-face visit and examination for this patient.  - Routine Urine Drug Screen completed deferred appropriately positive for opioids and negative for illicit substances  - Controlled  Substance Agreement completed deferred  - Specifically discussed that controlled substance prescriptions will only be provided by our group as outlined in the completed agreement  - Prescribed naloxone deferred  - Red Flags: 20 yr history ETOH misuse (in recovery)     Nausea   Intermittent nausea without vomiting related to chemotherapy and opioids   -continue compazine 10mg q6hrs PRN.   -continue zofran 8mg q8hrs PRN.   -continue protonix 40mg daily.     Constipation   At risk for constipation related to opioids,  currently not constipated   Usual bowel pattern: q1-2 days   Current regimen:   -educated pt on importance of maintaining regular bowel schedule  -start colace 100mg bid PRN for hard stools  -start senna 8.6mg, 1-2tabs, 1-2x/day PRN  -may continue miralax 1 cap full daily, as long as staying well hydrated.   -start MOM 15mL 4x/day PRN    Sleeping Difficulty:  Impaired sleep related to insomnia, pain  Current regimen:    -goal for improved sleep with better pain control   -see pain section/ plan above   -consider additional medication management if not improved with MSER   Intolerances/previously tried:   -doxepin: ineffective     Decreased appetite  Related to malignancy, chemotherapy, taste changes, and disease process  Weight loss 30 lbs in 6 months   Current regimen:    -encouraged smaller, more frequent meals through out the day  -encouraged continued use of protein supplements as tolerated     Supportive Interventions: given copy of advance directives/ [place] DNR form for review     Introduction to Supportive and Palliative Oncology:  Spoke with pt and     Introduced the role and philosophy of Supportive and Palliative oncology in the evaluation and management of symptoms during cancer treatment  Palliative care was introduced as a service for patients with serious illness to help with symptoms, assist with goals of care conversations, navigate complex decision making, improve quality of life  for patients, and provide support both patients and families.  Patient seemed to appreciate the extra layer of support.    Advance Directives  Existence of Advance Directives:No - has interest  Decision maker: Surrogate decision maker is Terrance Colón (): [phone]  Code Status: Full code        Next Follow-Up Visit:  Return to clinic in 3 weeks     Signature and billing  Thank you for allowing us to participate in the care of this patient. Recommendations will be communicated back to the consulting service by way of shared electronic medical record or face-to-face.    Medical complexity was high level due to due to complexity of problems, extensive data review, and high risk of management/treatment.  Time was spent on the following: Prep Time, Time Directly with Patient/Family/Caregiver, Documentation Time. Total time spent: 65 mins      DATA   Diagnostic tests and information reviewed for today's visit:  Most recent labs, Medications       Some elements copied from oncology note on 3/4/25, the elements have been updated and all reflect current decision making from today, 3/20/2025.      Plan of Care discussed with: Patient and Family/Significant Other:        SIGNATURE: NUVIA Freitas    Contact information:  Supportive and Palliative Oncology  Monday-Friday 8 AM-5 PM  Phone:  [phone], press option #5, then option #1.   Or Epic Secure Chat

## 2025-03-20 NOTE — PATIENT INSTRUCTIONS
For your bowels:   --take colace 100mg 2x/day as needed for hard stools (stool softener)  --take senna 8.6mg, 1-2tabs, 1-2x/day as needed for constipation (mild/ maintenance laxative)  --take bisacodyl 5-10mg daily at bedtime as needed for constipation. If no response from these medications:  --take Milk of Magnesia 15mL every 4 hrs as needed until bowel movement is achieved  --if no response from milk of magnesia, take lactulose 30 mL up to 4x/day until bowel movement achieved (no more than 4 doses in 24 hr period)    2. For pain:  --start MS Contin (morphine sulfate extended release) 15mg two times a day. This is your long acting, or extended release, pain medication. Please take this medication on a schedule, 2 times a day, regardless of pain levels.   --continue MSIR (morphine sulfate immediate release) 15mg every 4 hrs as NEEDED for pain. This is your short acting, or quick release, pain medication. You may take 2 if necessary, but please call my office to let me know if you are having to do this often, so the  prescription can be adjusted.

## 2025-03-27 ENCOUNTER — LAB (OUTPATIENT)
Dept: HEMATOLOGY/ONCOLOGY | Facility: CLINIC | Age: 70
End: 2025-03-27
Payer: MEDICARE

## 2025-03-27 ENCOUNTER — HOSPITAL ENCOUNTER (OUTPATIENT)
Dept: RADIOLOGY | Facility: HOSPITAL | Age: 70
Discharge: HOME | End: 2025-03-27
Payer: MEDICARE

## 2025-03-27 VITALS
SYSTOLIC BLOOD PRESSURE: 126 MMHG | BODY MASS INDEX: 20.91 KG/M2 | HEART RATE: 86 BPM | DIASTOLIC BLOOD PRESSURE: 86 MMHG | TEMPERATURE: 97.7 F | OXYGEN SATURATION: 97 % | HEIGHT: 70 IN | RESPIRATION RATE: 16 BRPM

## 2025-03-27 DIAGNOSIS — C25.0 MALIGNANT NEOPLASM OF HEAD OF PANCREAS (MULTI): ICD-10-CM

## 2025-03-27 LAB
ALBUMIN SERPL BCP-MCNC: 2.8 G/DL (ref 3.4–5)
ALP SERPL-CCNC: 487 U/L (ref 33–136)
ALT SERPL W P-5'-P-CCNC: 31 U/L (ref 7–45)
ANION GAP SERPL CALC-SCNC: 8 MMOL/L (ref 10–20)
AST SERPL W P-5'-P-CCNC: 50 U/L (ref 9–39)
BASOPHILS # BLD AUTO: 0.02 X10*3/UL (ref 0–0.1)
BASOPHILS NFR BLD AUTO: 0.4 %
BILIRUB SERPL-MCNC: 0.9 MG/DL (ref 0–1.2)
BUN SERPL-MCNC: 11 MG/DL (ref 6–23)
CALCIUM SERPL-MCNC: 8.4 MG/DL (ref 8.6–10.3)
CANCER AG19-9 SERPL-ACNC: 7925.42 U/ML
CHLORIDE SERPL-SCNC: 103 MMOL/L (ref 98–107)
CO2 SERPL-SCNC: 27 MMOL/L (ref 21–32)
CREAT SERPL-MCNC: 0.69 MG/DL (ref 0.5–1.05)
EGFRCR SERPLBLD CKD-EPI 2021: >90 ML/MIN/1.73M*2
EOSINOPHIL # BLD AUTO: 0.21 X10*3/UL (ref 0–0.7)
EOSINOPHIL NFR BLD AUTO: 4.2 %
ERYTHROCYTE [DISTWIDTH] IN BLOOD BY AUTOMATED COUNT: 13.5 % (ref 11.5–14.5)
GLUCOSE SERPL-MCNC: 156 MG/DL (ref 74–99)
HCT VFR BLD AUTO: 29.4 % (ref 36–46)
HGB BLD-MCNC: 9.6 G/DL (ref 12–16)
IMM GRANULOCYTES # BLD AUTO: 0.01 X10*3/UL (ref 0–0.7)
IMM GRANULOCYTES NFR BLD AUTO: 0.2 % (ref 0–0.9)
LYMPHOCYTES # BLD AUTO: 1.06 X10*3/UL (ref 1.2–4.8)
LYMPHOCYTES NFR BLD AUTO: 21 %
MCH RBC QN AUTO: 34.2 PG (ref 26–34)
MCHC RBC AUTO-ENTMCNC: 32.7 G/DL (ref 32–36)
MCV RBC AUTO: 105 FL (ref 80–100)
MONOCYTES # BLD AUTO: 0.52 X10*3/UL (ref 0.1–1)
MONOCYTES NFR BLD AUTO: 10.3 %
NEUTROPHILS # BLD AUTO: 3.23 X10*3/UL (ref 1.2–7.7)
NEUTROPHILS NFR BLD AUTO: 63.9 %
PLATELET # BLD AUTO: 166 X10*3/UL (ref 150–450)
POTASSIUM SERPL-SCNC: 3.7 MMOL/L (ref 3.5–5.3)
PROT SERPL-MCNC: 5.6 G/DL (ref 6.4–8.2)
RBC # BLD AUTO: 2.81 X10*6/UL (ref 4–5.2)
SODIUM SERPL-SCNC: 134 MMOL/L (ref 136–145)
WBC # BLD AUTO: 5.1 X10*3/UL (ref 4.4–11.3)

## 2025-03-27 PROCEDURE — 78815 PET IMAGE W/CT SKULL-THIGH: CPT | Mod: PS

## 2025-03-27 PROCEDURE — 3430000001 HC RX 343 DIAGNOSTIC RADIOPHARMACEUTICALS: Performed by: INTERNAL MEDICINE

## 2025-03-27 PROCEDURE — 85025 COMPLETE CBC W/AUTO DIFF WBC: CPT

## 2025-03-27 PROCEDURE — 2500000004 HC RX 250 GENERAL PHARMACY W/ HCPCS (ALT 636 FOR OP/ED): Performed by: INTERNAL MEDICINE

## 2025-03-27 PROCEDURE — 36591 DRAW BLOOD OFF VENOUS DEVICE: CPT

## 2025-03-27 PROCEDURE — 86301 IMMUNOASSAY TUMOR CA 19-9: CPT | Mod: PORLAB

## 2025-03-27 PROCEDURE — A9552 F18 FDG: HCPCS | Performed by: INTERNAL MEDICINE

## 2025-03-27 PROCEDURE — 80053 COMPREHEN METABOLIC PANEL: CPT

## 2025-03-27 RX ORDER — HEPARIN 100 UNIT/ML
500 SYRINGE INTRAVENOUS AS NEEDED
Status: DISCONTINUED | OUTPATIENT
Start: 2025-03-27 | End: 2025-03-27 | Stop reason: HOSPADM

## 2025-03-27 RX ORDER — HEPARIN SODIUM,PORCINE/PF 10 UNIT/ML
50 SYRINGE (ML) INTRAVENOUS AS NEEDED
OUTPATIENT
Start: 2025-03-27

## 2025-03-27 RX ORDER — FLUDEOXYGLUCOSE F 18 200 MCI/ML
14.2 INJECTION, SOLUTION INTRAVENOUS
Status: COMPLETED | OUTPATIENT
Start: 2025-03-27 | End: 2025-03-27

## 2025-03-27 RX ORDER — HEPARIN 100 UNIT/ML
500 SYRINGE INTRAVENOUS AS NEEDED
OUTPATIENT
Start: 2025-03-27

## 2025-03-27 RX ADMIN — FLUDEOXYGLUCOSE F 18 14.2 MILLICURIE: 200 INJECTION, SOLUTION INTRAVENOUS at 11:56

## 2025-03-27 RX ADMIN — Medication 500 UNITS: at 11:04

## 2025-03-27 ASSESSMENT — PAIN SCALES - GENERAL: PAINLEVEL_OUTOF10: 0-NO PAIN

## 2025-03-27 NOTE — PROGRESS NOTES
Patient is here for port access for CT and labs. Tolerated well. Patient returned for de access. Patient is aware of plan of care and discharged in stable condition.

## 2025-03-28 ENCOUNTER — TELEPHONE (OUTPATIENT)
Dept: PALLIATIVE MEDICINE | Facility: CLINIC | Age: 70
End: 2025-03-28
Payer: MEDICARE

## 2025-03-28 ENCOUNTER — TELEPHONE (OUTPATIENT)
Dept: HEMATOLOGY/ONCOLOGY | Facility: CLINIC | Age: 70
End: 2025-03-28
Payer: MEDICARE

## 2025-03-28 NOTE — TELEPHONE ENCOUNTER
Call returned to J[facility]. Education provided on current pain regimen of MSER 15mg BID and MSIR 15mg every 4 hours as needed. She verbalized understanding and shared that she had not been taking the short acting tabs. She did not  the refill from [facility] dated 3/21. I called [facility] and they will fill this for her today. She will taking the short acting tabs every 4 hours as needed over the weekend and call back next week if pain still uncontrolled.  
Universal Safety Interventions

## 2025-04-01 ENCOUNTER — OFFICE VISIT (OUTPATIENT)
Dept: HEMATOLOGY/ONCOLOGY | Facility: CLINIC | Age: 70
End: 2025-04-01
Payer: MEDICARE

## 2025-04-01 VITALS
WEIGHT: 147.05 LBS | SYSTOLIC BLOOD PRESSURE: 137 MMHG | HEART RATE: 87 BPM | TEMPERATURE: 98.4 F | OXYGEN SATURATION: 97 % | RESPIRATION RATE: 18 BRPM | DIASTOLIC BLOOD PRESSURE: 80 MMHG | BODY MASS INDEX: 21.36 KG/M2

## 2025-04-01 DIAGNOSIS — C25.0 MALIGNANT NEOPLASM OF HEAD OF PANCREAS (MULTI): Primary | ICD-10-CM

## 2025-04-01 PROCEDURE — 1159F MED LIST DOCD IN RCRD: CPT | Performed by: INTERNAL MEDICINE

## 2025-04-01 PROCEDURE — 1126F AMNT PAIN NOTED NONE PRSNT: CPT | Performed by: INTERNAL MEDICINE

## 2025-04-01 PROCEDURE — 1036F TOBACCO NON-USER: CPT | Performed by: INTERNAL MEDICINE

## 2025-04-01 PROCEDURE — 99215 OFFICE O/P EST HI 40 MIN: CPT | Performed by: INTERNAL MEDICINE

## 2025-04-01 PROCEDURE — 3075F SYST BP GE 130 - 139MM HG: CPT | Performed by: INTERNAL MEDICINE

## 2025-04-01 PROCEDURE — 3079F DIAST BP 80-89 MM HG: CPT | Performed by: INTERNAL MEDICINE

## 2025-04-01 RX ORDER — EPINEPHRINE 0.3 MG/.3ML
0.3 INJECTION SUBCUTANEOUS EVERY 5 MIN PRN
OUTPATIENT
Start: 2025-05-21

## 2025-04-01 RX ORDER — FAMOTIDINE 10 MG/ML
20 INJECTION, SOLUTION INTRAVENOUS ONCE AS NEEDED
OUTPATIENT
Start: 2025-05-21

## 2025-04-01 RX ORDER — ALBUTEROL SULFATE 0.83 MG/ML
3 SOLUTION RESPIRATORY (INHALATION) AS NEEDED
OUTPATIENT
Start: 2025-05-21

## 2025-04-01 RX ORDER — EPINEPHRINE 0.3 MG/.3ML
0.3 INJECTION SUBCUTANEOUS EVERY 5 MIN PRN
OUTPATIENT
Start: 2025-04-09

## 2025-04-01 RX ORDER — FAMOTIDINE 10 MG/ML
20 INJECTION, SOLUTION INTRAVENOUS ONCE AS NEEDED
OUTPATIENT
Start: 2025-05-07

## 2025-04-01 RX ORDER — PROCHLORPERAZINE EDISYLATE 5 MG/ML
10 INJECTION INTRAMUSCULAR; INTRAVENOUS EVERY 6 HOURS PRN
OUTPATIENT
Start: 2025-05-07

## 2025-04-01 RX ORDER — ONDANSETRON HYDROCHLORIDE 2 MG/ML
8 INJECTION, SOLUTION INTRAVENOUS ONCE
OUTPATIENT
Start: 2025-05-07

## 2025-04-01 RX ORDER — ALBUTEROL SULFATE 0.83 MG/ML
3 SOLUTION RESPIRATORY (INHALATION) AS NEEDED
OUTPATIENT
Start: 2025-04-23

## 2025-04-01 RX ORDER — ALBUTEROL SULFATE 0.83 MG/ML
3 SOLUTION RESPIRATORY (INHALATION) AS NEEDED
OUTPATIENT
Start: 2025-04-09

## 2025-04-01 RX ORDER — PROCHLORPERAZINE EDISYLATE 5 MG/ML
10 INJECTION INTRAMUSCULAR; INTRAVENOUS EVERY 6 HOURS PRN
OUTPATIENT
Start: 2025-04-23

## 2025-04-01 RX ORDER — DIPHENHYDRAMINE HYDROCHLORIDE 50 MG/ML
50 INJECTION, SOLUTION INTRAMUSCULAR; INTRAVENOUS AS NEEDED
OUTPATIENT
Start: 2025-04-09

## 2025-04-01 RX ORDER — ONDANSETRON HYDROCHLORIDE 2 MG/ML
8 INJECTION, SOLUTION INTRAVENOUS ONCE
OUTPATIENT
Start: 2025-04-23

## 2025-04-01 RX ORDER — FAMOTIDINE 10 MG/ML
20 INJECTION, SOLUTION INTRAVENOUS ONCE AS NEEDED
OUTPATIENT
Start: 2025-04-09

## 2025-04-01 RX ORDER — EPINEPHRINE 0.3 MG/.3ML
0.3 INJECTION SUBCUTANEOUS EVERY 5 MIN PRN
OUTPATIENT
Start: 2025-04-23

## 2025-04-01 RX ORDER — DIPHENHYDRAMINE HYDROCHLORIDE 50 MG/ML
50 INJECTION, SOLUTION INTRAMUSCULAR; INTRAVENOUS AS NEEDED
OUTPATIENT
Start: 2025-04-23

## 2025-04-01 RX ORDER — PROCHLORPERAZINE MALEATE 10 MG
10 TABLET ORAL EVERY 6 HOURS PRN
OUTPATIENT
Start: 2025-04-09

## 2025-04-01 RX ORDER — PROCHLORPERAZINE MALEATE 10 MG
10 TABLET ORAL EVERY 6 HOURS PRN
OUTPATIENT
Start: 2025-04-23

## 2025-04-01 RX ORDER — ALBUTEROL SULFATE 0.83 MG/ML
3 SOLUTION RESPIRATORY (INHALATION) AS NEEDED
OUTPATIENT
Start: 2025-05-07

## 2025-04-01 RX ORDER — ONDANSETRON HYDROCHLORIDE 2 MG/ML
8 INJECTION, SOLUTION INTRAVENOUS ONCE
OUTPATIENT
Start: 2025-04-09

## 2025-04-01 RX ORDER — PROCHLORPERAZINE MALEATE 10 MG
10 TABLET ORAL EVERY 6 HOURS PRN
OUTPATIENT
Start: 2025-05-07

## 2025-04-01 RX ORDER — PROCHLORPERAZINE EDISYLATE 5 MG/ML
10 INJECTION INTRAMUSCULAR; INTRAVENOUS EVERY 6 HOURS PRN
OUTPATIENT
Start: 2025-05-21

## 2025-04-01 RX ORDER — DIPHENHYDRAMINE HYDROCHLORIDE 50 MG/ML
50 INJECTION, SOLUTION INTRAMUSCULAR; INTRAVENOUS AS NEEDED
OUTPATIENT
Start: 2025-05-21

## 2025-04-01 RX ORDER — ONDANSETRON HYDROCHLORIDE 2 MG/ML
8 INJECTION, SOLUTION INTRAVENOUS ONCE
OUTPATIENT
Start: 2025-05-21

## 2025-04-01 RX ORDER — DIPHENHYDRAMINE HYDROCHLORIDE 50 MG/ML
50 INJECTION, SOLUTION INTRAMUSCULAR; INTRAVENOUS AS NEEDED
OUTPATIENT
Start: 2025-05-07

## 2025-04-01 RX ORDER — EPINEPHRINE 0.3 MG/.3ML
0.3 INJECTION SUBCUTANEOUS EVERY 5 MIN PRN
OUTPATIENT
Start: 2025-05-07

## 2025-04-01 RX ORDER — PROCHLORPERAZINE EDISYLATE 5 MG/ML
10 INJECTION INTRAMUSCULAR; INTRAVENOUS EVERY 6 HOURS PRN
OUTPATIENT
Start: 2025-04-09

## 2025-04-01 RX ORDER — FAMOTIDINE 10 MG/ML
20 INJECTION, SOLUTION INTRAVENOUS ONCE AS NEEDED
OUTPATIENT
Start: 2025-04-23

## 2025-04-01 RX ORDER — PROCHLORPERAZINE MALEATE 10 MG
10 TABLET ORAL EVERY 6 HOURS PRN
OUTPATIENT
Start: 2025-05-21

## 2025-04-01 ASSESSMENT — PAIN SCALES - GENERAL: PAINLEVEL_OUTOF10: 0-NO PAIN

## 2025-04-01 ASSESSMENT — ENCOUNTER SYMPTOMS
DEPRESSION: 0
OCCASIONAL FEELINGS OF UNSTEADINESS: 0
LOSS OF SENSATION IN FEET: 0

## 2025-04-01 NOTE — PROGRESS NOTES
Patient ID: Nanci Colón is a 69 y.o. female from [place], [place].     Referring Physician: Francisco Cruz MD  [id number] [place] [address]    Primary Care Provider: Diana Blunt DO    Diagnosis:   Pancreatic cancer 9/2024    Primary Oncologic Surgeon:   Nazia    Primary Medical Oncologist:  Anthony    Primary Radiation Oncologist:  KVNG    Current Therapy:  10/4/24 -  Neoadjuvant Chemo - FOLFIRINOX    Oncologic Surgery History:  None    Oncologic Therapy History:  10/9/24 -  mFOLFIRINOX     Molecular Genetics:  Mmr-P  TP53 splice site SNV, TP53 C135R, MSI-H not detected, TMB not evaluable     Current Sites of Disease:  Pancreatic head    Oncologic Problem List:  Pancreatic cancer  DM2    Oncologic Narrative:  69 y.o. woman who initially presented in mid September 2024 with abdominal pain jaundice MARK and pancreatitis.  She had an ultrasound that showed extrahepatic bile duct dilation and pancreatic duct dilation.  Both of these seem to emanate from the pancreatic head.  A CT scan on September 9, 2024 was concerning for a mass.  MRCP on September 27, 2024 showed an ill-defined mass in the pancreatic head and uncinate process measuring approximately 3.5 cm.  Biopsy of the pancreatic mass from the September 11, 2024 endoscopic ultrasound revealed adenocarcinoma.  She met me for initial consultation regarding treatment planning on 9/26/2024.       Past Medical History: Nanci has a past medical history of Hyperlipidemia, Hypertension, Hypothyroidism, Personal history of other diseases of the circulatory system (09/15/2017), Personal history of other endocrine, nutritional and metabolic disease (02/24/2016), Personal history of other endocrine, nutritional and metabolic disease (03/09/2017), Personal history of other endocrine, nutritional and metabolic disease, Type 2 diabetes mellitus with other skin complications, and Vision loss.  Surgical History:  Nanci has a past surgical history that includes  Other surgical history (09/01/2022); Other surgical history (09/01/2022); and Other surgical history (09/01/2022).  Social History:  Nanci reports that she has never smoked. She has never used smokeless tobacco. She reports that she does not drink alcohol and does not use drugs.  Family History:    Family History   Problem Relation Name Age of Onset    Cancer Mother      Heart disease Father       Family Oncology History:  Cancer-related family history includes Cancer in her mother.      SUBJECTIVE:    History of Present Illness:  Nanci Colón is a 69 y.o. female who was referred by Francisco Cruz MD and presents with follow up.     She is s/p 7 cycles of FOLFIRINOX. She is feeling similar to her last visit. Biggest complaint remains fatigue. Continues to have occasional back pain.        OBJECTIVE:    VS / Pain:  /80   Pulse 87   Temp 36.9 °C (98.4 °F) (Core)   Resp 18   Wt 66.7 kg (147 lb 0.8 oz)   SpO2 97%   BMI 21.36 kg/m²   BSA: 1.81 meters squared   Pain Scale: 0    Daily Weight  04/01/25 : 66.7 kg (147 lb 0.8 oz)  03/20/25 : 65.3 kg (143 lb 15.4 oz)  02/10/25 : 68 kg (150 lb)      Physical Exam  Constitutional:       Appearance: She is normal weight.   HENT:      Nose: Nose normal.      Mouth/Throat:      Mouth: Mucous membranes are moist.      Pharynx: Oropharynx is clear.   Eyes:      Extraocular Movements: Extraocular movements intact.      Conjunctiva/sclera: Conjunctivae normal.   Cardiovascular:      Rate and Rhythm: Normal rate.   Pulmonary:      Effort: Pulmonary effort is normal.      Breath sounds: Normal breath sounds.   Abdominal:      General: Abdomen is flat. Bowel sounds are normal.   Musculoskeletal:         General: Normal range of motion.   Skin:     General: Skin is warm.   Neurological:      General: No focal deficit present.      Mental Status: She is alert. Mental status is at baseline.   Psychiatric:         Mood and Affect: Mood normal.         Thought Content:  Thought content normal.         Judgment: Judgment normal.         Performance Status:   ECOG 1    Diagnostic Results         WBC   Date/Time Value Ref Range Status   03/27/2025 11:04 AM 5.1 4.4 - 11.3 x10*3/uL Final   03/04/2025 12:20 PM 5.2 4.4 - 11.3 x10*3/uL Final   02/18/2025 02:01 PM 6.9 4.4 - 11.3 x10*3/uL Final     Hemoglobin   Date Value Ref Range Status   03/27/2025 9.6 (L) 12.0 - 16.0 g/dL Final   03/04/2025 9.2 (L) 12.0 - 16.0 g/dL Final   02/18/2025 9.1 (L) 12.0 - 16.0 g/dL Final     MCV   Date/Time Value Ref Range Status   03/27/2025 11:04  (H) 80 - 100 fL Final   03/04/2025 12:20  (H) 80 - 100 fL Final   02/18/2025 02:01  (H) 80 - 100 fL Final     Platelets   Date/Time Value Ref Range Status   03/27/2025 11:04  150 - 450 x10*3/uL Final   03/04/2025 12:20  150 - 450 x10*3/uL Final   02/18/2025 02:01  150 - 450 x10*3/uL Final     Neutrophils Absolute   Date/Time Value Ref Range Status   03/27/2025 11:04 AM 3.23 1.20 - 7.70 x10*3/uL Final     Comment:     Percent differential counts (%) should be interpreted in the context of the absolute cell counts (cells/uL).   03/04/2025 12:20 PM 3.12 1.20 - 7.70 x10*3/uL Final     Comment:     Percent differential counts (%) should be interpreted in the context of the absolute cell counts (cells/uL).   02/18/2025 02:01 PM 4.83 1.20 - 7.70 x10*3/uL Final     Comment:     Percent differential counts (%) should be interpreted in the context of the absolute cell counts (cells/uL).     Bilirubin, Total   Date/Time Value Ref Range Status   03/27/2025 11:04 AM 0.9 0.0 - 1.2 mg/dL Final   03/04/2025 12:20 PM 0.4 0.0 - 1.2 mg/dL Final   02/18/2025 02:01 PM 0.5 0.0 - 1.2 mg/dL Final     AST   Date/Time Value Ref Range Status   03/27/2025 11:04 AM 50 (H) 9 - 39 U/L Final   03/04/2025 12:20 PM 26 9 - 39 U/L Final   02/18/2025 02:01  (H) 9 - 39 U/L Final     ALT   Date/Time Value Ref Range Status   03/27/2025 11:04 AM 31 7 - 45 U/L Final  "    Comment:     Patients treated with Sulfasalazine may generate falsely decreased results for ALT.   03/04/2025 12:20 PM 31 7 - 45 U/L Final     Comment:     Patients treated with Sulfasalazine may generate falsely decreased results for ALT.   02/18/2025 02:01 PM 48 (H) 7 - 45 U/L Final     Comment:     Patients treated with Sulfasalazine may generate falsely decreased results for ALT.     Creatinine   Date/Time Value Ref Range Status   03/27/2025 11:04 AM 0.69 0.50 - 1.05 mg/dL Final   03/04/2025 12:20 PM 0.83 0.50 - 1.05 mg/dL Final   02/18/2025 02:01 PM 0.82 0.50 - 1.05 mg/dL Final     Urea Nitrogen   Date/Time Value Ref Range Status   03/27/2025 11:04 AM 11 6 - 23 mg/dL Final   03/04/2025 12:20 PM 14 6 - 23 mg/dL Final   02/18/2025 02:01 PM 12 6 - 23 mg/dL Final     Albumin   Date/Time Value Ref Range Status   03/27/2025 11:04 AM 2.8 (L) 3.4 - 5.0 g/dL Final   03/04/2025 12:20 PM 2.8 (L) 3.4 - 5.0 g/dL Final   02/18/2025 02:01 PM 2.8 (L) 3.4 - 5.0 g/dL Final     Cancer AG 19-9   Date/Time Value Ref Range Status   03/27/2025 11:04 AM 7,925.42 (H) <35.00 U/mL Final   03/04/2025 12:20 PM 2,199.55 (H) <35.00 U/mL Final   02/18/2025 02:01 .79 (H) <35.00 U/mL Final   02/10/2025 11:41 .27 (H) <35.00 U/mL Final   02/03/2025 02:38 .66 (H) <35.00 U/mL Final   01/20/2025 12:52 .22 (H) <35.00 U/mL Final   12/31/2024 01:19 PM 1,391.30 (H) <35.00 U/mL Final   12/17/2024 03:04 .09 (H) <35.00 U/mL Final   12/02/2024 03:00 .06 (H) <35.00 U/mL Final   11/25/2024 10:33 .63 (H) <35.00 U/mL Final     No results found for: \"CEA\"    === 02/19/25 ===    CT CHEST ABDOMEN PELVIS W IV CONTRAST    - Impression -  Pancreatic cancer restaging scan with evidence of worsening  intrathoracic metastatic disease burden.    CHEST:  1.  Multiple new bilateral spiculated pulmonary nodules which, in the setting of rising CA 19-9 levels, raises concern for pulmonary  metastasis.  2. No thoracic " lymphadenopathy.    ABDOMEN-PELVIS:  1. Indeterminate ill-defined hepatic segment 4B hypodensity, likely reflect a focal marked hepatic steatosis. However, in the setting of rising CA 19-9 levels, consider MRI liver for complete characterization.  2. Similar size and appearance of the primary pancreatic  head/uncinate process mass with satellite peripancreatic soft tissue deposit adjacent to the superior mesenteric vessels.  3. Interval development of diffuse hepatic steatosis, likely  treatment related given ongoing chemotherapy.  4. No enlarged or suspicious abdominopelvic lymphadenopathy.  5. Additional findings as above.    I personally reviewed the images/study and I agree with the findings  as stated by Radiology resident.    MACRO:  None    Signed by: Areli Marcano 2/21/2025 4:36 PM  Dictation workstation:   [id number]    3/27/25 PET-CT  IMPRESSION:  1. FDG avid lesion in the pancreatic head/uncinate process ,  compatible with biopsy-proven pancreatic neoplasm.  2. FDG avid left supraclavicular, superior mediastinal ,  retroperitoneal and mesenteric nodes as described above, suggestive  of elina metastases.  3. Mild focal FDG avidity within hepatic segment 5, indeterminate.  The hepatic segment 4B lesion seen on prior examination is not FDG  avid.      Assessment/Plan   69 y.o.  woman with a resectable pancreatic cancer but elevated CA 19-9 and recent pancreatitis so we will plan for neoadjuvant chemotherapy with modified FOLFIRINOX.   9/30/24 - diagnostic lab negative   10/6/24 - 1st dose mFOLFIRINOX  - tolerated with fatigue, nausea   Added fosaprepitant to C2 - Allergic rxn to fosaprepitant - discontinue  C3 held due to ANC of 500    - plan for C3 with addition of neulasta   C4   S/p 7 cycles:   - Ca19-9 is uptrending 507>549>621>2200>7925  - CT from 2/19/25 is read as showing multiple new pulmonary nodules (and an ill-defined hepatic segment 4B hypodensity that likely reflects a focal hepatic steatosis) In  conjunction with the uptrend in the Ca19-9 there is concern for disease progression, however, patient did have a recent respiratory illness and her disease is stable in the pancreas and surrounding area. At her last visit, we reviewed stopping her mFOLFIRINOX given her elevated LFTs and hepatic steatosis are likely a direct consequence of her chemotherapy and lack of response.   - PET-CT is difficult to interpret given her primary lesion is only mildly PET-avid; there are mildly avid left supraclavicular, mediastinal, RP and mesenteric nodes, but given continued rise in Ca19-9, there is concern for disease progression  - discussed switching chemotherapy to gem/abraxane. Reviewed the dosing schedule, rationale for use, and possible adverse events. Plan to start next week on an every other week schedule    Cancer related pain:   -on oxycodone 15mg   -Supportive oncology following     DM:   -Continue metformin, however keep close eye on renal function.    - Endocrinology scheduled for April    Patient seen and discussed with Dr. Cruz.     Shorty Sanford MD  Hematology Oncology PGYVI

## 2025-04-02 DIAGNOSIS — C25.0 MALIGNANT NEOPLASM OF HEAD OF PANCREAS (MULTI): ICD-10-CM

## 2025-04-07 ENCOUNTER — LAB (OUTPATIENT)
Dept: LAB | Facility: HOSPITAL | Age: 70
End: 2025-04-07
Payer: MEDICARE

## 2025-04-07 ENCOUNTER — TELEPHONE (OUTPATIENT)
Dept: HEMATOLOGY/ONCOLOGY | Facility: HOSPITAL | Age: 70
End: 2025-04-07

## 2025-04-07 DIAGNOSIS — C25.0 MALIGNANT NEOPLASM OF HEAD OF PANCREAS (MULTI): Primary | ICD-10-CM

## 2025-04-07 LAB
ALBUMIN SERPL BCP-MCNC: 2.9 G/DL (ref 3.4–5)
ALP SERPL-CCNC: 819 U/L (ref 33–136)
ALT SERPL W P-5'-P-CCNC: 38 U/L (ref 7–45)
ANION GAP SERPL CALC-SCNC: 9 MMOL/L (ref 10–20)
AST SERPL W P-5'-P-CCNC: 63 U/L (ref 9–39)
BASOPHILS # BLD AUTO: 0.04 X10*3/UL (ref 0–0.1)
BASOPHILS NFR BLD AUTO: 0.7 %
BILIRUB SERPL-MCNC: 2.1 MG/DL (ref 0–1.2)
BUN SERPL-MCNC: 10 MG/DL (ref 6–23)
CALCIUM SERPL-MCNC: 8.3 MG/DL (ref 8.6–10.3)
CHLORIDE SERPL-SCNC: 100 MMOL/L (ref 98–107)
CO2 SERPL-SCNC: 29 MMOL/L (ref 21–32)
CREAT SERPL-MCNC: 0.7 MG/DL (ref 0.5–1.05)
EGFRCR SERPLBLD CKD-EPI 2021: >90 ML/MIN/1.73M*2
EOSINOPHIL # BLD AUTO: 0.1 X10*3/UL (ref 0–0.7)
EOSINOPHIL NFR BLD AUTO: 1.8 %
ERYTHROCYTE [DISTWIDTH] IN BLOOD BY AUTOMATED COUNT: 14.2 % (ref 11.5–14.5)
GLUCOSE SERPL-MCNC: 185 MG/DL (ref 74–99)
HCT VFR BLD AUTO: 32.7 % (ref 36–46)
HGB BLD-MCNC: 10.5 G/DL (ref 12–16)
IMM GRANULOCYTES # BLD AUTO: 0.02 X10*3/UL (ref 0–0.7)
IMM GRANULOCYTES NFR BLD AUTO: 0.4 % (ref 0–0.9)
LYMPHOCYTES # BLD AUTO: 1.21 X10*3/UL (ref 1.2–4.8)
LYMPHOCYTES NFR BLD AUTO: 22.1 %
MCH RBC QN AUTO: 33.8 PG (ref 26–34)
MCHC RBC AUTO-ENTMCNC: 32.1 G/DL (ref 32–36)
MCV RBC AUTO: 105 FL (ref 80–100)
MONOCYTES # BLD AUTO: 0.46 X10*3/UL (ref 0.1–1)
MONOCYTES NFR BLD AUTO: 8.4 %
NEUTROPHILS # BLD AUTO: 3.65 X10*3/UL (ref 1.2–7.7)
NEUTROPHILS NFR BLD AUTO: 66.6 %
NRBC BLD-RTO: 0 /100 WBCS (ref 0–0)
PLATELET # BLD AUTO: 246 X10*3/UL (ref 150–450)
POTASSIUM SERPL-SCNC: 4.4 MMOL/L (ref 3.5–5.3)
PROT SERPL-MCNC: 5.7 G/DL (ref 6.4–8.2)
RBC # BLD AUTO: 3.11 X10*6/UL (ref 4–5.2)
SODIUM SERPL-SCNC: 134 MMOL/L (ref 136–145)
WBC # BLD AUTO: 5.5 X10*3/UL (ref 4.4–11.3)

## 2025-04-07 PROCEDURE — 80053 COMPREHEN METABOLIC PANEL: CPT

## 2025-04-07 PROCEDURE — 86301 IMMUNOASSAY TUMOR CA 19-9: CPT

## 2025-04-07 PROCEDURE — 85025 COMPLETE CBC W/AUTO DIFF WBC: CPT

## 2025-04-07 PROCEDURE — 36415 COLL VENOUS BLD VENIPUNCTURE: CPT

## 2025-04-08 LAB — CANCER AG19-9 SERPL-ACNC: ABNORMAL U/ML

## 2025-04-09 ENCOUNTER — INFUSION (OUTPATIENT)
Dept: HEMATOLOGY/ONCOLOGY | Facility: CLINIC | Age: 70
End: 2025-04-09
Payer: MEDICARE

## 2025-04-09 VITALS
HEIGHT: 64 IN | WEIGHT: 143.08 LBS | DIASTOLIC BLOOD PRESSURE: 84 MMHG | HEART RATE: 104 BPM | RESPIRATION RATE: 17 BRPM | TEMPERATURE: 97.2 F | OXYGEN SATURATION: 96 % | SYSTOLIC BLOOD PRESSURE: 118 MMHG | BODY MASS INDEX: 24.43 KG/M2

## 2025-04-09 DIAGNOSIS — C25.0 MALIGNANT NEOPLASM OF HEAD OF PANCREAS (MULTI): Primary | ICD-10-CM

## 2025-04-09 PROCEDURE — 96413 CHEMO IV INFUSION 1 HR: CPT

## 2025-04-09 PROCEDURE — 2500000004 HC RX 250 GENERAL PHARMACY W/ HCPCS (ALT 636 FOR OP/ED): Performed by: INTERNAL MEDICINE

## 2025-04-09 PROCEDURE — 96375 TX/PRO/DX INJ NEW DRUG ADDON: CPT | Mod: INF

## 2025-04-09 PROCEDURE — 96417 CHEMO IV INFUS EACH ADDL SEQ: CPT

## 2025-04-09 RX ORDER — FAMOTIDINE 10 MG/ML
20 INJECTION, SOLUTION INTRAVENOUS ONCE AS NEEDED
Status: DISCONTINUED | OUTPATIENT
Start: 2025-04-09 | End: 2025-04-09 | Stop reason: ALTCHOICE

## 2025-04-09 RX ORDER — HEPARIN 100 UNIT/ML
500 SYRINGE INTRAVENOUS AS NEEDED
OUTPATIENT
Start: 2025-04-09

## 2025-04-09 RX ORDER — EPINEPHRINE 0.3 MG/.3ML
0.3 INJECTION SUBCUTANEOUS EVERY 5 MIN PRN
Status: DISCONTINUED | OUTPATIENT
Start: 2025-04-09 | End: 2025-04-09 | Stop reason: ALTCHOICE

## 2025-04-09 RX ORDER — HEPARIN SODIUM,PORCINE/PF 10 UNIT/ML
50 SYRINGE (ML) INTRAVENOUS AS NEEDED
OUTPATIENT
Start: 2025-04-09

## 2025-04-09 RX ORDER — FAMOTIDINE 10 MG/ML
20 INJECTION, SOLUTION INTRAVENOUS ONCE AS NEEDED
Status: DISCONTINUED | OUTPATIENT
Start: 2025-04-09 | End: 2025-04-09 | Stop reason: HOSPADM

## 2025-04-09 RX ORDER — HEPARIN 100 UNIT/ML
500 SYRINGE INTRAVENOUS AS NEEDED
Status: DISCONTINUED | OUTPATIENT
Start: 2025-04-09 | End: 2025-04-09 | Stop reason: HOSPADM

## 2025-04-09 RX ORDER — ONDANSETRON HYDROCHLORIDE 2 MG/ML
8 INJECTION, SOLUTION INTRAVENOUS ONCE
Status: COMPLETED | OUTPATIENT
Start: 2025-04-09 | End: 2025-04-09

## 2025-04-09 RX ORDER — ONDANSETRON HYDROCHLORIDE 2 MG/ML
8 INJECTION, SOLUTION INTRAVENOUS ONCE
Status: CANCELLED | OUTPATIENT
Start: 2025-04-09

## 2025-04-09 RX ORDER — PROCHLORPERAZINE MALEATE 10 MG
10 TABLET ORAL EVERY 6 HOURS PRN
Status: DISCONTINUED | OUTPATIENT
Start: 2025-04-09 | End: 2025-04-09 | Stop reason: HOSPADM

## 2025-04-09 RX ORDER — PROCHLORPERAZINE EDISYLATE 5 MG/ML
10 INJECTION INTRAMUSCULAR; INTRAVENOUS EVERY 6 HOURS PRN
Status: CANCELLED | OUTPATIENT
Start: 2025-04-09

## 2025-04-09 RX ORDER — DIPHENHYDRAMINE HYDROCHLORIDE 50 MG/ML
50 INJECTION, SOLUTION INTRAMUSCULAR; INTRAVENOUS AS NEEDED
Status: DISCONTINUED | OUTPATIENT
Start: 2025-04-09 | End: 2025-04-09 | Stop reason: HOSPADM

## 2025-04-09 RX ORDER — ALBUTEROL SULFATE 0.83 MG/ML
3 SOLUTION RESPIRATORY (INHALATION) AS NEEDED
Status: DISCONTINUED | OUTPATIENT
Start: 2025-04-09 | End: 2025-04-09 | Stop reason: ALTCHOICE

## 2025-04-09 RX ORDER — FAMOTIDINE 10 MG/ML
20 INJECTION, SOLUTION INTRAVENOUS ONCE AS NEEDED
Status: CANCELLED | OUTPATIENT
Start: 2025-04-09

## 2025-04-09 RX ORDER — ALBUTEROL SULFATE 0.83 MG/ML
3 SOLUTION RESPIRATORY (INHALATION) AS NEEDED
Status: CANCELLED | OUTPATIENT
Start: 2025-04-09

## 2025-04-09 RX ORDER — DIPHENHYDRAMINE HYDROCHLORIDE 50 MG/ML
50 INJECTION, SOLUTION INTRAMUSCULAR; INTRAVENOUS AS NEEDED
Status: DISCONTINUED | OUTPATIENT
Start: 2025-04-09 | End: 2025-04-09 | Stop reason: ALTCHOICE

## 2025-04-09 RX ORDER — ALBUTEROL SULFATE 0.83 MG/ML
3 SOLUTION RESPIRATORY (INHALATION) AS NEEDED
Status: DISCONTINUED | OUTPATIENT
Start: 2025-04-09 | End: 2025-04-09 | Stop reason: HOSPADM

## 2025-04-09 RX ORDER — PROCHLORPERAZINE MALEATE 10 MG
10 TABLET ORAL EVERY 6 HOURS PRN
Status: CANCELLED | OUTPATIENT
Start: 2025-04-09

## 2025-04-09 RX ORDER — PROCHLORPERAZINE EDISYLATE 5 MG/ML
10 INJECTION INTRAMUSCULAR; INTRAVENOUS EVERY 6 HOURS PRN
Status: DISCONTINUED | OUTPATIENT
Start: 2025-04-09 | End: 2025-04-09 | Stop reason: ALTCHOICE

## 2025-04-09 RX ORDER — ONDANSETRON HYDROCHLORIDE 2 MG/ML
8 INJECTION, SOLUTION INTRAVENOUS ONCE
Status: DISCONTINUED | OUTPATIENT
Start: 2025-04-09 | End: 2025-04-09 | Stop reason: ALTCHOICE

## 2025-04-09 RX ORDER — EPINEPHRINE 0.3 MG/.3ML
0.3 INJECTION SUBCUTANEOUS EVERY 5 MIN PRN
Status: DISCONTINUED | OUTPATIENT
Start: 2025-04-09 | End: 2025-04-09 | Stop reason: HOSPADM

## 2025-04-09 RX ORDER — DIPHENHYDRAMINE HYDROCHLORIDE 50 MG/ML
50 INJECTION, SOLUTION INTRAMUSCULAR; INTRAVENOUS AS NEEDED
Status: CANCELLED | OUTPATIENT
Start: 2025-04-09

## 2025-04-09 RX ORDER — EPINEPHRINE 0.3 MG/.3ML
0.3 INJECTION SUBCUTANEOUS EVERY 5 MIN PRN
Status: CANCELLED | OUTPATIENT
Start: 2025-04-09

## 2025-04-09 RX ORDER — PROCHLORPERAZINE MALEATE 10 MG
10 TABLET ORAL EVERY 6 HOURS PRN
Status: DISCONTINUED | OUTPATIENT
Start: 2025-04-09 | End: 2025-04-09 | Stop reason: ALTCHOICE

## 2025-04-09 RX ORDER — PROCHLORPERAZINE EDISYLATE 5 MG/ML
10 INJECTION INTRAMUSCULAR; INTRAVENOUS EVERY 6 HOURS PRN
Status: DISCONTINUED | OUTPATIENT
Start: 2025-04-09 | End: 2025-04-09 | Stop reason: HOSPADM

## 2025-04-09 RX ADMIN — GEMCITABINE 1447.8 MG: 38 INJECTION, SOLUTION INTRAVENOUS at 12:52

## 2025-04-09 RX ADMIN — PACLITAXEL 145 MG: 100 INJECTION, POWDER, LYOPHILIZED, FOR SUSPENSION INTRAVENOUS at 12:05

## 2025-04-09 RX ADMIN — HEPARIN 500 UNITS: 100 SYRINGE at 13:27

## 2025-04-09 RX ADMIN — ONDANSETRON 8 MG: 2 INJECTION INTRAMUSCULAR; INTRAVENOUS at 11:36

## 2025-04-09 ASSESSMENT — PAIN SCALES - GENERAL: PAINLEVEL_OUTOF10: 2

## 2025-04-09 NOTE — PROGRESS NOTES
Educated on treatment schedule including follow-up appointments including lab and physician visits. Educated on missed appointment policy with expectations for rescheduling or canceling. Educated on medication side effects both short and long term, supportive medications, and signs and symptoms to report. Red bag given to the patient and information reviewed. Educated on safe handling of bodily secretions and waste in the home.    Nanci Colón self ambulated to [facility] for first dose Abraxane and Gemcitavine .  Pt tolerated treatment without incident.  Gait steady upon DC home.  Nanci Colón denies further questions/concerns/comments and agrees to POC at [place] going forward.

## 2025-04-10 ENCOUNTER — TELEMEDICINE (OUTPATIENT)
Dept: PALLIATIVE MEDICINE | Facility: CLINIC | Age: 70
End: 2025-04-10
Payer: MEDICARE

## 2025-04-10 DIAGNOSIS — K59.03 THERAPEUTIC OPIOID INDUCED CONSTIPATION: ICD-10-CM

## 2025-04-10 DIAGNOSIS — G89.3 CANCER RELATED PAIN: ICD-10-CM

## 2025-04-10 DIAGNOSIS — Z51.5 PALLIATIVE CARE ENCOUNTER: Primary | ICD-10-CM

## 2025-04-10 DIAGNOSIS — Z51.81 ENCOUNTER FOR MONITORING OPIOID MAINTENANCE THERAPY: ICD-10-CM

## 2025-04-10 DIAGNOSIS — Z79.891 ENCOUNTER FOR MONITORING OPIOID MAINTENANCE THERAPY: ICD-10-CM

## 2025-04-10 DIAGNOSIS — R10.9 ABDOMINAL CRAMPING: ICD-10-CM

## 2025-04-10 DIAGNOSIS — T40.2X5A THERAPEUTIC OPIOID INDUCED CONSTIPATION: ICD-10-CM

## 2025-04-10 PROCEDURE — 99214 OFFICE O/P EST MOD 30 MIN: CPT | Performed by: NURSE PRACTITIONER

## 2025-04-10 PROCEDURE — 3051F HG A1C>EQUAL 7.0%<8.0%: CPT | Performed by: NURSE PRACTITIONER

## 2025-04-10 RX ORDER — DOCUSATE SODIUM 100 MG/1
100 CAPSULE, LIQUID FILLED ORAL 2 TIMES DAILY PRN
Qty: 60 CAPSULE | Refills: 3 | Status: SHIPPED | OUTPATIENT
Start: 2025-04-10

## 2025-04-10 RX ORDER — DICYCLOMINE HYDROCHLORIDE 10 MG/1
10 CAPSULE ORAL 4 TIMES DAILY PRN
Qty: 120 CAPSULE | Refills: 3 | Status: SHIPPED | OUTPATIENT
Start: 2025-04-10 | End: 2025-05-10

## 2025-04-10 RX ORDER — MORPHINE SULFATE 15 MG/1
15 TABLET, FILM COATED, EXTENDED RELEASE ORAL 2 TIMES DAILY
Qty: 60 TABLET | Refills: 0 | Status: SHIPPED | OUTPATIENT
Start: 2025-04-10 | End: 2025-05-10

## 2025-04-10 NOTE — PROGRESS NOTES
SUPPORTIVE AND PALLIATIVE ONCOLOGY OUTPATIENT FOLLOW-UP      SERVICE DATE: 4/10/2025    Virtual or Telephone Consent    While technically available, the patient was unable or unwilling to consent to connect via audio/video telehealth technology; therefore, I performed this visit using a real-time audio only connection between Nanci Colón & NUVIA Freitas.  Verbal consent was requested and obtained from Nanci Colón on this date, 04/10/25 for a telehealth visit and the patient's location was confirmed at the time of the visit.     Cancer History   Pancreatic CA  -s/p 7 cycles FOLFIRINOX     Onc: [facility]    Subjective   HISTORY OF PRESENT ILLNESS: Nanci Colón is a 69 y.o. female with Pmhx of HTN, hypothyroidism, DMII, diabetic neuropathy, Chiari malformation (type 1), and pancreatic CA.      She presents to supportive oncology for follow up for pain and symptom management.     Spoke with pt on the phone for FUV.     Pain Assessment:  Pain Score:    Location:    Education:      Symptom Assessment:  {ROS - Unable to Obtain:74288}  Pain:{ :43658}  Headache: {  :70836}  Dizziness:{ :66906}  Lack of energy: { :05452}  Difficulty sleeping: { :46818}  Worrying: {  :22716}  Anxiety: { :27383}  Depression: { :03676}  Pain in mouth/swallowing: { :93407}  Dry mouth: { :44700}  Taste changes: { :39336}  Shortness of breath: { :46829}  Lack of appetite: {  :02390}   Nausea: { :07907}  Vomiting: { :20067}  Constipation: { :86295}  Diarrhea: { :55384}  Sore muscles: { :22123}  Numbness or tingling in hands/feet/other: { :19154}  Weight loss: { :77043}  Other: { :13059}      Information obtained from: { :30288}  ______________________________________________________________________        Objective       {If you would like to pull in Lab results for the last 24 hours, type .[id number] :99}  {If you would like to pull in Imaging results, type .imgrslt :99}       PHYSICAL EXAMINATION   Vital Signs:   Vital signs  reviewed  There were no vitals filed for this visit.  Pain Score:        Physical Exam    ASSESSMENT/PLAN    Pain  Pain is: { :97937}  Type: { :75228}  Pain control: { :74178}  Home regimen: ***  Intolerances/previously tried: ***  Personalized pain goal: ***  ORT-OUD Score: Total Score:    due to { :77293}  indicating Opioid Risk Category:   of future opioid use disorder.   ***    Opioid Use  Medication Management:   - OARRS report reviewed with no aberrant behavior; consistent with  prescriptions/records and patient history  - MED ***.  Overdose Risk Score ***.   This has been discussed with patient.   - We will continue to closely monitor the patient for signs of prescription misuse including UDS, OARRS review and subjective reports at each visit.  - *** concurrent benzodiazepine use   - I am a provider who either is or has consulted and collaborated with a provider certified in Hospice and Palliative Medicine and have conducted a face-face visit and examination for this patient.  - Routine Urine Drug Screen completed *** appropriately positive for opioids and negative for illicit substances  - Controlled Substance Agreement completed ***  - Specifically discussed that controlled substance prescriptions will only be provided by our group as outlined in the completed agreement  - Prescribed naloxone ***  - Red Flags: ***     Nausea   { :90854} nausea {with or without:83856} vomiting related to { :64656} ***    Constipation  At risk for constipation related to opioids, ***,  { :53608}  Usual bowel pattern: ***  Home regimen: ***  LBM ***   ***     Altered Mood  {Acute/Chronic:89534} {anxiety/depression:96564} related to {related to:62585}   {controlled/uncontrolled:81405} with home regimen  Home regimen: ***    Sleeping Difficulty:  Impaired sleep related to ***  Home regimen:  ***  ***    Decreased appetite  Related to { :97583}  Nutrition { :31687}  Weight loss ***  Home regimen:  ***  ***    Supportive  Interventions: { :44787}    Supportive and Palliative Oncology encounter:  Spoke with *** at bedside  Emotional support provided  Coordination of care  We will continue to follow and address symptoms as needed    Medical Decision Making/Goals of Care/Advance Care Planning:  Patient's current clinical condition, including diagnosis, prognosis, and management plan, and goals of care were discussed.   Life limiting disease: { :44165}  Family: Supportive ***  Performance status: Major limitations due to { :67104}  Joys/meaning/strength: ***  Understanding of health: Demonstrates good prognostic understanding of disease process, understands plan for ***  Information:{ :34188}  Medical update:***  Prognosis:***  Goals: {Goals:23575}  Worries and fears now and future: { :50623}   Minimum acceptable outcome/QOL:  ***  Code status discussion:  ***    Advance Directives  Existence of Advance Directives:{ :12655}  Decision maker: { :50991} ***  Code Status: { :18296}    Next Follow-Up Visit:  Return to clinic in ***    Signature and billing  Medical complexity was { :67454} level due to due to complexity of problems, extensive data review, and high risk of management/treatment.  Time was spent on the following: { :97887}. Total time spent: ***      Data  Diagnostic tests and information reviewed for today's visit:  { :50165}         Some elements copied from *** note on ***, the elements have been updated and all reflect current decision making from today, 4/10/2025.      Plan of Care discussed with: { :79695}    SIGNATURE: Connie Cerda, WENDIE-CNP    Contact information:  Supportive and Palliative Oncology  Monday-Friday 8 AM-5 PM  Phone:  [phone], press option #5, then option #1.   Or Epic Secure Chat          4/10/2025.      Plan of Care discussed with: Patient    SIGNATURE: WENDIE Freitas-CNP    Contact information:  Supportive and Palliative Oncology  Monday-Friday 8 AM-5 PM  Phone:  [phone], press option #5, then option #1.   Or Epic Secure Chat

## 2025-04-11 ENCOUNTER — TELEPHONE (OUTPATIENT)
Dept: PALLIATIVE MEDICINE | Facility: CLINIC | Age: 70
End: 2025-04-11
Payer: MEDICARE

## 2025-04-11 NOTE — TELEPHONE ENCOUNTER
I spoke to patient regarding information to be mailed regarding anatomical gift donation. Patient appreciative of information.   
Refer to the Assessment tab to view/cancel completed assessment.

## 2025-04-15 LAB
ALBUMIN/CREAT UR: 15 MG/G CREAT
CREAT UR-MCNC: 157 MG/DL (ref 20–275)
MICROALBUMIN UR-MCNC: 2.4 MG/DL
T4 FREE SERPL-MCNC: 1 NG/DL (ref 0.8–1.8)
TSH SERPL-ACNC: 24.4 MIU/L (ref 0.4–4.5)

## 2025-04-16 NOTE — PROGRESS NOTES
"Endocrinology  04/17/25       History of Present Illness   Nanci Colón is a 69 y.o. year old female with medical history of pancreatic adenocarcinoma, T2DM, HTN, HLD, and hypothyroidism here for T2DM management.     Interim hx:  Since our visit in 1/2025, the patient has seen oncology multiple times. Unfortunately, 2/2025 CT demonstrates several new pulmonary nodules and CA 19-9 has increased suggestive of disease progression. In 4/2025 they discussed switching her chemo from mFOLFIRINOX to gem/abraxane. She received her first infusion of new regimen on 4/9. Of note, dexamethasone was not given.    Current TRT: 75 mcg daily  Ca/Mg/Fe/biotin/PPI: takes a multivitamin  Waits 30-60 minutes prior to eating breakfast after laking LT4.  Recent thyroid labs demonstrated uncontrolled hypothyroidism.     Says her appetite is somewhat better because her taste is recovering. Feels that this chemo makes her tired and weak. She also feels as though there is \"a fog going on in my brain.\"    Has only been intermittently taking the metformin because she had some hypoglycemia to the 60s.       BG log:          HPI:  She presented to clinic 1/2025 to establish care for T2DM.     She follows with Dr. Cruz for oncology care. She presented in mid September with abdominal pain, jaundice, MARK and pancreatitis. She had an ultrasound that demonstrated extrahepatic bile duct dilation and pancreatic head dilation. Both seem to emanate from the pancreatic head. 9/9/24 CT scan was c/f a mass. MRCP oerfirned 9/27/24 showed an ill-defined mass in the pancreatic head and uncinate process. Bx was positive for adenocarcinoma. She is currently receiving FOLFIRINOX therapy, on which her diabetes has been uncontrolled. On chemo days she receives 12 mg dexamethasone (last infusion 1/6/25). Her most recent HbA1c is 8.0% (9/2024). She is currently anemic and so HbA1c is not accurate.    Today the patient states, feeling a little \"spacy and " "tired.\"    Prior to cancer diagnosis she noted her BG was becoming out of control. Previously controlled with just metformin. Was taken off metformin around the time of her pancreatitis hospitalization due to poor renal function and restarted by her oncologist. FBG at the time the patient established care were  on non chemo days and >200 on the morning following chemo.      Diabetes Summary   Diagnosis Date:   16-17 yrs ago (around 2008)         Glucose Meter: One Touch Flex  Diabetes Tech (CGM or pump):  none            Eye Exam: over a year ago  Foot Exam:    has never had one                  Dental exam:     a couple years              Diet:   suffering from poor appetite due to bad taste            Her current regimen is as follows:   Orals: metformin 500 mg BID         Injectables: none  Insulin: none  Hx of pancreatitis: yes likely 2/2 malignancy  Hx of medullary thyroid cancer: denies  Complications: none  Last episode of DKA or hyperosmolar coma:   denies    BG Evaluation  Home blood glucose log:  Frequency of BG checks:  Fasting values:    Non-fasting values: 105-191    Hypoglycemia:  Frequency and timing of hypoglycemia: fasting  Time of day hypoglycemia usually occurs: only takes fasting BG  Severity of hypoglycemic episode: mild (dizzy, weak)  Hypoglycemic threshold: 60s  Last call to EMS or hospitalization: no    Review of Systems   10 pt ROS negative other than mentioned in HPI       Medications     Current Outpatient Medications   Medication Instructions    atorvastatin (LIPITOR) 40 mg, oral, Every other day    dicyclomine (BENTYL) 10 mg, oral, 4 times daily PRN    docusate sodium (COLACE) 100 mg, oral, 2 times daily PRN    lactulose 10 g, oral, 3 times daily PRN, Only take as many times as needed for a BM    lipase-protease-amylase (Creon) 24,000-76,000 -120,000 unit capsule 2 capsules, oral, 3 times daily (morning, midday, late afternoon), With meals and 1 with snacks for 8 per day    " metFORMIN (GLUCOPHAGE) 500 mg, oral, 2 times daily (morning and late afternoon)    metoprolol succinate XL (TOPROL-XL) 50 mg, oral, Daily    morphine (MSIR) 15 mg, oral, Every 4 hours PRN    morphine CR (MS CONTIN) 15 mg, oral, 2 times daily, Do not crush, chew, or split.    pantoprazole (PROTONIX) 40 mg, oral, Daily, Do not crush, chew, or split.    Synthroid 75 mcg, oral, Daily        History     Past Medical History:   Diagnosis Date    Hyperlipidemia     Hypertension     Hypothyroidism     Personal history of other diseases of the circulatory system 09/15/2017    History of hypertension    Personal history of other endocrine, nutritional and metabolic disease 02/24/2016    History of type 2 diabetes mellitus    Personal history of other endocrine, nutritional and metabolic disease 03/09/2017    History of hypothyroidism    Personal history of other endocrine, nutritional and metabolic disease     History of hyperlipidemia    Type 2 diabetes mellitus with other skin complications     Diabetic dermopathy    Vision loss        Past Surgical History:   Procedure Laterality Date    OTHER SURGICAL HISTORY  09/01/2022    Foot surgery    OTHER SURGICAL HISTORY  09/01/2022    Appendectomy    OTHER SURGICAL HISTORY  09/01/2022    Knee surgery       Family History   Problem Relation Name Age of Onset    Cancer Mother      Heart disease Father          Allergies   Allergen Reactions    Emend [Fosaprepitant] Shortness of breath    Shellfish Containing Products Anaphylaxis          Physical Exam   No vitals d/t virtual    Brief physical exam -- video not working despite multiple attempts.   General: speaking coherently in NAD  Neuro: awake, alert, oriented  Psych: mood appropriate for clinical setting     Labs and Imaging     Lab Results   Component Value Date    HGBA1C 7.2 (A) 01/15/2025    HGBA1C 8.0 (H) 09/11/2024    HGBA1C 8.1 (H) 09/09/2024     (L) 04/07/2025    K 4.4 04/07/2025     04/07/2025    CO2 29  04/07/2025    BUN 10 04/07/2025    CREATININE 0.70 04/07/2025    CALCIUM 8.3 (L) 04/07/2025    ALBUMIN 2.9 (L) 04/07/2025    PROT 5.7 (L) 04/07/2025    BILITOT 2.1 (H) 04/07/2025    ALKPHOS 819 (H) 04/07/2025    ALT 38 04/07/2025    AST 63 (H) 04/07/2025    GLUCOSE 185 (H) 04/07/2025    CHOL 130 09/09/2024    TRIG 312 (H) 09/09/2024    HDL 9.1 09/09/2024    BHYDRXBUT 0.17 09/24/2024       Assessment and Plan   Nanci Colón is a 69 y.o. year old female with medical history of pancreatic adenocarcinoma, T2DM, HTN, HLD, and hypothyroidism here for T2DM management    With respect to diabetes, now that she is on a glucocorticoid-free chemo regimen, BG have been improved. She is only intermittently taking metformin as she cites she had a BG in the 60s on it. Recommended we decrease the dose but recommend she take it regularly to try to prevent hyperglycemia. We discussed glycemic goals and if she is lower or higher than goal she will reach out to me.     With respect to hypothyroidism, recent TSH >20. Increase synthroid and repeat TFTs in 6 wks    #T2DM  - Dx around 2008   - Complications include: none at this time  - Most recent A1c is 7.2%, Hgb 8.3. Goal a1c is 7.5%-8.0% given age and comorbidities  - Health maintenance:  ==> According to the ADA, BP goal is <130/80. Patient was at goal at last in person visit.  ==> Most recent retinal exam showed unknown. Due now but will address when the patient is in the less active stages of treating her pancreatic cancer. She is in agreement with this.  ==> Most recent LDL is 59. Due for repeat in 9/2025. Goal LDL <70, continue statin therapy.  ==> Most recent urine albumin/Cr ratio is 15. Due 4/2026.  ==> Foot exam due now, we discussed podiatry referral but this is again something she prefers to wait on given her frequent doctors appts with oncology. She does note neuropathy in both hands and feet.  ==> TSH 3.83 (see thyroid section)  - Medication management:  ==> DECREASE  metformin 500 mg BID to 500 mg daily  - I reminded the patient to inform me if/when there is a surgical plan to address her malignancy.    #Hypothyroidism -- uncontrolled  - Long history of hypothyroidism  - Recent lab recheck in 4/2025 demonstrates uncontrolled hypothyroidism with TSH of 24.40, FT4 still WNL  - INCREASE synthroid from 75 mcg to 100 mcg daily  - Repeat thyroid labs in 6 weeks to assess impact of increased dose    #Thyroid nodules  - Thyroid assessed via US in 5/2024, no change in nodule size in over a decade (6/2014 US)  - No need for further assessment           Jane Womack MD   of Medicine  Department of Internal Medicine  Division of Endocrinology, Diabetes and Metabolism  [facility]

## 2025-04-17 ENCOUNTER — APPOINTMENT (OUTPATIENT)
Age: 70
End: 2025-04-17
Payer: MEDICARE

## 2025-04-17 VITALS — HEIGHT: 64 IN | WEIGHT: 143 LBS | BODY MASS INDEX: 24.41 KG/M2

## 2025-04-17 DIAGNOSIS — E03.9 HYPOTHYROIDISM, UNSPECIFIED TYPE: Primary | ICD-10-CM

## 2025-04-17 PROCEDURE — 1036F TOBACCO NON-USER: CPT | Performed by: STUDENT IN AN ORGANIZED HEALTH CARE EDUCATION/TRAINING PROGRAM

## 2025-04-17 PROCEDURE — 99214 OFFICE O/P EST MOD 30 MIN: CPT | Performed by: STUDENT IN AN ORGANIZED HEALTH CARE EDUCATION/TRAINING PROGRAM

## 2025-04-17 PROCEDURE — 1159F MED LIST DOCD IN RCRD: CPT | Performed by: STUDENT IN AN ORGANIZED HEALTH CARE EDUCATION/TRAINING PROGRAM

## 2025-04-17 PROCEDURE — G2211 COMPLEX E/M VISIT ADD ON: HCPCS | Performed by: STUDENT IN AN ORGANIZED HEALTH CARE EDUCATION/TRAINING PROGRAM

## 2025-04-17 PROCEDURE — 3051F HG A1C>EQUAL 7.0%<8.0%: CPT | Performed by: STUDENT IN AN ORGANIZED HEALTH CARE EDUCATION/TRAINING PROGRAM

## 2025-04-17 PROCEDURE — 3008F BODY MASS INDEX DOCD: CPT | Performed by: STUDENT IN AN ORGANIZED HEALTH CARE EDUCATION/TRAINING PROGRAM

## 2025-04-17 RX ORDER — LEVOTHYROXINE SODIUM 100 UG/1
100 TABLET ORAL DAILY
Qty: 30 TABLET | Refills: 11 | Status: SHIPPED | OUTPATIENT
Start: 2025-04-17 | End: 2026-04-17

## 2025-04-17 SDOH — ECONOMIC STABILITY: FOOD INSECURITY: WITHIN THE PAST 12 MONTHS, THE FOOD YOU BOUGHT JUST DIDN'T LAST AND YOU DIDN'T HAVE MONEY TO GET MORE.: NEVER TRUE

## 2025-04-17 SDOH — ECONOMIC STABILITY: FOOD INSECURITY: WITHIN THE PAST 12 MONTHS, YOU WORRIED THAT YOUR FOOD WOULD RUN OUT BEFORE YOU GOT MONEY TO BUY MORE.: NEVER TRUE

## 2025-04-17 ASSESSMENT — ENCOUNTER SYMPTOMS
LOSS OF SENSATION IN FEET: 1
OCCASIONAL FEELINGS OF UNSTEADINESS: 0
DEPRESSION: 0

## 2025-04-21 ENCOUNTER — TELEPHONE (OUTPATIENT)
Dept: ADMISSION | Facility: HOSPITAL | Age: 70
End: 2025-04-21
Payer: MEDICARE

## 2025-04-21 ENCOUNTER — TELEPHONE (OUTPATIENT)
Dept: PALLIATIVE MEDICINE | Facility: HOSPITAL | Age: 70
End: 2025-04-21
Payer: MEDICARE

## 2025-04-21 NOTE — TELEPHONE ENCOUNTER
Nanci Colón called the refill line for Morphine 15mg BID. Would like refills to be sent to [facility] pharmacy on file. Message sent to Palliative Care team to send in.

## 2025-04-21 NOTE — TELEPHONE ENCOUNTER
Reason for Conversation  PA approved       PA approved for MS Contin 15 mg BID by [facility] from 1/20/25- 4/21/26.  [facility] updated regarding PA.  Patient called.  Voicemail left for patient medication would be filled and ready for  today.

## 2025-04-21 NOTE — TELEPHONE ENCOUNTER
Reason for Conversation  PA required        Urgent PA submitted to  specialty pharmacy for MS Contin 15 mg BID.

## 2025-04-21 NOTE — TELEPHONE ENCOUNTER
Med Refill      Called [facility].  PA required for MS Contin 15 mg BID.  Urgent PA sent to  specialty pharmacy for MS Contin.  Patient called back and updated.

## 2025-04-22 ENCOUNTER — LAB (OUTPATIENT)
Dept: HEMATOLOGY/ONCOLOGY | Facility: CLINIC | Age: 70
End: 2025-04-22
Payer: MEDICARE

## 2025-04-22 VITALS
DIASTOLIC BLOOD PRESSURE: 73 MMHG | TEMPERATURE: 97.3 F | WEIGHT: 143.08 LBS | OXYGEN SATURATION: 99 % | BODY MASS INDEX: 24.43 KG/M2 | SYSTOLIC BLOOD PRESSURE: 123 MMHG | HEIGHT: 64 IN | RESPIRATION RATE: 16 BRPM | HEART RATE: 80 BPM

## 2025-04-22 DIAGNOSIS — C25.0 MALIGNANT NEOPLASM OF HEAD OF PANCREAS (MULTI): ICD-10-CM

## 2025-04-22 LAB
ALBUMIN SERPL BCP-MCNC: 2.7 G/DL (ref 3.4–5)
ALP SERPL-CCNC: 397 U/L (ref 33–136)
ALT SERPL W P-5'-P-CCNC: 13 U/L (ref 7–45)
ANION GAP SERPL CALC-SCNC: 8 MMOL/L (ref 10–20)
AST SERPL W P-5'-P-CCNC: 22 U/L (ref 9–39)
BASOPHILS # BLD AUTO: 0.01 X10*3/UL (ref 0–0.1)
BASOPHILS NFR BLD AUTO: 0.4 %
BILIRUB SERPL-MCNC: 1 MG/DL (ref 0–1.2)
BUN SERPL-MCNC: 12 MG/DL (ref 6–23)
CALCIUM SERPL-MCNC: 8.1 MG/DL (ref 8.6–10.3)
CHLORIDE SERPL-SCNC: 104 MMOL/L (ref 98–107)
CO2 SERPL-SCNC: 25 MMOL/L (ref 21–32)
CREAT SERPL-MCNC: 0.72 MG/DL (ref 0.5–1.05)
EGFRCR SERPLBLD CKD-EPI 2021: >90 ML/MIN/1.73M*2
EOSINOPHIL # BLD AUTO: 0.01 X10*3/UL (ref 0–0.7)
EOSINOPHIL NFR BLD AUTO: 0.4 %
ERYTHROCYTE [DISTWIDTH] IN BLOOD BY AUTOMATED COUNT: 13.7 % (ref 11.5–14.5)
GLUCOSE SERPL-MCNC: 217 MG/DL (ref 74–99)
HCT VFR BLD AUTO: 29.4 % (ref 36–46)
HGB BLD-MCNC: 9.5 G/DL (ref 12–16)
IMM GRANULOCYTES # BLD AUTO: 0 X10*3/UL (ref 0–0.7)
IMM GRANULOCYTES NFR BLD AUTO: 0 % (ref 0–0.9)
LYMPHOCYTES # BLD AUTO: 0.83 X10*3/UL (ref 1.2–4.8)
LYMPHOCYTES NFR BLD AUTO: 29.9 %
MCH RBC QN AUTO: 32.6 PG (ref 26–34)
MCHC RBC AUTO-ENTMCNC: 32.3 G/DL (ref 32–36)
MCV RBC AUTO: 101 FL (ref 80–100)
MONOCYTES # BLD AUTO: 0.28 X10*3/UL (ref 0.1–1)
MONOCYTES NFR BLD AUTO: 10.1 %
NEUTROPHILS # BLD AUTO: 1.65 X10*3/UL (ref 1.2–7.7)
NEUTROPHILS NFR BLD AUTO: 59.2 %
PLATELET # BLD AUTO: 167 X10*3/UL (ref 150–450)
POTASSIUM SERPL-SCNC: 3.6 MMOL/L (ref 3.5–5.3)
PROT SERPL-MCNC: 5.7 G/DL (ref 6.4–8.2)
RBC # BLD AUTO: 2.91 X10*6/UL (ref 4–5.2)
SODIUM SERPL-SCNC: 133 MMOL/L (ref 136–145)
WBC # BLD AUTO: 2.8 X10*3/UL (ref 4.4–11.3)

## 2025-04-22 PROCEDURE — 36591 DRAW BLOOD OFF VENOUS DEVICE: CPT

## 2025-04-22 PROCEDURE — 80053 COMPREHEN METABOLIC PANEL: CPT

## 2025-04-22 PROCEDURE — 85025 COMPLETE CBC W/AUTO DIFF WBC: CPT

## 2025-04-22 RX ORDER — HEPARIN 100 UNIT/ML
500 SYRINGE INTRAVENOUS AS NEEDED
Status: CANCELLED | OUTPATIENT
Start: 2025-04-22

## 2025-04-22 RX ORDER — HEPARIN SODIUM,PORCINE/PF 10 UNIT/ML
50 SYRINGE (ML) INTRAVENOUS AS NEEDED
Status: CANCELLED | OUTPATIENT
Start: 2025-04-22

## 2025-04-22 ASSESSMENT — PAIN SCALES - GENERAL: PAINLEVEL_OUTOF10: 0-NO PAIN

## 2025-04-22 NOTE — PROGRESS NOTES
Patient presents for port labs,  has no complaints alert and oriented x 4. CVAD accessed per order, positive for blood return and flushes, labs drawn per orders. Mediport flushed with 10 ml NS followed by 5 ml of 100mg/ml Heparin Flush, Murguia Needle removed per practice policy and procedure, site care given with bandaid applied. Pt tolerated procedure well. Pt tolerated procedure well. Patient feels well and has no complaints, vital signs stable. Patient discharged in stable condition with no further needs.

## 2025-04-23 ENCOUNTER — INFUSION (OUTPATIENT)
Dept: HEMATOLOGY/ONCOLOGY | Facility: CLINIC | Age: 70
End: 2025-04-23
Payer: MEDICARE

## 2025-04-23 VITALS
WEIGHT: 138 LBS | TEMPERATURE: 95.7 F | SYSTOLIC BLOOD PRESSURE: 122 MMHG | HEART RATE: 92 BPM | DIASTOLIC BLOOD PRESSURE: 75 MMHG | HEIGHT: 64 IN | OXYGEN SATURATION: 99 % | BODY MASS INDEX: 23.56 KG/M2 | RESPIRATION RATE: 16 BRPM

## 2025-04-23 DIAGNOSIS — C25.0 MALIGNANT NEOPLASM OF HEAD OF PANCREAS (MULTI): ICD-10-CM

## 2025-04-23 PROCEDURE — 96413 CHEMO IV INFUSION 1 HR: CPT

## 2025-04-23 PROCEDURE — 2500000004 HC RX 250 GENERAL PHARMACY W/ HCPCS (ALT 636 FOR OP/ED): Mod: JZ | Performed by: INTERNAL MEDICINE

## 2025-04-23 PROCEDURE — 2500000004 HC RX 250 GENERAL PHARMACY W/ HCPCS (ALT 636 FOR OP/ED): Mod: TB | Performed by: INTERNAL MEDICINE

## 2025-04-23 PROCEDURE — 96417 CHEMO IV INFUS EACH ADDL SEQ: CPT

## 2025-04-23 PROCEDURE — 96375 TX/PRO/DX INJ NEW DRUG ADDON: CPT | Mod: INF

## 2025-04-23 RX ORDER — HEPARIN 100 UNIT/ML
500 SYRINGE INTRAVENOUS AS NEEDED
OUTPATIENT
Start: 2025-04-23

## 2025-04-23 RX ORDER — HEPARIN SODIUM,PORCINE/PF 10 UNIT/ML
50 SYRINGE (ML) INTRAVENOUS AS NEEDED
OUTPATIENT
Start: 2025-04-23

## 2025-04-23 RX ORDER — ONDANSETRON HYDROCHLORIDE 2 MG/ML
8 INJECTION, SOLUTION INTRAVENOUS ONCE
Status: COMPLETED | OUTPATIENT
Start: 2025-04-23 | End: 2025-04-23

## 2025-04-23 RX ADMIN — GEMCITABINE 1447.8 MG: 38 INJECTION, SOLUTION INTRAVENOUS at 14:36

## 2025-04-23 RX ADMIN — PACLITAXEL 145 MG: 100 INJECTION, POWDER, LYOPHILIZED, FOR SUSPENSION INTRAVENOUS at 13:45

## 2025-04-23 RX ADMIN — ONDANSETRON 8 MG: 2 INJECTION INTRAMUSCULAR; INTRAVENOUS at 11:43

## 2025-04-23 ASSESSMENT — PAIN SCALES - GENERAL: PAINLEVEL_OUTOF10: 0-NO PAIN

## 2025-04-23 NOTE — SIGNIFICANT EVENT
04/23/25 1124   Prechemo Checklist   Has the patient been in the hospital, ED, or urgent care since last date of service No   Chemo/Immuno Consent Completed and Signed Yes   Protocol/Indications Verified Yes   Confirmed to previous date/time of medication Yes   Compared to previous dose Yes   Pregnancy Test Negative Not applicable   Parameters Met Yes   Provider Notified Yes   Is Patient Proceeding With Treatment? Yes   BSA/Weight-Height Verified Yes   Dose Calculations Verified (current, total, cumulative) Yes

## 2025-04-23 NOTE — PROGRESS NOTES
Patient presents for abraxane/gemcitabine treatment,  has no complaints alert and oriented x 4. CVAD accessed per order, positive for blood return and flushes. Patient meets parameters for treatment. Patient tolerated treatment well, no reaction noted. After treatment, .Mediport flushed with 10 ml NS followed by 5 ml of 100mg/ml Heparin Flush, Murguia Needle removed per practice policy and procedure, site care given with bandaid applied. Pt tolerated procedure well. Patient feels well and has no complaints, vital signs stable. Patient discharged in stable condition with no further needs.

## 2025-05-06 ENCOUNTER — LAB (OUTPATIENT)
Dept: HEMATOLOGY/ONCOLOGY | Facility: CLINIC | Age: 70
End: 2025-05-06
Payer: MEDICARE

## 2025-05-06 ENCOUNTER — OFFICE VISIT (OUTPATIENT)
Dept: HEMATOLOGY/ONCOLOGY | Facility: CLINIC | Age: 70
End: 2025-05-06
Payer: MEDICARE

## 2025-05-06 VITALS
RESPIRATION RATE: 18 BRPM | WEIGHT: 132.3 LBS | TEMPERATURE: 98.2 F | HEART RATE: 71 BPM | BODY MASS INDEX: 22.81 KG/M2 | DIASTOLIC BLOOD PRESSURE: 74 MMHG | OXYGEN SATURATION: 97 % | SYSTOLIC BLOOD PRESSURE: 112 MMHG

## 2025-05-06 DIAGNOSIS — C25.0 MALIGNANT NEOPLASM OF HEAD OF PANCREAS (MULTI): ICD-10-CM

## 2025-05-06 DIAGNOSIS — C25.0 MALIGNANT NEOPLASM OF HEAD OF PANCREAS (MULTI): Primary | ICD-10-CM

## 2025-05-06 LAB
ALBUMIN SERPL BCP-MCNC: 2.9 G/DL (ref 3.4–5)
ALP SERPL-CCNC: 379 U/L (ref 33–136)
ALT SERPL W P-5'-P-CCNC: 13 U/L (ref 7–45)
ANION GAP SERPL CALC-SCNC: 12 MMOL/L (ref 10–20)
AST SERPL W P-5'-P-CCNC: 21 U/L (ref 9–39)
BASOPHILS # BLD AUTO: 0.02 X10*3/UL (ref 0–0.1)
BASOPHILS NFR BLD AUTO: 0.5 %
BILIRUB SERPL-MCNC: 0.8 MG/DL (ref 0–1.2)
BUN SERPL-MCNC: 10 MG/DL (ref 6–23)
CALCIUM SERPL-MCNC: 8.4 MG/DL (ref 8.6–10.6)
CHLORIDE SERPL-SCNC: 102 MMOL/L (ref 98–107)
CO2 SERPL-SCNC: 26 MMOL/L (ref 21–32)
CREAT SERPL-MCNC: 0.7 MG/DL (ref 0.5–1.05)
EGFRCR SERPLBLD CKD-EPI 2021: >90 ML/MIN/1.73M*2
EOSINOPHIL # BLD AUTO: 0.03 X10*3/UL (ref 0–0.7)
EOSINOPHIL NFR BLD AUTO: 0.7 %
ERYTHROCYTE [DISTWIDTH] IN BLOOD BY AUTOMATED COUNT: 14 % (ref 11.5–14.5)
GLUCOSE SERPL-MCNC: 157 MG/DL (ref 74–99)
HCT VFR BLD AUTO: 29.2 % (ref 36–46)
HGB BLD-MCNC: 9.7 G/DL (ref 12–16)
IMM GRANULOCYTES # BLD AUTO: 0.01 X10*3/UL (ref 0–0.7)
IMM GRANULOCYTES NFR BLD AUTO: 0.2 % (ref 0–0.9)
LYMPHOCYTES # BLD AUTO: 1.35 X10*3/UL (ref 1.2–4.8)
LYMPHOCYTES NFR BLD AUTO: 30.6 %
MCH RBC QN AUTO: 33.6 PG (ref 26–34)
MCHC RBC AUTO-ENTMCNC: 33.2 G/DL (ref 32–36)
MCV RBC AUTO: 101 FL (ref 80–100)
MONOCYTES # BLD AUTO: 0.57 X10*3/UL (ref 0.1–1)
MONOCYTES NFR BLD AUTO: 12.9 %
NEUTROPHILS # BLD AUTO: 2.43 X10*3/UL (ref 1.2–7.7)
NEUTROPHILS NFR BLD AUTO: 55.1 %
NRBC BLD-RTO: ABNORMAL /100{WBCS}
PLATELET # BLD AUTO: 220 X10*3/UL (ref 150–450)
POTASSIUM SERPL-SCNC: 4.2 MMOL/L (ref 3.5–5.3)
PROT SERPL-MCNC: 5.9 G/DL (ref 6.4–8.2)
RBC # BLD AUTO: 2.89 X10*6/UL (ref 4–5.2)
SODIUM SERPL-SCNC: 136 MMOL/L (ref 136–145)
WBC # BLD AUTO: 4.4 X10*3/UL (ref 4.4–11.3)

## 2025-05-06 PROCEDURE — 3051F HG A1C>EQUAL 7.0%<8.0%: CPT | Performed by: INTERNAL MEDICINE

## 2025-05-06 PROCEDURE — 99214 OFFICE O/P EST MOD 30 MIN: CPT | Performed by: INTERNAL MEDICINE

## 2025-05-06 PROCEDURE — 85025 COMPLETE CBC W/AUTO DIFF WBC: CPT

## 2025-05-06 PROCEDURE — 36591 DRAW BLOOD OFF VENOUS DEVICE: CPT

## 2025-05-06 PROCEDURE — 84075 ASSAY ALKALINE PHOSPHATASE: CPT

## 2025-05-06 PROCEDURE — 2500000004 HC RX 250 GENERAL PHARMACY W/ HCPCS (ALT 636 FOR OP/ED): Mod: JZ | Performed by: INTERNAL MEDICINE

## 2025-05-06 RX ORDER — HEPARIN 100 UNIT/ML
500 SYRINGE INTRAVENOUS AS NEEDED
Status: CANCELLED | OUTPATIENT
Start: 2025-05-06

## 2025-05-06 RX ORDER — HEPARIN 100 UNIT/ML
500 SYRINGE INTRAVENOUS AS NEEDED
Status: DISCONTINUED | OUTPATIENT
Start: 2025-05-06 | End: 2025-05-06 | Stop reason: HOSPADM

## 2025-05-06 RX ORDER — DIPHENHYDRAMINE HYDROCHLORIDE 50 MG/ML
50 INJECTION, SOLUTION INTRAMUSCULAR; INTRAVENOUS AS NEEDED
OUTPATIENT
Start: 2025-06-18

## 2025-05-06 RX ORDER — PROCHLORPERAZINE MALEATE 10 MG
10 TABLET ORAL EVERY 6 HOURS PRN
OUTPATIENT
Start: 2025-06-04

## 2025-05-06 RX ORDER — PROCHLORPERAZINE EDISYLATE 5 MG/ML
10 INJECTION INTRAMUSCULAR; INTRAVENOUS EVERY 6 HOURS PRN
OUTPATIENT
Start: 2025-06-18

## 2025-05-06 RX ORDER — ALBUTEROL SULFATE 0.83 MG/ML
3 SOLUTION RESPIRATORY (INHALATION) AS NEEDED
OUTPATIENT
Start: 2025-06-04

## 2025-05-06 RX ORDER — PROCHLORPERAZINE MALEATE 10 MG
10 TABLET ORAL EVERY 6 HOURS PRN
OUTPATIENT
Start: 2025-06-18

## 2025-05-06 RX ORDER — FAMOTIDINE 10 MG/ML
20 INJECTION, SOLUTION INTRAVENOUS ONCE AS NEEDED
OUTPATIENT
Start: 2025-06-18

## 2025-05-06 RX ORDER — ONDANSETRON HYDROCHLORIDE 2 MG/ML
8 INJECTION, SOLUTION INTRAVENOUS ONCE
OUTPATIENT
Start: 2025-06-18

## 2025-05-06 RX ORDER — FAMOTIDINE 10 MG/ML
20 INJECTION, SOLUTION INTRAVENOUS ONCE AS NEEDED
OUTPATIENT
Start: 2025-06-04

## 2025-05-06 RX ORDER — EPINEPHRINE 0.3 MG/.3ML
0.3 INJECTION SUBCUTANEOUS EVERY 5 MIN PRN
OUTPATIENT
Start: 2025-06-18

## 2025-05-06 RX ORDER — ALBUTEROL SULFATE 0.83 MG/ML
3 SOLUTION RESPIRATORY (INHALATION) AS NEEDED
OUTPATIENT
Start: 2025-06-18

## 2025-05-06 RX ORDER — ONDANSETRON HYDROCHLORIDE 2 MG/ML
8 INJECTION, SOLUTION INTRAVENOUS ONCE
OUTPATIENT
Start: 2025-06-04

## 2025-05-06 RX ORDER — DIPHENHYDRAMINE HYDROCHLORIDE 50 MG/ML
50 INJECTION, SOLUTION INTRAMUSCULAR; INTRAVENOUS AS NEEDED
OUTPATIENT
Start: 2025-06-04

## 2025-05-06 RX ORDER — PROCHLORPERAZINE EDISYLATE 5 MG/ML
10 INJECTION INTRAMUSCULAR; INTRAVENOUS EVERY 6 HOURS PRN
OUTPATIENT
Start: 2025-06-04

## 2025-05-06 RX ORDER — HEPARIN SODIUM,PORCINE/PF 10 UNIT/ML
50 SYRINGE (ML) INTRAVENOUS AS NEEDED
Status: CANCELLED | OUTPATIENT
Start: 2025-05-06

## 2025-05-06 RX ORDER — EPINEPHRINE 0.3 MG/.3ML
0.3 INJECTION SUBCUTANEOUS EVERY 5 MIN PRN
OUTPATIENT
Start: 2025-06-04

## 2025-05-06 RX ADMIN — HEPARIN 500 UNITS: 100 SYRINGE at 11:46

## 2025-05-06 ASSESSMENT — PAIN SCALES - GENERAL: PAINLEVEL_OUTOF10: 0-NO PAIN

## 2025-05-06 NOTE — PROGRESS NOTES
Patient ID: Nanci Colón is a 69 y.o. female from [place], [place].     Referring Physician: Francisco Cruz MD  [id number] [place] [address]    Primary Care Provider: Diana Blunt DO    Diagnosis:   Pancreatic cancer 9/2024    Primary Oncologic Surgeon:   Nazia    Primary Medical Oncologist:  Anthony    Primary Radiation Oncologist:  KVNG    Current Therapy:  10/4/24 -  Neoadjuvant Chemo - FOLFIRINOX    Oncologic Surgery History:  None    Oncologic Therapy History:  10/9/24 -  mFOLFIRINOX     Molecular Genetics:  Mmr-P  TP53 splice site SNV, TP53 C135R, MSI-H not detected, TMB not evaluable     Current Sites of Disease:  Pancreatic head    Oncologic Problem List:  Pancreatic cancer  DM2    Oncologic Narrative:  69 y.o. woman who initially presented in mid September 2024 with abdominal pain jaundice MARK and pancreatitis.  She had an ultrasound that showed extrahepatic bile duct dilation and pancreatic duct dilation.  Both of these seem to emanate from the pancreatic head.  A CT scan on September 9, 2024 was concerning for a mass.  MRCP on September 27, 2024 showed an ill-defined mass in the pancreatic head and uncinate process measuring approximately 3.5 cm.  Biopsy of the pancreatic mass from the September 11, 2024 endoscopic ultrasound revealed adenocarcinoma.  She met me for initial consultation regarding treatment planning on 9/26/2024.       Past Medical History: Nanci has a past medical history of Hyperlipidemia, Hypertension, Hypothyroidism, Personal history of other diseases of the circulatory system (09/15/2017), Personal history of other endocrine, nutritional and metabolic disease (02/24/2016), Personal history of other endocrine, nutritional and metabolic disease (03/09/2017), Personal history of other endocrine, nutritional and metabolic disease, Type 2 diabetes mellitus with other skin complications, and Vision loss.  Surgical History:  Nanci has a past surgical history that includes  Other surgical history (09/01/2022); Other surgical history (09/01/2022); and Other surgical history (09/01/2022).  Social History:  Nanci reports that she has never smoked. She has never been exposed to tobacco smoke. She has never used smokeless tobacco. She reports that she does not drink alcohol and does not use drugs.  Family History:    Family History   Problem Relation Name Age of Onset   • Cancer Mother     • Heart disease Father       Family Oncology History:  Cancer-related family history includes Cancer in her mother.      SUBJECTIVE:    History of Present Illness:  Nanci Colón is a 69 y.o. female who was referred by Francisco Cruz MD and presents with follow up.     She is s/p 7 cycles of FOLFIRINOX. She is feeling similar to her last visit. Biggest complaint remains fatigue. Continues to have occasional back pain.        OBJECTIVE:    VS / Pain:  There were no vitals taken for this visit.  BSA: There is no height or weight on file to calculate BSA.   Pain Scale: 0    Daily Weight  05/06/25 : 60 kg (132 lb 4.8 oz)  04/23/25 : 62.6 kg (138 lb)  04/22/25 : 64.9 kg (143 lb 1.3 oz)      Physical Exam  Constitutional:       Appearance: She is normal weight.   HENT:      Nose: Nose normal.      Mouth/Throat:      Mouth: Mucous membranes are moist.      Pharynx: Oropharynx is clear.   Eyes:      Extraocular Movements: Extraocular movements intact.      Conjunctiva/sclera: Conjunctivae normal.   Cardiovascular:      Rate and Rhythm: Normal rate.   Pulmonary:      Effort: Pulmonary effort is normal.      Breath sounds: Normal breath sounds.   Abdominal:      General: Abdomen is flat. Bowel sounds are normal.   Musculoskeletal:         General: Normal range of motion.   Skin:     General: Skin is warm.   Neurological:      General: No focal deficit present.      Mental Status: She is alert. Mental status is at baseline.   Psychiatric:         Mood and Affect: Mood normal.         Thought Content: Thought  content normal.         Judgment: Judgment normal.       Performance Status:   ECOG 1    Diagnostic Results         WBC   Date/Time Value Ref Range Status   05/06/2025 11:42 AM 4.4 4.4 - 11.3 x10*3/uL Final   04/22/2025 11:56 AM 2.8 (L) 4.4 - 11.3 x10*3/uL Final   04/07/2025 01:50 PM 5.5 4.4 - 11.3 x10*3/uL Final     Hemoglobin   Date Value Ref Range Status   05/06/2025 9.7 (L) 12.0 - 16.0 g/dL Final   04/22/2025 9.5 (L) 12.0 - 16.0 g/dL Final   04/07/2025 10.5 (L) 12.0 - 16.0 g/dL Final     MCV   Date/Time Value Ref Range Status   05/06/2025 11:42  (H) 80 - 100 fL Final   04/22/2025 11:56  (H) 80 - 100 fL Final   04/07/2025 01:50  (H) 80 - 100 fL Final     Platelets   Date/Time Value Ref Range Status   05/06/2025 11:42  150 - 450 x10*3/uL Final   04/22/2025 11:56  150 - 450 x10*3/uL Final   04/07/2025 01:50  150 - 450 x10*3/uL Final     Neutrophils Absolute   Date/Time Value Ref Range Status   05/06/2025 11:42 AM 2.43 1.20 - 7.70 x10*3/uL Final     Comment:     Percent differential counts (%) should be interpreted in the context of the absolute cell counts (cells/uL).   04/22/2025 11:56 AM 1.65 1.20 - 7.70 x10*3/uL Final     Comment:     Percent differential counts (%) should be interpreted in the context of the absolute cell counts (cells/uL).   04/07/2025 01:50 PM 3.65 1.20 - 7.70 x10*3/uL Final     Comment:     Percent differential counts (%) should be interpreted in the context of the absolute cell counts (cells/uL).     Bilirubin, Total   Date/Time Value Ref Range Status   04/22/2025 11:56 AM 1.0 0.0 - 1.2 mg/dL Final   04/07/2025 01:50 PM 2.1 (H) 0.0 - 1.2 mg/dL Final   03/27/2025 11:04 AM 0.9 0.0 - 1.2 mg/dL Final     AST   Date/Time Value Ref Range Status   04/22/2025 11:56 AM 22 9 - 39 U/L Final   04/07/2025 01:50 PM 63 (H) 9 - 39 U/L Final   03/27/2025 11:04 AM 50 (H) 9 - 39 U/L Final     ALT   Date/Time Value Ref Range Status   04/22/2025 11:56 AM 13 7 - 45 U/L Final  "    Comment:     Patients treated with Sulfasalazine may generate falsely decreased results for ALT.   04/07/2025 01:50 PM 38 7 - 45 U/L Final     Comment:     Patients treated with Sulfasalazine may generate falsely decreased results for ALT.   03/27/2025 11:04 AM 31 7 - 45 U/L Final     Comment:     Patients treated with Sulfasalazine may generate falsely decreased results for ALT.     Creatinine   Date/Time Value Ref Range Status   04/22/2025 11:56 AM 0.72 0.50 - 1.05 mg/dL Final   04/07/2025 01:50 PM 0.70 0.50 - 1.05 mg/dL Final   03/27/2025 11:04 AM 0.69 0.50 - 1.05 mg/dL Final     Urea Nitrogen   Date/Time Value Ref Range Status   04/22/2025 11:56 AM 12 6 - 23 mg/dL Final   04/07/2025 01:50 PM 10 6 - 23 mg/dL Final   03/27/2025 11:04 AM 11 6 - 23 mg/dL Final     Albumin   Date/Time Value Ref Range Status   04/22/2025 11:56 AM 2.7 (L) 3.4 - 5.0 g/dL Final   04/07/2025 01:50 PM 2.9 (L) 3.4 - 5.0 g/dL Final   03/27/2025 11:04 AM 2.8 (L) 3.4 - 5.0 g/dL Final     Cancer AG 19-9   Date/Time Value Ref Range Status   04/07/2025 01:50 PM 17,703.49 (H) <35.00 U/mL Final   03/27/2025 11:04 AM 7,925.42 (H) <35.00 U/mL Final   03/04/2025 12:20 PM 2,199.55 (H) <35.00 U/mL Final   02/18/2025 02:01 .79 (H) <35.00 U/mL Final   02/10/2025 11:41 .27 (H) <35.00 U/mL Final   02/03/2025 02:38 .66 (H) <35.00 U/mL Final   01/20/2025 12:52 .22 (H) <35.00 U/mL Final   12/31/2024 01:19 PM 1,391.30 (H) <35.00 U/mL Final   12/17/2024 03:04 .09 (H) <35.00 U/mL Final   12/02/2024 03:00 .06 (H) <35.00 U/mL Final     No results found for: \"CEA\"    === 02/19/25 ===    CT CHEST ABDOMEN PELVIS W IV CONTRAST    - Impression -  Pancreatic cancer restaging scan with evidence of worsening  intrathoracic metastatic disease burden.    CHEST:  1.  Multiple new bilateral spiculated pulmonary nodules which, in the setting of rising CA 19-9 levels, raises concern for pulmonary  metastasis.  2. No thoracic " lymphadenopathy.    ABDOMEN-PELVIS:  1. Indeterminate ill-defined hepatic segment 4B hypodensity, likely reflect a focal marked hepatic steatosis. However, in the setting of rising CA 19-9 levels, consider MRI liver for complete characterization.  2. Similar size and appearance of the primary pancreatic  head/uncinate process mass with satellite peripancreatic soft tissue deposit adjacent to the superior mesenteric vessels.  3. Interval development of diffuse hepatic steatosis, likely  treatment related given ongoing chemotherapy.  4. No enlarged or suspicious abdominopelvic lymphadenopathy.  5. Additional findings as above.    I personally reviewed the images/study and I agree with the findings  as stated by Radiology resident.    MACRO:  None    Signed by: Areli Marcano 2/21/2025 4:36 PM  Dictation workstation:   [id number]    3/27/25 PET-CT  IMPRESSION:  1. FDG avid lesion in the pancreatic head/uncinate process ,  compatible with biopsy-proven pancreatic neoplasm.  2. FDG avid left supraclavicular, superior mediastinal ,  retroperitoneal and mesenteric nodes as described above, suggestive  of elina metastases.  3. Mild focal FDG avidity within hepatic segment 5, indeterminate.  The hepatic segment 4B lesion seen on prior examination is not FDG  avid.      Assessment/Plan   69 y.o.  woman with a resectable pancreatic cancer but elevated CA 19-9 and recent pancreatitis so we will plan for neoadjuvant chemotherapy with modified FOLFIRINOX.   9/30/24 - diagnostic lab negative   10/6/24 - 1st dose mFOLFIRINOX  - tolerated with fatigue, nausea   Added fosaprepitant to C2 - Allergic rxn to fosaprepitant - discontinue  C3 held due to ANC of 500    - plan for C3 with addition of neulasta   C4   S/p 7 cycles:   - Ca19-9 is uptrending 507>549>621>2200>7925  - CT from 2/19/25 is read as showing multiple new pulmonary nodules (and an ill-defined hepatic segment 4B hypodensity that likely reflects a focal hepatic steatosis) In  conjunction with the uptrend in the Ca19-9 there is concern for disease progression, however, patient did have a recent respiratory illness and her disease is stable in the pancreas and surrounding area. At her last visit, we reviewed stopping her mFOLFIRINOX given her elevated LFTs and hepatic steatosis are likely a direct consequence of her chemotherapy and lack of response.   - PET-CT is difficult to interpret given her primary lesion is only mildly PET-avid; there are mildly avid left supraclavicular, mediastinal, RP and mesenteric nodes, but given continued rise in Ca19-9, there is concern for disease progression  - She started   gemcitabine and nab-paclitaxel in early 4/2025.  She felt better after starting.  Only neuropathy worse but pain in abdomen better.      Cancer related pain:   -on oxycodone 15mg   -Supportive oncology following     DM:   -Continue metformin, however keep close eye on renal function.    - Endocrinology scheduled for April    Patient seen and discussed with Dr. Cruz.     Francisco Cruz MD  Hematology Oncology PGYVI

## 2025-05-06 NOTE — PROGRESS NOTES
Mrs Colón is here with her  for fuv prior to infusion to [facility]. She states that this treatment seems to be going better than the last, she has been tolerating in much better. She also stated that her pain is getting better. Some N no V but she takes meds for the N and it gets better. She also stated that she is feeling neuropathy in her hands, which she never had before, only in her feet. MD evelio Lee RN

## 2025-05-07 ENCOUNTER — APPOINTMENT (OUTPATIENT)
Dept: HEMATOLOGY/ONCOLOGY | Facility: CLINIC | Age: 70
End: 2025-05-07
Payer: MEDICARE

## 2025-05-07 ENCOUNTER — INFUSION (OUTPATIENT)
Dept: HEMATOLOGY/ONCOLOGY | Facility: CLINIC | Age: 70
End: 2025-05-07
Payer: MEDICARE

## 2025-05-07 VITALS
SYSTOLIC BLOOD PRESSURE: 108 MMHG | RESPIRATION RATE: 16 BRPM | DIASTOLIC BLOOD PRESSURE: 74 MMHG | HEART RATE: 83 BPM | WEIGHT: 133.3 LBS | HEIGHT: 64 IN | OXYGEN SATURATION: 98 % | BODY MASS INDEX: 22.76 KG/M2 | TEMPERATURE: 97.9 F

## 2025-05-07 DIAGNOSIS — C25.0 MALIGNANT NEOPLASM OF HEAD OF PANCREAS (MULTI): ICD-10-CM

## 2025-05-07 PROCEDURE — 96375 TX/PRO/DX INJ NEW DRUG ADDON: CPT | Mod: INF

## 2025-05-07 PROCEDURE — 96417 CHEMO IV INFUS EACH ADDL SEQ: CPT

## 2025-05-07 PROCEDURE — 2500000004 HC RX 250 GENERAL PHARMACY W/ HCPCS (ALT 636 FOR OP/ED): Mod: JZ | Performed by: INTERNAL MEDICINE

## 2025-05-07 PROCEDURE — 2500000004 HC RX 250 GENERAL PHARMACY W/ HCPCS (ALT 636 FOR OP/ED): Mod: JW,TB | Performed by: INTERNAL MEDICINE

## 2025-05-07 PROCEDURE — 96413 CHEMO IV INFUSION 1 HR: CPT

## 2025-05-07 RX ORDER — ONDANSETRON HYDROCHLORIDE 2 MG/ML
8 INJECTION, SOLUTION INTRAVENOUS ONCE
Status: COMPLETED | OUTPATIENT
Start: 2025-05-07 | End: 2025-05-07

## 2025-05-07 RX ORDER — HEPARIN SODIUM,PORCINE/PF 10 UNIT/ML
50 SYRINGE (ML) INTRAVENOUS AS NEEDED
OUTPATIENT
Start: 2025-05-07

## 2025-05-07 RX ORDER — HEPARIN 100 UNIT/ML
500 SYRINGE INTRAVENOUS AS NEEDED
OUTPATIENT
Start: 2025-05-07

## 2025-05-07 RX ADMIN — ONDANSETRON 8 MG: 2 INJECTION INTRAMUSCULAR; INTRAVENOUS at 14:01

## 2025-05-07 RX ADMIN — PACLITAXEL 180 MG: 100 INJECTION, POWDER, LYOPHILIZED, FOR SUSPENSION INTRAVENOUS at 15:05

## 2025-05-07 RX ADMIN — GEMCITABINE 1447.8 MG: 38 INJECTION, SOLUTION INTRAVENOUS at 15:52

## 2025-05-07 ASSESSMENT — PAIN SCALES - GENERAL: PAINLEVEL_OUTOF10: 0-NO PAIN

## 2025-05-07 NOTE — SIGNIFICANT EVENT
05/07/25 1334   Prechemo Checklist   Has the patient been in the hospital, ED, or urgent care since last date of service No   Chemo/Immuno Consent Completed and Signed Yes   Protocol/Indications Verified Yes   Confirmed to previous date/time of medication Yes   Compared to previous dose Yes   All medications are dated accurately N/A   Pregnancy Test Negative Not applicable   Parameters Met Yes   Is Patient Proceeding With Treatment? Yes   BSA/Weight-Height Verified Yes   Dose Calculations Verified (current, total, cumulative) Yes

## 2025-05-07 NOTE — PROGRESS NOTES
Patient presents for treatment, has no complaints alert and oriented x 4. CVAD accessed per orders, positive for flush and blood return. Patient meets parameters for treatment. Patient tolerated treatment well, no reaction noted. After treatment, Mediport flushed with 10 ml NS followed by 5 ml of 100mg/ml Heparin Flush, Murguia Needle removed per practice policy and procedure, site care given with bandaid applied. Patient feels well and has no complaints, vital signs stable. Patient returns on 5/20 for labs. Patient verbalized understanding of all teaching and education. Patient discharged in stable condition with no further needs.

## 2025-05-08 ENCOUNTER — TELEPHONE (OUTPATIENT)
Dept: PALLIATIVE MEDICINE | Facility: CLINIC | Age: 70
End: 2025-05-08
Payer: MEDICARE

## 2025-05-08 ENCOUNTER — TELEMEDICINE (OUTPATIENT)
Dept: PALLIATIVE MEDICINE | Facility: CLINIC | Age: 70
End: 2025-05-08
Payer: MEDICARE

## 2025-05-08 DIAGNOSIS — Z79.891 ENCOUNTER FOR MONITORING OPIOID MAINTENANCE THERAPY: ICD-10-CM

## 2025-05-08 DIAGNOSIS — G47.00 INSOMNIA, UNSPECIFIED TYPE: ICD-10-CM

## 2025-05-08 DIAGNOSIS — R11.2 CHEMOTHERAPY INDUCED NAUSEA AND VOMITING: ICD-10-CM

## 2025-05-08 DIAGNOSIS — R63.0 POOR APPETITE: ICD-10-CM

## 2025-05-08 DIAGNOSIS — Z51.81 ENCOUNTER FOR MONITORING OPIOID MAINTENANCE THERAPY: ICD-10-CM

## 2025-05-08 DIAGNOSIS — Z51.5 PALLIATIVE CARE ENCOUNTER: Primary | ICD-10-CM

## 2025-05-08 DIAGNOSIS — T45.1X5A CHEMOTHERAPY INDUCED NAUSEA AND VOMITING: ICD-10-CM

## 2025-05-08 DIAGNOSIS — G89.3 CANCER RELATED PAIN: ICD-10-CM

## 2025-05-08 PROCEDURE — 99214 OFFICE O/P EST MOD 30 MIN: CPT | Performed by: NURSE PRACTITIONER

## 2025-05-08 PROCEDURE — 3051F HG A1C>EQUAL 7.0%<8.0%: CPT | Performed by: NURSE PRACTITIONER

## 2025-05-08 RX ORDER — OLANZAPINE 5 MG/1
5 TABLET, FILM COATED ORAL NIGHTLY
Qty: 30 TABLET | Refills: 3 | Status: SHIPPED | OUTPATIENT
Start: 2025-05-08 | End: 2025-06-07

## 2025-05-08 RX ORDER — MORPHINE SULFATE 15 MG/1
15 TABLET ORAL EVERY 4 HOURS PRN
Qty: 180 TABLET | Refills: 0 | Status: SHIPPED | OUTPATIENT
Start: 2025-05-08 | End: 2025-06-07

## 2025-05-08 RX ORDER — MORPHINE SULFATE 15 MG/1
15 TABLET, FILM COATED, EXTENDED RELEASE ORAL 2 TIMES DAILY
Qty: 60 TABLET | Refills: 0 | Status: SHIPPED | OUTPATIENT
Start: 2025-05-08 | End: 2025-06-07

## 2025-05-08 NOTE — TELEPHONE ENCOUNTER
I called pharmacy x2 to check if PA needed for Olanzapine and was unable to speak to pharmacy. I ended up leaving VM on their line to notify our office if PA was needed and call back number provided.

## 2025-05-19 ENCOUNTER — TELEPHONE (OUTPATIENT)
Dept: ADMISSION | Facility: HOSPITAL | Age: 70
End: 2025-05-19
Payer: MEDICARE

## 2025-05-19 DIAGNOSIS — C25.0 MALIGNANT NEOPLASM OF HEAD OF PANCREAS (MULTI): ICD-10-CM

## 2025-05-19 RX ORDER — POTASSIUM CHLORIDE 20 MEQ/1
20 TABLET, EXTENDED RELEASE ORAL DAILY
Qty: 30 TABLET | Refills: 11 | Status: SHIPPED | OUTPATIENT
Start: 2025-05-19

## 2025-05-19 NOTE — TELEPHONE ENCOUNTER
Nanci Colón called the refill line for Potassium 10 mEq two tabs daily. Medication not on med list/not able to reconcile med.   Next FUV is 6/3   Lat K was 4.2 on 5/6

## 2025-05-19 NOTE — TELEPHONE ENCOUNTER
You have to click on med history in the plan tab.  If it recently  it wont show as active.  I just sent a new rx.

## 2025-05-20 ENCOUNTER — INFUSION (OUTPATIENT)
Dept: HEMATOLOGY/ONCOLOGY | Facility: CLINIC | Age: 70
End: 2025-05-20
Payer: MEDICARE

## 2025-05-20 VITALS
TEMPERATURE: 97.7 F | RESPIRATION RATE: 16 BRPM | HEIGHT: 64 IN | OXYGEN SATURATION: 99 % | SYSTOLIC BLOOD PRESSURE: 126 MMHG | BODY MASS INDEX: 22.98 KG/M2 | DIASTOLIC BLOOD PRESSURE: 70 MMHG | HEART RATE: 100 BPM

## 2025-05-20 DIAGNOSIS — C25.0 MALIGNANT NEOPLASM OF HEAD OF PANCREAS (MULTI): ICD-10-CM

## 2025-05-20 LAB
ALBUMIN SERPL BCP-MCNC: 2.8 G/DL (ref 3.4–5)
ALP SERPL-CCNC: 394 U/L (ref 33–136)
ALT SERPL W P-5'-P-CCNC: 16 U/L (ref 7–45)
ANION GAP SERPL CALC-SCNC: 16 MMOL/L (ref 10–20)
AST SERPL W P-5'-P-CCNC: 36 U/L (ref 9–39)
BASOPHILS # BLD AUTO: 0.01 X10*3/UL (ref 0–0.1)
BASOPHILS NFR BLD AUTO: 0.3 %
BILIRUB SERPL-MCNC: 0.8 MG/DL (ref 0–1.2)
BUN SERPL-MCNC: 13 MG/DL (ref 6–23)
CALCIUM SERPL-MCNC: 8.1 MG/DL (ref 8.6–10.3)
CHLORIDE SERPL-SCNC: 104 MMOL/L (ref 98–107)
CO2 SERPL-SCNC: 24 MMOL/L (ref 21–32)
CREAT SERPL-MCNC: 0.73 MG/DL (ref 0.5–1.05)
EGFRCR SERPLBLD CKD-EPI 2021: 90 ML/MIN/1.73M*2
EOSINOPHIL # BLD AUTO: 0.05 X10*3/UL (ref 0–0.7)
EOSINOPHIL NFR BLD AUTO: 1.3 %
ERYTHROCYTE [DISTWIDTH] IN BLOOD BY AUTOMATED COUNT: 14.4 % (ref 11.5–14.5)
GLUCOSE SERPL-MCNC: 177 MG/DL (ref 74–99)
HCT VFR BLD AUTO: 28 % (ref 36–46)
HGB BLD-MCNC: 9 G/DL (ref 12–16)
IMM GRANULOCYTES # BLD AUTO: 0.01 X10*3/UL (ref 0–0.7)
IMM GRANULOCYTES NFR BLD AUTO: 0.3 % (ref 0–0.9)
LYMPHOCYTES # BLD AUTO: 0.97 X10*3/UL (ref 1.2–4.8)
LYMPHOCYTES NFR BLD AUTO: 24.9 %
MCH RBC QN AUTO: 32.8 PG (ref 26–34)
MCHC RBC AUTO-ENTMCNC: 32.1 G/DL (ref 32–36)
MCV RBC AUTO: 102 FL (ref 80–100)
MONOCYTES # BLD AUTO: 0.49 X10*3/UL (ref 0.1–1)
MONOCYTES NFR BLD AUTO: 12.6 %
NEUTROPHILS # BLD AUTO: 2.36 X10*3/UL (ref 1.2–7.7)
NEUTROPHILS NFR BLD AUTO: 60.6 %
PLATELET # BLD AUTO: 195 X10*3/UL (ref 150–450)
POTASSIUM SERPL-SCNC: 4 MMOL/L (ref 3.5–5.3)
PROT SERPL-MCNC: 5.6 G/DL (ref 6.4–8.2)
RBC # BLD AUTO: 2.74 X10*6/UL (ref 4–5.2)
SODIUM SERPL-SCNC: 140 MMOL/L (ref 136–145)
WBC # BLD AUTO: 3.9 X10*3/UL (ref 4.4–11.3)

## 2025-05-20 PROCEDURE — 85025 COMPLETE CBC W/AUTO DIFF WBC: CPT

## 2025-05-20 PROCEDURE — 2500000004 HC RX 250 GENERAL PHARMACY W/ HCPCS (ALT 636 FOR OP/ED): Mod: JZ | Performed by: INTERNAL MEDICINE

## 2025-05-20 PROCEDURE — 84075 ASSAY ALKALINE PHOSPHATASE: CPT

## 2025-05-20 PROCEDURE — 36591 DRAW BLOOD OFF VENOUS DEVICE: CPT

## 2025-05-20 RX ORDER — HEPARIN SODIUM,PORCINE/PF 10 UNIT/ML
50 SYRINGE (ML) INTRAVENOUS AS NEEDED
Status: CANCELLED | OUTPATIENT
Start: 2025-05-20

## 2025-05-20 RX ORDER — PANTOPRAZOLE SODIUM 40 MG/1
40 TABLET, DELAYED RELEASE ORAL DAILY
Qty: 30 TABLET | Refills: 5 | Status: SHIPPED | OUTPATIENT
Start: 2025-05-20 | End: 2025-11-16

## 2025-05-20 RX ORDER — HEPARIN 100 UNIT/ML
500 SYRINGE INTRAVENOUS AS NEEDED
Status: DISCONTINUED | OUTPATIENT
Start: 2025-05-20 | End: 2025-05-20 | Stop reason: HOSPADM

## 2025-05-20 RX ORDER — HEPARIN 100 UNIT/ML
500 SYRINGE INTRAVENOUS AS NEEDED
Status: CANCELLED | OUTPATIENT
Start: 2025-05-20

## 2025-05-20 RX ADMIN — Medication 500 UNITS: at 14:00

## 2025-05-20 ASSESSMENT — PAIN SCALES - GENERAL: PAINLEVEL_OUTOF10: 0-NO PAIN

## 2025-05-20 NOTE — PROGRESS NOTES
Pt arrived awake, alert, oriented, no apparent distress with unlabored breaths. Pt reports feeling well today without ss/sx of acute illness. Pt here for mediport access and blood work. Port site without s/sx of infection, accessed without difficulty, flushed easily with + blood return. Blood samples obtained and sent per order. Port flushed well with saline/heparin locked. Bleeding controlled and bandage applied to site. Pt with next appointment set for 5/21/25 for infusion.

## 2025-05-21 ENCOUNTER — INFUSION (OUTPATIENT)
Dept: HEMATOLOGY/ONCOLOGY | Facility: CLINIC | Age: 70
End: 2025-05-21
Payer: MEDICARE

## 2025-05-21 VITALS
WEIGHT: 133.9 LBS | SYSTOLIC BLOOD PRESSURE: 104 MMHG | HEIGHT: 64 IN | HEART RATE: 88 BPM | TEMPERATURE: 97.5 F | OXYGEN SATURATION: 99 % | BODY MASS INDEX: 22.86 KG/M2 | DIASTOLIC BLOOD PRESSURE: 66 MMHG | RESPIRATION RATE: 16 BRPM

## 2025-05-21 DIAGNOSIS — C25.0 MALIGNANT NEOPLASM OF HEAD OF PANCREAS (MULTI): ICD-10-CM

## 2025-05-21 PROCEDURE — 96417 CHEMO IV INFUS EACH ADDL SEQ: CPT

## 2025-05-21 PROCEDURE — 96375 TX/PRO/DX INJ NEW DRUG ADDON: CPT | Mod: INF

## 2025-05-21 PROCEDURE — 2500000004 HC RX 250 GENERAL PHARMACY W/ HCPCS (ALT 636 FOR OP/ED): Mod: JZ | Performed by: INTERNAL MEDICINE

## 2025-05-21 PROCEDURE — 96413 CHEMO IV INFUSION 1 HR: CPT

## 2025-05-21 RX ORDER — HEPARIN 100 UNIT/ML
500 SYRINGE INTRAVENOUS AS NEEDED
Status: DISCONTINUED | OUTPATIENT
Start: 2025-05-21 | End: 2025-05-21 | Stop reason: HOSPADM

## 2025-05-21 RX ORDER — EPINEPHRINE 0.3 MG/.3ML
0.3 INJECTION SUBCUTANEOUS EVERY 5 MIN PRN
Status: DISCONTINUED | OUTPATIENT
Start: 2025-05-21 | End: 2025-05-21 | Stop reason: HOSPADM

## 2025-05-21 RX ORDER — ONDANSETRON HYDROCHLORIDE 2 MG/ML
8 INJECTION, SOLUTION INTRAVENOUS ONCE
Status: COMPLETED | OUTPATIENT
Start: 2025-05-21 | End: 2025-05-21

## 2025-05-21 RX ORDER — ALBUTEROL SULFATE 0.83 MG/ML
3 SOLUTION RESPIRATORY (INHALATION) AS NEEDED
Status: DISCONTINUED | OUTPATIENT
Start: 2025-05-21 | End: 2025-05-21 | Stop reason: HOSPADM

## 2025-05-21 RX ORDER — FAMOTIDINE 10 MG/ML
20 INJECTION, SOLUTION INTRAVENOUS ONCE AS NEEDED
Status: DISCONTINUED | OUTPATIENT
Start: 2025-05-21 | End: 2025-05-21 | Stop reason: HOSPADM

## 2025-05-21 RX ORDER — DIPHENHYDRAMINE HYDROCHLORIDE 50 MG/ML
50 INJECTION, SOLUTION INTRAMUSCULAR; INTRAVENOUS AS NEEDED
Status: DISCONTINUED | OUTPATIENT
Start: 2025-05-21 | End: 2025-05-21 | Stop reason: HOSPADM

## 2025-05-21 RX ORDER — HEPARIN SODIUM,PORCINE/PF 10 UNIT/ML
50 SYRINGE (ML) INTRAVENOUS AS NEEDED
OUTPATIENT
Start: 2025-05-21

## 2025-05-21 RX ORDER — PROCHLORPERAZINE EDISYLATE 5 MG/ML
10 INJECTION INTRAMUSCULAR; INTRAVENOUS EVERY 6 HOURS PRN
Status: DISCONTINUED | OUTPATIENT
Start: 2025-05-21 | End: 2025-05-21 | Stop reason: HOSPADM

## 2025-05-21 RX ORDER — PROCHLORPERAZINE MALEATE 10 MG
10 TABLET ORAL EVERY 6 HOURS PRN
Status: DISCONTINUED | OUTPATIENT
Start: 2025-05-21 | End: 2025-05-21 | Stop reason: HOSPADM

## 2025-05-21 RX ORDER — HEPARIN 100 UNIT/ML
500 SYRINGE INTRAVENOUS AS NEEDED
OUTPATIENT
Start: 2025-05-21

## 2025-05-21 RX ADMIN — Medication 500 UNITS: at 13:09

## 2025-05-21 RX ADMIN — GEMCITABINE 1447.8 MG: 38 INJECTION, SOLUTION INTRAVENOUS at 12:30

## 2025-05-21 RX ADMIN — ONDANSETRON 8 MG: 2 INJECTION INTRAMUSCULAR; INTRAVENOUS at 11:22

## 2025-05-21 RX ADMIN — PACLITAXEL 180 MG: 100 INJECTION, POWDER, LYOPHILIZED, FOR SUSPENSION INTRAVENOUS at 11:28

## 2025-05-21 ASSESSMENT — PAIN SCALES - GENERAL: PAINLEVEL_OUTOF10: 0-NO PAIN

## 2025-05-21 NOTE — PROGRESS NOTES
Patient education:  Learner: patient  Educated on: Plan of care. Albumin bound paclitaxel and gemcitabine   Readiness: acceptance  Preferred learning method: preferred: listening  Method used: explanation  Response: demonstrated understanding  Barriers: None  Preferred language: English

## 2025-05-21 NOTE — PROGRESS NOTES
Patient in clinic for treatment. Labs done prior to visit. Results within parameters to treat. Patient tolerated treatment without issue. Denied problem or concern. Mediport de-accessed per protocol. Bandaid applied to side. Patient dc to home in stable condition. Ambulated out of clinic with walker

## 2025-05-25 NOTE — PROGRESS NOTES
SUPPORTIVE AND PALLIATIVE ONCOLOGY OUTPATIENT FOLLOW-UP        Cancer History   Pancreatic CA  -s/p 7 cycles FOLFIRINOX     Onc: Anthony    Subjective   HISTORY OF PRESENT ILLNESS: Nanci Colón is a 69 y.o. female with Pmhx of HTN, hypothyroidism, DMII, diabetic neuropathy, Chiari malformation (type 1), and pancreatic CA with mets to the lung initially diagnosed 9/2024. She is currently on Gemzar/Paclitaxel. She follows with Dr. Cruz.      She presents to supportive oncology for follow up for pain and symptom management.       Subjective   She feels better now that her chemo has been reduced. Has some diarrhea following treatment, but only for a few days. We discussed starting something additional to help with this and she declined. She takes imodium which she does not want changed.     Takes morphine cr and takes morphine ir at night. Has abdominal pain that is jabbing and sharp. She does not take her morphine IR very often other than at night and feels that most of her pain is controlled with the long acting. She does not feel that her pain medication needs adjusted.       Has some nausea but feels that the medication is working well for her nausea. She does not feel that her medications need to be adjusted.     Information obtained from: interview of patient  ______________________________________________________________________        Objective        PHYSICAL EXAMINATION not performed d/t phone visit     Pain Assessment:  Pain Score:   2  Location: Abdomen  Education:      Physical Exam  Constitutional:       Appearance: Normal appearance. She is normal weight.   HENT:      Head: Normocephalic and atraumatic.      Mouth/Throat:      Mouth: Mucous membranes are dry.   Eyes:      Extraocular Movements: Extraocular movements intact.   Cardiovascular:      Comments: No signs of cardiac distress  Pulmonary:      Effort: Pulmonary effort is normal.      Comments: No signs of respiratory distress  Abdominal:       General: Abdomen is flat.      Palpations: Abdomen is soft.   Musculoskeletal:         General: Normal range of motion.   Skin:     General: Skin is warm and dry.   Neurological:      Mental Status: She is alert and oriented to person, place, and time. Mental status is at baseline.   Psychiatric:         Mood and Affect: Mood normal.         Behavior: Behavior normal.         Thought Content: Thought content normal.         Judgment: Judgment normal.        ASSESSMENT/PLAN    Pain  Pain is: cancer related pain  Type: visceral and neuropathic  Pain control: sub-optimally controlled  Home regimen:   continue MSER 15mg bid.   Continue MSIR 15mg q4hrs PRN.   continue heating pad PRN  continue bentyl 10mg qid PRN for abdominal cramping.  Intolerances/previously tried:   -tramadol: ineffective  -oxycodone: ineffective      Opioid Use  Medication Management:   - OARRS report reviewed with no aberrant behavior; consistent with  prescriptions/records and patient history  - MED 30.  Overdose Risk Score 170.   This has been discussed with patient.   - We will continue to closely monitor the patient for signs of prescription misuse including UDS, OARRS review and subjective reports at each visit.  - no concurrent benzodiazepine use   - I am a provider who either is or has consulted and collaborated with a provider certified in Hospice and Palliative Medicine and have conducted a face-face visit and examination for this patient.  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   continue zofran 8mg q8hrs PRN.   continue protonix 40mg daily.    olanzapine 5mg at bedtime. Rx sent today.      Constipation   At risk for constipation related to opioids,  currently not constipated   Usual bowel pattern: q1-2 days   Current regimen:   educated pt on importance of maintaining regular bowel schedule  colace 100mg bid PRN for hard stools.   senna 8.6mg, 1-2tabs, 1-2x/day PRN  continue miralax 1 cap full daily, as long as staying well hydrated.   MOM 15mL 4x/day PRN     Sleeping Difficulty:  Impaired sleep related to insomnia, pain  Current regimen:    goal for improved sleep with better pain control   see pain section/ plan above    olanzapine 5mg at bedtime. Rx sent today.   Intolerances/previously tried:   -doxepin: ineffective      Decreased appetite  Related to malignancy, chemotherapy, taste changes, and disease process  Weight loss 30 lbs in 6 months   Current regimen:    encouraged smaller, more frequent meals through out the day  encouraged continued use of protein supplements as tolerated   olanzapine 5mg at bedtime. Rx sent today.     Supportive Interventions: reached out to [facility] regarding information for body donation    Supportive and Palliative Oncology encounter:  Emotional support provided  Coordination of care  We will continue to follow and address symptoms as needed    Advance Directives  Existence of Advance Directives:No - has interest  Decision maker: Surrogate decision maker is Terrance MACIELJero Eldon (): [phone]  Code Status: Full code    --would like to donate body to science, additional information requested from W today        Next Follow-Up Visit:  Return to clinic in 6 weeks     Signature and billing  Medical complexity was moderate level due to due to complexity of problems, extensive data review, and high risk of management/treatment.  Time was spent on the following: Prep Time, Time Directly with Patient/Family/Caregiver, Documentation Time. Total  time spent: 35 mins      Data  Diagnostic tests and information reviewed for today's visit:  Most recent labs, Medications         Some elements copied from previous supportive oncology note, the elements have been updated and all reflect current decision making from today, 6/13/2025.      Plan of Care discussed with: Patient    SIGNATURE: SHAHIDA Quinn    Contact information:  Supportive and Palliative Oncology  Monday-Friday 8 AM-5 PM  Phone:  [phone], press option #5, then option #1.   Or Epic Secure Chat

## 2025-05-30 ENCOUNTER — INFUSION (OUTPATIENT)
Dept: HEMATOLOGY/ONCOLOGY | Facility: CLINIC | Age: 70
End: 2025-05-30
Payer: MEDICARE

## 2025-05-30 ENCOUNTER — HOSPITAL ENCOUNTER (OUTPATIENT)
Dept: RADIOLOGY | Facility: HOSPITAL | Age: 70
Discharge: HOME | End: 2025-05-30
Payer: MEDICARE

## 2025-05-30 VITALS
SYSTOLIC BLOOD PRESSURE: 113 MMHG | TEMPERATURE: 97.2 F | RESPIRATION RATE: 16 BRPM | BODY MASS INDEX: 23.09 KG/M2 | HEIGHT: 64 IN | HEART RATE: 79 BPM | OXYGEN SATURATION: 98 % | DIASTOLIC BLOOD PRESSURE: 66 MMHG

## 2025-05-30 DIAGNOSIS — C25.0 MALIGNANT NEOPLASM OF HEAD OF PANCREAS (MULTI): ICD-10-CM

## 2025-05-30 DIAGNOSIS — R17 JAUNDICE: ICD-10-CM

## 2025-05-30 DIAGNOSIS — R63.4 WEIGHT LOSS: ICD-10-CM

## 2025-05-30 PROCEDURE — 2500000004 HC RX 250 GENERAL PHARMACY W/ HCPCS (ALT 636 FOR OP/ED): Mod: JZ | Performed by: RADIOLOGY

## 2025-05-30 PROCEDURE — 74177 CT ABD & PELVIS W/CONTRAST: CPT

## 2025-05-30 PROCEDURE — 2550000001 HC RX 255 CONTRASTS: Mod: JZ | Performed by: INTERNAL MEDICINE

## 2025-05-30 PROCEDURE — 96523 IRRIG DRUG DELIVERY DEVICE: CPT

## 2025-05-30 RX ORDER — ONDANSETRON 8 MG/1
TABLET, ORALLY DISINTEGRATING ORAL
Qty: 10 TABLET | Refills: 0 | Status: SHIPPED | OUTPATIENT
Start: 2025-05-30

## 2025-05-30 RX ORDER — HEPARIN SODIUM,PORCINE/PF 10 UNIT/ML
50 SYRINGE (ML) INTRAVENOUS AS NEEDED
OUTPATIENT
Start: 2025-05-30

## 2025-05-30 RX ORDER — HEPARIN 100 UNIT/ML
500 SYRINGE INTRAVENOUS ONCE
Status: COMPLETED | OUTPATIENT
Start: 2025-05-30 | End: 2025-05-30

## 2025-05-30 RX ORDER — HEPARIN 100 UNIT/ML
500 SYRINGE INTRAVENOUS AS NEEDED
Status: DISCONTINUED | OUTPATIENT
Start: 2025-05-30 | End: 2025-05-30 | Stop reason: HOSPADM

## 2025-05-30 RX ORDER — HEPARIN 100 UNIT/ML
500 SYRINGE INTRAVENOUS AS NEEDED
OUTPATIENT
Start: 2025-05-30

## 2025-05-30 RX ADMIN — IOHEXOL 75 ML: 350 INJECTION, SOLUTION INTRAVENOUS at 13:52

## 2025-05-30 RX ADMIN — Medication 500 UNITS: at 14:06

## 2025-05-30 ASSESSMENT — PAIN SCALES - GENERAL: PAINLEVEL_OUTOF10: 0-NO PAIN

## 2025-05-30 NOTE — TELEPHONE ENCOUNTER
Requested Prescriptions     Pending Prescriptions Disp Refills    ondansetron ODT (Zofran-ODT) 8 mg disintegrating tablet [Pharmacy Med Name: ONDANSETRON 8MG TBDP] 10 tablet 0     Sig: DISSOLVE ONE TABLET BY MOUTH EVERY 8 HOURS IF NEEDED FOR NAUSEA OR VOMITING FOR UP TO 7 DAYS     Lov 12/2/24 Nov 12/1/25

## 2025-06-03 ENCOUNTER — OFFICE VISIT (OUTPATIENT)
Dept: HEMATOLOGY/ONCOLOGY | Facility: CLINIC | Age: 70
End: 2025-06-03
Payer: MEDICARE

## 2025-06-03 ENCOUNTER — LAB (OUTPATIENT)
Dept: HEMATOLOGY/ONCOLOGY | Facility: CLINIC | Age: 70
End: 2025-06-03
Payer: MEDICARE

## 2025-06-03 VITALS
BODY MASS INDEX: 23.43 KG/M2 | SYSTOLIC BLOOD PRESSURE: 103 MMHG | TEMPERATURE: 99.1 F | WEIGHT: 135.9 LBS | DIASTOLIC BLOOD PRESSURE: 66 MMHG | RESPIRATION RATE: 18 BRPM | HEART RATE: 66 BPM | OXYGEN SATURATION: 98 %

## 2025-06-03 DIAGNOSIS — C25.0 MALIGNANT NEOPLASM OF HEAD OF PANCREAS (MULTI): ICD-10-CM

## 2025-06-03 DIAGNOSIS — C25.0 MALIGNANT NEOPLASM OF HEAD OF PANCREAS (MULTI): Primary | ICD-10-CM

## 2025-06-03 LAB
ALBUMIN SERPL BCP-MCNC: 2.7 G/DL (ref 3.4–5)
ALP SERPL-CCNC: 273 U/L (ref 33–136)
ALT SERPL W P-5'-P-CCNC: 12 U/L (ref 7–45)
ANION GAP SERPL CALC-SCNC: 10 MMOL/L (ref 10–20)
AST SERPL W P-5'-P-CCNC: 17 U/L (ref 9–39)
BASOPHILS # BLD AUTO: 0.01 X10*3/UL (ref 0–0.1)
BASOPHILS NFR BLD AUTO: 0.3 %
BILIRUB SERPL-MCNC: 0.5 MG/DL (ref 0–1.2)
BUN SERPL-MCNC: 10 MG/DL (ref 6–23)
CALCIUM SERPL-MCNC: 7.9 MG/DL (ref 8.6–10.6)
CHLORIDE SERPL-SCNC: 106 MMOL/L (ref 98–107)
CO2 SERPL-SCNC: 25 MMOL/L (ref 21–32)
CREAT SERPL-MCNC: 0.77 MG/DL (ref 0.5–1.05)
EGFRCR SERPLBLD CKD-EPI 2021: 85 ML/MIN/1.73M*2
EOSINOPHIL # BLD AUTO: 0.04 X10*3/UL (ref 0–0.7)
EOSINOPHIL NFR BLD AUTO: 1.1 %
ERYTHROCYTE [DISTWIDTH] IN BLOOD BY AUTOMATED COUNT: 15 % (ref 11.5–14.5)
GLUCOSE SERPL-MCNC: 169 MG/DL (ref 74–99)
HCT VFR BLD AUTO: 25.1 % (ref 36–46)
HGB BLD-MCNC: 8.3 G/DL (ref 12–16)
IMM GRANULOCYTES # BLD AUTO: 0 X10*3/UL (ref 0–0.7)
IMM GRANULOCYTES NFR BLD AUTO: 0 % (ref 0–0.9)
LYMPHOCYTES # BLD AUTO: 1.52 X10*3/UL (ref 1.2–4.8)
LYMPHOCYTES NFR BLD AUTO: 42.1 %
MCH RBC QN AUTO: 33.3 PG (ref 26–34)
MCHC RBC AUTO-ENTMCNC: 33.1 G/DL (ref 32–36)
MCV RBC AUTO: 101 FL (ref 80–100)
MONOCYTES # BLD AUTO: 0.48 X10*3/UL (ref 0.1–1)
MONOCYTES NFR BLD AUTO: 13.3 %
NEUTROPHILS # BLD AUTO: 1.56 X10*3/UL (ref 1.2–7.7)
NEUTROPHILS NFR BLD AUTO: 43.2 %
NRBC BLD-RTO: ABNORMAL /100{WBCS}
PLATELET # BLD AUTO: 217 X10*3/UL (ref 150–450)
POTASSIUM SERPL-SCNC: 4.3 MMOL/L (ref 3.5–5.3)
PROT SERPL-MCNC: 5.4 G/DL (ref 6.4–8.2)
RBC # BLD AUTO: 2.49 X10*6/UL (ref 4–5.2)
SODIUM SERPL-SCNC: 137 MMOL/L (ref 136–145)
WBC # BLD AUTO: 3.6 X10*3/UL (ref 4.4–11.3)

## 2025-06-03 PROCEDURE — 3051F HG A1C>EQUAL 7.0%<8.0%: CPT | Performed by: INTERNAL MEDICINE

## 2025-06-03 PROCEDURE — 99215 OFFICE O/P EST HI 40 MIN: CPT | Performed by: INTERNAL MEDICINE

## 2025-06-03 PROCEDURE — 36415 COLL VENOUS BLD VENIPUNCTURE: CPT

## 2025-06-03 PROCEDURE — 1036F TOBACCO NON-USER: CPT | Performed by: INTERNAL MEDICINE

## 2025-06-03 PROCEDURE — 86301 IMMUNOASSAY TUMOR CA 19-9: CPT

## 2025-06-03 PROCEDURE — 1159F MED LIST DOCD IN RCRD: CPT | Performed by: INTERNAL MEDICINE

## 2025-06-03 PROCEDURE — 84075 ASSAY ALKALINE PHOSPHATASE: CPT

## 2025-06-03 PROCEDURE — 85025 COMPLETE CBC W/AUTO DIFF WBC: CPT

## 2025-06-03 PROCEDURE — 2500000004 HC RX 250 GENERAL PHARMACY W/ HCPCS (ALT 636 FOR OP/ED): Performed by: INTERNAL MEDICINE

## 2025-06-03 PROCEDURE — 36591 DRAW BLOOD OFF VENOUS DEVICE: CPT

## 2025-06-03 RX ORDER — ALBUTEROL SULFATE 0.83 MG/ML
3 SOLUTION RESPIRATORY (INHALATION) AS NEEDED
OUTPATIENT
Start: 2025-07-16

## 2025-06-03 RX ORDER — HEPARIN SODIUM,PORCINE/PF 10 UNIT/ML
50 SYRINGE (ML) INTRAVENOUS AS NEEDED
Status: DISCONTINUED | OUTPATIENT
Start: 2025-06-03 | End: 2025-06-03 | Stop reason: HOSPADM

## 2025-06-03 RX ORDER — HEPARIN 100 UNIT/ML
500 SYRINGE INTRAVENOUS AS NEEDED
Status: DISCONTINUED | OUTPATIENT
Start: 2025-06-03 | End: 2025-06-03 | Stop reason: HOSPADM

## 2025-06-03 RX ORDER — ALBUTEROL SULFATE 0.83 MG/ML
3 SOLUTION RESPIRATORY (INHALATION) AS NEEDED
OUTPATIENT
Start: 2025-07-02

## 2025-06-03 RX ORDER — EPINEPHRINE 0.3 MG/.3ML
0.3 INJECTION SUBCUTANEOUS EVERY 5 MIN PRN
OUTPATIENT
Start: 2025-07-16

## 2025-06-03 RX ORDER — EPINEPHRINE 0.3 MG/.3ML
0.3 INJECTION SUBCUTANEOUS EVERY 5 MIN PRN
OUTPATIENT
Start: 2025-07-02

## 2025-06-03 RX ORDER — DIPHENHYDRAMINE HYDROCHLORIDE 50 MG/ML
50 INJECTION, SOLUTION INTRAMUSCULAR; INTRAVENOUS AS NEEDED
OUTPATIENT
Start: 2025-07-02

## 2025-06-03 RX ORDER — BLOOD-GLUCOSE METER
EACH MISCELLANEOUS
COMMUNITY
Start: 2025-04-27

## 2025-06-03 RX ORDER — PROCHLORPERAZINE EDISYLATE 5 MG/ML
10 INJECTION INTRAMUSCULAR; INTRAVENOUS EVERY 6 HOURS PRN
OUTPATIENT
Start: 2025-07-16

## 2025-06-03 RX ORDER — FAMOTIDINE 10 MG/ML
20 INJECTION, SOLUTION INTRAVENOUS ONCE AS NEEDED
OUTPATIENT
Start: 2025-07-16

## 2025-06-03 RX ORDER — DIPHENHYDRAMINE HYDROCHLORIDE 50 MG/ML
50 INJECTION, SOLUTION INTRAMUSCULAR; INTRAVENOUS AS NEEDED
OUTPATIENT
Start: 2025-07-16

## 2025-06-03 RX ORDER — PROCHLORPERAZINE EDISYLATE 5 MG/ML
10 INJECTION INTRAMUSCULAR; INTRAVENOUS EVERY 6 HOURS PRN
OUTPATIENT
Start: 2025-07-02

## 2025-06-03 RX ORDER — ONDANSETRON HYDROCHLORIDE 2 MG/ML
8 INJECTION, SOLUTION INTRAVENOUS ONCE
OUTPATIENT
Start: 2025-07-02

## 2025-06-03 RX ORDER — HEPARIN SODIUM,PORCINE/PF 10 UNIT/ML
50 SYRINGE (ML) INTRAVENOUS AS NEEDED
Status: CANCELLED | OUTPATIENT
Start: 2025-06-03

## 2025-06-03 RX ORDER — ONDANSETRON HYDROCHLORIDE 2 MG/ML
8 INJECTION, SOLUTION INTRAVENOUS ONCE
OUTPATIENT
Start: 2025-07-16

## 2025-06-03 RX ORDER — FAMOTIDINE 10 MG/ML
20 INJECTION, SOLUTION INTRAVENOUS ONCE AS NEEDED
OUTPATIENT
Start: 2025-07-02

## 2025-06-03 RX ORDER — LIDOCAINE AND PRILOCAINE 25; 25 MG/G; MG/G
CREAM TOPICAL
COMMUNITY
Start: 2025-03-04

## 2025-06-03 RX ORDER — PROCHLORPERAZINE MALEATE 10 MG
10 TABLET ORAL EVERY 6 HOURS PRN
OUTPATIENT
Start: 2025-07-02

## 2025-06-03 RX ORDER — HEPARIN 100 UNIT/ML
500 SYRINGE INTRAVENOUS AS NEEDED
Status: CANCELLED | OUTPATIENT
Start: 2025-06-03

## 2025-06-03 RX ORDER — PROCHLORPERAZINE MALEATE 10 MG
10 TABLET ORAL EVERY 6 HOURS PRN
OUTPATIENT
Start: 2025-07-16

## 2025-06-03 RX ADMIN — HEPARIN 500 UNITS: 100 SYRINGE at 12:27

## 2025-06-03 ASSESSMENT — PAIN SCALES - GENERAL: PAINLEVEL_OUTOF10: 0-NO PAIN

## 2025-06-03 NOTE — PROGRESS NOTES
Nanci is here with her , son, and son's SO. She reports she is doing well today with only c/o fatigue. She has a walker with her due to unsteady gait at times. Meds and allergies reviewed. She is requesting a new Rx for 10meq K please sent in to pharmacy as she cannot swallow the 20meq tabs. MD made aware.  Education Documentation  Healthy Lifestyle, taught by Lisa Pacheco RN at 6/3/2025  1:43 PM.  Learner: Significant Other, Family, Patient  Readiness: Acceptance  Method: Explanation  Response: Verbalizes Understanding    Tips for Daily Living, taught by Lisa Pacheco RN at 6/3/2025  1:43 PM.  Learner: Significant Other, Family, Patient  Readiness: Acceptance  Method: Explanation  Response: Verbalizes Understanding    Nutrition/Diet, taught by Lisa Pacheco RN at 6/3/2025  1:43 PM.  Learner: Significant Other, Family, Patient  Readiness: Acceptance  Method: Explanation  Response: Verbalizes Understanding    Pain Management, taught by Lisa Pacheco RN at 6/3/2025  1:43 PM.  Learner: Significant Other, Family, Patient  Readiness: Acceptance  Method: Explanation  Response: Verbalizes Understanding    Pain Rating Scale, taught by Lisa Pacheco RN at 6/3/2025  1:43 PM.  Learner: Significant Other, Family, Patient  Readiness: Acceptance  Method: Explanation  Response: Verbalizes Understanding    Treatment Plan and Schedule, taught by Lisa Pacheco RN at 6/3/2025  1:43 PM.  Learner: Significant Other, Family, Patient  Readiness: Acceptance  Method: Explanation  Response: Verbalizes Understanding    General Medication Information, taught by Lisa Pacheco RN at 6/3/2025  1:43 PM.  Learner: Significant Other, Family, Patient  Readiness: Acceptance  Method: Explanation  Response: Verbalizes Understanding    Supportive Medications, taught by Lisa Pacheco RN at 6/3/2025  1:43 PM.  Learner: Significant Other, Family, Patient  Readiness: Acceptance  Method: Explanation  Response:  Verbalizes Understanding    When and How to Contact Clinic, taught by Lisa Pacheco RN at 6/3/2025  1:43 PM.  Learner: Significant Other, Family, Patient  Readiness: Acceptance  Method: Explanation  Response: Verbalizes Understanding    Education Comments  No comments found.

## 2025-06-03 NOTE — PROGRESS NOTES
Patient ID: Nnaci Colón is a 69 y.o. female from [place], [place].     Referring Physician: Francisco Cruz MD  [id number] [place] [address]    Primary Care Provider: Diana Blunt DO    Diagnosis:   Pancreatic cancer 9/2024    Primary Oncologic Surgeon:   Nazia    Primary Medical Oncologist:  Anthony    Primary Radiation Oncologist:  KVNG    Current Therapy:  10/4/24 -  Neoadjuvant Chemo - FOLFIRINOX    Oncologic Surgery History:  None    Oncologic Therapy History:  10/9/24 -  mFOLFIRINOX     Molecular Genetics:  Mmr-P  TP53 splice site SNV, TP53 C135R, MSI-H not detected, TMB not evaluable     Current Sites of Disease:  Pancreatic head    Oncologic Problem List:  Pancreatic cancer  DM2    Oncologic Narrative:  69 y.o. woman who initially presented in mid September 2024 with abdominal pain jaundice MARK and pancreatitis.  She had an ultrasound that showed extrahepatic bile duct dilation and pancreatic duct dilation.  Both of these seem to emanate from the pancreatic head.  A CT scan on September 9, 2024 was concerning for a mass.  MRCP on September 27, 2024 showed an ill-defined mass in the pancreatic head and uncinate process measuring approximately 3.5 cm.  Biopsy of the pancreatic mass from the September 11, 2024 endoscopic ultrasound revealed adenocarcinoma.  She met me for initial consultation regarding treatment planning on 9/26/2024.       Past Medical History: Nanci has a past medical history of Hyperlipidemia, Hypertension, Hypothyroidism, Personal history of other diseases of the circulatory system (09/15/2017), Personal history of other endocrine, nutritional and metabolic disease (02/24/2016), Personal history of other endocrine, nutritional and metabolic disease (03/09/2017), Personal history of other endocrine, nutritional and metabolic disease, Type 2 diabetes mellitus with other skin complications, and Vision loss.  Surgical History:  Nanci has a past surgical history that includes  Other surgical history (09/01/2022); Other surgical history (09/01/2022); and Other surgical history (09/01/2022).  Social History:  Nanci reports that she has never smoked. She has never been exposed to tobacco smoke. She has never used smokeless tobacco. She reports that she does not drink alcohol and does not use drugs.  Family History:    Family History   Problem Relation Name Age of Onset    Cancer Mother      Heart disease Father       Family Oncology History:  Cancer-related family history includes Cancer in her mother.      SUBJECTIVE:    History of Present Illness:  Nanci Colón is a 69 y.o. female who was referred by Francisco Cruz MD and presents with follow up.     She is s/p 7 cycles of FOLFIRINOX. She is feeling similar to her last visit. Biggest complaint remains fatigue. Continues to have occasional back pain.        OBJECTIVE:    VS / Pain:  There were no vitals taken for this visit.  BSA: There is no height or weight on file to calculate BSA.   Pain Scale: 0    Daily Weight  06/03/25 : 61.6 kg (135 lb 14.4 oz)  05/21/25 : 60.7 kg (133 lb 14.4 oz)  05/07/25 : 60.5 kg (133 lb 4.8 oz)      Physical Exam  Constitutional:       Appearance: She is normal weight.   HENT:      Nose: Nose normal.      Mouth/Throat:      Mouth: Mucous membranes are moist.      Pharynx: Oropharynx is clear.   Eyes:      Extraocular Movements: Extraocular movements intact.      Conjunctiva/sclera: Conjunctivae normal.   Cardiovascular:      Rate and Rhythm: Normal rate.   Pulmonary:      Effort: Pulmonary effort is normal.      Breath sounds: Normal breath sounds.   Abdominal:      General: Abdomen is flat. Bowel sounds are normal.   Musculoskeletal:         General: Normal range of motion.   Skin:     General: Skin is warm.   Neurological:      General: No focal deficit present.      Mental Status: She is alert. Mental status is at baseline.   Psychiatric:         Mood and Affect: Mood normal.         Thought Content:  Thought content normal.         Judgment: Judgment normal.       Performance Status:   ECOG 1    Diagnostic Results         WBC   Date/Time Value Ref Range Status   05/20/2025 02:08 PM 3.9 (L) 4.4 - 11.3 x10*3/uL Final   05/06/2025 11:42 AM 4.4 4.4 - 11.3 x10*3/uL Final   04/22/2025 11:56 AM 2.8 (L) 4.4 - 11.3 x10*3/uL Final     Hemoglobin   Date Value Ref Range Status   05/20/2025 9.0 (L) 12.0 - 16.0 g/dL Final   05/06/2025 9.7 (L) 12.0 - 16.0 g/dL Final   04/22/2025 9.5 (L) 12.0 - 16.0 g/dL Final     MCV   Date/Time Value Ref Range Status   05/20/2025 02:08  (H) 80 - 100 fL Final   05/06/2025 11:42  (H) 80 - 100 fL Final   04/22/2025 11:56  (H) 80 - 100 fL Final     Platelets   Date/Time Value Ref Range Status   05/20/2025 02:08  150 - 450 x10*3/uL Final   05/06/2025 11:42  150 - 450 x10*3/uL Final   04/22/2025 11:56  150 - 450 x10*3/uL Final     Neutrophils Absolute   Date/Time Value Ref Range Status   05/20/2025 02:08 PM 2.36 1.20 - 7.70 x10*3/uL Final     Comment:     Percent differential counts (%) should be interpreted in the context of the absolute cell counts (cells/uL).   05/06/2025 11:42 AM 2.43 1.20 - 7.70 x10*3/uL Final     Comment:     Percent differential counts (%) should be interpreted in the context of the absolute cell counts (cells/uL).   04/22/2025 11:56 AM 1.65 1.20 - 7.70 x10*3/uL Final     Comment:     Percent differential counts (%) should be interpreted in the context of the absolute cell counts (cells/uL).     Bilirubin, Total   Date/Time Value Ref Range Status   05/20/2025 02:08 PM 0.8 0.0 - 1.2 mg/dL Final   05/06/2025 11:42 AM 0.8 0.0 - 1.2 mg/dL Final   04/22/2025 11:56 AM 1.0 0.0 - 1.2 mg/dL Final     AST   Date/Time Value Ref Range Status   05/20/2025 02:08 PM 36 9 - 39 U/L Final   05/06/2025 11:42 AM 21 9 - 39 U/L Final   04/22/2025 11:56 AM 22 9 - 39 U/L Final     ALT   Date/Time Value Ref Range Status   05/20/2025 02:08 PM 16 7 - 45 U/L Final     " Comment:     Patients treated with Sulfasalazine may generate falsely decreased results for ALT.   05/06/2025 11:42 AM 13 7 - 45 U/L Final     Comment:     Patients treated with Sulfasalazine may generate falsely decreased results for ALT.   04/22/2025 11:56 AM 13 7 - 45 U/L Final     Comment:     Patients treated with Sulfasalazine may generate falsely decreased results for ALT.     Creatinine   Date/Time Value Ref Range Status   05/20/2025 02:08 PM 0.73 0.50 - 1.05 mg/dL Final   05/06/2025 11:42 AM 0.70 0.50 - 1.05 mg/dL Final   04/22/2025 11:56 AM 0.72 0.50 - 1.05 mg/dL Final     Urea Nitrogen   Date/Time Value Ref Range Status   05/20/2025 02:08 PM 13 6 - 23 mg/dL Final   05/06/2025 11:42 AM 10 6 - 23 mg/dL Final   04/22/2025 11:56 AM 12 6 - 23 mg/dL Final     Albumin   Date/Time Value Ref Range Status   05/20/2025 02:08 PM 2.8 (L) 3.4 - 5.0 g/dL Final   05/06/2025 11:42 AM 2.9 (L) 3.4 - 5.0 g/dL Final   04/22/2025 11:56 AM 2.7 (L) 3.4 - 5.0 g/dL Final     Cancer AG 19-9   Date/Time Value Ref Range Status   04/07/2025 01:50 PM 17,703.49 (H) <35.00 U/mL Final   03/27/2025 11:04 AM 7,925.42 (H) <35.00 U/mL Final   03/04/2025 12:20 PM 2,199.55 (H) <35.00 U/mL Final   02/18/2025 02:01 .79 (H) <35.00 U/mL Final   02/10/2025 11:41 .27 (H) <35.00 U/mL Final   02/03/2025 02:38 .66 (H) <35.00 U/mL Final   01/20/2025 12:52 .22 (H) <35.00 U/mL Final   12/31/2024 01:19 PM 1,391.30 (H) <35.00 U/mL Final   12/17/2024 03:04 .09 (H) <35.00 U/mL Final   12/02/2024 03:00 .06 (H) <35.00 U/mL Final     No results found for: \"CEA\"  === 05/30/25 ===    CT CHEST ABDOMEN PELVIS W IV CONTRAST    - Impression -  Pancreatic adenocarcinoma restaging scan compared to CT chest abdomen  pelvis 02/19/2025 and PET-CT 03/27/2025:  1. Overall worsening of measurable and non-measurable disease.  Numerous bilateral pulmonary nodules have decreased in size however  new pulmonary nodules have developed in the " interim. Ill-defined  pancreatic, liver lesion and abdominopelvic lymph nodes have remained  overall stable in size. New micronodularity throughout the greater  omentum and peritoneal reflections are concerning for early  peritoneal carcinomatosis.  2. Atrophic appearance of the thyroid gland which can be seen in  treatment associated thyroiditis.  3. New small volume ascites in the rectovesical recess with  increasing body wall and mesenteric edema is likely related to low  albumin levels, congestion and third-spacing versus secondary to  early peritoneal carcinomatosis.  4. Additional incidental non-acute findings as detailed above.      I personally reviewed the images/study and I agree with the findings  as stated by Dr. Jefferson Burleson. This study was interpreted at [place], [place].    MACRO:  None.    Signed by: Herson Baker 5/31/2025 3:05 PM  Dictation workstation:   [id number]      Assessment/Plan   69 y.o.  woman with a resectable pancreatic cancer but elevated CA 19-9 and recent pancreatitis so we will plan for neoadjuvant chemotherapy with modified FOLFIRINOX.   9/30/24 - diagnostic lab negative   10/6/24 - 1st dose mFOLFIRINOX  - tolerated with fatigue, nausea   Added fosaprepitant to C2 - Allergic rxn to fosaprepitant - discontinue  C3 held due to ANC of 500    - plan for C3 with addition of neulasta   C4   S/p 7 cycles:   - Ca19-9 is uptrending 507>549>621>2200>7925  - CT from 2/19/25 is read as showing multiple new pulmonary nodules (and an ill-defined hepatic segment 4B hypodensity that likely reflects a focal hepatic steatosis) In conjunction with the uptrend in the Ca19-9 there is concern for disease progression, however, patient did have a recent respiratory illness and her disease is stable in the pancreas and surrounding area. At her last visit, we reviewed stopping her mFOLFIRINOX given her elevated LFTs and hepatic steatosis are likely a direct  consequence of her chemotherapy and lack of response.   - PET-CT is difficult to interpret given her primary lesion is only mildly PET-avid; there are mildly avid left supraclavicular, mediastinal, RP and mesenteric nodes, but given continued rise in Ca19-9, there is concern for disease progression  - She started   gemcitabine and nab-paclitaxel in early 4/2025.  She felt better after starting.  Only neuropathy worse but pain in abdomen better.  Added  to C2 labs to help with interpretation.  Response scans after C2 show mixed response but overall improved.  Will continue for now.  Need Ca 19-9.     Cancer related pain:   -on oxycodone 15mg   -Supportive oncology following     DM:   -Continue metformin, however keep close eye on renal function.    - Endocrinology scheduled for April    Patient seen and discussed with Dr. Cruz.     Francisco Cruz MD  Hematology Oncology PGYVI

## 2025-06-04 ENCOUNTER — APPOINTMENT (OUTPATIENT)
Dept: HEMATOLOGY/ONCOLOGY | Facility: CLINIC | Age: 70
End: 2025-06-04
Payer: MEDICARE

## 2025-06-04 ENCOUNTER — INFUSION (OUTPATIENT)
Dept: HEMATOLOGY/ONCOLOGY | Facility: CLINIC | Age: 70
End: 2025-06-04
Payer: MEDICARE

## 2025-06-04 ENCOUNTER — NUTRITION (OUTPATIENT)
Dept: HEMATOLOGY/ONCOLOGY | Facility: CLINIC | Age: 70
End: 2025-06-04

## 2025-06-04 VITALS — WEIGHT: 136.3 LBS | BODY MASS INDEX: 23.27 KG/M2 | HEIGHT: 64 IN

## 2025-06-04 VITALS
RESPIRATION RATE: 16 BRPM | TEMPERATURE: 98.2 F | OXYGEN SATURATION: 99 % | HEART RATE: 76 BPM | BODY MASS INDEX: 23.5 KG/M2 | DIASTOLIC BLOOD PRESSURE: 66 MMHG | SYSTOLIC BLOOD PRESSURE: 106 MMHG | WEIGHT: 136.3 LBS

## 2025-06-04 DIAGNOSIS — C25.0 MALIGNANT NEOPLASM OF HEAD OF PANCREAS (MULTI): Primary | ICD-10-CM

## 2025-06-04 LAB — CANCER AG19-9 SERPL-ACNC: 2749.53 U/ML

## 2025-06-04 PROCEDURE — 96417 CHEMO IV INFUS EACH ADDL SEQ: CPT

## 2025-06-04 PROCEDURE — 2500000004 HC RX 250 GENERAL PHARMACY W/ HCPCS (ALT 636 FOR OP/ED): Performed by: INTERNAL MEDICINE

## 2025-06-04 PROCEDURE — 96375 TX/PRO/DX INJ NEW DRUG ADDON: CPT | Mod: INF

## 2025-06-04 PROCEDURE — 96413 CHEMO IV INFUSION 1 HR: CPT

## 2025-06-04 RX ORDER — HEPARIN SODIUM,PORCINE/PF 10 UNIT/ML
50 SYRINGE (ML) INTRAVENOUS AS NEEDED
OUTPATIENT
Start: 2025-06-04

## 2025-06-04 RX ORDER — ONDANSETRON HYDROCHLORIDE 2 MG/ML
8 INJECTION, SOLUTION INTRAVENOUS ONCE
Status: COMPLETED | OUTPATIENT
Start: 2025-06-04 | End: 2025-06-04

## 2025-06-04 RX ORDER — DIPHENHYDRAMINE HYDROCHLORIDE 50 MG/ML
50 INJECTION, SOLUTION INTRAMUSCULAR; INTRAVENOUS AS NEEDED
Status: DISCONTINUED | OUTPATIENT
Start: 2025-06-04 | End: 2025-06-04 | Stop reason: HOSPADM

## 2025-06-04 RX ORDER — PROCHLORPERAZINE MALEATE 10 MG
10 TABLET ORAL EVERY 6 HOURS PRN
Status: DISCONTINUED | OUTPATIENT
Start: 2025-06-04 | End: 2025-06-04 | Stop reason: HOSPADM

## 2025-06-04 RX ORDER — ALBUTEROL SULFATE 0.83 MG/ML
3 SOLUTION RESPIRATORY (INHALATION) AS NEEDED
Status: DISCONTINUED | OUTPATIENT
Start: 2025-06-04 | End: 2025-06-04 | Stop reason: HOSPADM

## 2025-06-04 RX ORDER — FAMOTIDINE 10 MG/ML
20 INJECTION, SOLUTION INTRAVENOUS ONCE AS NEEDED
Status: DISCONTINUED | OUTPATIENT
Start: 2025-06-04 | End: 2025-06-04 | Stop reason: HOSPADM

## 2025-06-04 RX ORDER — HEPARIN 100 UNIT/ML
500 SYRINGE INTRAVENOUS AS NEEDED
Status: DISCONTINUED | OUTPATIENT
Start: 2025-06-04 | End: 2025-06-04 | Stop reason: HOSPADM

## 2025-06-04 RX ORDER — PROCHLORPERAZINE EDISYLATE 5 MG/ML
10 INJECTION INTRAMUSCULAR; INTRAVENOUS EVERY 6 HOURS PRN
Status: DISCONTINUED | OUTPATIENT
Start: 2025-06-04 | End: 2025-06-04 | Stop reason: HOSPADM

## 2025-06-04 RX ORDER — HEPARIN 100 UNIT/ML
500 SYRINGE INTRAVENOUS AS NEEDED
OUTPATIENT
Start: 2025-06-04

## 2025-06-04 RX ORDER — EPINEPHRINE 0.3 MG/.3ML
0.3 INJECTION SUBCUTANEOUS EVERY 5 MIN PRN
Status: DISCONTINUED | OUTPATIENT
Start: 2025-06-04 | End: 2025-06-04 | Stop reason: HOSPADM

## 2025-06-04 RX ADMIN — Medication 500 UNITS: at 16:25

## 2025-06-04 RX ADMIN — SODIUM CHLORIDE 1447.8 MG: 9 INJECTION, SOLUTION INTRAVENOUS at 15:59

## 2025-06-04 RX ADMIN — ONDANSETRON 8 MG: 2 INJECTION INTRAMUSCULAR; INTRAVENOUS at 14:36

## 2025-06-04 RX ADMIN — PACLITAXEL 180 MG: 100 INJECTION, POWDER, LYOPHILIZED, FOR SUSPENSION INTRAVENOUS at 15:19

## 2025-06-04 ASSESSMENT — PAIN SCALES - GENERAL: PAINLEVEL_OUTOF10: 0-NO PAIN

## 2025-06-04 NOTE — PROGRESS NOTES
Patient is here for Cycle 3. Patient tolerated treatment well. AVS printed for patient. Discharged in stable condition.

## 2025-06-04 NOTE — SIGNIFICANT EVENT
06/04/25 1426   Prechemo Checklist   Has the patient been in the hospital, ED, or urgent care since last date of service No   Chemo/Immuno Consent Completed and Signed Yes   Protocol/Indications Verified Yes   Confirmed to previous date/time of medication Yes   Compared to previous dose Yes   All medications are dated accurately Yes   Pregnancy Test Negative Not applicable   Parameters Met Yes   Provider Notified Yes   Is Patient Proceeding With Treatment? Yes   BSA/Weight-Height Verified Yes   Dose Calculations Verified (current, total, cumulative) Yes

## 2025-06-04 NOTE — PROGRESS NOTES
"NUTRITION Follow Up NOTE    Nutrition Assessment     Reason for Visit:  Nanci Colón is a 69 y.o. female with resectable Pancreatic cancer 9/2024     She presented in mid September 2024 with abdominal pain, jaundice, MARK and pancreatitis.   She had an ultrasound that showed extrahepatic bile duct dilation and pancreatic duct dilation   MRCP on September 27, 2024 showed an ill-defined mass in the pancreatic head and uncinate process measuring approximately 3.5 cm.  A biliary stent was placed.     CT from 2/19/25 showing multiple new pulmonary nodules (and an ill-defined hepatic segment 4B hypodensity that likely reflects a focal hepatic steatosis.)     Current Therapy:  gemcitabine and nab-paclitaxel in early 4/2025.     Prior Tx:   Neoadjuvant Chemo- 10/9/24 -  mFOLFIRINOX     Current Sites of Disease:  Pancreatic head     Oncologic Problem List:  Pancreatic cancer  DM2    Referred to this service last year for high risk dx and assessed 10/23/24.  Re-assessed today during infusion in the presence of her .    Supportive oncology following for ca-related pain.  Referred to Endocrine for BS management during tx.    PMH noted: Hyperlipidemia, Hypertension, Hypothyroidism, Type II DM    Lab Results   Component Value Date/Time    GLUCOSE 169 (H) 06/03/2025 1227     06/03/2025 1227    K 4.3 06/03/2025 1227     06/03/2025 1227    CO2 25 06/03/2025 1227    ANIONGAP 10 06/03/2025 1227    BUN 10 06/03/2025 1227    CREATININE 0.77 06/03/2025 1227    EGFR 85 06/03/2025 1227    CALCIUM 7.9 (L) 06/03/2025 1227    ALBUMIN 2.7 (L) 06/03/2025 1227    ALKPHOS 273 (H) 06/03/2025 1227    PROT 5.4 (L) 06/03/2025 1227    AST 17 06/03/2025 1227    BILITOT 0.5 06/03/2025 1227    ALT 12 06/03/2025 1227   Calcium corrected at 8.9 for low albumin    No results found for: \"VITD25\"    Anthropometrics:  Anthropometrics  Height: 162.2 cm (5' 3.86\")  Weight: 61.8 kg (136 lb 4.8 oz)  BMI (Calculated): 23.5  IBW/kg (Dietitian " Calculated): 53.2 kg  Percent of IBW: 116.2 %    Wt Readings    6/4/25 61.8 kg   5/6/25 60.0 kg   4/9/25 64.9 kg   2/10/25 68.0 kg   1/8/25 69.6 kg   12/17/25 68.0 kg   11/20/24 73.4 kg   11/6/24 73.6 kg- Additional loss of 2 kg (2.6%) in 2 weeks- significant   10/23/24 75.6 kg (166 lb 9.6 oz)- Loss of 10.8% in < 6 months - significant   10/09/24 77.7 kg (171 lb 3.2 oz)   09/30/24 77.4 kg (170 lb 10.2 oz)   09/27/24 78 kg (172 lb)   09/26/24 78 kg (172 lb)   09/24/24 77.1 kg (170 lb)   09/18/24 79.8 kg (175 lb 14.8 oz)   09/11/24 84.5 kg (186 lb 3.2 oz)   09/09/24 80.7 kg (178 lb)   09/09/24 81.6 kg (179 lb 12.8 oz)   08/21/24 82.5 kg (181 lb 12.8 oz)   05/01/24 84.8 kg (187 lb)     11/01/23 86.2 kg (190 lb)   07/31/23 85.7 kg (189 lb)   01/30/23 88.5 kg (195 lb)            Food And Nutrient Intake:  Food and Nutrient History  Food and Nutrient History: She is taking olanzine at bedtime and feels her appetite is better- she gets hungry.  She feels she is getting more food done- it's not all nasty.  States her BS is much improved.  IF her BS is high in the morning, she will take metformin at lunch or dinner.  Energy Intake: Fair 50-75 %          She c/o a lot of upper and lower gas.  Acid reflux with some dysphagia and burning.  She was sleeping sitting up and belching significantly when moving.  Diarrhea started a few days ago with explosive gas.  Reports foamy stools or liquid.  She did not have diarrhea after either chemo infusion.    Recall pt previously reported she took compazine before her breakfast on chemo day and it was more effective than zofran.        Food Intake  Fluid Intake: Drinks water- occasional green tea                                                        Nutrition Focused Physical Exam Findings:                     Physical Findings  Digestive System Findings: Constipation, Nausea  Last time she moved was today- going every day.  She is taking 2 each colace a day.   Occasional nausea, PRNs  "effective.   Taste buds are coming back a bit.  States that the baking soda and salt rinses gagged her.  She feels it was the salt component.      Energy Needs  Calculated Energy Needs Using Equations  Height: 162.2 cm (5' 3.86\")        Nutrition Diagnosis              Pt with elevated BS 3 hours post prandial on DM meds as directed.  Question need for SSI.       Nutrition Interventions/Recommendations   Nutrition Prescription       Food and Nutrition Delivery       Nutrition Education       Coordination of Care       There are no Patient Instructions on file for this visit.    Nutrition Monitoring and Evaluation                           "

## 2025-06-12 ENCOUNTER — DOCUMENTATION (OUTPATIENT)
Dept: HEMATOLOGY/ONCOLOGY | Facility: HOSPITAL | Age: 70
End: 2025-06-12
Payer: MEDICARE

## 2025-06-12 NOTE — PROGRESS NOTES
Call received from outpatient lab on 6/12/25 critical result, cancellation of [facility], secure text sent to Dr. Cruz provider and he acknowledged response and no further needs noted from this RN.

## 2025-06-13 ENCOUNTER — OFFICE VISIT (OUTPATIENT)
Dept: PALLIATIVE MEDICINE | Facility: CLINIC | Age: 70
End: 2025-06-13
Payer: MEDICARE

## 2025-06-13 VITALS
HEIGHT: 64 IN | RESPIRATION RATE: 16 BRPM | DIASTOLIC BLOOD PRESSURE: 69 MMHG | SYSTOLIC BLOOD PRESSURE: 105 MMHG | WEIGHT: 130.51 LBS | BODY MASS INDEX: 22.28 KG/M2 | TEMPERATURE: 97.9 F | OXYGEN SATURATION: 99 % | HEART RATE: 85 BPM

## 2025-06-13 DIAGNOSIS — Z51.81 THERAPEUTIC DRUG MONITORING: Primary | ICD-10-CM

## 2025-06-13 DIAGNOSIS — G89.3 CANCER RELATED PAIN: ICD-10-CM

## 2025-06-13 DIAGNOSIS — Z51.5 ENCOUNTER FOR PALLIATIVE CARE: ICD-10-CM

## 2025-06-13 PROCEDURE — 3078F DIAST BP <80 MM HG: CPT | Performed by: CLINICAL NURSE SPECIALIST

## 2025-06-13 PROCEDURE — 99214 OFFICE O/P EST MOD 30 MIN: CPT | Performed by: CLINICAL NURSE SPECIALIST

## 2025-06-13 PROCEDURE — 3051F HG A1C>EQUAL 7.0%<8.0%: CPT | Performed by: CLINICAL NURSE SPECIALIST

## 2025-06-13 PROCEDURE — 3008F BODY MASS INDEX DOCD: CPT | Performed by: CLINICAL NURSE SPECIALIST

## 2025-06-13 PROCEDURE — 3074F SYST BP LT 130 MM HG: CPT | Performed by: CLINICAL NURSE SPECIALIST

## 2025-06-13 PROCEDURE — 1125F AMNT PAIN NOTED PAIN PRSNT: CPT | Performed by: CLINICAL NURSE SPECIALIST

## 2025-06-13 RX ORDER — MORPHINE SULFATE 15 MG/1
15 TABLET, FILM COATED, EXTENDED RELEASE ORAL 2 TIMES DAILY
Qty: 60 TABLET | Refills: 0 | Status: SHIPPED | OUTPATIENT
Start: 2025-06-20 | End: 2025-07-20

## 2025-06-13 ASSESSMENT — PAIN SCALES - GENERAL: PAINLEVEL_OUTOF10: 2

## 2025-06-17 ENCOUNTER — INFUSION (OUTPATIENT)
Dept: HEMATOLOGY/ONCOLOGY | Facility: CLINIC | Age: 70
End: 2025-06-17
Payer: MEDICARE

## 2025-06-17 VITALS
RESPIRATION RATE: 16 BRPM | DIASTOLIC BLOOD PRESSURE: 70 MMHG | SYSTOLIC BLOOD PRESSURE: 108 MMHG | TEMPERATURE: 95.9 F | HEART RATE: 90 BPM | OXYGEN SATURATION: 100 %

## 2025-06-17 DIAGNOSIS — C25.0 MALIGNANT NEOPLASM OF HEAD OF PANCREAS (MULTI): ICD-10-CM

## 2025-06-17 LAB
ALBUMIN SERPL BCP-MCNC: 2.7 G/DL (ref 3.4–5)
ALP SERPL-CCNC: 274 U/L (ref 33–136)
ALT SERPL W P-5'-P-CCNC: 13 U/L (ref 7–45)
ANION GAP SERPL CALC-SCNC: 10 MMOL/L (ref 10–20)
AST SERPL W P-5'-P-CCNC: 36 U/L (ref 9–39)
BASOPHILS # BLD AUTO: 0.02 X10*3/UL (ref 0–0.1)
BASOPHILS NFR BLD AUTO: 0.4 %
BILIRUB SERPL-MCNC: 0.7 MG/DL (ref 0–1.2)
BUN SERPL-MCNC: 11 MG/DL (ref 6–23)
CALCIUM SERPL-MCNC: 8.2 MG/DL (ref 8.6–10.3)
CHLORIDE SERPL-SCNC: 103 MMOL/L (ref 98–107)
CO2 SERPL-SCNC: 26 MMOL/L (ref 21–32)
CREAT SERPL-MCNC: 0.72 MG/DL (ref 0.5–1.05)
EGFRCR SERPLBLD CKD-EPI 2021: >90 ML/MIN/1.73M*2
EOSINOPHIL # BLD AUTO: 0.07 X10*3/UL (ref 0–0.7)
EOSINOPHIL NFR BLD AUTO: 1.3 %
ERYTHROCYTE [DISTWIDTH] IN BLOOD BY AUTOMATED COUNT: 15.7 % (ref 11.5–14.5)
GLUCOSE SERPL-MCNC: 169 MG/DL (ref 74–99)
HCT VFR BLD AUTO: 27.2 % (ref 36–46)
HGB BLD-MCNC: 8.7 G/DL (ref 12–16)
IMM GRANULOCYTES # BLD AUTO: 0.01 X10*3/UL (ref 0–0.7)
IMM GRANULOCYTES NFR BLD AUTO: 0.2 % (ref 0–0.9)
LYMPHOCYTES # BLD AUTO: 1.03 X10*3/UL (ref 1.2–4.8)
LYMPHOCYTES NFR BLD AUTO: 18.8 %
MCH RBC QN AUTO: 33 PG (ref 26–34)
MCHC RBC AUTO-ENTMCNC: 32 G/DL (ref 32–36)
MCV RBC AUTO: 103 FL (ref 80–100)
MONOCYTES # BLD AUTO: 0.71 X10*3/UL (ref 0.1–1)
MONOCYTES NFR BLD AUTO: 12.9 %
NEUTROPHILS # BLD AUTO: 3.65 X10*3/UL (ref 1.2–7.7)
NEUTROPHILS NFR BLD AUTO: 66.4 %
PLATELET # BLD AUTO: 178 X10*3/UL (ref 150–450)
POTASSIUM SERPL-SCNC: 4.3 MMOL/L (ref 3.5–5.3)
PROT SERPL-MCNC: 5.6 G/DL (ref 6.4–8.2)
RBC # BLD AUTO: 2.64 X10*6/UL (ref 4–5.2)
SODIUM SERPL-SCNC: 135 MMOL/L (ref 136–145)
WBC # BLD AUTO: 5.5 X10*3/UL (ref 4.4–11.3)

## 2025-06-17 PROCEDURE — 2500000004 HC RX 250 GENERAL PHARMACY W/ HCPCS (ALT 636 FOR OP/ED): Performed by: INTERNAL MEDICINE

## 2025-06-17 PROCEDURE — 85025 COMPLETE CBC W/AUTO DIFF WBC: CPT

## 2025-06-17 PROCEDURE — 36591 DRAW BLOOD OFF VENOUS DEVICE: CPT

## 2025-06-17 PROCEDURE — 80053 COMPREHEN METABOLIC PANEL: CPT

## 2025-06-17 RX ORDER — HEPARIN 100 UNIT/ML
500 SYRINGE INTRAVENOUS AS NEEDED
Status: DISCONTINUED | OUTPATIENT
Start: 2025-06-17 | End: 2025-06-17 | Stop reason: HOSPADM

## 2025-06-17 RX ORDER — HEPARIN 100 UNIT/ML
500 SYRINGE INTRAVENOUS AS NEEDED
Status: CANCELLED | OUTPATIENT
Start: 2025-06-17

## 2025-06-17 RX ORDER — HEPARIN SODIUM,PORCINE/PF 10 UNIT/ML
50 SYRINGE (ML) INTRAVENOUS AS NEEDED
Status: CANCELLED | OUTPATIENT
Start: 2025-06-17

## 2025-06-17 RX ADMIN — HEPARIN 500 UNITS: 100 SYRINGE at 14:30

## 2025-06-17 ASSESSMENT — PAIN SCALES - GENERAL: PAINLEVEL_OUTOF10: 0-NO PAIN

## 2025-06-18 ENCOUNTER — INFUSION (OUTPATIENT)
Dept: HEMATOLOGY/ONCOLOGY | Facility: CLINIC | Age: 70
End: 2025-06-18
Payer: MEDICARE

## 2025-06-18 VITALS
WEIGHT: 130.5 LBS | HEART RATE: 86 BPM | BODY MASS INDEX: 22.28 KG/M2 | OXYGEN SATURATION: 99 % | SYSTOLIC BLOOD PRESSURE: 110 MMHG | TEMPERATURE: 97.9 F | HEIGHT: 64 IN | DIASTOLIC BLOOD PRESSURE: 74 MMHG | RESPIRATION RATE: 16 BRPM

## 2025-06-18 DIAGNOSIS — C25.0 MALIGNANT NEOPLASM OF HEAD OF PANCREAS (MULTI): ICD-10-CM

## 2025-06-18 PROCEDURE — 2500000004 HC RX 250 GENERAL PHARMACY W/ HCPCS (ALT 636 FOR OP/ED): Performed by: INTERNAL MEDICINE

## 2025-06-18 PROCEDURE — 96417 CHEMO IV INFUS EACH ADDL SEQ: CPT

## 2025-06-18 PROCEDURE — 96375 TX/PRO/DX INJ NEW DRUG ADDON: CPT | Mod: INF

## 2025-06-18 PROCEDURE — 96413 CHEMO IV INFUSION 1 HR: CPT

## 2025-06-18 RX ORDER — DIPHENHYDRAMINE HYDROCHLORIDE 50 MG/ML
50 INJECTION, SOLUTION INTRAMUSCULAR; INTRAVENOUS AS NEEDED
Status: DISCONTINUED | OUTPATIENT
Start: 2025-06-18 | End: 2025-06-18 | Stop reason: HOSPADM

## 2025-06-18 RX ORDER — ONDANSETRON HYDROCHLORIDE 2 MG/ML
8 INJECTION, SOLUTION INTRAVENOUS ONCE
Status: COMPLETED | OUTPATIENT
Start: 2025-06-18 | End: 2025-06-18

## 2025-06-18 RX ORDER — HEPARIN 100 UNIT/ML
500 SYRINGE INTRAVENOUS AS NEEDED
Status: DISCONTINUED | OUTPATIENT
Start: 2025-06-18 | End: 2025-06-18 | Stop reason: HOSPADM

## 2025-06-18 RX ORDER — PROCHLORPERAZINE MALEATE 10 MG
10 TABLET ORAL EVERY 6 HOURS PRN
Status: DISCONTINUED | OUTPATIENT
Start: 2025-06-18 | End: 2025-06-18 | Stop reason: HOSPADM

## 2025-06-18 RX ORDER — ALBUTEROL SULFATE 0.83 MG/ML
3 SOLUTION RESPIRATORY (INHALATION) AS NEEDED
Status: DISCONTINUED | OUTPATIENT
Start: 2025-06-18 | End: 2025-06-18 | Stop reason: HOSPADM

## 2025-06-18 RX ORDER — FAMOTIDINE 10 MG/ML
20 INJECTION, SOLUTION INTRAVENOUS ONCE AS NEEDED
Status: DISCONTINUED | OUTPATIENT
Start: 2025-06-18 | End: 2025-06-18 | Stop reason: HOSPADM

## 2025-06-18 RX ORDER — EPINEPHRINE 0.3 MG/.3ML
0.3 INJECTION SUBCUTANEOUS EVERY 5 MIN PRN
Status: DISCONTINUED | OUTPATIENT
Start: 2025-06-18 | End: 2025-06-18 | Stop reason: HOSPADM

## 2025-06-18 RX ORDER — HEPARIN SODIUM,PORCINE/PF 10 UNIT/ML
50 SYRINGE (ML) INTRAVENOUS AS NEEDED
OUTPATIENT
Start: 2025-06-18

## 2025-06-18 RX ORDER — HEPARIN 100 UNIT/ML
500 SYRINGE INTRAVENOUS AS NEEDED
OUTPATIENT
Start: 2025-06-18

## 2025-06-18 RX ORDER — PROCHLORPERAZINE EDISYLATE 5 MG/ML
10 INJECTION INTRAMUSCULAR; INTRAVENOUS EVERY 6 HOURS PRN
Status: DISCONTINUED | OUTPATIENT
Start: 2025-06-18 | End: 2025-06-18 | Stop reason: HOSPADM

## 2025-06-18 RX ADMIN — SODIUM CHLORIDE 1303.4 MG: 9 INJECTION, SOLUTION INTRAVENOUS at 16:11

## 2025-06-18 RX ADMIN — Medication 500 UNITS: at 16:45

## 2025-06-18 RX ADMIN — ONDANSETRON 8 MG: 2 INJECTION INTRAMUSCULAR; INTRAVENOUS at 15:23

## 2025-06-18 RX ADMIN — PACLITAXEL 165 MG: 100 INJECTION, POWDER, LYOPHILIZED, FOR SUSPENSION INTRAVENOUS at 15:27

## 2025-06-18 ASSESSMENT — PAIN SCALES - GENERAL: PAINLEVEL_OUTOF10: 0-NO PAIN

## 2025-06-18 NOTE — SIGNIFICANT EVENT
06/18/25 1442   Prechemo Checklist   Has the patient been in the hospital, ED, or urgent care since last date of service No   Chemo/Immuno Consent Completed and Signed Yes   Protocol/Indications Verified Yes   Confirmed to previous date/time of medication Yes   Compared to previous dose Yes   All medications are dated accurately Yes   Pregnancy Test Negative Not applicable   Parameters Met Yes   Provider Notified Yes   Is Patient Proceeding With Treatment? Yes   BSA/Weight-Height Verified Yes   Dose Calculations Verified (current, total, cumulative) Yes  (dose reductions made due to weight loss)

## 2025-06-18 NOTE — PROGRESS NOTES
Pt arrived awake, alert, oriented, no apparent distress with unlabored breaths. Pt reports feeling well today without s/sx of acute illness. Pt here for day 15, cycle 3, in which she received and tolerated well. Pt with next appointment set for 6/24/25 for CT access. Pt without further questions or concerns, discharged in stable condition.

## 2025-06-24 ENCOUNTER — HOSPITAL ENCOUNTER (OUTPATIENT)
Dept: RADIOLOGY | Facility: HOSPITAL | Age: 70
Discharge: HOME | End: 2025-06-24
Payer: MEDICARE

## 2025-06-24 ENCOUNTER — LAB (OUTPATIENT)
Dept: HEMATOLOGY/ONCOLOGY | Facility: CLINIC | Age: 70
End: 2025-06-24
Payer: MEDICARE

## 2025-06-24 VITALS
RESPIRATION RATE: 16 BRPM | SYSTOLIC BLOOD PRESSURE: 107 MMHG | DIASTOLIC BLOOD PRESSURE: 69 MMHG | HEIGHT: 64 IN | HEART RATE: 93 BPM | BODY MASS INDEX: 22.5 KG/M2 | TEMPERATURE: 97.9 F | OXYGEN SATURATION: 100 %

## 2025-06-24 DIAGNOSIS — C25.0 MALIGNANT NEOPLASM OF HEAD OF PANCREAS (MULTI): ICD-10-CM

## 2025-06-24 PROCEDURE — 74177 CT ABD & PELVIS W/CONTRAST: CPT | Performed by: RADIOLOGY

## 2025-06-24 PROCEDURE — 96523 IRRIG DRUG DELIVERY DEVICE: CPT

## 2025-06-24 PROCEDURE — 2500000004 HC RX 250 GENERAL PHARMACY W/ HCPCS (ALT 636 FOR OP/ED): Performed by: INTERNAL MEDICINE

## 2025-06-24 PROCEDURE — 71260 CT THORAX DX C+: CPT | Performed by: RADIOLOGY

## 2025-06-24 PROCEDURE — 2550000001 HC RX 255 CONTRASTS: Performed by: INTERNAL MEDICINE

## 2025-06-24 PROCEDURE — 74177 CT ABD & PELVIS W/CONTRAST: CPT

## 2025-06-24 RX ORDER — HEPARIN 100 UNIT/ML
500 SYRINGE INTRAVENOUS AS NEEDED
Status: DISCONTINUED | OUTPATIENT
Start: 2025-06-24 | End: 2025-06-24 | Stop reason: HOSPADM

## 2025-06-24 RX ORDER — HEPARIN 100 UNIT/ML
500 SYRINGE INTRAVENOUS AS NEEDED
OUTPATIENT
Start: 2025-06-24

## 2025-06-24 RX ORDER — HEPARIN SODIUM,PORCINE/PF 10 UNIT/ML
50 SYRINGE (ML) INTRAVENOUS AS NEEDED
OUTPATIENT
Start: 2025-06-24

## 2025-06-24 RX ADMIN — Medication 500 UNITS: at 10:44

## 2025-06-24 RX ADMIN — IOHEXOL 75 ML: 350 INJECTION, SOLUTION INTRAVENOUS at 10:17

## 2025-06-24 ASSESSMENT — PAIN SCALES - GENERAL: PAINLEVEL_OUTOF10: 0-NO PAIN

## 2025-06-24 NOTE — PROGRESS NOTES
Pt here for port access for CT. Tolerated well. She is aware of plan of care, will call with further questions or concerns.

## 2025-07-01 ENCOUNTER — INFUSION (OUTPATIENT)
Dept: HEMATOLOGY/ONCOLOGY | Facility: CLINIC | Age: 70
End: 2025-07-01
Payer: MEDICARE

## 2025-07-01 ENCOUNTER — OFFICE VISIT (OUTPATIENT)
Dept: HEMATOLOGY/ONCOLOGY | Facility: CLINIC | Age: 70
End: 2025-07-01
Payer: MEDICARE

## 2025-07-01 VITALS
HEART RATE: 74 BPM | TEMPERATURE: 98.2 F | DIASTOLIC BLOOD PRESSURE: 69 MMHG | BODY MASS INDEX: 21.95 KG/M2 | WEIGHT: 127.3 LBS | RESPIRATION RATE: 18 BRPM | SYSTOLIC BLOOD PRESSURE: 109 MMHG | OXYGEN SATURATION: 94 %

## 2025-07-01 DIAGNOSIS — C25.0 MALIGNANT NEOPLASM OF HEAD OF PANCREAS (MULTI): ICD-10-CM

## 2025-07-01 DIAGNOSIS — C25.0 MALIGNANT NEOPLASM OF HEAD OF PANCREAS (MULTI): Primary | ICD-10-CM

## 2025-07-01 LAB
ALBUMIN SERPL BCP-MCNC: 2.9 G/DL (ref 3.4–5)
ALP SERPL-CCNC: 266 U/L (ref 33–136)
ALT SERPL W P-5'-P-CCNC: 14 U/L (ref 7–45)
ANION GAP SERPL CALC-SCNC: 13 MMOL/L (ref 10–20)
AST SERPL W P-5'-P-CCNC: 31 U/L (ref 9–39)
BASOPHILS # BLD AUTO: 0.02 X10*3/UL (ref 0–0.1)
BASOPHILS NFR BLD AUTO: 0.4 %
BILIRUB SERPL-MCNC: 0.6 MG/DL (ref 0–1.2)
BUN SERPL-MCNC: 11 MG/DL (ref 6–23)
CALCIUM SERPL-MCNC: 8.3 MG/DL (ref 8.6–10.6)
CANCER AG19-9 SERPL-ACNC: 2659.33 U/ML
CHLORIDE SERPL-SCNC: 102 MMOL/L (ref 98–107)
CO2 SERPL-SCNC: 25 MMOL/L (ref 21–32)
CREAT SERPL-MCNC: 0.81 MG/DL (ref 0.5–1.05)
EGFRCR SERPLBLD CKD-EPI 2021: 80 ML/MIN/1.73M*2
EOSINOPHIL # BLD AUTO: 0.08 X10*3/UL (ref 0–0.7)
EOSINOPHIL NFR BLD AUTO: 1.7 %
ERYTHROCYTE [DISTWIDTH] IN BLOOD BY AUTOMATED COUNT: 15.6 % (ref 11.5–14.5)
GLUCOSE SERPL-MCNC: 158 MG/DL (ref 74–99)
HCT VFR BLD AUTO: 27.7 % (ref 36–46)
HGB BLD-MCNC: 8.9 G/DL (ref 12–16)
IMM GRANULOCYTES # BLD AUTO: 0 X10*3/UL (ref 0–0.7)
IMM GRANULOCYTES NFR BLD AUTO: 0 % (ref 0–0.9)
LYMPHOCYTES # BLD AUTO: 0.96 X10*3/UL (ref 1.2–4.8)
LYMPHOCYTES NFR BLD AUTO: 20.6 %
MCH RBC QN AUTO: 33.1 PG (ref 26–34)
MCHC RBC AUTO-ENTMCNC: 32.1 G/DL (ref 32–36)
MCV RBC AUTO: 103 FL (ref 80–100)
MONOCYTES # BLD AUTO: 0.62 X10*3/UL (ref 0.1–1)
MONOCYTES NFR BLD AUTO: 13.3 %
NEUTROPHILS # BLD AUTO: 2.98 X10*3/UL (ref 1.2–7.7)
NEUTROPHILS NFR BLD AUTO: 64 %
NRBC BLD-RTO: ABNORMAL /100{WBCS}
PLATELET # BLD AUTO: 204 X10*3/UL (ref 150–450)
POTASSIUM SERPL-SCNC: 3.9 MMOL/L (ref 3.5–5.3)
PROT SERPL-MCNC: 5.5 G/DL (ref 6.4–8.2)
RBC # BLD AUTO: 2.69 X10*6/UL (ref 4–5.2)
SODIUM SERPL-SCNC: 136 MMOL/L (ref 136–145)
WBC # BLD AUTO: 4.7 X10*3/UL (ref 4.4–11.3)

## 2025-07-01 PROCEDURE — 85025 COMPLETE CBC W/AUTO DIFF WBC: CPT

## 2025-07-01 PROCEDURE — 36591 DRAW BLOOD OFF VENOUS DEVICE: CPT

## 2025-07-01 PROCEDURE — 80053 COMPREHEN METABOLIC PANEL: CPT

## 2025-07-01 PROCEDURE — 99215 OFFICE O/P EST HI 40 MIN: CPT | Performed by: INTERNAL MEDICINE

## 2025-07-01 PROCEDURE — 86301 IMMUNOASSAY TUMOR CA 19-9: CPT

## 2025-07-01 PROCEDURE — 1036F TOBACCO NON-USER: CPT | Performed by: INTERNAL MEDICINE

## 2025-07-01 PROCEDURE — 2500000004 HC RX 250 GENERAL PHARMACY W/ HCPCS (ALT 636 FOR OP/ED): Performed by: INTERNAL MEDICINE

## 2025-07-01 PROCEDURE — 1159F MED LIST DOCD IN RCRD: CPT | Performed by: INTERNAL MEDICINE

## 2025-07-01 PROCEDURE — 3051F HG A1C>EQUAL 7.0%<8.0%: CPT | Performed by: INTERNAL MEDICINE

## 2025-07-01 RX ORDER — HEPARIN 100 UNIT/ML
500 SYRINGE INTRAVENOUS AS NEEDED
Status: CANCELLED | OUTPATIENT
Start: 2025-07-01

## 2025-07-01 RX ORDER — FAMOTIDINE 10 MG/ML
20 INJECTION, SOLUTION INTRAVENOUS ONCE AS NEEDED
OUTPATIENT
Start: 2025-08-27

## 2025-07-01 RX ORDER — HEPARIN SODIUM,PORCINE/PF 10 UNIT/ML
50 SYRINGE (ML) INTRAVENOUS AS NEEDED
Status: DISCONTINUED | OUTPATIENT
Start: 2025-07-01 | End: 2025-07-01 | Stop reason: HOSPADM

## 2025-07-01 RX ORDER — EPINEPHRINE 0.3 MG/.3ML
0.3 INJECTION SUBCUTANEOUS EVERY 5 MIN PRN
OUTPATIENT
Start: 2025-09-10

## 2025-07-01 RX ORDER — ONDANSETRON HYDROCHLORIDE 2 MG/ML
8 INJECTION, SOLUTION INTRAVENOUS ONCE
OUTPATIENT
Start: 2025-08-13

## 2025-07-01 RX ORDER — ONDANSETRON HYDROCHLORIDE 2 MG/ML
8 INJECTION, SOLUTION INTRAVENOUS ONCE
OUTPATIENT
Start: 2025-09-10

## 2025-07-01 RX ORDER — PROCHLORPERAZINE MALEATE 10 MG
10 TABLET ORAL EVERY 6 HOURS PRN
OUTPATIENT
Start: 2025-09-10

## 2025-07-01 RX ORDER — PROCHLORPERAZINE EDISYLATE 5 MG/ML
10 INJECTION INTRAMUSCULAR; INTRAVENOUS EVERY 6 HOURS PRN
OUTPATIENT
Start: 2025-08-27

## 2025-07-01 RX ORDER — ONDANSETRON HYDROCHLORIDE 2 MG/ML
8 INJECTION, SOLUTION INTRAVENOUS ONCE
OUTPATIENT
Start: 2025-07-30

## 2025-07-01 RX ORDER — ONDANSETRON HYDROCHLORIDE 2 MG/ML
8 INJECTION, SOLUTION INTRAVENOUS ONCE
OUTPATIENT
Start: 2025-08-27

## 2025-07-01 RX ORDER — EPINEPHRINE 0.3 MG/.3ML
0.3 INJECTION SUBCUTANEOUS EVERY 5 MIN PRN
OUTPATIENT
Start: 2025-08-27

## 2025-07-01 RX ORDER — HEPARIN SODIUM,PORCINE/PF 10 UNIT/ML
50 SYRINGE (ML) INTRAVENOUS AS NEEDED
Status: CANCELLED | OUTPATIENT
Start: 2025-07-01

## 2025-07-01 RX ORDER — EPINEPHRINE 0.3 MG/.3ML
0.3 INJECTION SUBCUTANEOUS EVERY 5 MIN PRN
OUTPATIENT
Start: 2025-07-30

## 2025-07-01 RX ORDER — ALBUTEROL SULFATE 0.83 MG/ML
3 SOLUTION RESPIRATORY (INHALATION) AS NEEDED
OUTPATIENT
Start: 2025-08-13

## 2025-07-01 RX ORDER — EPINEPHRINE 0.3 MG/.3ML
0.3 INJECTION SUBCUTANEOUS EVERY 5 MIN PRN
OUTPATIENT
Start: 2025-08-13

## 2025-07-01 RX ORDER — ALBUTEROL SULFATE 0.83 MG/ML
3 SOLUTION RESPIRATORY (INHALATION) AS NEEDED
OUTPATIENT
Start: 2025-07-30

## 2025-07-01 RX ORDER — DIPHENHYDRAMINE HYDROCHLORIDE 50 MG/ML
50 INJECTION, SOLUTION INTRAMUSCULAR; INTRAVENOUS AS NEEDED
OUTPATIENT
Start: 2025-09-10

## 2025-07-01 RX ORDER — ALBUTEROL SULFATE 0.83 MG/ML
3 SOLUTION RESPIRATORY (INHALATION) AS NEEDED
OUTPATIENT
Start: 2025-09-10

## 2025-07-01 RX ORDER — DIPHENHYDRAMINE HYDROCHLORIDE 50 MG/ML
50 INJECTION, SOLUTION INTRAMUSCULAR; INTRAVENOUS AS NEEDED
OUTPATIENT
Start: 2025-08-27

## 2025-07-01 RX ORDER — PROCHLORPERAZINE MALEATE 10 MG
10 TABLET ORAL EVERY 6 HOURS PRN
OUTPATIENT
Start: 2025-08-27

## 2025-07-01 RX ORDER — PROCHLORPERAZINE EDISYLATE 5 MG/ML
10 INJECTION INTRAMUSCULAR; INTRAVENOUS EVERY 6 HOURS PRN
OUTPATIENT
Start: 2025-08-13

## 2025-07-01 RX ORDER — ALBUTEROL SULFATE 0.83 MG/ML
3 SOLUTION RESPIRATORY (INHALATION) AS NEEDED
OUTPATIENT
Start: 2025-08-27

## 2025-07-01 RX ORDER — POTASSIUM CHLORIDE 750 MG/1
20 TABLET, FILM COATED, EXTENDED RELEASE ORAL DAILY
Qty: 60 TABLET | Refills: 5 | Status: SHIPPED | OUTPATIENT
Start: 2025-07-01 | End: 2025-12-28

## 2025-07-01 RX ORDER — PROCHLORPERAZINE MALEATE 10 MG
10 TABLET ORAL EVERY 6 HOURS PRN
OUTPATIENT
Start: 2025-08-13

## 2025-07-01 RX ORDER — HEPARIN 100 UNIT/ML
500 SYRINGE INTRAVENOUS AS NEEDED
Status: DISCONTINUED | OUTPATIENT
Start: 2025-07-01 | End: 2025-07-01 | Stop reason: HOSPADM

## 2025-07-01 RX ORDER — DIPHENHYDRAMINE HYDROCHLORIDE 50 MG/ML
50 INJECTION, SOLUTION INTRAMUSCULAR; INTRAVENOUS AS NEEDED
OUTPATIENT
Start: 2025-07-30

## 2025-07-01 RX ORDER — FAMOTIDINE 10 MG/ML
20 INJECTION, SOLUTION INTRAVENOUS ONCE AS NEEDED
OUTPATIENT
Start: 2025-07-30

## 2025-07-01 RX ORDER — FAMOTIDINE 10 MG/ML
20 INJECTION, SOLUTION INTRAVENOUS ONCE AS NEEDED
OUTPATIENT
Start: 2025-08-13

## 2025-07-01 RX ORDER — FAMOTIDINE 10 MG/ML
20 INJECTION, SOLUTION INTRAVENOUS ONCE AS NEEDED
OUTPATIENT
Start: 2025-09-10

## 2025-07-01 RX ORDER — PROCHLORPERAZINE MALEATE 10 MG
10 TABLET ORAL EVERY 6 HOURS PRN
OUTPATIENT
Start: 2025-07-30

## 2025-07-01 RX ORDER — PROCHLORPERAZINE EDISYLATE 5 MG/ML
10 INJECTION INTRAMUSCULAR; INTRAVENOUS EVERY 6 HOURS PRN
OUTPATIENT
Start: 2025-09-10

## 2025-07-01 RX ORDER — PROCHLORPERAZINE EDISYLATE 5 MG/ML
10 INJECTION INTRAMUSCULAR; INTRAVENOUS EVERY 6 HOURS PRN
OUTPATIENT
Start: 2025-07-30

## 2025-07-01 RX ORDER — DIPHENHYDRAMINE HYDROCHLORIDE 50 MG/ML
50 INJECTION, SOLUTION INTRAMUSCULAR; INTRAVENOUS AS NEEDED
OUTPATIENT
Start: 2025-08-13

## 2025-07-01 RX ADMIN — HEPARIN 500 UNITS: 100 SYRINGE at 10:36

## 2025-07-01 ASSESSMENT — PAIN SCALES - GENERAL: PAINLEVEL_OUTOF10: 0-NO PAIN

## 2025-07-01 NOTE — PROGRESS NOTES
Patient ID: Nanci Colón is a 69 y.o. female from [place], [place].     Referring Physician: Francisco Cruz MD  [id number] [place] [address]    Primary Care Provider: Diana Blunt DO    Diagnosis:   Pancreatic cancer 9/2024    Primary Oncologic Surgeon:   Nazia    Primary Medical Oncologist:  Anthony    Primary Radiation Oncologist:  KVNG    Current Therapy:  10/4/24 -  Neoadjuvant Chemo - FOLFIRINOX    Oncologic Surgery History:  None    Oncologic Therapy History:  10/9/24 -  mFOLFIRINOX     Molecular Genetics:  Mmr-P  ** Copied from [address] [place] [address] on 01-Jul-2025 11:25AM by Francisco Cruz **    Test Name: Voice Assist  Laboratory Name: [facility]  Collection Date: 04-Mar-2025  Cancer Type: Pancreatic ductal adenocarcinoma  Report Id: [id number]    Molecular Findings:  * Gene: TP53, Variant: Splice Site SNV, c.994-1G>C (Allele Frequency - 0.3%)  * Gene: TP53, Variant: C135R, c.403T>C (Allele Frequency - 0.1%)  * Gene: PPM1D, Variant: Q510*, c.1528C>T (Allele Frequency - 0.2%)  * Biomarker: Tumor Mutation [place], Status: Not evaluable  * Biomarker: Microsatellite Instability, Status: YVETTE/MSI-L (MSI-High: NOT DETECTED)  * Biomarker: Tumor Fraction (0.1%)  * 4 variants of unknown significance    ** End of copied information **    Current Sites of Disease:  Pancreatic head    Oncologic Problem List:  Pancreatic cancer  DM2  Cachexia  ascites    Oncologic Narrative:  69 y.o. woman who initially presented in mid September 2024 with abdominal pain jaundice MARK and pancreatitis.  She had an ultrasound that showed extrahepatic bile duct dilation and pancreatic duct dilation.  Both of these seem to emanate from the pancreatic head.  A CT scan on September 9, 2024 was concerning for a mass.  MRCP on September 27, 2024 showed an ill-defined mass in the pancreatic head and uncinate process measuring approximately 3.5 cm.  Biopsy of the pancreatic mass from the September 11, 2024 endoscopic ultrasound revealed  adenocarcinoma.  She met me for initial consultation regarding treatment planning on 9/26/2024.       Past Medical History: Nanci has a past medical history of Hyperlipidemia, Hypertension, Hypothyroidism, Personal history of other diseases of the circulatory system (09/15/2017), Personal history of other endocrine, nutritional and metabolic disease (02/24/2016), Personal history of other endocrine, nutritional and metabolic disease (03/09/2017), Personal history of other endocrine, nutritional and metabolic disease, Type 2 diabetes mellitus with other skin complications, and Vision loss.  Surgical History:  Nanci has a past surgical history that includes Other surgical history (09/01/2022); Other surgical history (09/01/2022); and Other surgical history (09/01/2022).  Social History:  Nanci reports that she has never smoked. She has never been exposed to tobacco smoke. She has never used smokeless tobacco. She reports that she does not drink alcohol and does not use drugs.  Family History:    Family History   Problem Relation Name Age of Onset    Cancer Mother      Heart disease Father       Family Oncology History:  Cancer-related family history includes Cancer in her mother.      SUBJECTIVE:    History of Present Illness:  Nanci Colón is a 69 y.o. female who was referred by Francisco Cruz MD and presents with follow up.     She is s/p 7 cycles of FOLFIRINOX. She is feeling similar to her last visit. Biggest complaint remains fatigue. Continues to have occasional back pain.        OBJECTIVE:    VS / Pain:  There were no vitals taken for this visit.  BSA: There is no height or weight on file to calculate BSA.   Pain Scale: 0    Daily Weight  07/01/25 : 57.7 kg (127 lb 4.8 oz)  06/18/25 : 59.2 kg (130 lb 8 oz)  06/13/25 : 59.2 kg (130 lb 8.2 oz)  06/04/25 : 61.8 kg (136 lb 4.8 oz)  06/04/25 : 61.8 kg (136 lb 4.8 oz)  06/03/25 : 61.6 kg (135 lb 14.4 oz)  05/21/25 : 60.7 kg (133 lb 14.4 oz)  05/07/25 : 60.5  kg (133 lb 4.8 oz)      Physical Exam  Constitutional:       Appearance: She is normal weight.   HENT:      Nose: Nose normal.      Mouth/Throat:      Mouth: Mucous membranes are moist.      Pharynx: Oropharynx is clear.   Eyes:      Extraocular Movements: Extraocular movements intact.      Conjunctiva/sclera: Conjunctivae normal.   Cardiovascular:      Rate and Rhythm: Normal rate.   Pulmonary:      Effort: Pulmonary effort is normal.      Breath sounds: Normal breath sounds.   Abdominal:      General: Abdomen is flat. Bowel sounds are normal.   Musculoskeletal:         General: Normal range of motion.   Skin:     General: Skin is warm.   Neurological:      General: No focal deficit present.      Mental Status: She is alert. Mental status is at baseline.   Psychiatric:         Mood and Affect: Mood normal.         Thought Content: Thought content normal.         Judgment: Judgment normal.       Performance Status:   ECOG 1    Diagnostic Results     WBC   Date/Time Value Ref Range Status   07/01/2025 10:36 AM 4.7 4.4 - 11.3 x10*3/uL Final   06/17/2025 02:20 PM 5.5 4.4 - 11.3 x10*3/uL Final   06/03/2025 12:27 PM 3.6 (L) 4.4 - 11.3 x10*3/uL Final     Hemoglobin   Date Value Ref Range Status   07/01/2025 8.9 (L) 12.0 - 16.0 g/dL Final   06/17/2025 8.7 (L) 12.0 - 16.0 g/dL Final   06/03/2025 8.3 (L) 12.0 - 16.0 g/dL Final     MCV   Date/Time Value Ref Range Status   07/01/2025 10:36  (H) 80 - 100 fL Final   06/17/2025 02:20  (H) 80 - 100 fL Final   06/03/2025 12:27  (H) 80 - 100 fL Final     Platelets   Date/Time Value Ref Range Status   07/01/2025 10:36  150 - 450 x10*3/uL Final   06/17/2025 02:20  150 - 450 x10*3/uL Final   06/03/2025 12:27  150 - 450 x10*3/uL Final     Neutrophils Absolute   Date/Time Value Ref Range Status   07/01/2025 10:36 AM 2.98 1.20 - 7.70 x10*3/uL Final     Comment:     Percent differential counts (%) should be interpreted in the context of the absolute cell  counts (cells/uL).   06/17/2025 02:20 PM 3.65 1.20 - 7.70 x10*3/uL Final     Comment:     Percent differential counts (%) should be interpreted in the context of the absolute cell counts (cells/uL).   06/03/2025 12:27 PM 1.56 1.20 - 7.70 x10*3/uL Final     Comment:     Percent differential counts (%) should be interpreted in the context of the absolute cell counts (cells/uL).     Bilirubin, Total   Date/Time Value Ref Range Status   06/17/2025 02:20 PM 0.7 0.0 - 1.2 mg/dL Final   06/03/2025 12:27 PM 0.5 0.0 - 1.2 mg/dL Final   05/20/2025 02:08 PM 0.8 0.0 - 1.2 mg/dL Final     AST   Date/Time Value Ref Range Status   06/17/2025 02:20 PM 36 9 - 39 U/L Final   06/03/2025 12:27 PM 17 9 - 39 U/L Final   05/20/2025 02:08 PM 36 9 - 39 U/L Final     ALT   Date/Time Value Ref Range Status   06/17/2025 02:20 PM 13 7 - 45 U/L Final     Comment:     Patients treated with Sulfasalazine may generate falsely decreased results for ALT.   06/03/2025 12:27 PM 12 7 - 45 U/L Final     Comment:     Patients treated with Sulfasalazine may generate falsely decreased results for ALT.   05/20/2025 02:08 PM 16 7 - 45 U/L Final     Comment:     Patients treated with Sulfasalazine may generate falsely decreased results for ALT.     Creatinine   Date/Time Value Ref Range Status   06/17/2025 02:20 PM 0.72 0.50 - 1.05 mg/dL Final   06/03/2025 12:27 PM 0.77 0.50 - 1.05 mg/dL Final   05/20/2025 02:08 PM 0.73 0.50 - 1.05 mg/dL Final     Urea Nitrogen   Date/Time Value Ref Range Status   06/17/2025 02:20 PM 11 6 - 23 mg/dL Final   06/03/2025 12:27 PM 10 6 - 23 mg/dL Final   05/20/2025 02:08 PM 13 6 - 23 mg/dL Final     Albumin   Date/Time Value Ref Range Status   06/17/2025 02:20 PM 2.7 (L) 3.4 - 5.0 g/dL Final   06/03/2025 12:27 PM 2.7 (L) 3.4 - 5.0 g/dL Final   05/20/2025 02:08 PM 2.8 (L) 3.4 - 5.0 g/dL Final     Cancer AG 19-9   Date/Time Value Ref Range Status   06/03/2025 12:27 PM 2,749.53 (H) <35.00 U/mL Final   04/07/2025 01:50 PM 17,703.49  "(H) <35.00 U/mL Final   03/27/2025 11:04 AM 7,925.42 (H) <35.00 U/mL Final   03/04/2025 12:20 PM 2,199.55 (H) <35.00 U/mL Final   02/18/2025 02:01 .79 (H) <35.00 U/mL Final   02/10/2025 11:41 .27 (H) <35.00 U/mL Final   02/03/2025 02:38 .66 (H) <35.00 U/mL Final   01/20/2025 12:52 .22 (H) <35.00 U/mL Final   12/31/2024 01:19 PM 1,391.30 (H) <35.00 U/mL Final   12/17/2024 03:04 .09 (H) <35.00 U/mL Final     No results found for: \"CEA\"  === 05/30/25 ===    CT CHEST ABDOMEN PELVIS W IV CONTRAST    - Impression -  Pancreatic adenocarcinoma restaging scan compared to CT chest abdomen  pelvis 02/19/2025 and PET-CT 03/27/2025:  1. Overall worsening of measurable and non-measurable disease.  Numerous bilateral pulmonary nodules have decreased in size however  new pulmonary nodules have developed in the interim. Ill-defined  pancreatic, liver lesion and abdominopelvic lymph nodes have remained  overall stable in size. New micronodularity throughout the greater  omentum and peritoneal reflections are concerning for early  peritoneal carcinomatosis.  2. Atrophic appearance of the thyroid gland which can be seen in  treatment associated thyroiditis.  3. New small volume ascites in the rectovesical recess with  increasing body wall and mesenteric edema is likely related to low  albumin levels, congestion and third-spacing versus secondary to  early peritoneal carcinomatosis.  4. Additional incidental non-acute findings as detailed above.    Signed by: Herson Baker 5/31/2025 3:05 PM  Dictation workstation:   [id number]    === 06/24/25 ===    CT CHEST ABDOMEN PELVIS W IV CONTRAST    - Impression -  Pancreatic adenocarcinoma restaging scan compared to CT chest abdomen  pelvis 05/30/2025:  1. Stable ill-defined pancreatic masses, ground-glass  nodules/opacities throughout the bilateral lungs and prominent retroperitoneal nodes. Similar scattered nodularity throughout the peritoneal reflections " overall favored to represent peritoneal nodes  rather than early peritoneal carcinomatosis given downtrending serum tumor markers. No new measurable and non-measurable disease  throughout the chest abdomen pelvis.  2. Interval decrease to small volume free fluid within the recto uterine and ureterovesical recesses suggestive of improving congestion and third-spacing.  3. Similar atrophic appearance of the thyroid gland which can be seen in treatment associated thyroiditis.  4. CBD stent is again noted terminating at the junction of the 2nd/3rd portion of the duodenum stable from prior exam with similar pneumobilia and air in the gallbladder consistent with CBD patency.  5. Additional incidental non-acute findings as detailed above.      I personally reviewed the images/study and I agree with the findings  as stated by Dr. Jefferson Burleson. This study was interpreted at [facility], [place], [place].    MACRO:  None.    Signed by: Herson Baker 6/25/2025 10:12 PM  Dictation workstation:   [id number]        Assessment/Plan   69 y.o.  woman with a resectable pancreatic cancer but elevated CA 19-9 and recent pancreatitis so we will plan for neoadjuvant chemotherapy with modified FOLFIRINOX.   9/30/24 - diagnostic lab negative   10/6/24 - 1st dose mFOLFIRINOX  - tolerated with fatigue, nausea   Added fosaprepitant to C2 - Allergic rxn to fosaprepitant - discontinue  C3 held due to ANC of 500    - plan for C3 with addition of neulasta   C4   S/p 7 cycles:   - Ca19-9 is uptrending 507>549>621>2200>7925  - CT from 2/19/25 is read as showing multiple new pulmonary nodules (and an ill-defined hepatic segment 4B hypodensity that likely reflects a focal hepatic steatosis) In conjunction with the uptrend in the Ca19-9 there is concern for disease progression, however, patient did have a recent respiratory illness and her disease is stable in the pancreas and surrounding area. At her last visit, we  reviewed stopping her mFOLFIRINOX given her elevated LFTs and hepatic steatosis are likely a direct consequence of her chemotherapy and lack of response.   - PET-CT is difficult to interpret given her primary lesion is only mildly PET-avid; there are mildly avid left supraclavicular, mediastinal, RP and mesenteric nodes, but given continued rise in Ca19-9, there is concern for disease progression  - She started   gemcitabine and nab-paclitaxel in early 4/2025.  She felt better after starting.  Only neuropathy worse but pain in abdomen better.  Added  to C2 labs to help with interpretation.  Response scans after C2 show mixed response but overall improved.  Will continue for now.    - She is responding on scans and biochemically we will continue with C4 on 7/2/2025.  She will see Opal Mendez prior to C5. We will plan scans again prior to C6.     Cancer related pain:   -on oxycodone 15mg   -Supportive oncology following     DM:   -Continue metformin, however keep close eye on renal function.    - Endocrinology scheduled for April    Patient seen and discussed with Dr. Cruz.     Francisco Cruz MD  Hematology Oncology PGYVI

## 2025-07-02 ENCOUNTER — APPOINTMENT (OUTPATIENT)
Dept: HEMATOLOGY/ONCOLOGY | Facility: CLINIC | Age: 70
End: 2025-07-02
Payer: MEDICARE

## 2025-07-02 ENCOUNTER — INFUSION (OUTPATIENT)
Dept: HEMATOLOGY/ONCOLOGY | Facility: CLINIC | Age: 70
End: 2025-07-02
Payer: MEDICARE

## 2025-07-02 VITALS
TEMPERATURE: 97.9 F | RESPIRATION RATE: 16 BRPM | BODY MASS INDEX: 21.75 KG/M2 | HEART RATE: 71 BPM | SYSTOLIC BLOOD PRESSURE: 103 MMHG | HEIGHT: 64 IN | WEIGHT: 127.4 LBS | OXYGEN SATURATION: 99 % | DIASTOLIC BLOOD PRESSURE: 70 MMHG

## 2025-07-02 DIAGNOSIS — C25.0 MALIGNANT NEOPLASM OF HEAD OF PANCREAS (MULTI): ICD-10-CM

## 2025-07-02 PROCEDURE — 2500000004 HC RX 250 GENERAL PHARMACY W/ HCPCS (ALT 636 FOR OP/ED): Performed by: INTERNAL MEDICINE

## 2025-07-02 PROCEDURE — 96413 CHEMO IV INFUSION 1 HR: CPT

## 2025-07-02 PROCEDURE — 96375 TX/PRO/DX INJ NEW DRUG ADDON: CPT | Mod: INF

## 2025-07-02 PROCEDURE — 2500000004 HC RX 250 GENERAL PHARMACY W/ HCPCS (ALT 636 FOR OP/ED): Mod: JW | Performed by: INTERNAL MEDICINE

## 2025-07-02 PROCEDURE — 96417 CHEMO IV INFUS EACH ADDL SEQ: CPT

## 2025-07-02 RX ORDER — HEPARIN 100 UNIT/ML
500 SYRINGE INTRAVENOUS AS NEEDED
OUTPATIENT
Start: 2025-07-02

## 2025-07-02 RX ORDER — PROCHLORPERAZINE MALEATE 10 MG
10 TABLET ORAL EVERY 6 HOURS PRN
Status: DISCONTINUED | OUTPATIENT
Start: 2025-07-02 | End: 2025-07-02 | Stop reason: HOSPADM

## 2025-07-02 RX ORDER — DIPHENHYDRAMINE HYDROCHLORIDE 50 MG/ML
50 INJECTION, SOLUTION INTRAMUSCULAR; INTRAVENOUS AS NEEDED
Status: DISCONTINUED | OUTPATIENT
Start: 2025-07-02 | End: 2025-07-02 | Stop reason: HOSPADM

## 2025-07-02 RX ORDER — PROCHLORPERAZINE EDISYLATE 5 MG/ML
10 INJECTION INTRAMUSCULAR; INTRAVENOUS EVERY 6 HOURS PRN
Status: DISCONTINUED | OUTPATIENT
Start: 2025-07-02 | End: 2025-07-02 | Stop reason: HOSPADM

## 2025-07-02 RX ORDER — HEPARIN SODIUM,PORCINE/PF 10 UNIT/ML
50 SYRINGE (ML) INTRAVENOUS AS NEEDED
OUTPATIENT
Start: 2025-07-02

## 2025-07-02 RX ORDER — EPINEPHRINE 0.3 MG/.3ML
0.3 INJECTION SUBCUTANEOUS EVERY 5 MIN PRN
Status: DISCONTINUED | OUTPATIENT
Start: 2025-07-02 | End: 2025-07-02 | Stop reason: HOSPADM

## 2025-07-02 RX ORDER — HEPARIN 100 UNIT/ML
500 SYRINGE INTRAVENOUS AS NEEDED
Status: DISCONTINUED | OUTPATIENT
Start: 2025-07-02 | End: 2025-07-02 | Stop reason: HOSPADM

## 2025-07-02 RX ORDER — ONDANSETRON HYDROCHLORIDE 2 MG/ML
8 INJECTION, SOLUTION INTRAVENOUS ONCE
Status: COMPLETED | OUTPATIENT
Start: 2025-07-02 | End: 2025-07-02

## 2025-07-02 RX ORDER — FAMOTIDINE 10 MG/ML
20 INJECTION, SOLUTION INTRAVENOUS ONCE AS NEEDED
Status: DISCONTINUED | OUTPATIENT
Start: 2025-07-02 | End: 2025-07-02 | Stop reason: HOSPADM

## 2025-07-02 RX ORDER — ALBUTEROL SULFATE 0.83 MG/ML
3 SOLUTION RESPIRATORY (INHALATION) AS NEEDED
Status: DISCONTINUED | OUTPATIENT
Start: 2025-07-02 | End: 2025-07-02 | Stop reason: HOSPADM

## 2025-07-02 RX ADMIN — SODIUM CHLORIDE 165 MG: 9 INJECTION INTRAMUSCULAR; INTRAVENOUS; SUBCUTANEOUS at 15:28

## 2025-07-02 RX ADMIN — ONDANSETRON 8 MG: 2 INJECTION INTRAMUSCULAR; INTRAVENOUS at 14:42

## 2025-07-02 RX ADMIN — GEMCITABINE 1303.4 MG: 38 INJECTION, SOLUTION INTRAVENOUS at 16:10

## 2025-07-02 RX ADMIN — Medication 500 UNITS: at 16:45

## 2025-07-02 ASSESSMENT — PAIN SCALES - GENERAL: PAINLEVEL_OUTOF10: 0-NO PAIN

## 2025-07-02 NOTE — PROGRESS NOTES
Pt arrived awake, alert, oriented, no apparent distress with unlabored breaths. Pt reports feeling well today without s/sx of acute illness. Pt here for day 1, cycle 4, in which she received and tolerated well as of thus far. Pt next appointment set for 7/15/25 for port labs and 7/16/25 for next treatment. No further questions or concerns, discharged in stable condition.

## 2025-07-02 NOTE — SIGNIFICANT EVENT
07/02/25 1422   Prechemo Checklist   Has the patient been in the hospital, ED, or urgent care since last date of service No   Chemo/Immuno Consent Completed and Signed Yes   Protocol/Indications Verified Yes   Confirmed to previous date/time of medication Yes   Compared to previous dose Yes   All medications are dated accurately Yes   Pregnancy Test Negative Not applicable   Parameters Met Yes   Provider Notified Yes   Is Patient Proceeding With Treatment? Yes   BSA/Weight-Height Verified Yes   Dose Calculations Verified (current, total, cumulative) Yes

## 2025-07-11 NOTE — PROGRESS NOTES
SUPPORTIVE AND PALLIATIVE ONCOLOGY OUTPATIENT FOLLOW-UP        Cancer History   Pancreatic CA  -s/p 7 cycles FOLFIRINOX     Onc: Anthony    Subjective   HISTORY OF PRESENT ILLNESS: Nanci Colón is a 69 y.o. female with Pmhx of HTN, hypothyroidism, DMII, diabetic neuropathy, Chiari malformation (type 1), and pancreatic CA with mets to the lung initially diagnosed 9/2024. She is currently on Gemzar/Paclitaxel. She follows with Dr. Cruz.      She presents to supportive oncology for follow up for pain and symptom management.       Subjective   Having dizziness when she is up and ambulating. She feels that this is related to her ears. She states that it started after her shower. She states that she is scheduled to see ENT    She reports that her pain is well controlled. She states that she is taking the long acting morphine. She states that she takes the short acting morphine only when she gets her chemo, but does not take it outside of that.     She reports that her bowels are moving well.     Information obtained from: interview of patient  ______________________________________________________________________        Objective        PHYSICAL EXAMINATION not performed d/t phone visit     Pain Assessment:  Pain Score: 0-No pain  Location:    Education:      Physical Exam  Constitutional:       Appearance: Normal appearance. She is normal weight.   HENT:      Head: Normocephalic and atraumatic.      Mouth/Throat:      Mouth: Mucous membranes are dry.     Eyes:      Extraocular Movements: Extraocular movements intact.       Cardiovascular:      Comments: No signs of cardiac distress  Pulmonary:      Effort: Pulmonary effort is normal.      Comments: No signs of respiratory distress  Abdominal:      General: Abdomen is flat.      Palpations: Abdomen is soft.     Musculoskeletal:         General: Normal range of motion.     Skin:     General: Skin is warm and dry.     Neurological:      Mental Status: She is alert and  oriented to person, place, and time. Mental status is at baseline.     Psychiatric:         Mood and Affect: Mood normal.         Behavior: Behavior normal.         Thought Content: Thought content normal.         Judgment: Judgment normal.        ASSESSMENT/PLAN    Pain  Pain is: cancer related pain  Type: visceral and neuropathic  Pain control: sub-optimally controlled  Home regimen:   continue MSER 15mg bid.   Continue MSIR 15mg q4hrs PRN.   continue heating pad PRN  continue bentyl 10mg qid PRN for abdominal cramping.  Intolerances/previously tried:   -tramadol: ineffective  -oxycodone: ineffective      Opioid Use  Medication Management:   - OARRS report reviewed with no aberrant behavior; consistent with  prescriptions/records and patient history  - MED 30.  Overdose Risk Score 160.   This has been discussed with patient.   - We will continue to closely monitor the patient for signs of prescription misuse including UDS, OARRS review and subjective reports at each visit.  - no concurrent benzodiazepine use   - I am a provider who either is or has consulted and collaborated with a provider certified in Hospice and Palliative Medicine and have conducted a face-face visit and examination for this patient.  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   continue zofran 8mg q8hrs PRN.   continue protonix 40mg daily.   olanzapine 5mg at bedtime. Rx sent today.      Constipation   At risk for constipation related to opioids,  currently not  constipated   Usual bowel pattern: q1-2 days   Current regimen:   educated pt on importance of maintaining regular bowel schedule  colace 100mg bid PRN for hard stools.   senna 8.6mg, 1-2tabs, 1-2x/day PRN  continue miralax 1 cap full daily, as long as staying well hydrated.   MOM 15mL 4x/day PRN     Sleeping Difficulty:  Impaired sleep related to insomnia, pain  Current regimen:    goal for improved sleep with better pain control   see pain section/ plan above    olanzapine 5mg at bedtime. Rx sent today.   Intolerances/previously tried:   -doxepin: ineffective      Decreased appetite  Related to malignancy, chemotherapy, taste changes, and disease process  Weight loss 30 lbs in 6 months   Current regimen:    encouraged smaller, more frequent meals through out the day  encouraged continued use of protein supplements as tolerated   olanzapine 5mg at bedtime. Rx sent today.     Dizziness  Has an appointment scheduled with the ENT     Supportive Interventions: reached out to LSW regarding information for body donation    Supportive and Palliative Oncology encounter:  Emotional support provided  Coordination of care  We will continue to follow and address symptoms as needed    Advance Directives  Existence of Advance Directives:No - has interest  Decision maker: Surrogate decision maker is Terrance Colón (): [phone]  Code Status: Full code    --would like to donate body to science, additional information requested from LSW today        Next Follow-Up Visit:  Return to clinic in 8 weeks     Signature and billing  Medical complexity was low level due to due to complexity of problems, extensive data review, and high risk of management/treatment.  Time was spent on the following: Prep Time, Time Directly with Patient/Family/Caregiver, Documentation Time. Total time spent: 35 mins      Data  Diagnostic tests and information reviewed for today's visit:  Most recent labs, Medications         Some elements copied  from previous supportive oncology note, the elements have been updated and all reflect current decision making from today, 7/25/2025.      Plan of Care discussed with: Patient    SIGNATURE: WENDIE Quinn-CNS    Contact information:  Supportive and Palliative Oncology  Monday-Friday 8 AM-5 PM  Phone:  [phone], press option #5, then option #1.   Or Epic Secure Chat

## 2025-07-15 ENCOUNTER — INFUSION (OUTPATIENT)
Dept: HEMATOLOGY/ONCOLOGY | Facility: CLINIC | Age: 70
End: 2025-07-15
Payer: MEDICARE

## 2025-07-15 VITALS
HEART RATE: 78 BPM | RESPIRATION RATE: 16 BRPM | SYSTOLIC BLOOD PRESSURE: 103 MMHG | DIASTOLIC BLOOD PRESSURE: 67 MMHG | OXYGEN SATURATION: 100 % | BODY MASS INDEX: 21.97 KG/M2 | TEMPERATURE: 98.2 F | HEIGHT: 64 IN

## 2025-07-15 DIAGNOSIS — C25.0 MALIGNANT NEOPLASM OF HEAD OF PANCREAS (MULTI): ICD-10-CM

## 2025-07-15 LAB
ALBUMIN SERPL BCP-MCNC: 2.9 G/DL (ref 3.4–5)
ALP SERPL-CCNC: 213 U/L (ref 33–136)
ALT SERPL W P-5'-P-CCNC: 12 U/L (ref 7–45)
ANION GAP SERPL CALC-SCNC: 8 MMOL/L (ref 10–20)
AST SERPL W P-5'-P-CCNC: 22 U/L (ref 9–39)
BASOPHILS # BLD AUTO: 0.01 X10*3/UL (ref 0–0.1)
BASOPHILS NFR BLD AUTO: 0.2 %
BILIRUB SERPL-MCNC: 0.4 MG/DL (ref 0–1.2)
BUN SERPL-MCNC: 14 MG/DL (ref 6–23)
CALCIUM SERPL-MCNC: 8.3 MG/DL (ref 8.6–10.3)
CHLORIDE SERPL-SCNC: 103 MMOL/L (ref 98–107)
CO2 SERPL-SCNC: 28 MMOL/L (ref 21–32)
CREAT SERPL-MCNC: 0.71 MG/DL (ref 0.5–1.05)
EGFRCR SERPLBLD CKD-EPI 2021: >90 ML/MIN/1.73M*2
EOSINOPHIL # BLD AUTO: 0.17 X10*3/UL (ref 0–0.7)
EOSINOPHIL NFR BLD AUTO: 3.5 %
ERYTHROCYTE [DISTWIDTH] IN BLOOD BY AUTOMATED COUNT: 15.4 % (ref 11.5–14.5)
GLUCOSE SERPL-MCNC: 146 MG/DL (ref 74–99)
HCT VFR BLD AUTO: 27 % (ref 36–46)
HGB BLD-MCNC: 8.7 G/DL (ref 12–16)
IMM GRANULOCYTES # BLD AUTO: 0 X10*3/UL (ref 0–0.7)
IMM GRANULOCYTES NFR BLD AUTO: 0 % (ref 0–0.9)
LYMPHOCYTES # BLD AUTO: 1.32 X10*3/UL (ref 1.2–4.8)
LYMPHOCYTES NFR BLD AUTO: 27.2 %
MCH RBC QN AUTO: 33.5 PG (ref 26–34)
MCHC RBC AUTO-ENTMCNC: 32.2 G/DL (ref 32–36)
MCV RBC AUTO: 104 FL (ref 80–100)
MONOCYTES # BLD AUTO: 0.58 X10*3/UL (ref 0.1–1)
MONOCYTES NFR BLD AUTO: 12 %
NEUTROPHILS # BLD AUTO: 2.77 X10*3/UL (ref 1.2–7.7)
NEUTROPHILS NFR BLD AUTO: 57.1 %
PLATELET # BLD AUTO: 181 X10*3/UL (ref 150–450)
POTASSIUM SERPL-SCNC: 4 MMOL/L (ref 3.5–5.3)
PROT SERPL-MCNC: 5.7 G/DL (ref 6.4–8.2)
RBC # BLD AUTO: 2.6 X10*6/UL (ref 4–5.2)
SODIUM SERPL-SCNC: 135 MMOL/L (ref 136–145)
WBC # BLD AUTO: 4.9 X10*3/UL (ref 4.4–11.3)

## 2025-07-15 PROCEDURE — 85025 COMPLETE CBC W/AUTO DIFF WBC: CPT

## 2025-07-15 PROCEDURE — 80053 COMPREHEN METABOLIC PANEL: CPT

## 2025-07-15 PROCEDURE — 2500000004 HC RX 250 GENERAL PHARMACY W/ HCPCS (ALT 636 FOR OP/ED): Performed by: INTERNAL MEDICINE

## 2025-07-15 PROCEDURE — 36591 DRAW BLOOD OFF VENOUS DEVICE: CPT

## 2025-07-15 RX ORDER — HEPARIN 100 UNIT/ML
500 SYRINGE INTRAVENOUS AS NEEDED
Status: CANCELLED | OUTPATIENT
Start: 2025-07-15

## 2025-07-15 RX ORDER — HEPARIN SODIUM,PORCINE/PF 10 UNIT/ML
50 SYRINGE (ML) INTRAVENOUS AS NEEDED
Status: CANCELLED | OUTPATIENT
Start: 2025-07-15

## 2025-07-15 RX ORDER — HEPARIN 100 UNIT/ML
500 SYRINGE INTRAVENOUS AS NEEDED
Status: DISCONTINUED | OUTPATIENT
Start: 2025-07-15 | End: 2025-07-15 | Stop reason: HOSPADM

## 2025-07-15 RX ADMIN — Medication 500 UNITS: at 13:51

## 2025-07-15 ASSESSMENT — PAIN SCALES - GENERAL: PAINLEVEL_OUTOF10: 0-NO PAIN

## 2025-07-15 NOTE — PROGRESS NOTES
Pt here for [facility] flush and labs prior to treatment tomorrow. Tolerated procedure without incident. Discharged in stable condition, no further needs at this time.

## 2025-07-16 ENCOUNTER — SOCIAL WORK (OUTPATIENT)
Dept: HEMATOLOGY/ONCOLOGY | Facility: CLINIC | Age: 70
End: 2025-07-16
Payer: MEDICARE

## 2025-07-16 ENCOUNTER — INFUSION (OUTPATIENT)
Dept: HEMATOLOGY/ONCOLOGY | Facility: CLINIC | Age: 70
End: 2025-07-16
Payer: MEDICARE

## 2025-07-16 VITALS
BODY MASS INDEX: 21.98 KG/M2 | RESPIRATION RATE: 16 BRPM | WEIGHT: 127.5 LBS | SYSTOLIC BLOOD PRESSURE: 102 MMHG | TEMPERATURE: 98.1 F | HEART RATE: 76 BPM | DIASTOLIC BLOOD PRESSURE: 72 MMHG | OXYGEN SATURATION: 99 %

## 2025-07-16 DIAGNOSIS — C25.0 MALIGNANT NEOPLASM OF HEAD OF PANCREAS (MULTI): ICD-10-CM

## 2025-07-16 PROCEDURE — 96417 CHEMO IV INFUS EACH ADDL SEQ: CPT

## 2025-07-16 PROCEDURE — 2500000004 HC RX 250 GENERAL PHARMACY W/ HCPCS (ALT 636 FOR OP/ED): Mod: JW,TB | Performed by: INTERNAL MEDICINE

## 2025-07-16 PROCEDURE — 96375 TX/PRO/DX INJ NEW DRUG ADDON: CPT | Mod: INF

## 2025-07-16 PROCEDURE — 2500000004 HC RX 250 GENERAL PHARMACY W/ HCPCS (ALT 636 FOR OP/ED): Performed by: INTERNAL MEDICINE

## 2025-07-16 PROCEDURE — 96413 CHEMO IV INFUSION 1 HR: CPT

## 2025-07-16 RX ORDER — DIPHENHYDRAMINE HYDROCHLORIDE 50 MG/ML
50 INJECTION, SOLUTION INTRAMUSCULAR; INTRAVENOUS AS NEEDED
Status: DISCONTINUED | OUTPATIENT
Start: 2025-07-16 | End: 2025-07-16 | Stop reason: HOSPADM

## 2025-07-16 RX ORDER — FAMOTIDINE 10 MG/ML
20 INJECTION, SOLUTION INTRAVENOUS ONCE AS NEEDED
Status: DISCONTINUED | OUTPATIENT
Start: 2025-07-16 | End: 2025-07-16 | Stop reason: HOSPADM

## 2025-07-16 RX ORDER — PROCHLORPERAZINE MALEATE 10 MG
10 TABLET ORAL EVERY 6 HOURS PRN
Status: DISCONTINUED | OUTPATIENT
Start: 2025-07-16 | End: 2025-07-16 | Stop reason: HOSPADM

## 2025-07-16 RX ORDER — HEPARIN SODIUM,PORCINE/PF 10 UNIT/ML
50 SYRINGE (ML) INTRAVENOUS AS NEEDED
OUTPATIENT
Start: 2025-07-16

## 2025-07-16 RX ORDER — ONDANSETRON HYDROCHLORIDE 2 MG/ML
8 INJECTION, SOLUTION INTRAVENOUS ONCE
Status: COMPLETED | OUTPATIENT
Start: 2025-07-16 | End: 2025-07-16

## 2025-07-16 RX ORDER — PROCHLORPERAZINE EDISYLATE 5 MG/ML
10 INJECTION INTRAMUSCULAR; INTRAVENOUS EVERY 6 HOURS PRN
Status: DISCONTINUED | OUTPATIENT
Start: 2025-07-16 | End: 2025-07-16 | Stop reason: HOSPADM

## 2025-07-16 RX ORDER — EPINEPHRINE 0.3 MG/.3ML
0.3 INJECTION SUBCUTANEOUS EVERY 5 MIN PRN
Status: DISCONTINUED | OUTPATIENT
Start: 2025-07-16 | End: 2025-07-16 | Stop reason: HOSPADM

## 2025-07-16 RX ORDER — HEPARIN 100 UNIT/ML
500 SYRINGE INTRAVENOUS AS NEEDED
OUTPATIENT
Start: 2025-07-16

## 2025-07-16 RX ORDER — ALBUTEROL SULFATE 0.83 MG/ML
3 SOLUTION RESPIRATORY (INHALATION) AS NEEDED
Status: DISCONTINUED | OUTPATIENT
Start: 2025-07-16 | End: 2025-07-16 | Stop reason: HOSPADM

## 2025-07-16 RX ORDER — HEPARIN 100 UNIT/ML
500 SYRINGE INTRAVENOUS AS NEEDED
Status: DISCONTINUED | OUTPATIENT
Start: 2025-07-16 | End: 2025-07-16 | Stop reason: HOSPADM

## 2025-07-16 RX ADMIN — SODIUM CHLORIDE 165 MG: 9 INJECTION INTRAMUSCULAR; INTRAVENOUS; SUBCUTANEOUS at 15:05

## 2025-07-16 RX ADMIN — GEMCITABINE 1303.4 MG: 38 INJECTION, SOLUTION INTRAVENOUS at 15:45

## 2025-07-16 RX ADMIN — Medication 500 UNITS: at 16:18

## 2025-07-16 RX ADMIN — ONDANSETRON 8 MG: 2 INJECTION INTRAMUSCULAR; INTRAVENOUS at 14:24

## 2025-07-16 ASSESSMENT — PAIN SCALES - GENERAL: PAINLEVEL_OUTOF10: 0-NO PAIN

## 2025-07-16 NOTE — PROGRESS NOTES
(SW) met with patient and her  today to assess needs and offer support.  Patient was A&Ox3 with appropriate and congruent mood and affect.   SW asked patient how she was doing.  Patient stated that since SW saw her that she has had a treatment change.  Patient stated that she continues to lose weight.  Patient and SW were discussing and patient stated that she was paying over $300.00 for her creon.  SW will look into assistance for her.  SW provided business card if she gets any phone calls about it.  SW will follow-up with SW after SW looks into the assistance.  Patient was also interested in getting a wig.  SW discussed [facility] and [facility].  Patient was interested in [facility] since it was closer to them.  SW will continue to follow patient.      Al Hayward MSW, LSW

## 2025-07-16 NOTE — SIGNIFICANT EVENT
07/16/25 1434   Prechemo Checklist   Has the patient been in the hospital, ED, or urgent care since last date of service No   Chemo/Immuno Consent Completed and Signed Yes   Protocol/Indications Verified Yes   Confirmed to previous date/time of medication Yes   Compared to previous dose Yes   All medications are dated accurately Yes   Pregnancy Test Negative Not applicable   Parameters Met Yes   Provider Notified Yes   Is Patient Proceeding With Treatment? Yes   Dose Calculations Verified (current, total, cumulative) Yes

## 2025-07-16 NOTE — PROGRESS NOTES
Patient is here for Cycle 4, day 15. Tolerated well. Patient is aware of next appt and discharged in stable condition.

## 2025-07-17 ENCOUNTER — TELEPHONE (OUTPATIENT)
Dept: HEMATOLOGY/ONCOLOGY | Facility: CLINIC | Age: 70
End: 2025-07-17
Payer: MEDICARE

## 2025-07-17 ENCOUNTER — TELEPHONE (OUTPATIENT)
Dept: ENDOCRINOLOGY | Facility: CLINIC | Age: 70
End: 2025-07-17
Payer: MEDICARE

## 2025-07-17 DIAGNOSIS — E11.9 TYPE 2 DIABETES MELLITUS WITHOUT COMPLICATION, WITHOUT LONG-TERM CURRENT USE OF INSULIN: ICD-10-CM

## 2025-07-17 DIAGNOSIS — E03.9 HYPOTHYROIDISM, UNSPECIFIED TYPE: Primary | ICD-10-CM

## 2025-07-17 NOTE — TELEPHONE ENCOUNTER
Patient called  (NADER) to get the phone number to Creon Assistance.  SW let patient know that SW has looked into this further today.  SW discussed that the assistance program requires her to call the Extra Help line through Medicare if your below the 150% poverty level.  SW provided the amount and patient stated that they are below the amount.  SW provided the phone number [phone].  If she gets denied coverage then she can apply for the assistance.  Patient stated that she was still going to call back Creon.  SW let patient know to keep SW updated.  Patient stated that she will do that.    Al Hayward MSW, LSW

## 2025-07-24 ENCOUNTER — TELEPHONE (OUTPATIENT)
Dept: ADMISSION | Facility: HOSPITAL | Age: 70
End: 2025-07-24
Payer: MEDICARE

## 2025-07-24 DIAGNOSIS — G89.3 CANCER RELATED PAIN: ICD-10-CM

## 2025-07-24 RX ORDER — MORPHINE SULFATE 15 MG/1
15 TABLET, FILM COATED, EXTENDED RELEASE ORAL 2 TIMES DAILY
Qty: 60 TABLET | Refills: 0 | Status: SHIPPED | OUTPATIENT
Start: 2025-07-24 | End: 2025-08-23

## 2025-07-24 NOTE — TELEPHONE ENCOUNTER
Refill Request  Morphine CR (MS Contin) 15mg every 12 hrs    Preferred Pharmacy  [id number] [place]

## 2025-07-25 ENCOUNTER — SOCIAL WORK (OUTPATIENT)
Dept: HEMATOLOGY/ONCOLOGY | Facility: CLINIC | Age: 70
End: 2025-07-25
Payer: MEDICARE

## 2025-07-25 ENCOUNTER — OFFICE VISIT (OUTPATIENT)
Dept: PALLIATIVE MEDICINE | Facility: CLINIC | Age: 70
End: 2025-07-25
Payer: MEDICARE

## 2025-07-25 VITALS
HEART RATE: 82 BPM | BODY MASS INDEX: 21.75 KG/M2 | OXYGEN SATURATION: 100 % | HEIGHT: 64 IN | SYSTOLIC BLOOD PRESSURE: 116 MMHG | WEIGHT: 127.43 LBS | DIASTOLIC BLOOD PRESSURE: 72 MMHG | TEMPERATURE: 97.9 F | RESPIRATION RATE: 16 BRPM

## 2025-07-25 DIAGNOSIS — Z51.5 ENCOUNTER FOR PALLIATIVE CARE: ICD-10-CM

## 2025-07-25 DIAGNOSIS — G89.3 CANCER RELATED PAIN: ICD-10-CM

## 2025-07-25 DIAGNOSIS — Z51.81 THERAPEUTIC DRUG MONITORING: ICD-10-CM

## 2025-07-25 LAB
AMPHETAMINES UR QL SCN: ABNORMAL
BARBITURATES UR QL SCN: ABNORMAL
BENZODIAZ UR QL SCN: ABNORMAL
BZE UR QL SCN: ABNORMAL
CANNABINOIDS UR QL SCN: ABNORMAL
FENTANYL+NORFENTANYL UR QL SCN: ABNORMAL
METHADONE UR QL SCN: ABNORMAL
OPIATES UR QL SCN: ABNORMAL
OXYCODONE+OXYMORPHONE UR QL SCN: ABNORMAL
PCP UR QL SCN: ABNORMAL

## 2025-07-25 PROCEDURE — 99213 OFFICE O/P EST LOW 20 MIN: CPT | Performed by: CLINICAL NURSE SPECIALIST

## 2025-07-25 PROCEDURE — 3074F SYST BP LT 130 MM HG: CPT | Performed by: CLINICAL NURSE SPECIALIST

## 2025-07-25 PROCEDURE — 1126F AMNT PAIN NOTED NONE PRSNT: CPT | Performed by: CLINICAL NURSE SPECIALIST

## 2025-07-25 PROCEDURE — 1159F MED LIST DOCD IN RCRD: CPT | Performed by: CLINICAL NURSE SPECIALIST

## 2025-07-25 PROCEDURE — 3008F BODY MASS INDEX DOCD: CPT | Performed by: CLINICAL NURSE SPECIALIST

## 2025-07-25 PROCEDURE — 80307 DRUG TEST PRSMV CHEM ANLYZR: CPT | Performed by: CLINICAL NURSE SPECIALIST

## 2025-07-25 PROCEDURE — 3051F HG A1C>EQUAL 7.0%<8.0%: CPT | Performed by: CLINICAL NURSE SPECIALIST

## 2025-07-25 PROCEDURE — 3078F DIAST BP <80 MM HG: CPT | Performed by: CLINICAL NURSE SPECIALIST

## 2025-07-25 RX ORDER — MORPHINE SULFATE 15 MG/1
15 TABLET ORAL EVERY 4 HOURS PRN
Start: 2025-07-25

## 2025-07-25 ASSESSMENT — PAIN SCALES - GENERAL: PAINLEVEL_OUTOF10: 0-NO PAIN

## 2025-07-25 NOTE — PROGRESS NOTES
Patient had a follow-up appointment with Palliative Care.   (SW) checked in and asked if she heard anything else on her Creon assistance.  Patient stated that she is working with [employer].  She stated that they are waiting for something from the doctor.  Patient stated that she is not sure.  SW will have Dr. Cruz fill out the prescription part and send to [employer].  Patient stated that she did get a call from the [employer] and she had to return the call.  SW will follow-up again on Wednesday.      Al Hayward MSW, LSW

## 2025-07-28 ENCOUNTER — SOCIAL WORK (OUTPATIENT)
Dept: HEMATOLOGY/ONCOLOGY | Facility: CLINIC | Age: 70
End: 2025-07-28
Payer: MEDICARE

## 2025-07-28 NOTE — PROGRESS NOTES
(NADER) faxed Creon Assistance to [employer].  SW received verification that it was sent.  SW will check in with patient on her next infusion.      Al Hayward MSW, LSW

## 2025-07-29 ENCOUNTER — LAB (OUTPATIENT)
Dept: HEMATOLOGY/ONCOLOGY | Facility: CLINIC | Age: 70
End: 2025-07-29
Payer: MEDICARE

## 2025-07-29 ENCOUNTER — OFFICE VISIT (OUTPATIENT)
Dept: HEMATOLOGY/ONCOLOGY | Facility: CLINIC | Age: 70
End: 2025-07-29
Payer: MEDICARE

## 2025-07-29 VITALS
BODY MASS INDEX: 21.93 KG/M2 | WEIGHT: 127.21 LBS | OXYGEN SATURATION: 96 % | SYSTOLIC BLOOD PRESSURE: 110 MMHG | RESPIRATION RATE: 18 BRPM | DIASTOLIC BLOOD PRESSURE: 72 MMHG | HEART RATE: 80 BPM | TEMPERATURE: 98.1 F

## 2025-07-29 DIAGNOSIS — C25.0 MALIGNANT NEOPLASM OF HEAD OF PANCREAS (MULTI): ICD-10-CM

## 2025-07-29 LAB
ALBUMIN SERPL BCP-MCNC: 3.2 G/DL (ref 3.4–5)
ALP SERPL-CCNC: 165 U/L (ref 33–136)
ALT SERPL W P-5'-P-CCNC: 9 U/L (ref 7–45)
ANION GAP SERPL CALC-SCNC: 12 MMOL/L (ref 10–20)
AST SERPL W P-5'-P-CCNC: 18 U/L (ref 9–39)
BASOPHILS # BLD AUTO: 0.01 X10*3/UL (ref 0–0.1)
BASOPHILS NFR BLD AUTO: 0.2 %
BILIRUB SERPL-MCNC: 0.5 MG/DL (ref 0–1.2)
BUN SERPL-MCNC: 14 MG/DL (ref 6–23)
CALCIUM SERPL-MCNC: 8.6 MG/DL (ref 8.6–10.6)
CHLORIDE SERPL-SCNC: 103 MMOL/L (ref 98–107)
CO2 SERPL-SCNC: 26 MMOL/L (ref 21–32)
CREAT SERPL-MCNC: 0.74 MG/DL (ref 0.5–1.05)
EGFRCR SERPLBLD CKD-EPI 2021: 89 ML/MIN/1.73M*2
EOSINOPHIL # BLD AUTO: 0.13 X10*3/UL (ref 0–0.7)
EOSINOPHIL NFR BLD AUTO: 2.5 %
ERYTHROCYTE [DISTWIDTH] IN BLOOD BY AUTOMATED COUNT: 15.7 % (ref 11.5–14.5)
GLUCOSE SERPL-MCNC: 156 MG/DL (ref 74–99)
HCT VFR BLD AUTO: 30.9 % (ref 36–46)
HGB BLD-MCNC: 9.7 G/DL (ref 12–16)
IMM GRANULOCYTES # BLD AUTO: 0 X10*3/UL (ref 0–0.7)
IMM GRANULOCYTES NFR BLD AUTO: 0 % (ref 0–0.9)
LYMPHOCYTES # BLD AUTO: 1.72 X10*3/UL (ref 1.2–4.8)
LYMPHOCYTES NFR BLD AUTO: 33.7 %
MCH RBC QN AUTO: 33.1 PG (ref 26–34)
MCHC RBC AUTO-ENTMCNC: 31.4 G/DL (ref 32–36)
MCV RBC AUTO: 106 FL (ref 80–100)
MONOCYTES # BLD AUTO: 0.59 X10*3/UL (ref 0.1–1)
MONOCYTES NFR BLD AUTO: 11.5 %
NEUTROPHILS # BLD AUTO: 2.66 X10*3/UL (ref 1.2–7.7)
NEUTROPHILS NFR BLD AUTO: 52.1 %
NRBC BLD-RTO: ABNORMAL /100{WBCS}
PLATELET # BLD AUTO: 220 X10*3/UL (ref 150–450)
POTASSIUM SERPL-SCNC: 4.7 MMOL/L (ref 3.5–5.3)
PROT SERPL-MCNC: 6 G/DL (ref 6.4–8.2)
RBC # BLD AUTO: 2.93 X10*6/UL (ref 4–5.2)
SODIUM SERPL-SCNC: 136 MMOL/L (ref 136–145)
WBC # BLD AUTO: 5.1 X10*3/UL (ref 4.4–11.3)

## 2025-07-29 PROCEDURE — 99215 OFFICE O/P EST HI 40 MIN: CPT | Performed by: NURSE PRACTITIONER

## 2025-07-29 PROCEDURE — 2500000004 HC RX 250 GENERAL PHARMACY W/ HCPCS (ALT 636 FOR OP/ED): Performed by: INTERNAL MEDICINE

## 2025-07-29 PROCEDURE — 3051F HG A1C>EQUAL 7.0%<8.0%: CPT | Performed by: NURSE PRACTITIONER

## 2025-07-29 PROCEDURE — 86301 IMMUNOASSAY TUMOR CA 19-9: CPT

## 2025-07-29 PROCEDURE — 80053 COMPREHEN METABOLIC PANEL: CPT

## 2025-07-29 PROCEDURE — 36415 COLL VENOUS BLD VENIPUNCTURE: CPT

## 2025-07-29 PROCEDURE — 36591 DRAW BLOOD OFF VENOUS DEVICE: CPT

## 2025-07-29 PROCEDURE — 85025 COMPLETE CBC W/AUTO DIFF WBC: CPT

## 2025-07-29 PROCEDURE — 1159F MED LIST DOCD IN RCRD: CPT | Performed by: NURSE PRACTITIONER

## 2025-07-29 RX ORDER — HEPARIN 100 UNIT/ML
500 SYRINGE INTRAVENOUS AS NEEDED
OUTPATIENT
Start: 2025-07-29

## 2025-07-29 RX ORDER — HEPARIN SODIUM,PORCINE/PF 10 UNIT/ML
50 SYRINGE (ML) INTRAVENOUS AS NEEDED
OUTPATIENT
Start: 2025-07-29

## 2025-07-29 RX ORDER — HEPARIN 100 UNIT/ML
500 SYRINGE INTRAVENOUS AS NEEDED
Status: DISCONTINUED | OUTPATIENT
Start: 2025-07-29 | End: 2025-07-29 | Stop reason: HOSPADM

## 2025-07-29 RX ADMIN — HEPARIN 500 UNITS: 100 SYRINGE at 13:26

## 2025-07-29 ASSESSMENT — PAIN SCALES - GENERAL: PAINLEVEL_OUTOF10: 0-NO PAIN

## 2025-07-29 ASSESSMENT — ENCOUNTER SYMPTOMS
LOSS OF SENSATION IN FEET: 0
DEPRESSION: 0
OCCASIONAL FEELINGS OF UNSTEADINESS: 0

## 2025-07-29 NOTE — PROGRESS NOTES
Patient ID: Nanci Colón is a 69 y.o. female from [place], [place].     Referring Physician: Francisco Cruz MD  [id number] [place] [address]    Primary Care Provider: Diana Blunt DO    Diagnosis:   Pancreatic cancer 9/2024    Primary Oncologic Surgeon:   Nazia    Primary Medical Oncologist:  Anthony    Primary Radiation Oncologist:  KVNG    Current Therapy:  4/8/25 - [place]/ Abraxane     Oncologic Surgery History:  None    Oncologic Therapy History:  10/9/24 -  3/2025 - mFOLFIRINOX     Molecular Genetics:  Mmr-P  ** Copied from [address] [place] [address] on 01-Jul-2025 11:25AM by Francisco Cruz **    Test Name: [facility]  [facility]  Collection Date: 04-Mar-2025  Cancer Type: Pancreatic ductal adenocarcinoma  Report Id: [id number]    Molecular Findings:  * Gene: TP53, Variant: Splice Site SNV, c.994-1G>C (Allele Frequency - 0.3%)  * Gene: TP53, Variant: C135R, c.403T>C (Allele Frequency - 0.1%)  * Gene: PPM1D, Variant: Q510*, c.1528C>T (Allele Frequency - 0.2%)  * Biomarker: Tumor Mutation [place], Status: Not evaluable  * Biomarker: Microsatellite Instability, Status: YVETTE/MSI-L (MSI-High: NOT DETECTED)  * Biomarker: Tumor Fraction (0.1%)  * 4 variants of unknown significance    ** End of copied information **    Current Sites of Disease:  Pancreatic head    Oncologic Problem List:  Pancreatic cancer  DM2  Cachexia  ascites    Oncologic Narrative:  69 y.o. woman who initially presented in mid September 2024 with abdominal pain jaundice MARK and pancreatitis.  She had an ultrasound that showed extrahepatic bile duct dilation and pancreatic duct dilation.  Both of these seem to emanate from the pancreatic head.  A CT scan on September 9, 2024 was concerning for a mass.  MRCP on September 27, 2024 showed an ill-defined mass in the pancreatic head and uncinate process measuring approximately 3.5 cm.  Biopsy of the pancreatic mass from the September 11, 2024 endoscopic ultrasound revealed adenocarcinoma.  She  met me for initial consultation regarding treatment planning on 9/26/2024.       Past Medical History: Nanci has a past medical history of Hyperlipidemia, Hypertension, Hypothyroidism, Personal history of other diseases of the circulatory system (09/15/2017), Personal history of other endocrine, nutritional and metabolic disease (02/24/2016), Personal history of other endocrine, nutritional and metabolic disease (03/09/2017), Personal history of other endocrine, nutritional and metabolic disease, Type 2 diabetes mellitus with other skin complications, and Vision loss.  Surgical History:  Nanci has a past surgical history that includes Other surgical history (09/01/2022); Other surgical history (09/01/2022); and Other surgical history (09/01/2022).  Social History:  Nanci reports that she has never smoked. She has never been exposed to tobacco smoke. She has never used smokeless tobacco. She reports that she does not drink alcohol and does not use drugs.  Family History:    Family History   Problem Relation Name Age of Onset    Cancer Mother      Heart disease Father       Family Oncology History:  Cancer-related family history includes Cancer in her mother.      SUBJECTIVE:    History of Present Illness:  Nanci Colón is a 69 y.o. female who was referred by Francisco Cruz MD and presents with follow up.     4/8/25 - started [place]/ Abraxane - has continued on tx QOW     Tires easily   Feels weak & fatigued  - using walker  Eats better - appetite improve after first 3 days of chemo   No nausea  - takes daily med   MSIR for breakthrough is helping    - joint pain with treatment for few days   Blood sugars are better   No fevers or chills   Occ dizziness       Has questions about genetic testing results     OBJECTIVE:    VS / Pain:  There were no vitals taken for this visit.  BSA: There is no height or weight on file to calculate BSA.   Pain Scale: 0    Daily Weight  07/29/25 : 57.7 kg (127 lb 3.3 oz)  07/25/25  : 57.8 kg (127 lb 6.8 oz)  07/16/25 : 57.8 kg (127 lb 8 oz)  07/02/25 : 57.8 kg (127 lb 6.4 oz)  07/01/25 : 57.7 kg (127 lb 4.8 oz)  06/18/25 : 59.2 kg (130 lb 8 oz)  06/13/25 : 59.2 kg (130 lb 8.2 oz)  06/04/25 : 61.8 kg (136 lb 4.8 oz)      Physical Exam  Constitutional:       Appearance: She is normal weight.      Comments: Fatigued appearing / thin   Using walker   HENT:      Nose: Nose normal.      Mouth/Throat:      Mouth: Mucous membranes are moist.      Pharynx: Oropharynx is clear.     Eyes:      Extraocular Movements: Extraocular movements intact.      Conjunctiva/sclera: Conjunctivae normal.       Cardiovascular:      Rate and Rhythm: Normal rate.   Pulmonary:      Effort: Pulmonary effort is normal.      Breath sounds: Normal breath sounds.   Abdominal:      General: Bowel sounds are normal. There is distension.      Comments: + BS      Musculoskeletal:         General: Normal range of motion.     Skin:     General: Skin is warm.     Neurological:      General: No focal deficit present.      Mental Status: Mental status is at baseline.     Psychiatric:         Mood and Affect: Mood normal.         Thought Content: Thought content normal.         Judgment: Judgment normal.         Performance Status:   ECOG 1    Diagnostic Results     WBC   Date/Time Value Ref Range Status   07/15/2025 01:51 PM 4.9 4.4 - 11.3 x10*3/uL Final   07/01/2025 10:36 AM 4.7 4.4 - 11.3 x10*3/uL Final   06/17/2025 02:20 PM 5.5 4.4 - 11.3 x10*3/uL Final     Hemoglobin   Date Value Ref Range Status   07/15/2025 8.7 (L) 12.0 - 16.0 g/dL Final   07/01/2025 8.9 (L) 12.0 - 16.0 g/dL Final   06/17/2025 8.7 (L) 12.0 - 16.0 g/dL Final     MCV   Date/Time Value Ref Range Status   07/15/2025 01:51  (H) 80 - 100 fL Final   07/01/2025 10:36  (H) 80 - 100 fL Final   06/17/2025 02:20  (H) 80 - 100 fL Final     Platelets   Date/Time Value Ref Range Status   07/15/2025 01:51  150 - 450 x10*3/uL Final   07/01/2025 10:36 AM  204 150 - 450 x10*3/uL Final   06/17/2025 02:20  150 - 450 x10*3/uL Final     Neutrophils Absolute   Date/Time Value Ref Range Status   07/15/2025 01:51 PM 2.77 1.20 - 7.70 x10*3/uL Final     Comment:     Percent differential counts (%) should be interpreted in the context of the absolute cell counts (cells/uL).   07/01/2025 10:36 AM 2.98 1.20 - 7.70 x10*3/uL Final     Comment:     Percent differential counts (%) should be interpreted in the context of the absolute cell counts (cells/uL).   06/17/2025 02:20 PM 3.65 1.20 - 7.70 x10*3/uL Final     Comment:     Percent differential counts (%) should be interpreted in the context of the absolute cell counts (cells/uL).     Bilirubin, Total   Date/Time Value Ref Range Status   07/15/2025 01:51 PM 0.4 0.0 - 1.2 mg/dL Final   07/01/2025 10:36 AM 0.6 0.0 - 1.2 mg/dL Final   06/17/2025 02:20 PM 0.7 0.0 - 1.2 mg/dL Final     AST   Date/Time Value Ref Range Status   07/15/2025 01:51 PM 22 9 - 39 U/L Final   07/01/2025 10:36 AM 31 9 - 39 U/L Final   06/17/2025 02:20 PM 36 9 - 39 U/L Final     ALT   Date/Time Value Ref Range Status   07/15/2025 01:51 PM 12 7 - 45 U/L Final     Comment:     Patients treated with Sulfasalazine may generate falsely decreased results for ALT.   07/01/2025 10:36 AM 14 7 - 45 U/L Final     Comment:     Patients treated with Sulfasalazine may generate falsely decreased results for ALT.   06/17/2025 02:20 PM 13 7 - 45 U/L Final     Comment:     Patients treated with Sulfasalazine may generate falsely decreased results for ALT.     Creatinine   Date/Time Value Ref Range Status   07/15/2025 01:51 PM 0.71 0.50 - 1.05 mg/dL Final   07/01/2025 10:36 AM 0.81 0.50 - 1.05 mg/dL Final   06/17/2025 02:20 PM 0.72 0.50 - 1.05 mg/dL Final     Urea Nitrogen   Date/Time Value Ref Range Status   07/15/2025 01:51 PM 14 6 - 23 mg/dL Final   07/01/2025 10:36 AM 11 6 - 23 mg/dL Final   06/17/2025 02:20 PM 11 6 - 23 mg/dL Final     Albumin   Date/Time Value Ref Range  "Status   07/15/2025 01:51 PM 2.9 (L) 3.4 - 5.0 g/dL Final   07/01/2025 10:36 AM 2.9 (L) 3.4 - 5.0 g/dL Final   06/17/2025 02:20 PM 2.7 (L) 3.4 - 5.0 g/dL Final     Cancer AG 19-9   Date/Time Value Ref Range Status   07/01/2025 10:36 AM 2,659.33 (H) <35.00 U/mL Final   06/03/2025 12:27 PM 2,749.53 (H) <35.00 U/mL Final   04/07/2025 01:50 PM 17,703.49 (H) <35.00 U/mL Final   03/27/2025 11:04 AM 7,925.42 (H) <35.00 U/mL Final   03/04/2025 12:20 PM 2,199.55 (H) <35.00 U/mL Final   02/18/2025 02:01 .79 (H) <35.00 U/mL Final   02/10/2025 11:41 .27 (H) <35.00 U/mL Final   02/03/2025 02:38 .66 (H) <35.00 U/mL Final   01/20/2025 12:52 .22 (H) <35.00 U/mL Final   12/31/2024 01:19 PM 1,391.30 (H) <35.00 U/mL Final     No results found for: \"CEA\"  === 05/30/25 ===    CT CHEST ABDOMEN PELVIS W IV CONTRAST    - Impression -  Pancreatic adenocarcinoma restaging scan compared to CT chest abdomen  pelvis 02/19/2025 and PET-CT 03/27/2025:  1. Overall worsening of measurable and non-measurable disease.  Numerous bilateral pulmonary nodules have decreased in size however  new pulmonary nodules have developed in the interim. Ill-defined  pancreatic, liver lesion and abdominopelvic lymph nodes have remained  overall stable in size. New micronodularity throughout the greater  omentum and peritoneal reflections are concerning for early  peritoneal carcinomatosis.  2. Atrophic appearance of the thyroid gland which can be seen in  treatment associated thyroiditis.  3. New small volume ascites in the rectovesical recess with  increasing body wall and mesenteric edema is likely related to low  albumin levels, congestion and third-spacing versus secondary to  early peritoneal carcinomatosis.  4. Additional incidental non-acute findings as detailed above.    Signed by: Herson Baker 5/31/2025 3:05 PM  Dictation workstation:   [id number]    === 06/24/25 ===    CT CHEST ABDOMEN PELVIS W IV CONTRAST    - Impression " -  Pancreatic adenocarcinoma restaging scan compared to CT chest abdomen  pelvis 05/30/2025:  1. Stable ill-defined pancreatic masses, ground-glass  nodules/opacities throughout the bilateral lungs and prominent retroperitoneal nodes. Similar scattered nodularity throughout the peritoneal reflections overall favored to represent peritoneal nodes  rather than early peritoneal carcinomatosis given downtrending serum tumor markers. No new measurable and non-measurable disease  throughout the chest abdomen pelvis.  2. Interval decrease to small volume free fluid within the recto uterine and ureterovesical recesses suggestive of improving congestion and third-spacing.  3. Similar atrophic appearance of the thyroid gland which can be seen in treatment associated thyroiditis.  4. CBD stent is again noted terminating at the junction of the 2nd/3rd portion of the duodenum stable from prior exam with similar pneumobilia and air in the gallbladder consistent with CBD patency.  5. Additional incidental non-acute findings as detailed above.      I personally reviewed the images/study and I agree with the findings  as stated by Dr. Jefferson Burleson. This study was interpreted at [place], [place].    MACRO:  None.    Signed by: Herson Baker 6/25/2025 10:12 PM  Dictation workstation:   [id number]        Assessment/Plan   69 y.o.  woman with a resectable pancreatic cancer but elevated CA 19-9 and recent pancreatitis so we will plan for neoadjuvant chemotherapy with modified FOLFIRINOX.   9/30/24 - diagnostic lab negative   10/6/24 - 1st dose mFOLFIRINOX  - tolerated with fatigue, nausea   Added fosaprepitant to C2 - Allergic rxn to fosaprepitant - discontinue  C3 held due to ANC of 500    - plan for C3 with addition of neulasta   C4   S/p 7 cycles:   - Ca19-9 is uptrending 507>549>621>2200>7925  - CT from 2/19/25 is read as showing multiple new pulmonary nodules (and an ill-defined hepatic  segment 4B hypodensity that likely reflects a focal hepatic steatosis) In conjunction with the uptrend in the Ca19-9 there is concern for disease progression, however, patient did have a recent respiratory illness and her disease is stable in the pancreas and surrounding area. At her last visit, we reviewed stopping her mFOLFIRINOX given her elevated LFTs and hepatic steatosis are likely a direct consequence of her chemotherapy and lack of response.   - PET-CT is difficult to interpret given her primary lesion is only mildly PET-avid; there are mildly avid left supraclavicular, mediastinal, RP and mesenteric nodes, but given continued rise in Ca19-9, there is concern for disease progression  - She started   gemcitabine and nab-paclitaxel in early 4/2025.  She felt better after starting.  Only neuropathy worse but pain in abdomen better.  Added  to C2 labs to help with interpretation.  Response scans after C2 show mixed response but overall improved.  Will continue for now.    - She is responding on scans and biochemically we will continue with C4 on 7/2/2025.    C5D1 tomorrow   C5D2 in 2 weeks     Follow up in 4 weeks with scans     Cancer related pain:   -on oxycodone 15mg   -Supportive oncology following     DM:   -Continue metformin, however keep close eye on renal function.      Genetics -   [place] with MSH6 heterozygous mutation of uncertain significance.    Genetics referral placed      Patient seen and discussed with Dr. Cruz.     WENDIE Oleary-CNP

## 2025-07-30 ENCOUNTER — APPOINTMENT (OUTPATIENT)
Dept: CARDIOLOGY | Facility: HOSPITAL | Age: 70
DRG: 808 | End: 2025-07-30
Payer: MEDICARE

## 2025-07-30 ENCOUNTER — INFUSION (OUTPATIENT)
Dept: HEMATOLOGY/ONCOLOGY | Facility: CLINIC | Age: 70
End: 2025-07-30
Payer: MEDICARE

## 2025-07-30 ENCOUNTER — APPOINTMENT (OUTPATIENT)
Dept: RADIOLOGY | Facility: HOSPITAL | Age: 70
DRG: 808 | End: 2025-07-30
Payer: MEDICARE

## 2025-07-30 ENCOUNTER — SOCIAL WORK (OUTPATIENT)
Dept: HEMATOLOGY/ONCOLOGY | Facility: CLINIC | Age: 70
End: 2025-07-30
Payer: MEDICARE

## 2025-07-30 ENCOUNTER — HOSPITAL ENCOUNTER (INPATIENT)
Facility: HOSPITAL | Age: 70
LOS: 3 days | Discharge: HOME | End: 2025-08-02
Admitting: INTERNAL MEDICINE
Payer: MEDICARE

## 2025-07-30 VITALS
RESPIRATION RATE: 16 BRPM | SYSTOLIC BLOOD PRESSURE: 126 MMHG | BODY MASS INDEX: 21.65 KG/M2 | OXYGEN SATURATION: 98 % | TEMPERATURE: 101.5 F | DIASTOLIC BLOOD PRESSURE: 79 MMHG | WEIGHT: 126.8 LBS | HEIGHT: 64 IN | HEART RATE: 122 BPM

## 2025-07-30 DIAGNOSIS — C25.0 MALIGNANT NEOPLASM OF HEAD OF PANCREAS (MULTI): ICD-10-CM

## 2025-07-30 DIAGNOSIS — R50.9 FEVER, UNSPECIFIED FEVER CAUSE: Primary | ICD-10-CM

## 2025-07-30 DIAGNOSIS — C25.0 MALIGNANT NEOPLASM OF HEAD OF PANCREAS (MULTI): Primary | ICD-10-CM

## 2025-07-30 LAB
6MAM UR CFM-MCNC: <25 NG/ML
ALBUMIN SERPL BCP-MCNC: 3.2 G/DL (ref 3.4–5)
ALP SERPL-CCNC: 467 U/L (ref 33–136)
ALT SERPL W P-5'-P-CCNC: 76 U/L (ref 7–45)
ANION GAP SERPL CALC-SCNC: 12 MMOL/L (ref 10–20)
APPEARANCE UR: CLEAR
AST SERPL W P-5'-P-CCNC: 266 U/L (ref 9–39)
BASOPHILS # BLD AUTO: 0.01 X10*3/UL (ref 0–0.1)
BASOPHILS NFR BLD AUTO: 0.1 %
BILIRUB SERPL-MCNC: 2.1 MG/DL (ref 0–1.2)
BILIRUB UR STRIP.AUTO-MCNC: NEGATIVE MG/DL
BUN SERPL-MCNC: 15 MG/DL (ref 6–23)
CALCIUM SERPL-MCNC: 8.7 MG/DL (ref 8.6–10.3)
CANCER AG19-9 SERPL-ACNC: 5038.02 U/ML
CHLORIDE SERPL-SCNC: 101 MMOL/L (ref 98–107)
CO2 SERPL-SCNC: 25 MMOL/L (ref 21–32)
CODEINE UR CFM-MCNC: <50 NG/ML
COLOR UR: YELLOW
CREAT SERPL-MCNC: 0.69 MG/DL (ref 0.5–1.05)
EGFRCR SERPLBLD CKD-EPI 2021: >90 ML/MIN/1.73M*2
EOSINOPHIL # BLD AUTO: 0.01 X10*3/UL (ref 0–0.7)
EOSINOPHIL NFR BLD AUTO: 0.1 %
ERYTHROCYTE [DISTWIDTH] IN BLOOD BY AUTOMATED COUNT: 15.3 % (ref 11.5–14.5)
FLUAV RNA RESP QL NAA+PROBE: NOT DETECTED
FLUBV RNA RESP QL NAA+PROBE: NOT DETECTED
GLUCOSE BLD MANUAL STRIP-MCNC: 187 MG/DL (ref 74–99)
GLUCOSE BLD MANUAL STRIP-MCNC: 221 MG/DL (ref 74–99)
GLUCOSE SERPL-MCNC: 140 MG/DL (ref 74–99)
GLUCOSE UR STRIP.AUTO-MCNC: NEGATIVE MG/DL
HCT VFR BLD AUTO: 30.3 % (ref 36–46)
HGB BLD-MCNC: 10 G/DL (ref 12–16)
HYDROCODONE CTO UR CFM-MCNC: <25 NG/ML
HYDROMORPHONE UR CFM-MCNC: 49 NG/ML
IMM GRANULOCYTES # BLD AUTO: 0.03 X10*3/UL (ref 0–0.7)
IMM GRANULOCYTES NFR BLD AUTO: 0.3 % (ref 0–0.9)
KETONES UR STRIP.AUTO-MCNC: NEGATIVE MG/DL
LACTATE SERPL-SCNC: 0.8 MMOL/L (ref 0.4–2)
LEUKOCYTE ESTERASE UR QL STRIP.AUTO: ABNORMAL
LYMPHOCYTES # BLD AUTO: 0.53 X10*3/UL (ref 1.2–4.8)
LYMPHOCYTES NFR BLD AUTO: 5.6 %
MCH RBC QN AUTO: 33.2 PG (ref 26–34)
MCHC RBC AUTO-ENTMCNC: 33 G/DL (ref 32–36)
MCV RBC AUTO: 101 FL (ref 80–100)
MONOCYTES # BLD AUTO: 0.9 X10*3/UL (ref 0.1–1)
MONOCYTES NFR BLD AUTO: 9.6 %
MORPHINE UR CFM-MCNC: >2500 NG/ML
MUCOUS THREADS #/AREA URNS AUTO: NORMAL /LPF
NEUTROPHILS # BLD AUTO: 7.92 X10*3/UL (ref 1.2–7.7)
NEUTROPHILS NFR BLD AUTO: 84.3 %
NITRITE UR QL STRIP.AUTO: ABNORMAL
NORHYDROCODONE UR CFM-MCNC: <25 NG/ML
NOROXYCODONE UR CFM-MCNC: <25 NG/ML
NRBC BLD-RTO: 0 /100 WBCS (ref 0–0)
OXYCODONE UR CFM-MCNC: <25 NG/ML
OXYMORPHONE UR CFM-MCNC: <25 NG/ML
PH UR STRIP.AUTO: 5.5 [PH]
PLATELET # BLD AUTO: 218 X10*3/UL (ref 150–450)
POTASSIUM SERPL-SCNC: 4 MMOL/L (ref 3.5–5.3)
PROT SERPL-MCNC: 6.2 G/DL (ref 6.4–8.2)
PROT UR STRIP.AUTO-MCNC: NEGATIVE MG/DL
RBC # BLD AUTO: 3.01 X10*6/UL (ref 4–5.2)
RBC # UR STRIP.AUTO: NEGATIVE MG/DL
RBC #/AREA URNS AUTO: NORMAL /HPF
SARS-COV-2 RNA RESP QL NAA+PROBE: NOT DETECTED
SODIUM SERPL-SCNC: 134 MMOL/L (ref 136–145)
SP GR UR STRIP.AUTO: 1.02
SQUAMOUS #/AREA URNS AUTO: NORMAL /HPF
UROBILINOGEN UR STRIP.AUTO-MCNC: ABNORMAL MG/DL
WBC # BLD AUTO: 9.4 X10*3/UL (ref 4.4–11.3)
WBC #/AREA URNS AUTO: NORMAL /HPF

## 2025-07-30 PROCEDURE — 83605 ASSAY OF LACTIC ACID: CPT

## 2025-07-30 PROCEDURE — 2500000001 HC RX 250 WO HCPCS SELF ADMINISTERED DRUGS (ALT 637 FOR MEDICARE OP)

## 2025-07-30 PROCEDURE — 96365 THER/PROPH/DIAG IV INF INIT: CPT

## 2025-07-30 PROCEDURE — 87636 SARSCOV2 & INF A&B AMP PRB: CPT

## 2025-07-30 PROCEDURE — 2500000004 HC RX 250 GENERAL PHARMACY W/ HCPCS (ALT 636 FOR OP/ED): Performed by: REGISTERED NURSE

## 2025-07-30 PROCEDURE — 85025 COMPLETE CBC W/AUTO DIFF WBC: CPT

## 2025-07-30 PROCEDURE — 99285 EMERGENCY DEPT VISIT HI MDM: CPT | Mod: 25

## 2025-07-30 PROCEDURE — 96367 TX/PROPH/DG ADDL SEQ IV INF: CPT

## 2025-07-30 PROCEDURE — 82947 ASSAY GLUCOSE BLOOD QUANT: CPT

## 2025-07-30 PROCEDURE — 87075 CULTR BACTERIA EXCEPT BLOOD: CPT | Mod: PORLAB

## 2025-07-30 PROCEDURE — 71046 X-RAY EXAM CHEST 2 VIEWS: CPT | Mod: FOREIGN READ | Performed by: RADIOLOGY

## 2025-07-30 PROCEDURE — 81001 URINALYSIS AUTO W/SCOPE: CPT

## 2025-07-30 PROCEDURE — 2500000002 HC RX 250 W HCPCS SELF ADMINISTERED DRUGS (ALT 637 FOR MEDICARE OP, ALT 636 FOR OP/ED): Performed by: NURSE PRACTITIONER

## 2025-07-30 PROCEDURE — 87086 URINE CULTURE/COLONY COUNT: CPT | Mod: PORLAB

## 2025-07-30 PROCEDURE — 93005 ELECTROCARDIOGRAM TRACING: CPT

## 2025-07-30 PROCEDURE — 2060000001 HC INTERMEDIATE ICU ROOM DAILY

## 2025-07-30 PROCEDURE — 2500000001 HC RX 250 WO HCPCS SELF ADMINISTERED DRUGS (ALT 637 FOR MEDICARE OP): Performed by: NURSE PRACTITIONER

## 2025-07-30 PROCEDURE — 36415 COLL VENOUS BLD VENIPUNCTURE: CPT

## 2025-07-30 PROCEDURE — 2500000004 HC RX 250 GENERAL PHARMACY W/ HCPCS (ALT 636 FOR OP/ED): Performed by: NURSE PRACTITIONER

## 2025-07-30 PROCEDURE — 2500000004 HC RX 250 GENERAL PHARMACY W/ HCPCS (ALT 636 FOR OP/ED)

## 2025-07-30 PROCEDURE — 84075 ASSAY ALKALINE PHOSPHATASE: CPT

## 2025-07-30 PROCEDURE — 87040 BLOOD CULTURE FOR BACTERIA: CPT | Mod: PORLAB

## 2025-07-30 PROCEDURE — 99223 1ST HOSP IP/OBS HIGH 75: CPT | Performed by: NURSE PRACTITIONER

## 2025-07-30 PROCEDURE — 71046 X-RAY EXAM CHEST 2 VIEWS: CPT

## 2025-07-30 PROCEDURE — 96366 THER/PROPH/DIAG IV INF ADDON: CPT

## 2025-07-30 RX ORDER — SODIUM CHLORIDE, SODIUM LACTATE, POTASSIUM CHLORIDE, CALCIUM CHLORIDE 600; 310; 30; 20 MG/100ML; MG/100ML; MG/100ML; MG/100ML
100 INJECTION, SOLUTION INTRAVENOUS CONTINUOUS
Status: ACTIVE | OUTPATIENT
Start: 2025-07-30 | End: 2025-07-31

## 2025-07-30 RX ORDER — ACETAMINOPHEN 325 MG/1
975 TABLET ORAL ONCE
Status: COMPLETED | OUTPATIENT
Start: 2025-07-30 | End: 2025-07-30

## 2025-07-30 RX ORDER — POTASSIUM CHLORIDE 750 MG/1
10 TABLET, FILM COATED, EXTENDED RELEASE ORAL 2 TIMES DAILY
Status: DISCONTINUED | OUTPATIENT
Start: 2025-07-30 | End: 2025-08-02 | Stop reason: HOSPADM

## 2025-07-30 RX ORDER — VANCOMYCIN HYDROCHLORIDE 1.5 G/300ML
1.5 INJECTION, SOLUTION INTRAVITREAL ONCE
Status: COMPLETED | OUTPATIENT
Start: 2025-07-30 | End: 2025-07-30

## 2025-07-30 RX ORDER — AMOXICILLIN 250 MG
1 CAPSULE ORAL NIGHTLY
Status: DISCONTINUED | OUTPATIENT
Start: 2025-07-30 | End: 2025-08-02 | Stop reason: HOSPADM

## 2025-07-30 RX ORDER — LEVOTHYROXINE SODIUM 100 UG/1
100 TABLET ORAL DAILY
Status: DISCONTINUED | OUTPATIENT
Start: 2025-07-30 | End: 2025-08-02 | Stop reason: HOSPADM

## 2025-07-30 RX ORDER — ACETAMINOPHEN 325 MG/1
650 TABLET ORAL EVERY 6 HOURS PRN
Status: DISCONTINUED | OUTPATIENT
Start: 2025-07-30 | End: 2025-08-02 | Stop reason: HOSPADM

## 2025-07-30 RX ORDER — MORPHINE SULFATE 15 MG/1
15 TABLET, FILM COATED, EXTENDED RELEASE ORAL 2 TIMES DAILY
Refills: 0 | Status: DISCONTINUED | OUTPATIENT
Start: 2025-07-30 | End: 2025-08-02 | Stop reason: HOSPADM

## 2025-07-30 RX ORDER — ONDANSETRON HYDROCHLORIDE 2 MG/ML
4 INJECTION, SOLUTION INTRAVENOUS EVERY 6 HOURS PRN
Status: DISCONTINUED | OUTPATIENT
Start: 2025-07-30 | End: 2025-08-02 | Stop reason: HOSPADM

## 2025-07-30 RX ORDER — DEXTROSE 50 % IN WATER (D50W) INTRAVENOUS SYRINGE
25
Status: DISCONTINUED | OUTPATIENT
Start: 2025-07-30 | End: 2025-08-02 | Stop reason: HOSPADM

## 2025-07-30 RX ORDER — ACETAMINOPHEN 325 MG/1
650 TABLET ORAL EVERY 4 HOURS PRN
Status: DISCONTINUED | OUTPATIENT
Start: 2025-07-30 | End: 2025-07-30

## 2025-07-30 RX ORDER — VANCOMYCIN HYDROCHLORIDE 1 G/20ML
INJECTION, POWDER, LYOPHILIZED, FOR SOLUTION INTRAVENOUS DAILY PRN
Status: DISCONTINUED | OUTPATIENT
Start: 2025-07-30 | End: 2025-08-02 | Stop reason: HOSPADM

## 2025-07-30 RX ORDER — OLANZAPINE 5 MG/1
5 TABLET, FILM COATED ORAL NIGHTLY
Status: DISCONTINUED | OUTPATIENT
Start: 2025-07-30 | End: 2025-08-02 | Stop reason: HOSPADM

## 2025-07-30 RX ORDER — PANTOPRAZOLE SODIUM 40 MG/1
40 TABLET, DELAYED RELEASE ORAL DAILY
Status: DISCONTINUED | OUTPATIENT
Start: 2025-07-30 | End: 2025-08-02 | Stop reason: HOSPADM

## 2025-07-30 RX ORDER — VANCOMYCIN HYDROCHLORIDE 1.5 G/300ML
1500 INJECTION, SOLUTION INTRAVITREAL ONCE
Status: DISCONTINUED | OUTPATIENT
Start: 2025-07-31 | End: 2025-07-31 | Stop reason: DRUGHIGH

## 2025-07-30 RX ORDER — LACTULOSE 10 G/15ML
10 SOLUTION ORAL 3 TIMES DAILY PRN
Status: DISCONTINUED | OUTPATIENT
Start: 2025-07-30 | End: 2025-08-02 | Stop reason: HOSPADM

## 2025-07-30 RX ORDER — INSULIN LISPRO 100 [IU]/ML
0-5 INJECTION, SOLUTION INTRAVENOUS; SUBCUTANEOUS
Status: DISCONTINUED | OUTPATIENT
Start: 2025-07-30 | End: 2025-08-02 | Stop reason: HOSPADM

## 2025-07-30 RX ORDER — DEXTROSE 50 % IN WATER (D50W) INTRAVENOUS SYRINGE
12.5
Status: DISCONTINUED | OUTPATIENT
Start: 2025-07-30 | End: 2025-08-02 | Stop reason: HOSPADM

## 2025-07-30 RX ADMIN — PANCRELIPASE 1 CAPSULE: 120000; 24000; 76000 CAPSULE, DELAYED RELEASE PELLETS ORAL at 18:49

## 2025-07-30 RX ADMIN — VANCOMYCIN HYDROCHLORIDE 1.5 G: 1.5 INJECTION, SOLUTION INTRAVITREAL at 14:36

## 2025-07-30 RX ADMIN — POTASSIUM CHLORIDE 10 MEQ: 750 TABLET, EXTENDED RELEASE ORAL at 20:22

## 2025-07-30 RX ADMIN — PIPERACILLIN SODIUM AND TAZOBACTAM SODIUM 4.5 G: 4; .5 INJECTION, SOLUTION INTRAVENOUS at 14:01

## 2025-07-30 RX ADMIN — OLANZAPINE 5 MG: 5 TABLET, FILM COATED ORAL at 20:21

## 2025-07-30 RX ADMIN — PIPERACILLIN SODIUM AND TAZOBACTAM SODIUM 4.5 G: 4; .5 INJECTION, SOLUTION INTRAVENOUS at 20:21

## 2025-07-30 RX ADMIN — SODIUM CHLORIDE, SODIUM LACTATE, POTASSIUM CHLORIDE, AND CALCIUM CHLORIDE 100 ML/HR: .6; .31; .03; .02 INJECTION, SOLUTION INTRAVENOUS at 21:21

## 2025-07-30 RX ADMIN — DOCUSATE SODIUM 50MG AND SENNOSIDES 8.6MG 1 TABLET: 8.6; 5 TABLET, FILM COATED ORAL at 20:21

## 2025-07-30 RX ADMIN — SODIUM CHLORIDE 1000 ML: 9 INJECTION, SOLUTION INTRAVENOUS at 13:52

## 2025-07-30 RX ADMIN — SODIUM CHLORIDE, SODIUM LACTATE, POTASSIUM CHLORIDE, AND CALCIUM CHLORIDE 500 ML: .6; .31; .03; .02 INJECTION, SOLUTION INTRAVENOUS at 20:21

## 2025-07-30 RX ADMIN — MORPHINE SULFATE 15 MG: 15 TABLET, FILM COATED, EXTENDED RELEASE ORAL at 21:20

## 2025-07-30 RX ADMIN — ACETAMINOPHEN 975 MG: 325 TABLET ORAL at 13:56

## 2025-07-30 SDOH — SOCIAL STABILITY: SOCIAL INSECURITY: WITHIN THE LAST YEAR, HAVE YOU BEEN HUMILIATED OR EMOTIONALLY ABUSED IN OTHER WAYS BY YOUR PARTNER OR EX-PARTNER?: NO

## 2025-07-30 SDOH — ECONOMIC STABILITY: HOUSING INSECURITY: AT ANY TIME IN THE PAST 12 MONTHS, WERE YOU HOMELESS OR LIVING IN A SHELTER (INCLUDING NOW)?: NO

## 2025-07-30 SDOH — ECONOMIC STABILITY: INCOME INSECURITY: IN THE PAST 12 MONTHS HAS THE ELECTRIC, GAS, OIL, OR WATER COMPANY THREATENED TO SHUT OFF SERVICES IN YOUR HOME?: NO

## 2025-07-30 SDOH — ECONOMIC STABILITY: FOOD INSECURITY: WITHIN THE PAST 12 MONTHS, YOU WORRIED THAT YOUR FOOD WOULD RUN OUT BEFORE YOU GOT THE MONEY TO BUY MORE.: NEVER TRUE

## 2025-07-30 SDOH — SOCIAL STABILITY: SOCIAL INSECURITY: DO YOU FEEL UNSAFE GOING BACK TO THE PLACE WHERE YOU ARE LIVING?: NO

## 2025-07-30 SDOH — SOCIAL STABILITY: SOCIAL INSECURITY
WITHIN THE LAST YEAR, HAVE YOU BEEN RAPED OR FORCED TO HAVE ANY KIND OF SEXUAL ACTIVITY BY YOUR PARTNER OR EX-PARTNER?: NO

## 2025-07-30 SDOH — SOCIAL STABILITY: SOCIAL INSECURITY
WITHIN THE LAST YEAR, HAVE YOU BEEN KICKED, HIT, SLAPPED, OR OTHERWISE PHYSICALLY HURT BY YOUR PARTNER OR EX-PARTNER?: NO

## 2025-07-30 SDOH — ECONOMIC STABILITY: FOOD INSECURITY: HOW HARD IS IT FOR YOU TO PAY FOR THE VERY BASICS LIKE FOOD, HOUSING, MEDICAL CARE, AND HEATING?: NOT HARD AT ALL

## 2025-07-30 SDOH — ECONOMIC STABILITY: HOUSING INSECURITY: IN THE LAST 12 MONTHS, WAS THERE A TIME WHEN YOU WERE NOT ABLE TO PAY THE MORTGAGE OR RENT ON TIME?: NO

## 2025-07-30 SDOH — SOCIAL STABILITY: SOCIAL INSECURITY: WITHIN THE LAST YEAR, HAVE YOU BEEN AFRAID OF YOUR PARTNER OR EX-PARTNER?: NO

## 2025-07-30 SDOH — ECONOMIC STABILITY: FOOD INSECURITY: WITHIN THE PAST 12 MONTHS, THE FOOD YOU BOUGHT JUST DIDN'T LAST AND YOU DIDN'T HAVE MONEY TO GET MORE.: NEVER TRUE

## 2025-07-30 SDOH — SOCIAL STABILITY: SOCIAL INSECURITY: WERE YOU ABLE TO COMPLETE ALL THE BEHAVIORAL HEALTH SCREENINGS?: YES

## 2025-07-30 SDOH — SOCIAL STABILITY: SOCIAL INSECURITY: DOES ANYONE TRY TO KEEP YOU FROM HAVING/CONTACTING OTHER FRIENDS OR DOING THINGS OUTSIDE YOUR HOME?: NO

## 2025-07-30 SDOH — ECONOMIC STABILITY: HOUSING INSECURITY: IN THE PAST 12 MONTHS, HOW MANY TIMES HAVE YOU MOVED WHERE YOU WERE LIVING?: 0

## 2025-07-30 SDOH — SOCIAL STABILITY: SOCIAL INSECURITY: DO YOU FEEL ANYONE HAS EXPLOITED OR TAKEN ADVANTAGE OF YOU FINANCIALLY OR OF YOUR PERSONAL PROPERTY?: NO

## 2025-07-30 SDOH — SOCIAL STABILITY: SOCIAL INSECURITY: HAVE YOU HAD ANY THOUGHTS OF HARMING ANYONE ELSE?: NO

## 2025-07-30 SDOH — SOCIAL STABILITY: SOCIAL INSECURITY: ARE YOU OR HAVE YOU BEEN THREATENED OR ABUSED PHYSICALLY, EMOTIONALLY, OR SEXUALLY BY ANYONE?: NO

## 2025-07-30 SDOH — SOCIAL STABILITY: SOCIAL INSECURITY: HAVE YOU HAD THOUGHTS OF HARMING ANYONE ELSE?: NO

## 2025-07-30 SDOH — SOCIAL STABILITY: SOCIAL INSECURITY: ARE THERE ANY APPARENT SIGNS OF INJURIES/BEHAVIORS THAT COULD BE RELATED TO ABUSE/NEGLECT?: NO

## 2025-07-30 SDOH — SOCIAL STABILITY: SOCIAL INSECURITY: ABUSE: ADULT

## 2025-07-30 SDOH — SOCIAL STABILITY: SOCIAL INSECURITY: HAS ANYONE EVER THREATENED TO HURT YOUR FAMILY OR YOUR PETS?: NO

## 2025-07-30 SDOH — SOCIAL STABILITY: SOCIAL INSECURITY: HAVE YOU HAD THOUGHTS OF HARMING ANYONE ELSE?: YES

## 2025-07-30 ASSESSMENT — PATIENT HEALTH QUESTIONNAIRE - PHQ9
1. LITTLE INTEREST OR PLEASURE IN DOING THINGS: NOT AT ALL
2. FEELING DOWN, DEPRESSED OR HOPELESS: NOT AT ALL
1. LITTLE INTEREST OR PLEASURE IN DOING THINGS: NOT AT ALL
SUM OF ALL RESPONSES TO PHQ9 QUESTIONS 1 & 2: 0

## 2025-07-30 ASSESSMENT — COGNITIVE AND FUNCTIONAL STATUS - GENERAL
MOVING TO AND FROM BED TO CHAIR: A LITTLE
MOBILITY SCORE: 23
MOBILITY SCORE: 19
PATIENT BASELINE BEDBOUND: NO
TURNING FROM BACK TO SIDE WHILE IN FLAT BAD: A LITTLE
HELP NEEDED FOR BATHING: A LITTLE
DAILY ACTIVITIY SCORE: 24
DAILY ACTIVITIY SCORE: 20
CLIMB 3 TO 5 STEPS WITH RAILING: A LITTLE
CLIMB 3 TO 5 STEPS WITH RAILING: A LITTLE
WALKING IN HOSPITAL ROOM: A LITTLE
DRESSING REGULAR LOWER BODY CLOTHING: A LITTLE
DRESSING REGULAR UPPER BODY CLOTHING: A LITTLE
STANDING UP FROM CHAIR USING ARMS: A LITTLE
TOILETING: A LITTLE

## 2025-07-30 ASSESSMENT — LIFESTYLE VARIABLES
HOW OFTEN DO YOU HAVE A DRINK CONTAINING ALCOHOL: NEVER
AUDIT-C TOTAL SCORE: 0
AUDIT-C TOTAL SCORE: 0
HOW MANY STANDARD DRINKS CONTAINING ALCOHOL DO YOU HAVE ON A TYPICAL DAY: PATIENT DOES NOT DRINK
HAVE PEOPLE ANNOYED YOU BY CRITICIZING YOUR DRINKING: NO
HOW MANY STANDARD DRINKS CONTAINING ALCOHOL DO YOU HAVE ON A TYPICAL DAY: PATIENT DOES NOT DRINK
TOTAL SCORE: 0
SKIP TO QUESTIONS 9-10: 1
AUDIT-C TOTAL SCORE: 0
HAVE YOU EVER FELT YOU SHOULD CUT DOWN ON YOUR DRINKING: NO
HOW OFTEN DO YOU HAVE 6 OR MORE DRINKS ON ONE OCCASION: NEVER
EVER HAD A DRINK FIRST THING IN THE MORNING TO STEADY YOUR NERVES TO GET RID OF A HANGOVER: NO
HOW OFTEN DO YOU HAVE A DRINK CONTAINING ALCOHOL: NEVER
AUDIT-C TOTAL SCORE: 0
HOW OFTEN DO YOU HAVE 6 OR MORE DRINKS ON ONE OCCASION: NEVER
SKIP TO QUESTIONS 9-10: 1
EVER FELT BAD OR GUILTY ABOUT YOUR DRINKING: NO

## 2025-07-30 ASSESSMENT — ACTIVITIES OF DAILY LIVING (ADL)
ASSISTIVE_DEVICE: EYEGLASSES;WALKER
HEARING - RIGHT EAR: DIFFICULTY WITH NOISE
FEEDING YOURSELF: INDEPENDENT
GROOMING: INDEPENDENT
ADEQUATE_TO_COMPLETE_ADL: YES
LACK_OF_TRANSPORTATION: NO
WALKS IN HOME: INDEPENDENT
PATIENT'S MEMORY ADEQUATE TO SAFELY COMPLETE DAILY ACTIVITIES?: YES
JUDGMENT_ADEQUATE_SAFELY_COMPLETE_DAILY_ACTIVITIES: YES
LACK_OF_TRANSPORTATION: NO
LACK_OF_TRANSPORTATION: NO
TOILETING: INDEPENDENT
BATHING: INDEPENDENT
HEARING - LEFT EAR: DIFFICULTY WITH NOISE
LACK_OF_TRANSPORTATION: NO
DRESSING YOURSELF: INDEPENDENT

## 2025-07-30 ASSESSMENT — PAIN - FUNCTIONAL ASSESSMENT
PAIN_FUNCTIONAL_ASSESSMENT: 0-10

## 2025-07-30 ASSESSMENT — PAIN SCALES - GENERAL
PAINLEVEL_OUTOF10: 0 - NO PAIN
PAINLEVEL_OUTOF10: 0 - NO PAIN
PAINLEVEL_OUTOF10: 0-NO PAIN
PAINLEVEL_OUTOF10: 0 - NO PAIN
PAINLEVEL_OUTOF10: 2
PAINLEVEL_OUTOF10: 0 - NO PAIN

## 2025-07-30 NOTE — PROGRESS NOTES
Vancomycin Dosing by Pharmacy- INITIAL    Nanci Colón is a 69 y.o. year old female who Pharmacy has been consulted for vancomycin dosing for other neutropenic fever. Based on the patient's indication and renal status this patient will be dosed based on a goal AUC of 400-600.     Renal function is currently stable.    Visit Vitals  /61   Pulse 89   Temp (!) 3.7 °C (38.7 °F) (Oral)   Resp 17        Lab Results   Component Value Date    CREATININE 0.69 2025    CREATININE 0.74 2025    CREATININE 0.71 07/15/2025    CREATININE 0.81 2025        Patient weight is as follows:   Vitals:    25 1233   Weight: 57.4 kg (126 lb 8 oz)       Cultures:  No results found for the encounter in last 14 days.        No intake/output data recorded.  I/O during current shift:  No intake/output data recorded.    Temp (24hrs), Av.7 °C (80 °F), Min:3.7 °C (38.7 °F), Max:38.6 °C (101.5 °F)         Assessment/Plan     Patient has already been given a loading dose of 1500 mg.  Will initiate vancomycin maintenance, 1500 mg every 24 hours.    This dosing regimen is predicted by [employer] to result in the following pharmacokinetic parameters:  Regimen: 1500 mg IV every 24 hours.  Exposure target: AUC24 (range) 400-600 mg/L.hr   AVQ31-03: 483 mg/L.hr  AUC24,ss: 505 mg/L.hr  Probability of AUC24 > 400: 73 %  Ctrough,ss: 13 mg/L  Probability of Ctrough,ss > 20: 21 %      Follow-up level will be ordered on  at 5:00 unless clinically indicated sooner.  Will continue to monitor renal function daily while on vancomycin and order serum creatinine at least every 48 hours if not already ordered.  Follow for continued vancomycin needs, clinical response, and signs/symptoms of toxicity.       Porter Craft, PharmD

## 2025-07-30 NOTE — PROGRESS NOTES
"Patient here for for treatment abraxane/gem. She is feeling shakey, cold and has a fever of 38.6, and HR is 122. She said symptoms started just this morning, no dizziness, or lightheadedness, but feeling \"spacey\" she is eating and drinking fine, no n/v/d. She has eating today and she checked her blood sugar prior to coming in and she was at 106.  RN contacted provider who would like patient to go to ED for a work up. Concerned for possible infection. RN communicated to patient to go to ED, patient agreeable. RN contacted ED charge RN to let him know patient was on the way. Patient discharged to ED.   " 1.93

## 2025-07-30 NOTE — ED PROVIDER NOTES
Emergency Department Provider Note       History of Present Illness     History provided by: Patient  Limitations to History: None  External Records Reviewed with Brief Summary: None    HPI:  aNnci Colón is a 69 y.o. female with PMHx of pancreatic cancer currently on chemotherapy who presents to the ED with a chief complaint of fever.  Patient reports she went to chemoinfusion center today, was sent to ED due to concern for fever.  Patient was unaware that she had a fever, denies shortness of breath, cough, congestion, diarrhea, constipation, dysuria, hematuria, sick contacts, myalgias or other symptoms at this time.    Physical Exam   Triage vitals:  T 37.7 °C (99.9 °F)  HR (!) 119  /82  RR 19  O2 98 %      General: Awake, alert, in no acute distress  Eyes: Gaze conjugate.  No scleral icterus or injection  HENT: Normo-cephalic, atraumatic. No stridor  CV: Tachycardic rate, regular rhythm. Radial pulses 2+ bilaterally  Resp: Breathing non-labored, speaking in full sentences.  Clear to auscultation bilaterally  GI: Soft, non-distended, non-tender. No rebound or guarding.  MSK/Extremities: No gross bony deformities. Moving all extremities  Skin: Warm. Appropriate color  Neuro: Alert. Oriented. Face symmetric. Speech is fluent.  Gross strength and sensation intact in b/l UE and LEs  Psych: Appropriate mood and affect      Medical Decision Making & ED Course   Medical Decision Makin y.o. female who presents for evaluation of fever.  Patient currently on chemotherapy treatment for pancreatic cancer.  Patient with fever 101.8 F outpatient, presents with tachycardia.  Sepsis workup initiated, broad-spectrum antibiotics given patient's age, risk factors and history of cancer currently on chemotherapy.  No source of infection identified, white blood cell count 9.4, chest x-ray with no acute cardiopulmonary abnormality, viral swab negative, urinalysis not impressive.  Discussed findings with patient  given fever currently on chemotherapy, rec admission for further IV antibiotics.  Discussed with hospitalist, given no source of infection identified as well as small bump in liver enzymes, I considered abdominal imaging.,  However patient with no nausea or abdominal pain at this time so held off in the ED emergent setting.  Patient admitted in stable condition.  ----    Differential diagnoses considered include but are not limited to: Sepsis, UTI, pneumonia, bacteremia    EKG Independent Interpretation: The EKG obtained at 1329 was independently interpreted by myself. It demonstrates sinus tachycardia rhythm with a ventricular rate of 110. normal axis. Intervals wnl. ST segments showed no elevation.     Independent Result Review and Interpretation: Relevant laboratory and radiographic results were reviewed and independently interpreted by myself.  As necessary, they are commented on in the ED Course.    Chronic conditions affecting the patient's care: As documented above in [facility]    The patient was discussed with the following consultants/services: Hospitalist/Admitting Provider who accepted the patient for admission    Care Considerations: As documented above in [facility]    ED Course:  ED Course as of 07/31/25 0615 Wed Jul 30, 2025   1307 BP: 131/82 [JM]   1307 Temperature: 37.7 °C (99.9 °F) [JM]   1307 Heart Rate(!): 119 [JM]   1307 Respirations: 19 [JM]   1307 Pulse Ox: 98 % [JM]   1309 Sepsis workup initiated at this time given concern for febrile neutropenia. Will give 1L NS and reevaluate for additional fluids due to concern for fluid overload [JM]   1413 WBC: 9.4 [JM]   1413 Neutrophils Absolute(!): 7.92 [JM]      ED Course User Index  [JM] Octaviano Chapa MD         Diagnoses as of 07/31/25 0615   Fever, unspecified fever cause       Disposition   As a result of their workup, the patient will require admission to the hospital.  The patient was informed of her diagnosis.  The patient was given the opportunity  to ask questions and I answered them. The patient agreed to be admitted to the hospital.    Procedures   Procedures      Octaviano Chapa MD  Emergency Medicine                                                       Octaviano Chapa MD  07/31/25 0619

## 2025-07-30 NOTE — CARE PLAN
The patient's goals for the shift include      The clinical goals for the shift include Tempurature well regulated throughout shift

## 2025-07-30 NOTE — H&P
"[facility] MEDICINE HISTORY AND PHYSICAL    Subjective     Nanci Colón is a 69 y.o. female with PMHx of pancreatic cancer on chemotherapy, diabetes and HTN who presented from her infusion center with fever. Per the center note, \"she is feeling shakey (sic), cold and has a fever of 38.6C, and HR is 122. She said symptoms started just this morning, no dizziness, or lightheadedness, but feeling \"spacey\" she is eating and drinking fine, no n/v/d. She has eating today and she checked her blood sugar prior to coming in and she was at 106.\" The pt says only that she felt cold this morning. She denies fevers, diaphoresis, shortness of breath, cough, congestion, diarrhea, constipation, urinary symptoms, hematuria, sick contacts, myalgias or other symptoms at this time. Remainder of ROS reviewed and negative except as indicated in HPI.     Objective       ED workup was significant for ALT/AST 76/266, lactate 0.8, WBC 9.4k. COVID/influenza screens were negative. UA was not suggestive of possible infection but urine cx was sent. CXR was nonacute. The pt was tachycardic to 119 bpm. The pt received Zosyn and vancomycin as well as a 1L NS bolus.     Visit Vitals  /66 (BP Location: Left arm, Patient Position: Lying)   Pulse 89   Temp 37.7 °C (99.9 °F) (Oral)   Resp 19   Ht 1.626 m (5' 4\")   Wt 57.4 kg (126 lb 8 oz)   SpO2 97%   BMI 21.71 kg/m²   OB Status Postmenopausal   Smoking Status Never   BSA 1.61 m²       Vitals:    07/30/25 1233   Weight: 57.4 kg (126 lb 8 oz)       Scheduled medications  Scheduled Medications[1]  Continuous medications  Continuous Medications[2]  PRN medications  PRN Medications[3]    The following labwork was reviewed:  Results for orders placed or performed during the hospital encounter of 07/30/25 (from the past 24 hours)   CBC and Auto Differential   Result Value Ref Range    WBC 9.4 4.4 - 11.3 x10*3/uL    nRBC 0.0 0.0 - 0.0 /100 WBCs    RBC 3.01 (L) 4.00 - 5.20 x10*6/uL    " Hemoglobin 10.0 (L) 12.0 - 16.0 g/dL    Hematocrit 30.3 (L) 36.0 - 46.0 %     (H) 80 - 100 fL    MCH 33.2 26.0 - 34.0 pg    MCHC 33.0 32.0 - 36.0 g/dL    RDW 15.3 (H) 11.5 - 14.5 %    Platelets 218 150 - 450 x10*3/uL    Neutrophils % 84.3 40.0 - 80.0 %    Immature Granulocytes %, Automated 0.3 0.0 - 0.9 %    Lymphocytes % 5.6 13.0 - 44.0 %    Monocytes % 9.6 2.0 - 10.0 %    Eosinophils % 0.1 0.0 - 6.0 %    Basophils % 0.1 0.0 - 2.0 %    Neutrophils Absolute 7.92 (H) 1.20 - 7.70 x10*3/uL    Immature Granulocytes Absolute, Automated 0.03 0.00 - 0.70 x10*3/uL    Lymphocytes Absolute 0.53 (L) 1.20 - 4.80 x10*3/uL    Monocytes Absolute 0.90 0.10 - 1.00 x10*3/uL    Eosinophils Absolute 0.01 0.00 - 0.70 x10*3/uL    Basophils Absolute 0.01 0.00 - 0.10 x10*3/uL   Comprehensive Metabolic Panel   Result Value Ref Range    Glucose 140 (H) 74 - 99 mg/dL    Sodium 134 (L) 136 - 145 mmol/L    Potassium 4.0 3.5 - 5.3 mmol/L    Chloride 101 98 - 107 mmol/L    Bicarbonate 25 21 - 32 mmol/L    Anion Gap 12 10 - 20 mmol/L    Urea Nitrogen 15 6 - 23 mg/dL    Creatinine 0.69 0.50 - 1.05 mg/dL    eGFR >90 >60 mL/min/1.73m*2    Calcium 8.7 8.6 - 10.3 mg/dL    Albumin 3.2 (L) 3.4 - 5.0 g/dL    Alkaline Phosphatase 467 (H) 33 - 136 U/L    Total Protein 6.2 (L) 6.4 - 8.2 g/dL     (H) 9 - 39 U/L    Bilirubin, Total 2.1 (H) 0.0 - 1.2 mg/dL    ALT 76 (H) 7 - 45 U/L   Lactate   Result Value Ref Range    Lactate 0.8 0.4 - 2.0 mmol/L   Sars-CoV-2 and Influenza A/B PCR   Result Value Ref Range    Flu A Result Not Detected Not Detected    Flu B Result Not Detected Not Detected    Coronavirus 2019, PCR Not Detected Not Detected   Urinalysis with Reflex Culture and Microscopic   Result Value Ref Range    Color, Urine Yellow Straw, Yellow    Appearance, Urine Clear Clear    Specific Gravity, Urine 1.020 1.005 - 1.035    pH, Urine 5.5 5.0, 5.5, 6.0, 6.5, 7.0, 7.5, 8.0    Protein, Urine NEGATIVE NEGATIVE, 10 (TRACE) mg/dL    Glucose, Urine  NEGATIVE NEGATIVE mg/dL    Blood, Urine NEGATIVE NEGATIVE mg/dL    Ketones, Urine NEGATIVE NEGATIVE mg/dL    Bilirubin, Urine NEGATIVE NEGATIVE mg/dL    Urobilinogen, Urine NORM NORM mg/dL    Nitrite, Urine 2+ (A) NEGATIVE    Leukocyte Esterase, Urine 25 Tasneem/uL (A) NEGATIVE   Microscopic Only, Urine   Result Value Ref Range    WBC, Urine 1-5 1-5, NONE /HPF    RBC, Urine 1-2 NONE, 1-2, 3-5 /HPF    Squamous Epithelial Cells, Urine 1-9 (SPARSE) Reference range not established. /HPF    Mucus, Urine FEW Reference range not established. /LPF       The following imaging was reviewed:  XR chest 2 views  Result Date: 7/30/2025  No evidence of consolidating infiltrate or effusion. Signed by Ricky Hayward MD      Medical History[4]    Surgical History[5]    Social History[6]    Family History[7]    Allergies  Emend [fosaprepitant] and Shellfish containing products    Constitutional: Thin, awake, alert, calm, oriented x4, no acute distress, cooperative   Eyes: EOMI, clear sclerae   ENMT: mucous membranes moist, no lesions seen   Head/Neck: Neck supple, no apparent injury, head atraumatic   Respiratory/Thorax: CTAB, good chest expansion, respirations even and unlabored   Cardiovascular: Regular rate and rhythm, no murmurs/rubs/gallops, normal S1 and S 2   Gastrointestinal: Abdomen nondistended, soft, nontender, +BS, no bruits   Musculoskeletal: ROM intact, no joint swelling, normal  strength   Extremities: no cyanosis, contusions or clubbing, or edema   Neurological: no focal deficit, pt alert and oriented x4   Psychological: Appropriate affect and behavior   Skin: Warm and dry, no lesions, no rashes       Assessment/Plan     Neutropenic fever  Pt denies shortness of breath, cough, congestion, diarrhea, constipation, urinary symptoms, hematuria, sick contacts, myalgias or other symptoms   Clinical workup is thus far negative  Continue empiric broad-spectrum Abx pending urine and blood cx  Consult infectious disease    Hx of  pancreatic cancer  Pt is on Gemzar/Paclitaxel since April  She follows with palliative medicine outpatient    Transaminitis  Pt had transaminitis last September, hepatitis B/C were wnl  Oncology felt her elevated LFTs and hepatic steatosis were 2/2 chemotherapy and lack of response   Will trend LFTs, continue Tylenol only as an antipyretic    HTN   BP is well controlled on home antihypertensive medications    Diabetes  Hold home diabetes meds and start insulin sliding scale with hypoglycemia order set    DVT ppx: SCDs    Discharge disposition  Discharge when medically stable        Angelia Hart CNP  [facility]    The pt's case and plan of care was discussed with the ED provider. The ED notes were reviewed in detail, as were prior outpatient and patient notes as part of the admission chart review. The pt is at high risk for infectious events including sepsis due to neutropenic fevers. The decision was made to escalate care and admit the pt under inpatient status.              [1] [2] [3] [4]   Past Medical History:  Diagnosis Date    Diabetes (Multi)     History of Chiari malformation     Hyperlipidemia     Hypertension     Hypothyroidism     Pancreatic cancer (Multi)    [5]   Past Surgical History:  Procedure Laterality Date    APPENDECTOMY      FOOT SURGERY      KNEE SURGERY     [6]   Social History  Tobacco Use    Smoking status: Never     Passive exposure: Never    Smokeless tobacco: Never   Vaping Use    Vaping status: Never Used   Substance Use Topics    Alcohol use: Never    Drug use: Never   [7]   Family History  Problem Relation Name Age of Onset    Cancer Mother      Heart disease Father

## 2025-07-30 NOTE — PROGRESS NOTES
(NADER) met with patient to let her know that SW faxed the health provider form to [employer] for her [employer] assistance.  SW asked how she was doing.  Patient stated that she had a fever.  Patient stated that she was fine yesterday but today she has the chills.  Patient is worried about what the doctor is going to say.  SW encouraged her to try not to worry.  Patient stated that she had a message about her Creon assistance stating that they are going to mail her more papers to complete.  Patient stated that she will call SW once she receives the paperwork.  They are requesting the papers to be faxed.  SW is able to do this for patient.  SW will continue to work on her assistance.  Patient was appreciative.      Al Hayward MSW, LSW

## 2025-07-31 ENCOUNTER — APPOINTMENT (OUTPATIENT)
Dept: RADIOLOGY | Facility: HOSPITAL | Age: 70
DRG: 808 | End: 2025-07-31
Payer: MEDICARE

## 2025-07-31 LAB
ALBUMIN SERPL BCP-MCNC: 2.6 G/DL (ref 3.4–5)
ALP SERPL-CCNC: 257 U/L (ref 33–136)
ALT SERPL W P-5'-P-CCNC: 56 U/L (ref 7–45)
ANION GAP SERPL CALC-SCNC: 10 MMOL/L (ref 10–20)
AST SERPL W P-5'-P-CCNC: 113 U/L (ref 9–39)
ATRIAL RATE: 103 BPM
BILIRUB SERPL-MCNC: 1.5 MG/DL (ref 0–1.2)
BUN SERPL-MCNC: 13 MG/DL (ref 6–23)
CALCIUM SERPL-MCNC: 8.3 MG/DL (ref 8.6–10.3)
CHLORIDE SERPL-SCNC: 106 MMOL/L (ref 98–107)
CO2 SERPL-SCNC: 26 MMOL/L (ref 21–32)
CREAT SERPL-MCNC: 0.81 MG/DL (ref 0.5–1.05)
EGFRCR SERPLBLD CKD-EPI 2021: 80 ML/MIN/1.73M*2
ERYTHROCYTE [DISTWIDTH] IN BLOOD BY AUTOMATED COUNT: 15.7 % (ref 11.5–14.5)
GLUCOSE BLD MANUAL STRIP-MCNC: 110 MG/DL (ref 74–99)
GLUCOSE BLD MANUAL STRIP-MCNC: 147 MG/DL (ref 74–99)
GLUCOSE BLD MANUAL STRIP-MCNC: 166 MG/DL (ref 74–99)
GLUCOSE BLD MANUAL STRIP-MCNC: 229 MG/DL (ref 74–99)
GLUCOSE SERPL-MCNC: 91 MG/DL (ref 74–99)
HCT VFR BLD AUTO: 26.2 % (ref 36–46)
HGB BLD-MCNC: 8.2 G/DL (ref 12–16)
HOLD SPECIMEN: NORMAL
MCH RBC QN AUTO: 32.3 PG (ref 26–34)
MCHC RBC AUTO-ENTMCNC: 31.3 G/DL (ref 32–36)
MCV RBC AUTO: 103 FL (ref 80–100)
NRBC BLD-RTO: 0 /100 WBCS (ref 0–0)
P AXIS: 61 DEGREES
PLATELET # BLD AUTO: 181 X10*3/UL (ref 150–450)
POTASSIUM SERPL-SCNC: 3.9 MMOL/L (ref 3.5–5.3)
PR INTERVAL: 151 MS
PROT SERPL-MCNC: 5.1 G/DL (ref 6.4–8.2)
Q ONSET: 249 MS
QRS COUNT: 16 BEATS
QRS DURATION: 86 MS
QT INTERVAL: 336 MS
QTC CALCULATION(BAZETT): 438 MS
QTC FREDERICIA: 400 MS
R AXIS: -3 DEGREES
RBC # BLD AUTO: 2.54 X10*6/UL (ref 4–5.2)
SODIUM SERPL-SCNC: 138 MMOL/L (ref 136–145)
T AXIS: 42 DEGREES
T OFFSET: 417 MS
VENTRICULAR RATE: 102 BPM
WBC # BLD AUTO: 6.7 X10*3/UL (ref 4.4–11.3)

## 2025-07-31 PROCEDURE — 2060000001 HC INTERMEDIATE ICU ROOM DAILY

## 2025-07-31 PROCEDURE — 76705 ECHO EXAM OF ABDOMEN: CPT

## 2025-07-31 PROCEDURE — 2500000004 HC RX 250 GENERAL PHARMACY W/ HCPCS (ALT 636 FOR OP/ED): Performed by: NURSE PRACTITIONER

## 2025-07-31 PROCEDURE — 82947 ASSAY GLUCOSE BLOOD QUANT: CPT

## 2025-07-31 PROCEDURE — 2500000004 HC RX 250 GENERAL PHARMACY W/ HCPCS (ALT 636 FOR OP/ED): Performed by: REGISTERED NURSE

## 2025-07-31 PROCEDURE — 2500000002 HC RX 250 W HCPCS SELF ADMINISTERED DRUGS (ALT 637 FOR MEDICARE OP, ALT 636 FOR OP/ED): Performed by: NURSE PRACTITIONER

## 2025-07-31 PROCEDURE — 2500000001 HC RX 250 WO HCPCS SELF ADMINISTERED DRUGS (ALT 637 FOR MEDICARE OP): Performed by: REGISTERED NURSE

## 2025-07-31 PROCEDURE — 99233 SBSQ HOSP IP/OBS HIGH 50: CPT | Performed by: INTERNAL MEDICINE

## 2025-07-31 PROCEDURE — 2500000001 HC RX 250 WO HCPCS SELF ADMINISTERED DRUGS (ALT 637 FOR MEDICARE OP): Performed by: NURSE PRACTITIONER

## 2025-07-31 PROCEDURE — 87081 CULTURE SCREEN ONLY: CPT | Mod: PORLAB | Performed by: INTERNAL MEDICINE

## 2025-07-31 PROCEDURE — 85027 COMPLETE CBC AUTOMATED: CPT | Performed by: NURSE PRACTITIONER

## 2025-07-31 PROCEDURE — 84075 ASSAY ALKALINE PHOSPHATASE: CPT | Performed by: NURSE PRACTITIONER

## 2025-07-31 RX ORDER — HEPARIN SODIUM,PORCINE/PF 10 UNIT/ML
50 SYRINGE (ML) INTRAVENOUS EVERY 12 HOURS
Status: DISCONTINUED | OUTPATIENT
Start: 2025-07-31 | End: 2025-07-31

## 2025-07-31 RX ORDER — HEPARIN SODIUM,PORCINE/PF 10 UNIT/ML
50 SYRINGE (ML) INTRAVENOUS AS NEEDED
Status: DISCONTINUED | OUTPATIENT
Start: 2025-07-31 | End: 2025-08-01

## 2025-07-31 RX ORDER — BISMUTH SUBSALICYLATE 262 MG
1 TABLET,CHEWABLE ORAL DAILY
COMMUNITY

## 2025-07-31 RX ORDER — HEPARIN 100 UNIT/ML
500 SYRINGE INTRAVENOUS ONCE AS NEEDED
Status: DISCONTINUED | OUTPATIENT
Start: 2025-07-31 | End: 2025-08-02 | Stop reason: HOSPADM

## 2025-07-31 RX ORDER — LOPERAMIDE HYDROCHLORIDE 2 MG/1
2 CAPSULE ORAL 4 TIMES DAILY PRN
Status: DISCONTINUED | OUTPATIENT
Start: 2025-07-31 | End: 2025-08-02 | Stop reason: HOSPADM

## 2025-07-31 RX ORDER — HYDROMORPHONE HYDROCHLORIDE 0.2 MG/ML
0.2 INJECTION INTRAMUSCULAR; INTRAVENOUS; SUBCUTANEOUS ONCE
Status: COMPLETED | OUTPATIENT
Start: 2025-07-31 | End: 2025-07-31

## 2025-07-31 RX ORDER — VANCOMYCIN HYDROCHLORIDE 1.25 G/250ML
1250 INJECTION, SOLUTION INTRAVITREAL EVERY 24 HOURS
Status: DISCONTINUED | OUTPATIENT
Start: 2025-07-31 | End: 2025-08-02 | Stop reason: HOSPADM

## 2025-07-31 RX ADMIN — SODIUM CHLORIDE, SODIUM LACTATE, POTASSIUM CHLORIDE, AND CALCIUM CHLORIDE 100 ML/HR: .6; .31; .03; .02 INJECTION, SOLUTION INTRAVENOUS at 02:07

## 2025-07-31 RX ADMIN — MORPHINE SULFATE 15 MG: 15 TABLET, FILM COATED, EXTENDED RELEASE ORAL at 20:01

## 2025-07-31 RX ADMIN — PANTOPRAZOLE SODIUM 40 MG: 40 TABLET, DELAYED RELEASE ORAL at 08:33

## 2025-07-31 RX ADMIN — OLANZAPINE 5 MG: 5 TABLET, FILM COATED ORAL at 20:01

## 2025-07-31 RX ADMIN — VANCOMYCIN HYDROCHLORIDE 1250 MG: 1.25 INJECTION, SOLUTION INTRAVITREAL at 15:13

## 2025-07-31 RX ADMIN — LEVOTHYROXINE SODIUM 100 MCG: 0.1 TABLET ORAL at 04:22

## 2025-07-31 RX ADMIN — PANCRELIPASE 2 CAPSULE: 120000; 24000; 76000 CAPSULE, DELAYED RELEASE PELLETS ORAL at 08:33

## 2025-07-31 RX ADMIN — PIPERACILLIN SODIUM AND TAZOBACTAM SODIUM 4.5 G: 4; .5 INJECTION, SOLUTION INTRAVENOUS at 08:46

## 2025-07-31 RX ADMIN — LOPERAMIDE HYDROCHLORIDE 2 MG: 2 CAPSULE ORAL at 21:17

## 2025-07-31 RX ADMIN — PIPERACILLIN SODIUM AND TAZOBACTAM SODIUM 4.5 G: 4; .5 INJECTION, SOLUTION INTRAVENOUS at 20:01

## 2025-07-31 RX ADMIN — MORPHINE SULFATE 15 MG: 15 TABLET, FILM COATED, EXTENDED RELEASE ORAL at 08:33

## 2025-07-31 RX ADMIN — PIPERACILLIN SODIUM AND TAZOBACTAM SODIUM 4.5 G: 4; .5 INJECTION, SOLUTION INTRAVENOUS at 02:07

## 2025-07-31 RX ADMIN — PIPERACILLIN SODIUM AND TAZOBACTAM SODIUM 4.5 G: 4; .5 INJECTION, SOLUTION INTRAVENOUS at 14:08

## 2025-07-31 RX ADMIN — PANCRELIPASE 1 CAPSULE: 120000; 24000; 76000 CAPSULE, DELAYED RELEASE PELLETS ORAL at 17:50

## 2025-07-31 RX ADMIN — HYDROMORPHONE HYDROCHLORIDE 0.2 MG: 0.2 INJECTION, SOLUTION INTRAMUSCULAR; INTRAVENOUS; SUBCUTANEOUS at 21:17

## 2025-07-31 RX ADMIN — POTASSIUM CHLORIDE 10 MEQ: 750 TABLET, EXTENDED RELEASE ORAL at 20:01

## 2025-07-31 RX ADMIN — POTASSIUM CHLORIDE 10 MEQ: 750 TABLET, EXTENDED RELEASE ORAL at 08:33

## 2025-07-31 RX ADMIN — PANCRELIPASE 2 CAPSULE: 120000; 24000; 76000 CAPSULE, DELAYED RELEASE PELLETS ORAL at 12:23

## 2025-07-31 ASSESSMENT — COGNITIVE AND FUNCTIONAL STATUS - GENERAL
DAILY ACTIVITIY SCORE: 24
CLIMB 3 TO 5 STEPS WITH RAILING: A LITTLE
MOBILITY SCORE: 23

## 2025-07-31 ASSESSMENT — PAIN SCALES - GENERAL
PAINLEVEL_OUTOF10: 0 - NO PAIN
PAINLEVEL_OUTOF10: 7
PAINLEVEL_OUTOF10: 0 - NO PAIN
PAINLEVEL_OUTOF10: 7

## 2025-07-31 ASSESSMENT — PAIN DESCRIPTION - DESCRIPTORS
DESCRIPTORS: ACHING
DESCRIPTORS: ACHING

## 2025-07-31 ASSESSMENT — PAIN - FUNCTIONAL ASSESSMENT
PAIN_FUNCTIONAL_ASSESSMENT: 0-10

## 2025-07-31 NOTE — PROGRESS NOTES
Vancomycin Dosing by Pharmacy- FOLLOW UP    Nanci Colón is a 69 y.o. year old female who Pharmacy has been consulted for vancomycin dosing for other neutropenic fever. Based on the patient's indication and renal status this patient is being dosed based on a goal AUC of 500-600.     Renal function is currently declining.    Current vancomycin dose: 1500 mg given every 24 hours    Estimated vancomycin AUC on current dose: 593 mg/L.hr  however renal function is declining.  Will bump dose down by 250mg.    Visit Vitals  /76 (BP Location: Right arm, Patient Position: Lying)   Pulse 73   Temp 36.3 °C (97.4 °F) (Temporal)   Resp 17      Lab Results   Component Value Date    CREATININE 0.81 2025    CREATININE 0.69 2025    CREATININE 0.74 2025    CREATININE 0.71 07/15/2025      Patient weight is as follows:   Vitals:    25 0632   Weight: 54.9 kg (121 lb)     Cultures:  No results found for the encounter in last 14 days.     I/O last 3 completed shifts:  In: 2191.7 (39.9 mL/kg) [P.O.:200; IV Piggyback:1991.7]  Out: - (0 mL/kg)   Weight: 54.9 kg   I/O during current shift:  No intake/output data recorded.    Temp (24hrs), Av.2 °C (98.9 °F), Min:36.3 °C (97.4 °F), Max:38.6 °C (101.5 °F)    Assessment/Plan    Within goal AUC range. Continue current vancomycin regimen.  But renal function is declining, will adjust dose down slightly.    This dosing regimen is predicted by [employer] to result in the following pharmacokinetic parameters:  Loading dose: N/A  Regimen: 1250 mg IV every 24 hours.  Start time: 14:36 on 2025  Exposure target: AUC24 (range) 400-600 mg/L.hr   KVC12-32: 460 mg/L.hr  AUC24,ss: 496 mg/L.hr  Probability of AUC24 > 400: 71 %  Ctrough,ss: 13.5 mg/L  Probability of Ctrough,ss > 20: 23 %    The next level will be obtained on  at 0500. May be obtained sooner if clinically indicated.   Will continue to monitor renal function daily while on vancomycin and order serum  creatinine at least every 48 hours if not already ordered.  Follow for continued vancomycin needs, clinical response, and signs/symptoms of toxicity.     Ascencion Nice, PharmD

## 2025-07-31 NOTE — PROGRESS NOTES
07/31/25 1135   Discharge Planning   Living Arrangements Spouse/significant other   Support Systems Spouse/significant other   Assistance Needed none   Type of Residence Private residence   Number of Stairs to Enter Residence 3   Number of Stairs Within Residence 0   Home or Post Acute Services None   Expected Discharge Disposition Home   Does the patient need discharge transport arranged? No   Stroke Family Assessment   Stroke Family Assessment Needed No   Intensity of Service   Intensity of Service 0-30 min     Discharge planning assessment completed with patient and spouse at bedside. Patient with hx Pancreatic CA admitted with fever of unknown origin. ID is on board. Patient and spouse are in a single story home with laundry in the basement. Spouse handles laundry. Patient follows with PCP Diana Blunt. She is receiving chemo at [facility]. She was scheduled yesterday but unable to receive chemo due to fever. Patient typically uses a walker at home. She doesn't feel that she will need PT/OT at discharge. Patient is planning to return home when ready, denies needs at this time.

## 2025-07-31 NOTE — PROGRESS NOTES
"Nanci Colón is a 69 y.o. female on day 1 of admission presenting with Fever, unspecified fever cause.      Subjective   Nanci Colón is a 69 y.o. female with PMHx of pancreatic cancer on chemotherapy, diabetes and HTN who presented from her infusion center with fever. Per the center note, \"she is feeling shakey (sic), cold and has a fever of 38.6C, and HR is 122. She said symptoms started just this morning, no dizziness, or lightheadedness, but feeling \"spacey\" she is eating and drinking fine, no n/v/d. She has eating today and she checked her blood sugar prior to coming in and she was at 106.\" The pt says only that she felt cold this morning. She denies fevers, diaphoresis, shortness of breath, cough, congestion, diarrhea, constipation, urinary symptoms, hematuria, sick contacts, myalgias or other symptoms at this time. Remainder of ROS reviewed and negative except as indicated in HPI.      7/31: Patient was seen and examined.  She has had no fever in 24 hours since admission.  Blood cultures are still pending.  ID on board and recommends continued IV antibiotic Zosyn and vancomycin.  Right upper quadrant ultrasound ordered and pending.  Continue to trend CBC and BMP daily.  Objective     Last Recorded Vitals  /72 (BP Location: Left arm, Patient Position: Lying)   Pulse 66   Temp 36.9 °C (98.5 °F) (Temporal)   Resp 14   Wt 54.9 kg (121 lb)   SpO2 96%   Intake/Output last 3 Shifts:    Intake/Output Summary (Last 24 hours) at 7/31/2025 1208  Last data filed at 7/31/2025 0246  Gross per 24 hour   Intake 2291.67 ml   Output --   Net 2291.67 ml       Admission Weight  Weight: 57.4 kg (126 lb 8 oz) (07/30/25 1233)    Daily Weight  07/31/25 : 54.9 kg (121 lb)    Image Results  Electrocardiogram, 12-lead ACS symptoms  Pacemaker spikes or artifacts  Sinus tachycardia  Ventricular premature complex  Low voltage, precordial leads      Physical Exam  Vitals:    07/31/25 1134   BP: 119/72   Pulse: 66   Resp: " 14   Temp: 36.9 °C (98.5 °F)   SpO2: 96%     Constitutional: Thin, awake, alert, calm, oriented x4, no acute distress, cooperative   Eyes: EOMI, clear sclerae   ENMT: mucous membranes moist, no lesions seen   Head/Neck: Neck supple, no apparent injury, head atraumatic   Respiratory/Thorax: CTAB, good chest expansion, respirations even and unlabored   Cardiovascular: Regular rate and rhythm, no murmurs/rubs/gallops, normal S1 and S 2   Gastrointestinal: Abdomen nondistended, soft, nontender, +BS, no bruits   Musculoskeletal: ROM intact, no joint swelling, normal  strength   Extremities: no cyanosis, contusions or clubbing, or edema   Neurological: no focal deficit, pt alert and oriented x4   Psychological: Appropriate affect and behavior   Skin: Warm and dry, no lesions, no rashes     Relevant Results             This patient currently has cardiac telemetry ordered; if you would like to modify or discontinue the telemetry order, click here to go to the orders activity to modify/discontinue the order.    This patient has a central line   Reason for the central line remaining today? Parenteral medication            Assessment & Plan  Fever, unspecified fever cause  Neutropenic fever  Pt denies shortness of breath, cough, congestion, diarrhea, constipation, urinary symptoms, hematuria, sick contacts, myalgias or other symptoms   Clinical workup is thus far negative  Continue empiric broad-spectrum Abx pending urine and blood cx  Consult infectious disease     Hx of pancreatic cancer  Pt is on Gemzar/Paclitaxel since April  She follows with palliative medicine outpatient     Transaminitis  Pt had transaminitis last September, hepatitis B/C were wnl  Oncology felt her elevated LFTs and hepatic steatosis were 2/2 chemotherapy and lack of response   Will trend LFTs, continue Tylenol only as an antipyretic     HTN   BP is well controlled on home antihypertensive medications     Diabetes  Hold home diabetes meds and start  insulin sliding scale with hypoglycemia order set     DVT ppx: SCDs     Discharge disposition  Discharge when medically stable                 Malnutrition Diagnosis Status: New  Malnutrition Diagnosis: Severe malnutrition related to chronic disease or condition  Related to: Pancreatic cancer, chemotherapy  As Evidenced by: prolonged poor PO intake <75% for > 7days, mild to moderate loss of subcutaneous fat and muscle mass, weight loss of >7.5% in 2 months  I agree with the dietitian's malnutrition diagnosis.      Spent 35 minutes in the follow-up management of this patient today    Cholo Colorado MD

## 2025-07-31 NOTE — PROGRESS NOTES
Nanci Colón is a 69 y.o. female admitted for Fever, unspecified fever cause. Pharmacy reviewed the patient's [id number] medications and allergies for accuracy.    The list below reflects the PTA list prior to pharmacy medication history. A summary a changes to the PTA medication list has been listed below. Please review each medication in order reconciliation for additional clarification and justification.    Source of information: surescripts, T2P    Medications added:  Multivitamin tablet     Medications modified:  Creon 2 capsules TID -> Creon 1-2 capsules TID   Metformin 1 tab BID -> Metformin 1 tab BID PRN     Medications to be removed:  Lipitor 40 mg tab   Metoprolol 50 mg tab   Emla cream   Zofran 8 mg tab  Potassium chloride 20 mEq tab        Medications of concern:      Prior to Admission Medications   Prescriptions Last Dose Informant Patient Reported? Taking?   OLANZapine (ZyPREXA) 5 mg tablet   No No   Sig: Take 1 tablet (5 mg) by mouth once daily at bedtime.   OneTouch Verio test strips 7/30/2025  Yes Yes   Synthroid 100 mcg tablet 7/30/2025 Morning  No Yes   Sig: Take 1 tablet (100 mcg) by mouth early in the morning.. Take on an empty stomach at the same time each day, either 30 to 60 minutes prior to breakfast   atorvastatin (Lipitor) 40 mg tablet   No No   Sig: Take 1 tablet (40 mg) by mouth every other day.   docusate sodium (Colace) 100 mg capsule 7/30/2025 Morning  No Yes   Sig: Take 1 capsule (100 mg) by mouth 2 times a day as needed for constipation (for hard stools).   lactulose 20 gram/30 mL oral solution   No No   Sig: Take 15 mL (10 g) by mouth 3 times a day as needed (constipation). Only take as many times as needed for a BM   lidocaine-prilocaine (Emla) 2.5-2.5 % cream   Yes No   lipase-protease-amylase (Creon) 24,000-76,000 -120,000 unit capsule 7/30/2025 Morning  No Yes   Sig: Take 2 capsules by mouth 3 times daily (morning, midday, late afternoon). With meals and 1 with  snacks for 8 per day   metFORMIN (Glucophage) 500 mg tablet Past Week  No Yes   Sig: Take 1 tablet (500 mg) by mouth 2 times daily (morning and late afternoon).   metoprolol succinate XL (Toprol-XL) 50 mg 24 hr tablet   No No   Sig: Take 1 tablet (50 mg) by mouth once daily.   morphine (MSIR) 15 mg tablet   No No   Sig: Take 1 tablet (15 mg) by mouth every 4 hours if needed for severe pain (7 - 10). Take one tablet as needed every 4 hours for cancer related pain G89.3   morphine CR (MS Contin) 15 mg 12 hr tablet 7/30/2025 at  8:00 AM  No Yes   Sig: Take 1 tablet (15 mg) by mouth 2 times a day. Do not crush, chew, or split.   ondansetron ODT (Zofran-ODT) 8 mg disintegrating tablet Unknown  No No   Sig: DISSOLVE ONE TABLET BY MOUTH EVERY 8 HOURS IF NEEDED FOR NAUSEA OR VOMITING FOR UP TO 7 DAYS   pantoprazole (ProtoNix) 40 mg EC tablet 7/30/2025 Morning  No Yes   Sig: Take 1 tablet (40 mg) by mouth once daily. Do not crush, chew, or split.   potassium chloride CR 10 mEq ER tablet 7/29/2025 Evening  No Yes   Sig: Take 2 tablets (20 mEq) by mouth once daily. Do not crush, chew, or split.   potassium chloride CR 20 mEq ER tablet Unknown  No No   Sig: Take 1 tablet (20 mEq) by mouth once daily. Do not crush, chew, or split.      Facility-Administered Medications: None       KERRIE HERNANDEZ

## 2025-07-31 NOTE — ASSESSMENT & PLAN NOTE
Neutropenic fever  Pt denies shortness of breath, cough, congestion, diarrhea, constipation, urinary symptoms, hematuria, sick contacts, myalgias or other symptoms   Clinical workup is thus far negative  Continue empiric broad-spectrum Abx pending urine and blood cx  Consult infectious disease     Hx of pancreatic cancer  Pt is on Gemzar/Paclitaxel since April  She follows with palliative medicine outpatient     Transaminitis  Pt had transaminitis last September, hepatitis B/C were wnl  Oncology felt her elevated LFTs and hepatic steatosis were 2/2 chemotherapy and lack of response   Will trend LFTs, continue Tylenol only as an antipyretic     HTN   BP is well controlled on home antihypertensive medications     Diabetes  Hold home diabetes meds and start insulin sliding scale with hypoglycemia order set     DVT ppx: SCDs     Discharge disposition  Discharge when medically stable

## 2025-07-31 NOTE — CONSULTS
"Nutrition Initial Assessment:   Nutrition Assessment    Reason for Assessment: Admission nursing screening    Patient is a 69 y.o. female presenting with fever. Consulted by [facility]ST for weight loss and poor PO intake.     Nutrition History:  Energy Intake: Poor < 50 %  Food and Nutrient History: Consulted by MST for weight loss and poor PO intake. Pt with resectable Pancreatic cancer 9/2024, currently undergoing chemotherapy. Is taking olanzapine at bedtime for appetite, reports sometimes getting hungry- no problems eating or drinking, does not report any N/V/D at this time. Reports some shaky-ness and being cold. Rec; ,magic cup BID (290 kcals and 9g pro each). Wt trending down, -15 lbs in 2 months. (-11%) which is clinically significant. Continue to monitor and encourage PO intakes, and monitor wt trends. May be able to discontinue cons cho diet order for more freedom of choices to encourage PO intake.    Anthropometrics:  Height: 162.6 cm (5' 4\")   Weight: 54.9 kg (121 lb)   BMI (Calculated): 20.76    Weight History:   Daily Weight  07/31/25 : 54.9 kg (121 lb)  07/30/25 : 57.5 kg (126 lb 12.8 oz)  07/29/25 : 57.7 kg (127 lb 3.3 oz)  07/25/25 : 57.8 kg (127 lb 6.8 oz)  07/16/25 : 57.8 kg (127 lb 8 oz)  07/02/25 : 57.8 kg (127 lb 6.4 oz)  07/01/25 : 57.7 kg (127 lb 4.8 oz)  06/18/25 : 59.2 kg (130 lb 8 oz)  06/13/25 : 59.2 kg (130 lb 8.2 oz)  06/04/25 : 61.8 kg (136 lb 4.8 oz)     Weight Change %:  Weight History / % Weight Change: wt loss of 11% in 2 months.  Significant Weight Loss: Yes  Interpretation of Weight Loss: >5% in 1 month    Nutrition Focused Physical Exam Findings:  Subcutaneous Fat Loss:   Orbital Fat Pads: Mild-Moderate (slight dark circles and slight hollowing)  Buccal Fat Pads: Mild-Moderate (flat cheeks, minimal bounce)  Muscle Wasting:  Temporalis: Mild-Moderate (slight depression)  Pectoralis (Clavicular Region): Mild-Moderate (some protrusion of clavicle)  Edema:  Edema: none  Physical " Findings:  Skin: Negative    Nutrition Significant Labs:  CBC Trend:   Results from last 7 days   Lab Units 07/31/25  0419 07/30/25  1346 07/29/25  1323   WBC AUTO x10*3/uL 6.7 9.4 5.1   RBC AUTO x10*6/uL 2.54* 3.01* 2.93*   HEMOGLOBIN g/dL 8.2* 10.0* 9.7*   HEMATOCRIT % 26.2* 30.3* 30.9*   MCV fL 103* 101* 106*   PLATELETS AUTO x10*3/uL 181 218 220    , BMP Trend:   Results from last 7 days   Lab Units 07/31/25  0419 07/30/25  1346 07/29/25  1323   GLUCOSE mg/dL 91 140* 156*   CALCIUM mg/dL 8.3* 8.7 8.6   SODIUM mmol/L 138 134* 136   POTASSIUM mmol/L 3.9 4.0 4.7   CO2 mmol/L 26 25 26   CHLORIDE mmol/L 106 101 103   BUN mg/dL 13 15 14   CREATININE mg/dL 0.81 0.69 0.74        Nutrition Specific Medications:  Scheduled Medications[1]     Dietary Orders (From admission, onward)       Start     Ordered    07/31/25 1058  Oral nutritional supplements  Until discontinued        Question Answer Comment   Deliver with Dinner    Deliver with Lunch    Select supplement: Magic Cup        07/31/25 1057    07/30/25 1716  May Participate in Room Service  ( ROOM SERVICE MAY PARTICIPATE)  Once        Question:  .  Answer:  Yes    07/30/25 1715    07/30/25 1708  Adult diet Consistent Carb; CCD 60 gm/meal  Diet effective now        Question Answer Comment   Diet type Consistent Carb    Carb diet selection: CCD 60 gm/meal        07/30/25 1709                     Estimated Needs:   Total Energy Estimated Needs in 24 hours (kCal): 1925 kCal  Method for Estimating Needs: 35kcals/kg BW  Total Protein Estimated Needs in 24 Hours (g): 66 g  Method for Estimating 24 Hour Protein Needs: 1.2g/kg BW     Method for Estimating 24 Hour Fluid Needs: 1 ml/kcal or per MD        Nutrition Diagnosis   Malnutrition Diagnosis  Patient has Malnutrition Diagnosis: Yes  Diagnosis Status: New  Malnutrition Diagnosis: Severe malnutrition related to chronic disease or condition  Related to: Pancreatic cancer, chemotherapy  As Evidenced by: prolonged poor PO  intake <75% for > 7days, mild to moderate loss of subcutaneous fat and muscle mass, weight loss of >7.5% in 2 months    Nutrition Diagnosis  Patient has Nutrition Diagnosis: Yes  Diagnosis Status (1): New  Nutrition Diagnosis 1: Increased nutrient needs  Related to (1): increased demand for nutrients  As Evidenced by (1): chemotherapy       Nutrition Interventions/Recommendations   Nutrition prescription for oral nutrition    Nutrition Recommendations:  Individualized Nutrition Prescription Provided for : Continue with cons cho diet order, rec; magic cup 2x daily with L/D    Nutrition Interventions/Goals:   Meals and Snacks: Carbohydrate-modified diet  Goal: consume >50% of meals and supplements  Medical Food Supplement: Commercial food medical food supplement therapy  Goal: magic cup BID      Education Documentation  No documentation found.    N/a  Nutrition Monitoring and Evaluation   Estimated Energy Intake: Energy intake 50 -75% of estimated energy needs  Intake / Amount of food: Consumes at least 50% or more of meals/snacks/supplements  Additional Plans: magic cup BID    Body Weight: Body weight - Maintain stable weight    Electrolyte and Renal Panel: Electrolytes within normal limits  Glucose/Endocrine Profile: Glucose within normal limits ( mg/dL)    Skin Finding: Promote intact skin - Promote skin integrity    Goal Status: New goal(s) identified    Time Spent (min): 60 minutes       [1] insulin lispro, 0-5 Units, subcutaneous, TID AC  levothyroxine, 100 mcg, oral, Daily  lipase-protease-amylase, 2 capsule, oral, TID  morphine CR, 15 mg, oral, BID  OLANZapine, 5 mg, oral, Nightly  pantoprazole, 40 mg, oral, Daily  piperacillin-tazobactam, 4.5 g, intravenous, q6h  potassium chloride CR, 10 mEq, oral, BID  sennosides-docusate sodium, 1 tablet, oral, Nightly  vancomycin, 1,250 mg, intravenous, q24h

## 2025-07-31 NOTE — CONSULTS
Infectious Disease Inpatient Consult    Inpatient consult to Infectious Diseases  Consult performed by: Oscar Gutierrez MD  Consult ordered by: WENDIE Mustafa-CNP          Primary MD: Diana Blunt,     Reason For Consult  Sepsis      Assessment/Plan:    #Sepsis like syndrome  Fever, tachycardic.  No obvious source of infection.  Patient does have a port.  GI translocation?  Given chemo 2 weeks ago.  Monitor blood cultures.    #Immunosuppressed  Pancreatic cancer on chemotherapy  Last chemo 2 weeks ago.    #Transaminitis  #Hyperbilirubinemia  LFTs and bilirubin was normal on July 29th.  Will check right upper quadrant ultrasound    Recommendations:    - Continue Zosyn every 6 hours  - Continue vancomycin.  Pharmacy to dose  - Monitor blood cultures x 2  - Check right upper quadrant ultrasound  - Monitor liver enzymes  - Thank you for consult.  Will continue to follow    Oscar Gutierrez MD  Date of service: 7/31/2025  Time of service: 7:47 AM      History Of Present Illness  Nanci Colón is a 69 y.o. female with PMH of pancreatic CA on chemo on Hilton/Abraxane presented from the infusion center with fever.  Patient had a Tmax of 38.6, tachycardic.  Patient reports symptoms starting yesterday morning.  Reports only chills but denies any other complaints.    On admission, Tmax 39.9, tachycardic.  Labs showed WBC count 9.4, Hb 10, platelet 218, potassium 4, creatinine 0.6, , ALT 76, total bilirubin 2.1, lactic acid 0.8.  UA did not show any pyuria.  Blood cultures, urine cultures drawn and patient started on vancomycin and Zosyn.  Tested negative for COVID, flu, RSV ID consulted for further antibiotic recommendation.      Past Medical History  She has a past medical history of Diabetes (Multi), History of Chiari malformation, Hyperlipidemia, Hypertension, Hypothyroidism, and Pancreatic cancer (Multi).    Surgical History  She has a past surgical history that includes Appendectomy; Knee surgery; and  "Foot surgery.     Social History     Occupational History    Not on file   Tobacco Use    Smoking status: Never     Passive exposure: Never    Smokeless tobacco: Never   Vaping Use    Vaping status: Never Used   Substance and Sexual Activity    Alcohol use: Never    Drug use: Never    Sexual activity: Defer     Travel History   Travel since 06/30/25    No documented travel since 06/30/25              Family History  Family History[1]  Allergies  Emend [fosaprepitant] and Shellfish containing products     Immunization History   Administered Date(s) Administered    Flu vaccine (IIV4), preservative free *Check age/dose* 10/04/2016, 09/15/2017, 09/24/2018, 01/06/2020    Flu vaccine, trivalent, preservative free, age 6 months and greater (Fluarix/Fluzone/Flulaval) 12/07/2012, 10/13/2015    Influenza, seasonal, injectable 10/02/2013    Adán SARS-CoV-2 Vaccination 05/16/2021     Medications  Home medications:  Prescriptions Prior to Admission[2]  Current medications:  Scheduled medications  Scheduled Medications[3]  Continuous medications  Continuous Medications[4]  PRN medications  PRN Medications[5]    Review of Systems     Constitutional: Reports chills  HENT:  Denies ear discharge, ear pain, sore throat    Eyes:  Denies photophobia, eye drainage  Respiratory:  Denies cough, choking, chest tightness, shortness of breath  Cardiovascular:  Denies chest pain, palpitations  Gastrointestinal:  Denies abdominal pain, diarrhea, nausea and vomiting.   Genitourinary:  Denies dysuria, flank pain, frequency  Musculoskeletal:  Denies joint pain  Skin:  Denies ulcer and rash   Neurological:  Denies light-headedness, numbness and headaches.    Objective  Range of Vitals (last 24 hours)  Heart Rate:  []   Temp:  [36.3 °C (97.4 °F)-38.6 °C (101.5 °F)]   Resp:  [14-19]   BP: ()/(61-82)   Height:  [162.2 cm (5' 3.86\")-162.6 cm (5' 4\")]   Weight:  [54.9 kg (121 lb)-57.5 kg (126 lb 12.8 oz)]   SpO2:  [95 %-99 %]   Daily " "Weight  25 : 54.9 kg (121 lb)    Body mass index is 20.77 kg/m².     Physical Exam  /76 (BP Location: Right arm, Patient Position: Lying)   Pulse 73   Temp 36.3 °C (97.4 °F) (Temporal)   Resp 17   Ht 1.626 m (5' 4\")   Wt 54.9 kg (121 lb)   SpO2 95%   BMI 20.77 kg/m²   Temp (24hrs), Av.2 °C (98.9 °F), Min:36.3 °C (97.4 °F), Max:38.6 °C (101.5 °F)      General: alert, oriented, NAD  HEENT: No conjunctival pallor, no scleral icterus  Chest: R ACW port  Lungs: bilaterally clear to auscultation, no wheezing  Heart: regular rate and rhythm, no murmur  Abdomen: soft, non tender, non distended, BS+  Neuro: AAO x 3, PERRL, CN grossly intact  Extremities: no edema, no cyanosis  Skin: No rashes or ulcers  MSK: No joint inflammation     Relevant Results    Labs  Results from last 72 hours   Lab Units 25  0419 25  1346 25  1323   WBC AUTO x10*3/uL 6.7 9.4 5.1   HEMOGLOBIN g/dL 8.2* 10.0* 9.7*   HEMATOCRIT % 26.2* 30.3* 30.9*   PLATELETS AUTO x10*3/uL 181 218 220   NEUTROS PCT AUTO %  --  84.3 52.1   LYMPHS PCT AUTO %  --  5.6 33.7   MONOS PCT AUTO %  --  9.6 11.5   EOS PCT AUTO %  --  0.1 2.5     Results from last 72 hours   Lab Units 25  0419 25  1346 25  1323   SODIUM mmol/L 138 134* 136   POTASSIUM mmol/L 3.9 4.0 4.7   CHLORIDE mmol/L 106 101 103   CO2 mmol/L 26 25 26   BUN mg/dL 13 15 14   CREATININE mg/dL 0.81 0.69 0.74   GLUCOSE mg/dL 91 140* 156*   CALCIUM mg/dL 8.3* 8.7 8.6   ANION GAP mmol/L 10 12 12   EGFR mL/min/1.73m*2 80 >90 89     Results from last 72 hours   Lab Units 25  0419 25  1346 25  1323   ALK PHOS U/L 257* 467* 165*   BILIRUBIN TOTAL mg/dL 1.5* 2.1* 0.5   PROTEIN TOTAL g/dL 5.1* 6.2* 6.0*   ALT U/L 56* 76* 9   AST U/L 113* 266* 18   ALBUMIN g/dL 2.6* 3.2* 3.2*     Estimated Creatinine Clearance: 58.2 mL/min (by C-G formula based on SCr of 0.81 mg/dL).  No results found for: \"CRP\", \"SEDRATE\"  No results found for: \"HIV1X2\", " "\"HIVCONF\", \"[id number]\"  Hepatitis C AB   Date Value Ref Range Status   09/09/2024 Nonreactive Nonreactive Final     Comment:     Results from patients taking biotin supplements or receiving high-dose biotin therapy should be interpreted with caution due to possible interference with this test. Providers may contact their local laboratory for further information.       Microbiology  No results found for the last 14 days.      Imaging  XR chest 2 views  Result Date: 7/30/2025  No evidence of consolidating infiltrate or effusion. Signed by Ricky Hayward MD             [1]   Family History  Problem Relation Name Age of Onset    Cancer Mother      Heart disease Father     [2]   Medications Prior to Admission   Medication Sig Dispense Refill Last Dose/Taking    docusate sodium (Colace) 100 mg capsule Take 1 capsule (100 mg) by mouth 2 times a day as needed for constipation (for hard stools). 60 capsule 3 7/30/2025 Morning    lipase-protease-amylase (Creon) 24,000-76,000 -120,000 unit capsule Take 2 capsules by mouth 3 times daily (morning, midday, late afternoon). With meals and 1 with snacks for 8 per day 240 capsule 3 7/30/2025 Morning    metFORMIN (Glucophage) 500 mg tablet Take 1 tablet (500 mg) by mouth 2 times daily (morning and late afternoon). 60 tablet 11 Past Week    morphine CR (MS Contin) 15 mg 12 hr tablet Take 1 tablet (15 mg) by mouth 2 times a day. Do not crush, chew, or split. 60 tablet 0 7/30/2025 at  8:00 AM    OneTouch Verio test strips    7/30/2025    pantoprazole (ProtoNix) 40 mg EC tablet Take 1 tablet (40 mg) by mouth once daily. Do not crush, chew, or split. 30 tablet 5 7/30/2025 Morning    potassium chloride CR 10 mEq ER tablet Take 2 tablets (20 mEq) by mouth once daily. Do not crush, chew, or split. 60 tablet 5 7/29/2025 Evening    Synthroid 100 mcg tablet Take 1 tablet (100 mcg) by mouth early in the morning.. Take on an empty stomach at the same time each day, either 30 to 60 minutes prior to " breakfast 30 tablet 11 7/30/2025 Morning    atorvastatin (Lipitor) 40 mg tablet Take 1 tablet (40 mg) by mouth every other day. 90 tablet 3     lactulose 20 gram/30 mL oral solution Take 15 mL (10 g) by mouth 3 times a day as needed (constipation). Only take as many times as needed for a  mL 5     lidocaine-prilocaine (Emla) 2.5-2.5 % cream        metoprolol succinate XL (Toprol-XL) 50 mg 24 hr tablet Take 1 tablet (50 mg) by mouth once daily. 90 tablet 3     morphine (MSIR) 15 mg tablet Take 1 tablet (15 mg) by mouth every 4 hours if needed for severe pain (7 - 10). Take one tablet as needed every 4 hours for cancer related pain G89.3       OLANZapine (ZyPREXA) 5 mg tablet Take 1 tablet (5 mg) by mouth once daily at bedtime. 30 tablet 3     ondansetron ODT (Zofran-ODT) 8 mg disintegrating tablet DISSOLVE ONE TABLET BY MOUTH EVERY 8 HOURS IF NEEDED FOR NAUSEA OR VOMITING FOR UP TO 7 DAYS 10 tablet 0 Unknown    potassium chloride CR 20 mEq ER tablet Take 1 tablet (20 mEq) by mouth once daily. Do not crush, chew, or split. 30 tablet 11 Unknown   [3] insulin lispro, 0-5 Units, subcutaneous, TID AC  levothyroxine, 100 mcg, oral, Daily  lipase-protease-amylase, 2 capsule, oral, TID  morphine CR, 15 mg, oral, BID  OLANZapine, 5 mg, oral, Nightly  pantoprazole, 40 mg, oral, Daily  piperacillin-tazobactam, 4.5 g, intravenous, q6h  potassium chloride CR, 10 mEq, oral, BID  sennosides-docusate sodium, 1 tablet, oral, Nightly  vancomycin, 1,250 mg, intravenous, q24h    [4] lactated Ringer's, 100 mL/hr, Last Rate: 100 mL/hr (07/31/25 0207)    [5] PRN medications: acetaminophen, dextrose, dextrose, glucagon, glucagon, lactulose, ondansetron, vancomycin

## 2025-08-01 ENCOUNTER — APPOINTMENT (OUTPATIENT)
Dept: PRIMARY CARE | Facility: CLINIC | Age: 70
End: 2025-08-01
Payer: MEDICARE

## 2025-08-01 LAB
ALBUMIN SERPL BCP-MCNC: 2.5 G/DL (ref 3.4–5)
ALP SERPL-CCNC: 237 U/L (ref 33–136)
ALT SERPL W P-5'-P-CCNC: 45 U/L (ref 7–45)
ANION GAP SERPL CALC-SCNC: 12 MMOL/L
AST SERPL W P-5'-P-CCNC: 61 U/L (ref 9–39)
BACTERIA UR CULT: NORMAL
BASOPHILS # BLD AUTO: 0.03 X10*3/UL (ref 0–0.1)
BASOPHILS NFR BLD AUTO: 0.5 %
BILIRUB SERPL-MCNC: 0.8 MG/DL (ref 0–1.2)
BUN SERPL-MCNC: 11 MG/DL (ref 6–23)
CALCIUM SERPL-MCNC: 8.1 MG/DL (ref 8.6–10.3)
CHLORIDE SERPL-SCNC: 106 MMOL/L (ref 98–107)
CO2 SERPL-SCNC: 25 MMOL/L (ref 21–32)
CREAT SERPL-MCNC: 0.8 MG/DL (ref 0.5–1.05)
EGFRCR SERPLBLD CKD-EPI 2021: 81 ML/MIN/1.73M*2
EOSINOPHIL # BLD AUTO: 0.31 X10*3/UL (ref 0–0.7)
EOSINOPHIL NFR BLD AUTO: 5.5 %
ERYTHROCYTE [DISTWIDTH] IN BLOOD BY AUTOMATED COUNT: 15.7 % (ref 11.5–14.5)
GLUCOSE BLD MANUAL STRIP-MCNC: 224 MG/DL (ref 74–99)
GLUCOSE SERPL-MCNC: 124 MG/DL (ref 74–99)
HCT VFR BLD AUTO: 24.8 % (ref 36–46)
HGB BLD-MCNC: 8.3 G/DL (ref 12–16)
IMM GRANULOCYTES # BLD AUTO: 0.02 X10*3/UL (ref 0–0.7)
IMM GRANULOCYTES NFR BLD AUTO: 0.4 % (ref 0–0.9)
LYMPHOCYTES # BLD AUTO: 1.04 X10*3/UL (ref 1.2–4.8)
LYMPHOCYTES NFR BLD AUTO: 18.5 %
MCH RBC QN AUTO: 34 PG (ref 26–34)
MCHC RBC AUTO-ENTMCNC: 33.5 G/DL (ref 32–36)
MCV RBC AUTO: 102 FL (ref 80–100)
MONOCYTES # BLD AUTO: 0.62 X10*3/UL (ref 0.1–1)
MONOCYTES NFR BLD AUTO: 11 %
NEUTROPHILS # BLD AUTO: 3.61 X10*3/UL (ref 1.2–7.7)
NEUTROPHILS NFR BLD AUTO: 64.1 %
NRBC BLD-RTO: 0 /100 WBCS (ref 0–0)
PLATELET # BLD AUTO: 187 X10*3/UL (ref 150–450)
POTASSIUM SERPL-SCNC: 3.8 MMOL/L (ref 3.5–5.3)
PROT SERPL-MCNC: 5 G/DL (ref 6.4–8.2)
RBC # BLD AUTO: 2.44 X10*6/UL (ref 4–5.2)
SODIUM SERPL-SCNC: 139 MMOL/L (ref 136–145)
VANCOMYCIN SERPL-MCNC: 14.8 UG/ML (ref 5–20)
WBC # BLD AUTO: 5.6 X10*3/UL (ref 4.4–11.3)

## 2025-08-01 PROCEDURE — 80053 COMPREHEN METABOLIC PANEL: CPT | Performed by: INTERNAL MEDICINE

## 2025-08-01 PROCEDURE — 2500000001 HC RX 250 WO HCPCS SELF ADMINISTERED DRUGS (ALT 637 FOR MEDICARE OP): Performed by: REGISTERED NURSE

## 2025-08-01 PROCEDURE — 2500000001 HC RX 250 WO HCPCS SELF ADMINISTERED DRUGS (ALT 637 FOR MEDICARE OP): Performed by: NURSE PRACTITIONER

## 2025-08-01 PROCEDURE — 85025 COMPLETE CBC W/AUTO DIFF WBC: CPT | Performed by: INTERNAL MEDICINE

## 2025-08-01 PROCEDURE — 99233 SBSQ HOSP IP/OBS HIGH 50: CPT | Performed by: INTERNAL MEDICINE

## 2025-08-01 PROCEDURE — 2500000002 HC RX 250 W HCPCS SELF ADMINISTERED DRUGS (ALT 637 FOR MEDICARE OP, ALT 636 FOR OP/ED): Performed by: NURSE PRACTITIONER

## 2025-08-01 PROCEDURE — 80202 ASSAY OF VANCOMYCIN: CPT | Performed by: NURSE PRACTITIONER

## 2025-08-01 PROCEDURE — 82947 ASSAY GLUCOSE BLOOD QUANT: CPT

## 2025-08-01 PROCEDURE — 2060000001 HC INTERMEDIATE ICU ROOM DAILY

## 2025-08-01 PROCEDURE — 2500000004 HC RX 250 GENERAL PHARMACY W/ HCPCS (ALT 636 FOR OP/ED): Performed by: NURSE PRACTITIONER

## 2025-08-01 PROCEDURE — 99221 1ST HOSP IP/OBS SF/LOW 40: CPT | Performed by: INTERNAL MEDICINE

## 2025-08-01 RX ADMIN — PANCRELIPASE 2 CAPSULE: 120000; 24000; 76000 CAPSULE, DELAYED RELEASE PELLETS ORAL at 17:24

## 2025-08-01 RX ADMIN — POTASSIUM CHLORIDE 10 MEQ: 750 TABLET, EXTENDED RELEASE ORAL at 08:15

## 2025-08-01 RX ADMIN — MORPHINE SULFATE 15 MG: 15 TABLET, FILM COATED, EXTENDED RELEASE ORAL at 20:54

## 2025-08-01 RX ADMIN — LOPERAMIDE HYDROCHLORIDE 2 MG: 2 CAPSULE ORAL at 17:36

## 2025-08-01 RX ADMIN — PANCRELIPASE 2 CAPSULE: 120000; 24000; 76000 CAPSULE, DELAYED RELEASE PELLETS ORAL at 08:16

## 2025-08-01 RX ADMIN — PIPERACILLIN SODIUM AND TAZOBACTAM SODIUM 4.5 G: 4; .5 INJECTION, SOLUTION INTRAVENOUS at 03:32

## 2025-08-01 RX ADMIN — PANTOPRAZOLE SODIUM 40 MG: 40 TABLET, DELAYED RELEASE ORAL at 08:15

## 2025-08-01 RX ADMIN — VANCOMYCIN HYDROCHLORIDE 1250 MG: 1.25 INJECTION, SOLUTION INTRAVITREAL at 14:55

## 2025-08-01 RX ADMIN — MORPHINE SULFATE 15 MG: 15 TABLET, FILM COATED, EXTENDED RELEASE ORAL at 08:15

## 2025-08-01 RX ADMIN — PANCRELIPASE 2 CAPSULE: 120000; 24000; 76000 CAPSULE, DELAYED RELEASE PELLETS ORAL at 12:28

## 2025-08-01 RX ADMIN — LEVOTHYROXINE SODIUM 100 MCG: 0.1 TABLET ORAL at 03:45

## 2025-08-01 RX ADMIN — LOPERAMIDE HYDROCHLORIDE 2 MG: 2 CAPSULE ORAL at 10:01

## 2025-08-01 RX ADMIN — PIPERACILLIN SODIUM AND TAZOBACTAM SODIUM 4.5 G: 4; .5 INJECTION, SOLUTION INTRAVENOUS at 14:19

## 2025-08-01 RX ADMIN — OLANZAPINE 5 MG: 5 TABLET, FILM COATED ORAL at 20:54

## 2025-08-01 RX ADMIN — POTASSIUM CHLORIDE 10 MEQ: 750 TABLET, EXTENDED RELEASE ORAL at 20:54

## 2025-08-01 RX ADMIN — PIPERACILLIN SODIUM AND TAZOBACTAM SODIUM 4.5 G: 4; .5 INJECTION, SOLUTION INTRAVENOUS at 08:15

## 2025-08-01 RX ADMIN — PIPERACILLIN SODIUM AND TAZOBACTAM SODIUM 4.5 G: 4; .5 INJECTION, SOLUTION INTRAVENOUS at 20:11

## 2025-08-01 ASSESSMENT — COGNITIVE AND FUNCTIONAL STATUS - GENERAL
MOBILITY SCORE: 24
DAILY ACTIVITIY SCORE: 24

## 2025-08-01 ASSESSMENT — PAIN SCALES - GENERAL
PAINLEVEL_OUTOF10: 0 - NO PAIN

## 2025-08-01 ASSESSMENT — PAIN - FUNCTIONAL ASSESSMENT
PAIN_FUNCTIONAL_ASSESSMENT: 0-10
PAIN_FUNCTIONAL_ASSESSMENT: 0-10

## 2025-08-01 NOTE — CONSULTS
"Reason For Consult  Metastatic pancreatic cancer, neutropenic fever    History Of Present Illness  Nanci Colón is a 69 y.o. female with PMHx of pancreatic cancer on chemotherapy, diabetes and HTN who presented from her infusion center with fever. Per the center note, \"she is feeling shakey (sic), cold and has a fever of 38.6C, and HR is 122. She said symptoms started just this morning, no dizziness, or lightheadedness, but feeling \"spacey\" she is eating and drinking fine, no n/v/d. She has eating today and she checked her blood sugar prior to coming in and she was at 106.\" The pt says only that she felt cold this morning. She denies fevers, diaphoresis, shortness of breath, cough, congestion, diarrhea, constipation, urinary symptoms, hematuria, sick contacts, myalgias or other symptoms at this time.   Patient was admitted in the hospital because of   fever started on IV antibiotic.    This morning patient feeling much better still complains of weakness fatigue no nausea vomiting today WBC count 5.6, hemoglobin 8.3, platelets 187 with 64% neutrophil and absolute neutrophil count 3.6.  Past Medical History  She has a past medical history of Diabetes (Multi), History of Chiari malformation, Hyperlipidemia, Hypertension, Hypothyroidism, and Pancreatic cancer (Multi).    Surgical History  She has a past surgical history that includes Appendectomy; Knee surgery; and Foot surgery.     Social History  She reports that she has never smoked. She has never been exposed to tobacco smoke. She has never used smokeless tobacco. She reports that she does not drink alcohol and does not use drugs.    Family History  Family History[1]     Allergies  Emend [fosaprepitant] and Shellfish containing products    Review of Systems  Review of system is positive for weakness fatigue, low-grade fever, anorexia     Physical Exam  Patient is afebrile, chronically ill    Neck supple, no cervical lymphadenopathy    Lung examination did show " "good air movement on the both side    Heart rate normal regular rhythm    Abdomen is soft normotonic bowel sound nontender    Extremity no edema cyanosis     Last Recorded Vitals  Blood pressure 116/77, pulse 67, temperature 36.2 °C (97.1 °F), temperature source Temporal, resp. rate 18, height 1.626 m (5' 4\"), weight 56.8 kg (125 lb 3.5 oz), SpO2 95%.    Relevant Results   Latest Reference Range & Units 08/01/25 03:42   WBC 4.4 - 11.3 x10*3/uL 5.6   nRBC 0.0 - 0.0 /100 WBCs 0.0   RBC 4.00 - 5.20 x10*6/uL 2.44 (L)   HEMOGLOBIN 12.0 - 16.0 g/dL 8.3 (L)   HEMATOCRIT 36.0 - 46.0 % 24.8 (L)   MCV 80 - 100 fL 102 (H)   MCH 26.0 - 34.0 pg 34.0   MCHC 32.0 - 36.0 g/dL 33.5   RED CELL DISTRIBUTION WIDTH 11.5 - 14.5 % 15.7 (H)   Platelets 150 - 450 x10*3/uL 187      Latest Reference Range & Units 08/01/25 03:42   GLUCOSE 74 - 99 mg/dL 124 (H)   SODIUM 136 - 145 mmol/L 139   POTASSIUM 3.5 - 5.3 mmol/L 3.8   CHLORIDE 98 - 107 mmol/L 106   Bicarbonate 21 - 32 mmol/L 25   Anion Gap mmol/L 12   Blood Urea Nitrogen 6 - 23 mg/dL 11   Creatinine 0.50 - 1.05 mg/dL 0.80   EGFR >60 mL/min/1.73m*2 81   Calcium 8.6 - 10.3 mg/dL 8.1 (L)   Albumin 3.4 - 5.0 g/dL 2.5 (L)   Alkaline Phosphatase 33 - 136 U/L 237 (H)   ALT 7 - 45 U/L 45   AST 9 - 39 U/L 61 (H)   Bilirubin Total 0.0 - 1.2 mg/dL 0.8   (H): Data is abnormally high  (L): Data is abnormally low  Chest x-ray no acute pulmonary abnormality  Assessment/Plan   #1 febrile illness, continue IV antibiotic, watch blood culture, ANC 3.6    2.  Metastatic pancreatic cancer receiving second line systemic chemotherapy including Abraxane and gemcitabine    3.  Transaminitis could be due to chemotherapy    4.  Malnutrition    Clinically improving with IV antibiotic continue supportive care monitor electrolytes, LFT and CBC.  Discussed with patient       I spent 40 minutes in the professional and overall care of this patient.      Saeed Mireles MD         [1]   Family History  Problem Relation " Name Age of Onset    Cancer Mother      Heart disease Father

## 2025-08-01 NOTE — ASSESSMENT & PLAN NOTE
Neutropenic fever  Pt denies shortness of breath, cough, congestion, diarrhea, constipation, urinary symptoms, hematuria, sick contacts, myalgias or other symptoms   Clinical workup is thus far negative  Continue empiric broad-spectrum Abx   Blood cultures are negative x 1  Urine cultures are showing clinical and physical exam process.  Infectious disease on board     Hx of pancreatic cancer  Pt is on Gemzar/Paclitaxel since April  She follows with palliative medicine outpatient     Transaminitis  Pt had transaminitis last September, hepatitis B/C were wnl  Oncology felt her elevated LFTs and hepatic steatosis were 2/2 chemotherapy and lack of response   Will trend LFTs, continue Tylenol only as an antipyretic     HTN   BP is well controlled on home antihypertensive medications     Diabetes  Hold home diabetes meds and start insulin sliding scale with hypoglycemia order set     DVT ppx: SCDs     Discharge disposition  Discharge when medically stable

## 2025-08-01 NOTE — NURSING NOTE
End of Shift Report  8/1/25     Admission  Nanci Colón was admitted on 7/30/2025 for the following diagnoses:   Fever, unspecified fever cause [R50.9]    Significant Events  Pt experienced severe pain at beginning of shift and requested breakthrough pain medication as well as Immodium. Sindy Guzmán ordered one time dose Dilaudid IV and Immodium PRN QID.     Interventions      Response to Interventions       Recent Vital Signs  Patient Vitals for the past 12 hrs:   BP Temp Temp src Pulse Resp SpO2 Weight   07/31/25 1927 111/76 36.2 °C (97.1 °F) Temporal 75 18 96 % --   07/31/25 2317 114/74 36.2 °C (97.2 °F) Temporal 70 18 98 % --   08/01/25 0349 118/73 36.3 °C (97.4 °F) Temporal 61 18 97 % 56.8 kg (125 lb 3.5 oz)      0-10 (Numeric) Pain Score: 0 - No pain     Latest labs  Lab Results   Component Value Date    WBC 5.6 08/01/2025    HGB 8.3 (L) 08/01/2025    HCT 24.8 (L) 08/01/2025     08/01/2025    CHOL 130 09/09/2024    TRIG 312 (H) 09/09/2024    HDL 9.1 09/09/2024    ALT 45 08/01/2025    AST 61 (H) 08/01/2025     08/01/2025    K 3.8 08/01/2025     08/01/2025    CREATININE 0.80 08/01/2025    BUN 11 08/01/2025    CO2 25 08/01/2025    TSH 24.40 (H) 04/14/2025    INR 1.2 (H) 09/14/2024    HGBA1C 7.2 (A) 01/15/2025       Other Results  ***    Scheduled Medications:  Scheduled Medications[1]   Continuous Medications[2]          [1] insulin lispro, 0-5 Units, subcutaneous, TID AC  levothyroxine, 100 mcg, oral, Daily  lipase-protease-amylase, 2 capsule, oral, TID  morphine CR, 15 mg, oral, BID  OLANZapine, 5 mg, oral, Nightly  pantoprazole, 40 mg, oral, Daily  piperacillin-tazobactam, 4.5 g, intravenous, q6h  potassium chloride CR, 10 mEq, oral, BID  sennosides-docusate sodium, 1 tablet, oral, Nightly  vancomycin, 1,250 mg, intravenous, q24h  [2]

## 2025-08-01 NOTE — PROGRESS NOTES
"Nanci Colón is a 69 y.o. female on day 2 of admission presenting with Fever, unspecified fever cause.      Subjective   Nanci Colón is a 69 y.o. female with PMHx of pancreatic cancer on chemotherapy, diabetes and HTN who presented from her infusion center with fever. Per the center note, \"she is feeling shakey (sic), cold and has a fever of 38.6C, and HR is 122. She said symptoms started just this morning, no dizziness, or lightheadedness, but feeling \"spacey\" she is eating and drinking fine, no n/v/d. She has eating today and she checked her blood sugar prior to coming in and she was at 106.\" The pt says only that she felt cold this morning. She denies fevers, diaphoresis, shortness of breath, cough, congestion, diarrhea, constipation, urinary symptoms, hematuria, sick contacts, myalgias or other symptoms at this time. Remainder of ROS reviewed and negative except as indicated in HPI.      7/31: Patient was seen and examined.  She has had no fever in 24 hours since admission.  Blood cultures are still pending.  ID on board and recommends continued IV antibiotic Zosyn and vancomycin.  Right upper quadrant ultrasound ordered and pending.  Continue to trend CBC and BMP daily.  8/1: Patient was seen and examined.  Blood cultures are negative x 1.  Discussed with ID.  Patient can be discharged off antibiotics if blood cultures remain remain negative x 2 by tomorrow.  Reviewed right upper quadrant ultrasound which showed pneumobilia but low-grade tolerated back to normal and liver enzymes continue to trend down.  Will repeat CBC and BMP in AM.  Potential discharge in a.m. if blood cultures remain negative.      Objective     Last Recorded Vitals  /77 (BP Location: Left arm, Patient Position: Lying)   Pulse 67   Temp 36.2 °C (97.1 °F) (Temporal)   Resp 18   Wt 56.8 kg (125 lb 3.5 oz)   SpO2 95%   Intake/Output last 3 Shifts:    Intake/Output Summary (Last 24 hours) at 8/1/2025 1155  Last data filed at " 8/1/2025 0900  Gross per 24 hour   Intake 2221.67 ml   Output --   Net 2221.67 ml       Admission Weight  Weight: 57.4 kg (126 lb 8 oz) (07/30/25 1233)    Daily Weight  08/01/25 : 56.8 kg (125 lb 3.5 oz)    Image Results  US abdomen limited liver  Narrative: Interpreted By:  Regla Brewster,   STUDY:  US ABDOMEN LIMITED LIVER;  7/31/2025 11:00 am      INDICATION:  Signs/Symptoms:elevated bili, transaminitis.          COMPARISON:  CT abdomen 06/24/2026      ACCESSION NUMBER(S):  [id number]      ORDERING CLINICIAN:  ESTHELA REYES      TECHNIQUE:  Multiple images of the right upper quadrant were obtained.      FINDINGS:  LIVER:  Liver length is 14.4 cm. Parenchymal echogenicity is coarse and  mildly increased. No focal mass is identified. Liver is surrounded by  ascites.      GALLBLADDER:  Gallbladder is normal in size. There is a small amount of dependent  sludge. Gallbladder wall is surrounded by ascites. Wall thickness is  upper normal at 3 mm.      BILIARY TREE:  Pneumobilia is present. There is a common bile duct stent, partially  visualized. Much of the extrahepatic duct is obscured by bowel gas.  No intrahepatic biliary dilatation is noted.      PANCREAS:  The pancreas is poorly visualized due to overlying bowel gas.      RIGHT KIDNEY:  Length 10.7 cm. No hydronephrosis is present. Parenchymal  echogenicity is normal.      Impression: Study is limited by interfering bowel gas with pancreas and distal  common bile duct obscured.      Common bile duct stent is partially visible. Pneumobilia is present  but the intrahepatic ducts do not appear dilated.      Ascites, new since the CT in June      MACRO:  None.      Signed by: Regla Brewster 7/31/2025 1:03 PM  Dictation workstation:   [id number]  Electrocardiogram, 12-lead ACS symptoms  Pacemaker spikes or artifacts  Sinus tachycardia  Ventricular premature complex  Low voltage, precordial leads      Physical Exam  Vitals:    08/01/25 0758   BP: 116/77   Pulse:  67   Resp: 18   Temp: 36.2 °C (97.1 °F)   SpO2: 95%     Constitutional: Thin, awake, alert, calm, oriented x4, no acute distress, cooperative   Eyes: EOMI, clear sclerae   ENMT: mucous membranes moist, no lesions seen   Head/Neck: Neck supple, no apparent injury, head atraumatic   Respiratory/Thorax: CTAB, good chest expansion, respirations even and unlabored   Cardiovascular: Regular rate and rhythm, no murmurs/rubs/gallops, normal S1 and S 2   Gastrointestinal: Abdomen nondistended, soft, nontender, +BS, no bruits   Musculoskeletal: ROM intact, no joint swelling, normal  strength   Extremities: no cyanosis, contusions or clubbing, or edema   Neurological: no focal deficit, pt alert and oriented x4   Psychological: Appropriate affect and behavior   Skin: Warm and dry, no lesions, no rashes     Relevant Results             This patient currently has cardiac telemetry ordered; if you would like to modify or discontinue the telemetry order, click here to go to the orders activity to modify/discontinue the order.    This patient has a central line   Reason for the central line remaining today? Parenteral medication            Assessment & Plan  Fever, unspecified fever cause  Neutropenic fever  Pt denies shortness of breath, cough, congestion, diarrhea, constipation, urinary symptoms, hematuria, sick contacts, myalgias or other symptoms   Clinical workup is thus far negative  Continue empiric broad-spectrum Abx   Blood cultures are negative x 1  Urine cultures are showing clinical and physical exam process.  Infectious disease on board     Hx of pancreatic cancer  Pt is on Gemzar/Paclitaxel since April  She follows with palliative medicine outpatient     Transaminitis  Pt had transaminitis last September, hepatitis B/C were wnl  Oncology felt her elevated LFTs and hepatic steatosis were 2/2 chemotherapy and lack of response   Will trend LFTs, continue Tylenol only as an antipyretic     HTN   BP is well controlled on  home antihypertensive medications     Diabetes  Hold home diabetes meds and start insulin sliding scale with hypoglycemia order set     DVT ppx: SCDs     Discharge disposition  Discharge when medically stable                  Malnutrition Diagnosis Status: New  Malnutrition Diagnosis: Severe malnutrition related to chronic disease or condition  Related to: Pancreatic cancer, chemotherapy  As Evidenced by: prolonged poor PO intake <75% for > 7days, mild to moderate loss of subcutaneous fat and muscle mass, weight loss of >7.5% in 2 months  I agree with the dietitian's malnutrition diagnosis.      Spent 35 minutes in the follow-up management of this patient today    Cholo Colorado MD

## 2025-08-01 NOTE — PROGRESS NOTES
Nanci Colón is a 69 y.o. female on day 2 of admission presenting with Fever, unspecified fever cause.      Assessment/Plan:     #Sepsis like syndrome  Fever, tachycardic.  No obvious source of infection.  Patient does have a port.  GI translocation? chemo 2 weeks ago.  Monitor blood cultures.     #Immunosuppressed  Pancreatic cancer on chemotherapy  Last chemo 2 weeks ago.     #Transaminitis -resolving  #Hyperbilirubinemia -resolved  LFTs and bilirubin was normal on July 29th.  Right upper quadrant ultrasound shows pneumobilia, ascites.  No abdominal pain     Recommendations:     - Continue Zosyn every 6 hours  - Continue vancomycin.  Pharmacy to dose  - Monitor blood cultures x 2  - DC abx if blood cx negative for 48 hrs  - Loose stool likely due to abx.  No concern for C. difficile  - Will continue to follow      Oscar Gutierrez MD  Date of service: 8/1/2025  Time of service: 8:02 AM      Subjective   Interval History: No acute events overnight.  Reports soft bowel movement        Review of Systems  Denies: fever, chills, nausea, vomiting, diarrhea, dysuria    Objective   Range of Vitals (last 24 hours)  Heart Rate:  [61-81]   Temp:  [36.2 °C (97.1 °F)-36.9 °C (98.5 °F)]   Resp:  [14-18]   BP: (111-122)/(72-81)   Weight:  [56.8 kg (125 lb 3.5 oz)]   SpO2:  [95 %-98 %]  Daily Weight  08/01/25 : 56.8 kg (125 lb 3.5 oz)   Body mass index is 21.49 kg/m².      General: alert, oriented, NAD  HEENT: No conjunctival pallor, no scleral icterus  Chest: R ACW portmur  Abdomen: soft, non tender, non distended, BS+  Extremities: no edema, no cyanosis  Skin: No rashes or ulcers  MSK: No joint inflammation         Antibiotics  piperacillin-tazobactam - 4.5 gram/100 mL  vancomycin - 1.25 gram/250 mL      Relevant Results  Labs  Results from last 72 hours   Lab Units 08/01/25  0342 07/31/25  0419 07/30/25  1346 07/29/25  1323   WBC AUTO x10*3/uL 5.6 6.7 9.4 5.1   HEMOGLOBIN g/dL 8.3* 8.2* 10.0* 9.7*   HEMATOCRIT % 24.8* 26.2*  "30.3* 30.9*   PLATELETS AUTO x10*3/uL 187 181 218 220   NEUTROS PCT AUTO % 64.1  --  84.3 52.1   LYMPHS PCT AUTO % 18.5  --  5.6 33.7   MONOS PCT AUTO % 11.0  --  9.6 11.5   EOS PCT AUTO % 5.5  --  0.1 2.5     Results from last 72 hours   Lab Units 08/01/25  0342 07/31/25 0419 07/30/25  1346   SODIUM mmol/L 139 138 134*   POTASSIUM mmol/L 3.8 3.9 4.0   CHLORIDE mmol/L 106 106 101   CO2 mmol/L 25 26 25   BUN mg/dL 11 13 15   CREATININE mg/dL 0.80 0.81 0.69   GLUCOSE mg/dL 124* 91 140*   CALCIUM mg/dL 8.1* 8.3* 8.7   ANION GAP mmol/L 12 10 12   EGFR mL/min/1.73m*2 81 80 >90     Results from last 72 hours   Lab Units 08/01/25 0342 07/31/25 0419 07/30/25  1346   ALK PHOS U/L 237* 257* 467*   BILIRUBIN TOTAL mg/dL 0.8 1.5* 2.1*   PROTEIN TOTAL g/dL 5.0* 5.1* 6.2*   ALT U/L 45 56* 76*   AST U/L 61* 113* 266*   ALBUMIN g/dL 2.5* 2.6* 3.2*     Estimated Creatinine Clearance: 58.9 mL/min (by C-G formula based on SCr of 0.8 mg/dL).  No results found for: \"CRP\"    Microbiology  No results found for the last 14 days.      Imaging    US abdomen limited liver  Result Date: 7/31/2025  Study is limited by interfering bowel gas with pancreas and distal common bile duct obscured.   Common bile duct stent is partially visible. Pneumobilia is present but the intrahepatic ducts do not appear dilated.   Ascites, new since the CT in June   MACRO: None.   Signed by: Regla Brewster 7/31/2025 1:03 PM Dictation workstation:   [id number]    XR chest 2 views  Result Date: 7/30/2025  No evidence of consolidating infiltrate or effusion. Signed by Ricky Hayward MD      "

## 2025-08-01 NOTE — PROGRESS NOTES
Vancomycin Dosing by Pharmacy- FOLLOW UP    Nanci Colón is a 69 y.o. year old female who Pharmacy has been consulted for vancomycin dosing for other Neutropenic Fever. Based on the patient's indication and renal status this patient is being dosed based on a goal AUC of 400-600.     Renal function is currently stable.    Current vancomycin dose: 1250 mg given every 24 hours    Estimated vancomycin AUC on current dose: 475 mg/L.hr     Visit Vitals  /73 (BP Location: Left arm, Patient Position: Lying)   Pulse 61   Temp 36.3 °C (97.4 °F) (Temporal)   Resp 18        Lab Results   Component Value Date    CREATININE 0.80 2025    CREATININE 0.81 2025    CREATININE 0.69 2025    CREATININE 0.74 2025        Patient weight is as follows:   Vitals:    25 0349   Weight: 56.8 kg (125 lb 3.5 oz)       Cultures:  No results found for the encounter in last 14 days.       I/O last 3 completed shifts:  In: 2820 (51.4 mL/kg) [P.O.:200; I.V.:528.3 (9.6 mL/kg); IV Piggyback:2091.7]  Out: - (0 mL/kg)   Weight: 54.9 kg   I/O during current shift:  I/O this shift:  In: 1871.7 [I.V.:1471.7; IV Piggyback:400]  Out: -     Temp (24hrs), Av.6 °C (97.8 °F), Min:36.2 °C (97.1 °F), Max:36.9 °C (98.5 °F)      Assessment/Plan    Within goal AUC range. Continue current vancomycin regimen.    This dosing regimen is predicted by [employer] to result in the following pharmacokinetic parameters:  Regimen: 1250 mg IV every 24 hours.  Start time: 15:13 on 2025  Exposure target: AUC24 (range) 400-600 mg/L.hr   [id number]: 452 mg/L.hr  AUC24,ss: 475 mg/L.hr  Probability of AUC24 > 400: 83 %  Ctrough,ss: 12.9 mg/L  Probability of Ctrough,ss > 20: 7 %      The next level will be obtained on  at 0500. May be obtained sooner if clinically indicated.   Will continue to monitor renal function daily while on vancomycin and order serum creatinine at least every 48 hours if not already ordered.  Follow for continued  vancomycin needs, clinical response, and signs/symptoms of toxicity.       Fred Hayward, RPh

## 2025-08-01 NOTE — CARE PLAN
Problem: Skin  Goal: Decreased wound size/increased tissue granulation at next dressing change  Outcome: Progressing  Goal: Participates in plan/prevention/treatment measures  Outcome: Progressing  Goal: Prevent/manage excess moisture  Outcome: Progressing  Goal: Prevent/minimize sheer/friction injuries  Outcome: Progressing  Goal: Promote/optimize nutrition  Outcome: Progressing  Goal: Promote skin healing  Outcome: Progressing     Problem: Pain - Adult  Goal: Verbalizes/displays adequate comfort level or baseline comfort level  Outcome: Progressing     Problem: Safety - Adult  Goal: Free from fall injury  Outcome: Progressing     Problem: Discharge Planning  Goal: Discharge to home or other facility with appropriate resources  Outcome: Progressing     Problem: Chronic Conditions and Co-morbidities  Goal: Patient's chronic conditions and co-morbidity symptoms are monitored and maintained or improved  Outcome: Progressing     Problem: Nutrition  Goal: Nutrient intake appropriate for maintaining nutritional needs  Outcome: Progressing         The patient's goals for the shift include      The clinical goals for the shift include patient will remain safe, free of fall and injury, and hemodynamically stable throughou the shift.

## 2025-08-02 VITALS
SYSTOLIC BLOOD PRESSURE: 114 MMHG | RESPIRATION RATE: 18 BRPM | BODY MASS INDEX: 20.78 KG/M2 | OXYGEN SATURATION: 95 % | HEART RATE: 73 BPM | DIASTOLIC BLOOD PRESSURE: 72 MMHG | WEIGHT: 121.69 LBS | TEMPERATURE: 98.9 F | HEIGHT: 64 IN

## 2025-08-02 LAB
ALBUMIN SERPL BCP-MCNC: 2.7 G/DL (ref 3.4–5)
ALP SERPL-CCNC: 299 U/L (ref 33–136)
ALT SERPL W P-5'-P-CCNC: 36 U/L (ref 7–45)
ANION GAP SERPL CALC-SCNC: 9 MMOL/L (ref 10–20)
AST SERPL W P-5'-P-CCNC: 38 U/L (ref 9–39)
BASOPHILS # BLD AUTO: 0.04 X10*3/UL (ref 0–0.1)
BASOPHILS NFR BLD AUTO: 0.6 %
BILIRUB SERPL-MCNC: 0.8 MG/DL (ref 0–1.2)
BUN SERPL-MCNC: 8 MG/DL (ref 6–23)
CALCIUM SERPL-MCNC: 8.4 MG/DL (ref 8.6–10.3)
CHLORIDE SERPL-SCNC: 106 MMOL/L (ref 98–107)
CO2 SERPL-SCNC: 28 MMOL/L (ref 21–32)
CREAT SERPL-MCNC: 0.76 MG/DL (ref 0.5–1.05)
EGFRCR SERPLBLD CKD-EPI 2021: 86 ML/MIN/1.73M*2
EOSINOPHIL # BLD AUTO: 0.26 X10*3/UL (ref 0–0.7)
EOSINOPHIL NFR BLD AUTO: 4 %
ERYTHROCYTE [DISTWIDTH] IN BLOOD BY AUTOMATED COUNT: 15.9 % (ref 11.5–14.5)
GLUCOSE BLD MANUAL STRIP-MCNC: 103 MG/DL (ref 74–99)
GLUCOSE BLD MANUAL STRIP-MCNC: 154 MG/DL (ref 74–99)
GLUCOSE BLD MANUAL STRIP-MCNC: 222 MG/DL (ref 74–99)
GLUCOSE BLD MANUAL STRIP-MCNC: 286 MG/DL (ref 74–99)
GLUCOSE SERPL-MCNC: 137 MG/DL (ref 74–99)
HCT VFR BLD AUTO: 26.9 % (ref 36–46)
HGB BLD-MCNC: 9 G/DL (ref 12–16)
IMM GRANULOCYTES # BLD AUTO: 0.02 X10*3/UL (ref 0–0.7)
IMM GRANULOCYTES NFR BLD AUTO: 0.3 % (ref 0–0.9)
LYMPHOCYTES # BLD AUTO: 1.63 X10*3/UL (ref 1.2–4.8)
LYMPHOCYTES NFR BLD AUTO: 25 %
MCH RBC QN AUTO: 34.2 PG (ref 26–34)
MCHC RBC AUTO-ENTMCNC: 33.5 G/DL (ref 32–36)
MCV RBC AUTO: 102 FL (ref 80–100)
MONOCYTES # BLD AUTO: 0.76 X10*3/UL (ref 0.1–1)
MONOCYTES NFR BLD AUTO: 11.7 %
NEUTROPHILS # BLD AUTO: 3.8 X10*3/UL (ref 1.2–7.7)
NEUTROPHILS NFR BLD AUTO: 58.4 %
NRBC BLD-RTO: 0 /100 WBCS (ref 0–0)
PLATELET # BLD AUTO: 196 X10*3/UL (ref 150–450)
POTASSIUM SERPL-SCNC: 4.2 MMOL/L (ref 3.5–5.3)
PROT SERPL-MCNC: 5.3 G/DL (ref 6.4–8.2)
RBC # BLD AUTO: 2.63 X10*6/UL (ref 4–5.2)
SODIUM SERPL-SCNC: 139 MMOL/L (ref 136–145)
STAPHYLOCOCCUS SPEC CULT: NORMAL
WBC # BLD AUTO: 6.5 X10*3/UL (ref 4.4–11.3)

## 2025-08-02 PROCEDURE — 99239 HOSP IP/OBS DSCHRG MGMT >30: CPT | Performed by: INTERNAL MEDICINE

## 2025-08-02 PROCEDURE — 2500000002 HC RX 250 W HCPCS SELF ADMINISTERED DRUGS (ALT 637 FOR MEDICARE OP, ALT 636 FOR OP/ED): Performed by: NURSE PRACTITIONER

## 2025-08-02 PROCEDURE — 82947 ASSAY GLUCOSE BLOOD QUANT: CPT

## 2025-08-02 PROCEDURE — 85025 COMPLETE CBC W/AUTO DIFF WBC: CPT | Performed by: INTERNAL MEDICINE

## 2025-08-02 PROCEDURE — 2500000001 HC RX 250 WO HCPCS SELF ADMINISTERED DRUGS (ALT 637 FOR MEDICARE OP): Performed by: NURSE PRACTITIONER

## 2025-08-02 PROCEDURE — 2500000004 HC RX 250 GENERAL PHARMACY W/ HCPCS (ALT 636 FOR OP/ED): Performed by: NURSE PRACTITIONER

## 2025-08-02 PROCEDURE — 84075 ASSAY ALKALINE PHOSPHATASE: CPT | Performed by: INTERNAL MEDICINE

## 2025-08-02 RX ADMIN — POTASSIUM CHLORIDE 10 MEQ: 750 TABLET, EXTENDED RELEASE ORAL at 08:13

## 2025-08-02 RX ADMIN — PANTOPRAZOLE SODIUM 40 MG: 40 TABLET, DELAYED RELEASE ORAL at 08:11

## 2025-08-02 RX ADMIN — MORPHINE SULFATE 15 MG: 15 TABLET, FILM COATED, EXTENDED RELEASE ORAL at 08:11

## 2025-08-02 RX ADMIN — LEVOTHYROXINE SODIUM 100 MCG: 0.1 TABLET ORAL at 05:03

## 2025-08-02 RX ADMIN — PIPERACILLIN SODIUM AND TAZOBACTAM SODIUM 4.5 G: 4; .5 INJECTION, SOLUTION INTRAVENOUS at 08:14

## 2025-08-02 RX ADMIN — PIPERACILLIN SODIUM AND TAZOBACTAM SODIUM 4.5 G: 4; .5 INJECTION, SOLUTION INTRAVENOUS at 02:35

## 2025-08-02 RX ADMIN — PANCRELIPASE 1 CAPSULE: 120000; 24000; 76000 CAPSULE, DELAYED RELEASE PELLETS ORAL at 08:10

## 2025-08-02 ASSESSMENT — PAIN - FUNCTIONAL ASSESSMENT: PAIN_FUNCTIONAL_ASSESSMENT: 0-10

## 2025-08-02 ASSESSMENT — PAIN SCALES - GENERAL: PAINLEVEL_OUTOF10: 4

## 2025-08-02 NOTE — CARE PLAN
Problem: Skin  Goal: Participates in plan/prevention/treatment measures  Outcome: Progressing

## 2025-08-02 NOTE — ASSESSMENT & PLAN NOTE
Fever of malignancy suspected  Metastatic pancreas metastatic pancreatic cancer  Transaminitis-resolved  HTN   Diabetes  Malnutrition      Okay to discharge home follow-up with oncology see after visit summary for complete plan.

## 2025-08-02 NOTE — DISCHARGE SUMMARY
Discharge Diagnosis         Fever of malignancy suspected has resolved   Metastatic pancreas metastatic pancreatic cancer  Transaminitis-resolved  HTN   Diabetes  Malnutrition      Issues Requiring Follow-Up  See after visit summary for complete plan.    Discharge Meds     Medication List      CONTINUE taking these medications     Creon 24,000-76,000 -120,000 unit capsule; Generic drug:   lipase-protease-amylase; Take 2 capsules by mouth 3 times daily (morning,   midday, late afternoon). With meals and 1 with snacks for 8 per day   docusate sodium 100 mg capsule; Commonly known as: Colace; Take 1   capsule (100 mg) by mouth 2 times a day as needed for constipation (for   hard stools).   lactulose 20 gram/30 mL oral solution; Take 15 mL (10 g) by mouth 3   times a day as needed (constipation). Only take as many times as needed   for a BM   metFORMIN 500 mg tablet; Commonly known as: Glucophage; Take 1 tablet   (500 mg) by mouth 2 times daily (morning and late afternoon).   * morphine CR 15 mg 12 hr tablet; Commonly known as: MS Contin; Take 1   tablet (15 mg) by mouth 2 times a day. Do not crush, chew, or split.   * morphine 15 mg tablet; Commonly known as: MSIR; Take 1 tablet (15 mg)   by mouth every 4 hours if needed for severe pain (7 - 10). Take one tablet   as needed every 4 hours for cancer related pain G89.3   multivitamin tablet   OLANZapine 5 mg tablet; Commonly known as: ZyPREXA; Take 1 tablet (5 mg)   by mouth once daily at bedtime.   OneTouch Verio test strips; Generic drug: blood sugar diagnostic   pantoprazole 40 mg EC tablet; Commonly known as: ProtoNix; Take 1 tablet   (40 mg) by mouth once daily. Do not crush, chew, or split.   * potassium chloride CR 10 mEq ER tablet; Commonly known as: Klor-Con;   Take 2 tablets (20 mEq) by mouth once daily. Do not crush, chew, or split.   Synthroid 100 mcg tablet; Generic drug: levothyroxine; Take 1 tablet   (100 mcg) by mouth early in the morning.. Take on an  "empty stomach at the   same time each day, either 30 to 60 minutes prior to breakfast  * This list has 3 medication(s) that are the same as other medications   prescribed for you. Read the directions carefully, and ask your doctor or   other care provider to review them with you.     ASK your doctor about these medications     atorvastatin 40 mg tablet; Commonly known as: Lipitor; Take 1 tablet (40   mg) by mouth every other day.   metoprolol succinate XL 50 mg 24 hr tablet; Commonly known as:   Toprol-XL; Take 1 tablet (50 mg) by mouth once daily.   ondansetron ODT 8 mg disintegrating tablet; Commonly known as:   Zofran-ODT; DISSOLVE ONE TABLET BY MOUTH EVERY 8 HOURS IF NEEDED FOR   NAUSEA OR VOMITING FOR UP TO 7 DAYS   * potassium chloride CR 20 mEq ER tablet; Commonly known as: Klor-Con   M20; Take 1 tablet (20 mEq) by mouth once daily. Do not crush, chew, or   split.  * This list has 1 medication(s) that are the same as other medications   prescribed for you. Read the directions carefully, and ask your doctor or   other care provider to review them with you.       Test Results Pending At Discharge  Pending Labs       Order Current Status    Staphylococcus Aureus/MRSA Colonization, Culture In process    Blood Culture Preliminary result    Blood Culture Preliminary result            Hospital Course   Nanci Colón is a 69 y.o. female with PMHx of pancreatic cancer on chemotherapy, diabetes and HTN who presented from her infusion center with fever. Per the center note, \"she is feeling shakey (sic), cold and has a fever of 38.6C, and HR is 122. She said symptoms started just this morning, no dizziness, or lightheadedness, but feeling \"spacey\" she is eating and drinking fine, no n/v/d. She has eating today and she checked her blood sugar prior to coming in and she was at 106.\" The pt says only that she felt cold this morning. She denies fevers, diaphoresis, shortness of breath, cough, congestion, diarrhea, " constipation, urinary symptoms, hematuria, sick contacts, myalgias or other symptoms at this time. Remainder of ROS reviewed and negative except as indicated in HPI.      7/31: Patient was seen and examined.  She has had no fever in 24 hours since admission.  Blood cultures are still pending.  ID on board and recommends continued IV antibiotic Zosyn and vancomycin.  Right upper quadrant ultrasound ordered and pending.  Continue to trend CBC and BMP daily.  8/1: Patient was seen and examined.  Blood cultures are negative x 1.  Discussed with ID.  Patient can be discharged off antibiotics if blood cultures remain remain negative x 2 by tomorrow.  Reviewed right upper quadrant ultrasound which showed pneumobilia but low-grade tolerated back to normal and liver enzymes continue to trend down.  Will repeat CBC and BMP in AM.  Potential discharge in a.m. if blood cultures remain negative.  8/2: Patient remains afebrile discussed briefly with ID okay to discharge home on no antibiotics.  Suspect fever of malignancy.  Transaminases are now back down to normal.  Should follow-up with oncology as soon as possible.     See after visit summary for complete plan  35 minutes spent in the care of this patient.    Pertinent Physical Exam At Time of Discharge  Physical Exam  Significant cachexia no real significant changes physical exam no fever.  Outpatient Follow-Up  Future Appointments   Date Time Provider Department Center   8/12/2025 12:00 PM INF 04 [place] [id number] [facility]   8/13/2025 12:30 PM INF 02 [place] [id number] [facility]   8/13/2025  1:00 PM Spring Perez RDN, JOLIE [id number] [facility]   8/19/2025  9:45 AM [facility]   8/20/2025  1:00 PM NF 00 [place] [id number] [facility]   8/20/2025  1:45 PM POR CT 1 PORCT [facility]   8/26/2025 10:00 AM INF 12 MINOFF [id number] [facility]   8/26/2025 10:20 AM Francisco Cruz MD [id number] [facility]   8/27/2025  2:30 PM INF 02 [place] [id number] [facility]   9/9/2025  1:30  PM NF 00 [place] [id number] [facility]   9/10/2025 12:30 PM INF 02 [place] [id number] [facility]   9/26/2025  3:00 PM WENDIE Quinn-CNS [id number] [facility]   12/1/2025  2:40 PM Diana Blunt DO [id number] Chirag Worthy MD

## 2025-08-02 NOTE — CARE PLAN
The patient's goals for the shift include      The clinical goals for the shift include Pt will nothave fevers throughout shift

## 2025-08-02 NOTE — PROGRESS NOTES
"Nanci Cloón is a 69 y.o. female on day 3 of admission presenting with Fever, unspecified fever cause.      Subjective   Nanci Colón is a 69 y.o. female with PMHx of pancreatic cancer on chemotherapy, diabetes and HTN who presented from her infusion center with fever. Per the center note, \"she is feeling shakey (sic), cold and has a fever of 38.6C, and HR is 122. She said symptoms started just this morning, no dizziness, or lightheadedness, but feeling \"spacey\" she is eating and drinking fine, no n/v/d. She has eating today and she checked her blood sugar prior to coming in and she was at 106.\" The pt says only that she felt cold this morning. She denies fevers, diaphoresis, shortness of breath, cough, congestion, diarrhea, constipation, urinary symptoms, hematuria, sick contacts, myalgias or other symptoms at this time. Remainder of ROS reviewed and negative except as indicated in HPI.      7/31: Patient was seen and examined.  She has had no fever in 24 hours since admission.  Blood cultures are still pending.  ID on board and recommends continued IV antibiotic Zosyn and vancomycin.  Right upper quadrant ultrasound ordered and pending.  Continue to trend CBC and BMP daily.  8/1: Patient was seen and examined.  Blood cultures are negative x 1.  Discussed with ID.  Patient can be discharged off antibiotics if blood cultures remain remain negative x 2 by tomorrow.  Reviewed right upper quadrant ultrasound which showed pneumobilia but low-grade tolerated back to normal and liver enzymes continue to trend down.  Will repeat CBC and BMP in AM.  Potential discharge in a.m. if blood cultures remain negative.  8/2: Patient remains afebrile discussed briefly with ID okay to discharge home on no antibiotics.  Suspect fever of malignancy.  Transaminases are now back down to normal.  Should follow-up with oncology as soon as possible.    See after visit summary for complete plan  35 minutes spent in the care of this " patient.    Objective     Last Recorded Vitals  /72 (BP Location: Left arm, Patient Position: Lying)   Pulse 73   Temp 37.2 °C (98.9 °F) (Temporal)   Resp 18   Wt 55.2 kg (121 lb 11.1 oz)   SpO2 95%   Intake/Output last 3 Shifts:    Intake/Output Summary (Last 24 hours) at 8/2/2025 1011  Last data filed at 8/2/2025 0932  Gross per 24 hour   Intake 1248 ml   Output --   Net 1248 ml       Admission Weight  Weight: 57.4 kg (126 lb 8 oz) (07/30/25 1233)    Daily Weight  08/02/25 : 55.2 kg (121 lb 11.1 oz)    Image Results  US abdomen limited liver  Narrative: Interpreted By:  Regla Brewster,   STUDY:  US ABDOMEN LIMITED LIVER;  7/31/2025 11:00 am      INDICATION:  Signs/Symptoms:elevated bili, transaminitis.          COMPARISON:  CT abdomen 06/24/2026      ACCESSION NUMBER(S):  [id number]      ORDERING CLINICIAN:  ESTHELA REYES      TECHNIQUE:  Multiple images of the right upper quadrant were obtained.      FINDINGS:  LIVER:  Liver length is 14.4 cm. Parenchymal echogenicity is coarse and  mildly increased. No focal mass is identified. Liver is surrounded by  ascites.      GALLBLADDER:  Gallbladder is normal in size. There is a small amount of dependent  sludge. Gallbladder wall is surrounded by ascites. Wall thickness is  upper normal at 3 mm.      BILIARY TREE:  Pneumobilia is present. There is a common bile duct stent, partially  visualized. Much of the extrahepatic duct is obscured by bowel gas.  No intrahepatic biliary dilatation is noted.      PANCREAS:  The pancreas is poorly visualized due to overlying bowel gas.      RIGHT KIDNEY:  Length 10.7 cm. No hydronephrosis is present. Parenchymal  echogenicity is normal.      Impression: Study is limited by interfering bowel gas with pancreas and distal  common bile duct obscured.      Common bile duct stent is partially visible. Pneumobilia is present  but the intrahepatic ducts do not appear dilated.      Ascites, new since the CT in June       MACRO:  None.      Signed by: Regla Brewster 7/31/2025 1:03 PM  Dictation workstation:   [id number]  Electrocardiogram, 12-lead ACS symptoms  Pacemaker spikes or artifacts  Sinus tachycardia  Ventricular premature complex  Low voltage, precordial leads      Physical Exam  Vitals:    08/02/25 0736   BP: 114/72   Pulse: 73   Resp: 18   Temp: 37.2 °C (98.9 °F)   SpO2: 95%     Constitutional: Thin, awake, alert, calm, oriented x4, no acute distress, cooperative   Eyes: EOMI, clear sclerae   ENMT: mucous membranes moist, no lesions seen   Head/Neck: Neck supple, no apparent injury, head atraumatic   Respiratory/Thorax: CTAB, good chest expansion, respirations even and unlabored   Cardiovascular: Regular rate and rhythm, no murmurs/rubs/gallops, normal S1 and S 2   Gastrointestinal: Abdomen nondistended, soft, nontender, +BS, no bruits   Musculoskeletal: ROM intact, no joint swelling, normal  strength   Extremities: no cyanosis, contusions or clubbing, or edema   Neurological: no focal deficit, pt alert and oriented x4   Psychological: Appropriate affect and behavior   Skin: Warm and dry, no lesions, no rashes     Relevant Results             This patient currently has cardiac telemetry ordered; if you would like to modify or discontinue the telemetry order, click here to go to the orders activity to modify/discontinue the order.    This patient has a central line   Reason for the central line remaining today? Parenteral medication            Assessment & Plan  Fever, unspecified fever cause    Fever of malignancy suspected  Metastatic pancreas metastatic pancreatic cancer  Transaminitis-resolved  HTN   Diabetes  Malnutrition      Okay to discharge home follow-up with oncology see after visit summary for complete plan.                  Malnutrition Diagnosis Status: New  Malnutrition Diagnosis: Severe malnutrition related to chronic disease or condition  Related to: Pancreatic cancer, chemotherapy  As Evidenced by:  prolonged poor PO intake <75% for > 7days, mild to moderate loss of subcutaneous fat and muscle mass, weight loss of >7.5% in 2 months  I agree with the dietitian's malnutrition diagnosis.      Spent 35 minutes in the follow-up management of this patient today    John Worthy MD

## 2025-08-03 LAB
BACTERIA BLD CULT: NORMAL
BACTERIA BLD CULT: NORMAL

## 2025-08-04 DIAGNOSIS — C25.0 MALIGNANT NEOPLASM OF HEAD OF PANCREAS (MULTI): ICD-10-CM

## 2025-08-04 RX ORDER — PANCRELIPASE 24000; 76000; 120000 [USP'U]/1; [USP'U]/1; [USP'U]/1
2 CAPSULE, DELAYED RELEASE PELLETS ORAL
Qty: 240 CAPSULE | Refills: 3 | Status: SHIPPED | OUTPATIENT
Start: 2025-08-04

## 2025-08-05 ENCOUNTER — TELEPHONE (OUTPATIENT)
Dept: HEMATOLOGY/ONCOLOGY | Facility: CLINIC | Age: 70
End: 2025-08-05

## 2025-08-06 LAB
CHOLEST SERPL-MCNC: 207 MG/DL
CHOLEST/HDLC SERPL: 4.4 (CALC)
HDLC SERPL-MCNC: 47 MG/DL
LDLC SERPL CALC-MCNC: 131 MG/DL (CALC)
NONHDLC SERPL-MCNC: 160 MG/DL (CALC)
T4 FREE SERPL-MCNC: 1.4 NG/DL (ref 0.8–1.8)
TRIGL SERPL-MCNC: 158 MG/DL
TSH SERPL-ACNC: 21.15 MIU/L (ref 0.4–4.5)

## 2025-08-06 NOTE — PROGRESS NOTES
"Cancer Genetic Counseling - Initial Evaluation    Patient name:  Nanci Colón  :   1955  MRN:  [id number]     Referred by:  TARA Oleary*    [[*** Add virtual visit consent statement, if applicable]]    HISTORY OF PRESENT ILLNESS:  Nanci Colón is a 69 y.o. female referred for {history of cancer:29931} *** cancer and to discuss genetic testing options.      Cancer medical history:  Nanci {has:01468} a personal history of *** cancer.      Additional relevant personal medical history:  ***      Previous genetic testing for hereditary cancer risk? ***    Cancer screening history:  Mammograms: ***  Breast Biopsy: ***  PAP test: ***  Colonoscopy: ***  Upper endoscopies: ***  Prostate cancer screening: ***  Dermatology: ***  Other: ***    Reproductive History:  Number of children: ***  Number of pregnancies: ***  Age first birth: ***  Breast feeding? {YES/NO:}  Menarche (age): ***  Menopause (age): ***  OCP: {YES/NO:}  HRT: {YES/NO:}    Hysterectomy? {YES/NO:}  Oophorectomy? {YES/NO:}    FAMILY HISTORY:  A 4-generation pedigree was obtained, and the significant findings are noted below.  Family history (pedigree) will be scanned into Epic.  ***     Maternal ancestry is ***.  Paternal ancestry is ***. There is {maternal\paternal\no known:40968} Ashkenazi Amish ancestry. There is ***no known consanguinity.       COUNSELING:  Most cancers are not due to an inherited genetic susceptibility. However, about 5-10% of cancer is due to a hereditary cause. Those with a genetic predisposition to cancer are born with a gene change (pathogenic variant) that makes them more susceptible to developing certain forms of cancer. Within families with a genetic predisposition to cancer, we often see \"red flags\", such as cancers diagnosed at earlier ages than typical, multiple family members with the same cancer, multiple primary cancers in the same individual, or cancer occurring in " multiple generations. Seeing a certain combination of cancers within the same family may also raise suspicion for an underlying genetic predisposition.     Nanci was counseled about hereditary cancer susceptibility including cancer risks and genetic testing. The option of genetic testing via a hereditary cancer panel was discussed. We specifically discussed the ***-gene {Cancer Genetic Testing Options:88983} through ***[facility]. This test analyzes genes associated with hereditary cancer. Each gene on the panel is associated with an increased risk for specific types of cancer, and some genes increase the risk for cancer to a greater extent than others. Nanci was informed of the possible results of genetic testing (positive, negative, and variant(s) of uncertain significance).  The purpose of testing and potential implications for Nanci and her family were discussed.      If the results are positive for a pathogenic (disease-causing) variant, then we will discuss relevant options for cancer risk management (such as extra cancer screenings or risk-reducing options) according to current practice guidelines at a follow-up appointment. If genetic testing is negative or not completed, it is recommended that Nanci proceed with cancer screenings as per her personal medical history and family history of cancer.    ***We reviewed that it is most informative, if possible, to test a cancer-affected individual when trying to determine the cause of the cancer in the family.  We discussed that if this is not feasible, for any reason, then it is very appropriate to test cancer-unaffected family members.  We discussed that in Nanci's family, the best people to test would be ***.  We reviewed that a stepwise approach to testing would be reasonable, but if the family preferred, [multiple family members could pursue testing at the same time].     If other relatives complete genetic testing, their results should be  compared to Nanci's for further risk assessment and interpretation. She expressed her understanding.  In the meantime, genetic testing was offered to Nanci directly, keeping in mind the limitations of initiating testing with an unaffected individual.      We discussed the Genetic Information Nondiscrimination Act of 2008 (YANIQUE), which is a federal law that protects people from genetic discrimination in health insurance and employment. There are some exceptions to YANIQUE. YANIQUE does not apply to employers with fewer than 15 employees. Additionally, the protections of YANIQUE do not apply to federal employees enrolled in the Federal Employees Health Benefits program, members of the [place]  who receive their care through [employer], and veterans who receive their care through the [facility]; however, these groups have policies in place that provide protections similar to YANIQUE. We also discussed that YANIQUE does not apply to life, disability, or long-term care insurance.      IMPRESSION:  Nanci meets NCCN criteria for genetic testing for *** based on the family history of ***.     ***Nanci does NOT meet NCCN criteria for genetic testing ***    Genetic testing ordered: ***add panel and gene list       PLAN:  - Nanci elected to proceed with genetic testing via the ***-gene {Cancer Genetic Testing Options:61619} panel through [facility].  - Verbal consent for testing was obtained.    - [facility]'s insurance billing process was explained. *** Testing lab will contact patient regarding expected OOP cost.  - Nanci elected to proceed with testing via a {Patient Sample Selection:10532}.  - {Genetic test kit plan - [facility]:57692}  - The sample will be sent to [facility] for analysis.  Results are typically expected within 3-4 weeks.  - Nanci will return for a follow up visit to discuss her test results, once available.  ***  Further recommendations for Nanci and her family members will be made accordingly at that time.       Please contact us with any additional questions or concerns.      Lisa Mustafa MS  Licensed Genetic Counselor  [facility]  [phone]      Total time spent on day of encounter: *** minutes (*** minutes with patient, *** minutes on pre/post patient care activities, including documentation).

## 2025-08-08 ENCOUNTER — APPOINTMENT (OUTPATIENT)
Dept: RADIOLOGY | Facility: HOSPITAL | Age: 70
DRG: 299 | End: 2025-08-08
Payer: MEDICARE

## 2025-08-08 ENCOUNTER — HOSPITAL ENCOUNTER (INPATIENT)
Facility: HOSPITAL | Age: 70
LOS: 2 days | Discharge: HOME | End: 2025-08-10
Attending: EMERGENCY MEDICINE | Admitting: STUDENT IN AN ORGANIZED HEALTH CARE EDUCATION/TRAINING PROGRAM
Payer: MEDICARE

## 2025-08-08 ENCOUNTER — TELEMEDICINE CLINICAL SUPPORT (OUTPATIENT)
Dept: GENETICS | Facility: CLINIC | Age: 70
End: 2025-08-08
Payer: MEDICARE

## 2025-08-08 DIAGNOSIS — J18.9 PNEUMONIA OF LEFT LUNG DUE TO INFECTIOUS ORGANISM, UNSPECIFIED PART OF LUNG: ICD-10-CM

## 2025-08-08 DIAGNOSIS — I82.B12 THROMBOSIS OF LEFT SUBCLAVIAN VEIN: ICD-10-CM

## 2025-08-08 DIAGNOSIS — R50.9 FEVER AND CHILLS: Primary | ICD-10-CM

## 2025-08-08 LAB
ALBUMIN SERPL BCP-MCNC: 3.3 G/DL (ref 3.4–5)
ALP SERPL-CCNC: 899 U/L (ref 33–136)
ALT SERPL W P-5'-P-CCNC: 45 U/L (ref 7–45)
ANION GAP SERPL CALC-SCNC: 13 MMOL/L (ref 10–20)
APPEARANCE UR: CLEAR
APTT PPP: 28 SECONDS (ref 26–36)
AST SERPL W P-5'-P-CCNC: 143 U/L (ref 9–39)
BASOPHILS # BLD AUTO: 0.01 X10*3/UL (ref 0–0.1)
BASOPHILS NFR BLD AUTO: 0.1 %
BILIRUB SERPL-MCNC: 2.5 MG/DL (ref 0–1.2)
BILIRUB UR STRIP.AUTO-MCNC: NEGATIVE MG/DL
BUN SERPL-MCNC: 13 MG/DL (ref 6–23)
CALCIUM SERPL-MCNC: 8.7 MG/DL (ref 8.6–10.3)
CHLORIDE SERPL-SCNC: 100 MMOL/L (ref 98–107)
CO2 SERPL-SCNC: 24 MMOL/L (ref 21–32)
COLOR UR: YELLOW
CREAT SERPL-MCNC: 0.92 MG/DL (ref 0.5–1.05)
EGFRCR SERPLBLD CKD-EPI 2021: 68 ML/MIN/1.73M*2
EOSINOPHIL # BLD AUTO: 0.01 X10*3/UL (ref 0–0.7)
EOSINOPHIL NFR BLD AUTO: 0.1 %
ERYTHROCYTE [DISTWIDTH] IN BLOOD BY AUTOMATED COUNT: 15.9 % (ref 11.5–14.5)
FLUAV RNA RESP QL NAA+PROBE: NOT DETECTED
FLUBV RNA RESP QL NAA+PROBE: NOT DETECTED
GLUCOSE BLD MANUAL STRIP-MCNC: 137 MG/DL (ref 74–99)
GLUCOSE BLD MANUAL STRIP-MCNC: 138 MG/DL (ref 74–99)
GLUCOSE BLD MANUAL STRIP-MCNC: 150 MG/DL (ref 74–99)
GLUCOSE BLD MANUAL STRIP-MCNC: 183 MG/DL (ref 74–99)
GLUCOSE SERPL-MCNC: 134 MG/DL (ref 74–99)
GLUCOSE UR STRIP.AUTO-MCNC: NORMAL MG/DL
HCT VFR BLD AUTO: 31 % (ref 36–46)
HGB BLD-MCNC: 10.5 G/DL (ref 12–16)
IMM GRANULOCYTES # BLD AUTO: 0.02 X10*3/UL (ref 0–0.7)
IMM GRANULOCYTES NFR BLD AUTO: 0.2 % (ref 0–0.9)
KETONES UR STRIP.AUTO-MCNC: NEGATIVE MG/DL
LACTATE SERPL-SCNC: 1.4 MMOL/L (ref 0.4–2)
LEUKOCYTE ESTERASE UR QL STRIP.AUTO: NEGATIVE
LYMPHOCYTES # BLD AUTO: 0.17 X10*3/UL (ref 1.2–4.8)
LYMPHOCYTES NFR BLD AUTO: 1.9 %
MCH RBC QN AUTO: 34.1 PG (ref 26–34)
MCHC RBC AUTO-ENTMCNC: 33.9 G/DL (ref 32–36)
MCV RBC AUTO: 101 FL (ref 80–100)
MONOCYTES # BLD AUTO: 0.17 X10*3/UL (ref 0.1–1)
MONOCYTES NFR BLD AUTO: 1.9 %
MRSA DNA SPEC QL NAA+PROBE: NOT DETECTED
NEUTROPHILS # BLD AUTO: 8.74 X10*3/UL (ref 1.2–7.7)
NEUTROPHILS NFR BLD AUTO: 95.8 %
NITRITE UR QL STRIP.AUTO: NEGATIVE
NRBC BLD-RTO: 0 /100 WBCS (ref 0–0)
PH UR STRIP.AUTO: 6.5 [PH]
PLATELET # BLD AUTO: 202 X10*3/UL (ref 150–450)
POTASSIUM SERPL-SCNC: 4 MMOL/L (ref 3.5–5.3)
PROCALCITONIN SERPL-MCNC: 14.4 NG/ML
PROT SERPL-MCNC: 6.5 G/DL (ref 6.4–8.2)
PROT UR STRIP.AUTO-MCNC: NEGATIVE MG/DL
RBC # BLD AUTO: 3.08 X10*6/UL (ref 4–5.2)
RBC # UR STRIP.AUTO: NEGATIVE MG/DL
RSV RNA RESP QL NAA+PROBE: NOT DETECTED
SARS-COV-2 RNA RESP QL NAA+PROBE: NOT DETECTED
SODIUM SERPL-SCNC: 133 MMOL/L (ref 136–145)
SP GR UR STRIP.AUTO: 1.02
T GONDII IGG SER-ACNC: NONREACTIVE
UFH PPP CHRO-ACNC: 0.4 IU/ML (ref ?–1.1)
UFH PPP CHRO-ACNC: 0.8 IU/ML (ref ?–1.1)
UFH PPP CHRO-ACNC: 0.9 IU/ML (ref ?–1.1)
UROBILINOGEN UR STRIP.AUTO-MCNC: NORMAL MG/DL
WBC # BLD AUTO: 9.1 X10*3/UL (ref 4.4–11.3)

## 2025-08-08 PROCEDURE — 87632 RESP VIRUS 6-11 TARGETS: CPT | Performed by: STUDENT IN AN ORGANIZED HEALTH CARE EDUCATION/TRAINING PROGRAM

## 2025-08-08 PROCEDURE — 80053 COMPREHEN METABOLIC PANEL: CPT | Performed by: EMERGENCY MEDICINE

## 2025-08-08 PROCEDURE — 96366 THER/PROPH/DIAG IV INF ADDON: CPT

## 2025-08-08 PROCEDURE — 85025 COMPLETE CBC W/AUTO DIFF WBC: CPT | Performed by: EMERGENCY MEDICINE

## 2025-08-08 PROCEDURE — 96374 THER/PROPH/DIAG INJ IV PUSH: CPT | Mod: 59

## 2025-08-08 PROCEDURE — 83605 ASSAY OF LACTIC ACID: CPT | Performed by: EMERGENCY MEDICINE

## 2025-08-08 PROCEDURE — 2500000004 HC RX 250 GENERAL PHARMACY W/ HCPCS (ALT 636 FOR OP/ED): Performed by: EMERGENCY MEDICINE

## 2025-08-08 PROCEDURE — 99223 1ST HOSP IP/OBS HIGH 75: CPT | Performed by: STUDENT IN AN ORGANIZED HEALTH CARE EDUCATION/TRAINING PROGRAM

## 2025-08-08 PROCEDURE — 2550000001 HC RX 255 CONTRASTS: Performed by: EMERGENCY MEDICINE

## 2025-08-08 PROCEDURE — 87899 AGENT NOS ASSAY W/OPTIC: CPT | Mod: PORLAB | Performed by: STUDENT IN AN ORGANIZED HEALTH CARE EDUCATION/TRAINING PROGRAM

## 2025-08-08 PROCEDURE — 86778 TOXOPLASMA ANTIBODY IGM: CPT | Performed by: STUDENT IN AN ORGANIZED HEALTH CARE EDUCATION/TRAINING PROGRAM

## 2025-08-08 PROCEDURE — 99285 EMERGENCY DEPT VISIT HI MDM: CPT | Mod: 25 | Performed by: EMERGENCY MEDICINE

## 2025-08-08 PROCEDURE — 96367 TX/PROPH/DG ADDL SEQ IV INF: CPT

## 2025-08-08 PROCEDURE — 85730 THROMBOPLASTIN TIME PARTIAL: CPT | Performed by: EMERGENCY MEDICINE

## 2025-08-08 PROCEDURE — 71260 CT THORAX DX C+: CPT | Performed by: RADIOLOGY

## 2025-08-08 PROCEDURE — 36415 COLL VENOUS BLD VENIPUNCTURE: CPT | Performed by: EMERGENCY MEDICINE

## 2025-08-08 PROCEDURE — 2500000004 HC RX 250 GENERAL PHARMACY W/ HCPCS (ALT 636 FOR OP/ED): Performed by: STUDENT IN AN ORGANIZED HEALTH CARE EDUCATION/TRAINING PROGRAM

## 2025-08-08 PROCEDURE — 2500000001 HC RX 250 WO HCPCS SELF ADMINISTERED DRUGS (ALT 637 FOR MEDICARE OP): Performed by: STUDENT IN AN ORGANIZED HEALTH CARE EDUCATION/TRAINING PROGRAM

## 2025-08-08 PROCEDURE — 85520 HEPARIN ASSAY: CPT | Performed by: EMERGENCY MEDICINE

## 2025-08-08 PROCEDURE — 87637 SARSCOV2&INF A&B&RSV AMP PRB: CPT | Performed by: EMERGENCY MEDICINE

## 2025-08-08 PROCEDURE — 86777 TOXOPLASMA ANTIBODY: CPT | Mod: PORLAB | Performed by: STUDENT IN AN ORGANIZED HEALTH CARE EDUCATION/TRAINING PROGRAM

## 2025-08-08 PROCEDURE — 82947 ASSAY GLUCOSE BLOOD QUANT: CPT

## 2025-08-08 PROCEDURE — 87641 MR-STAPH DNA AMP PROBE: CPT | Performed by: STUDENT IN AN ORGANIZED HEALTH CARE EDUCATION/TRAINING PROGRAM

## 2025-08-08 PROCEDURE — 96375 TX/PRO/DX INJ NEW DRUG ADDON: CPT

## 2025-08-08 PROCEDURE — 81003 URINALYSIS AUTO W/O SCOPE: CPT | Performed by: EMERGENCY MEDICINE

## 2025-08-08 PROCEDURE — 96361 HYDRATE IV INFUSION ADD-ON: CPT

## 2025-08-08 PROCEDURE — 74177 CT ABD & PELVIS W/CONTRAST: CPT | Performed by: RADIOLOGY

## 2025-08-08 PROCEDURE — 74177 CT ABD & PELVIS W/CONTRAST: CPT

## 2025-08-08 PROCEDURE — 2500000002 HC RX 250 W HCPCS SELF ADMINISTERED DRUGS (ALT 637 FOR MEDICARE OP, ALT 636 FOR OP/ED): Performed by: STUDENT IN AN ORGANIZED HEALTH CARE EDUCATION/TRAINING PROGRAM

## 2025-08-08 PROCEDURE — 84145 PROCALCITONIN (PCT): CPT | Mod: PORLAB | Performed by: STUDENT IN AN ORGANIZED HEALTH CARE EDUCATION/TRAINING PROGRAM

## 2025-08-08 PROCEDURE — 87449 NOS EACH ORGANISM AG IA: CPT | Mod: PORLAB | Performed by: STUDENT IN AN ORGANIZED HEALTH CARE EDUCATION/TRAINING PROGRAM

## 2025-08-08 PROCEDURE — 87449 NOS EACH ORGANISM AG IA: CPT | Performed by: STUDENT IN AN ORGANIZED HEALTH CARE EDUCATION/TRAINING PROGRAM

## 2025-08-08 PROCEDURE — 2060000001 HC INTERMEDIATE ICU ROOM DAILY

## 2025-08-08 PROCEDURE — 87040 BLOOD CULTURE FOR BACTERIA: CPT | Mod: PORLAB | Performed by: EMERGENCY MEDICINE

## 2025-08-08 PROCEDURE — 96365 THER/PROPH/DIAG IV INF INIT: CPT

## 2025-08-08 RX ORDER — ONDANSETRON 4 MG/1
4 TABLET, FILM COATED ORAL EVERY 8 HOURS PRN
Status: DISCONTINUED | OUTPATIENT
Start: 2025-08-08 | End: 2025-08-10 | Stop reason: HOSPADM

## 2025-08-08 RX ORDER — VANCOMYCIN HYDROCHLORIDE 1.5 G/300ML
1.5 INJECTION, SOLUTION INTRAVITREAL ONCE
Status: COMPLETED | OUTPATIENT
Start: 2025-08-08 | End: 2025-08-08

## 2025-08-08 RX ORDER — VANCOMYCIN HYDROCHLORIDE 1.25 G/250ML
1250 INJECTION, SOLUTION INTRAVITREAL EVERY 24 HOURS
Status: DISCONTINUED | OUTPATIENT
Start: 2025-08-09 | End: 2025-08-10

## 2025-08-08 RX ORDER — DEXTROSE 50 % IN WATER (D50W) INTRAVENOUS SYRINGE
25
Status: DISCONTINUED | OUTPATIENT
Start: 2025-08-08 | End: 2025-08-10 | Stop reason: HOSPADM

## 2025-08-08 RX ORDER — MORPHINE SULFATE 15 MG/1
15 TABLET ORAL EVERY 4 HOURS PRN
Status: DISCONTINUED | OUTPATIENT
Start: 2025-08-08 | End: 2025-08-08 | Stop reason: WASHOUT

## 2025-08-08 RX ORDER — INSULIN LISPRO 100 [IU]/ML
0-5 INJECTION, SOLUTION INTRAVENOUS; SUBCUTANEOUS
Status: DISCONTINUED | OUTPATIENT
Start: 2025-08-08 | End: 2025-08-10 | Stop reason: HOSPADM

## 2025-08-08 RX ORDER — MORPHINE SULFATE 15 MG/1
15 TABLET, FILM COATED, EXTENDED RELEASE ORAL EVERY 12 HOURS SCHEDULED
Refills: 0 | Status: DISCONTINUED | OUTPATIENT
Start: 2025-08-08 | End: 2025-08-10 | Stop reason: HOSPADM

## 2025-08-08 RX ORDER — MORPHINE SULFATE 4 MG/ML
4 INJECTION INTRAVENOUS ONCE
Status: COMPLETED | OUTPATIENT
Start: 2025-08-08 | End: 2025-08-08

## 2025-08-08 RX ORDER — ONDANSETRON HYDROCHLORIDE 2 MG/ML
4 INJECTION, SOLUTION INTRAVENOUS EVERY 8 HOURS PRN
Status: DISCONTINUED | OUTPATIENT
Start: 2025-08-08 | End: 2025-08-10 | Stop reason: HOSPADM

## 2025-08-08 RX ORDER — LEVOTHYROXINE SODIUM 100 UG/1
100 TABLET ORAL DAILY
Status: DISCONTINUED | OUTPATIENT
Start: 2025-08-08 | End: 2025-08-10 | Stop reason: HOSPADM

## 2025-08-08 RX ORDER — DEXTROSE 50 % IN WATER (D50W) INTRAVENOUS SYRINGE
12.5
Status: DISCONTINUED | OUTPATIENT
Start: 2025-08-08 | End: 2025-08-10 | Stop reason: HOSPADM

## 2025-08-08 RX ORDER — GUAIFENESIN 600 MG/1
600 TABLET, EXTENDED RELEASE ORAL EVERY 12 HOURS PRN
Status: DISCONTINUED | OUTPATIENT
Start: 2025-08-08 | End: 2025-08-10 | Stop reason: HOSPADM

## 2025-08-08 RX ORDER — OLANZAPINE 5 MG/1
5 TABLET, FILM COATED ORAL NIGHTLY
Status: DISCONTINUED | OUTPATIENT
Start: 2025-08-08 | End: 2025-08-10 | Stop reason: HOSPADM

## 2025-08-08 RX ORDER — ONDANSETRON HYDROCHLORIDE 2 MG/ML
4 INJECTION, SOLUTION INTRAVENOUS ONCE
Status: COMPLETED | OUTPATIENT
Start: 2025-08-08 | End: 2025-08-08

## 2025-08-08 RX ORDER — TALC
3 POWDER (GRAM) TOPICAL NIGHTLY PRN
Status: DISCONTINUED | OUTPATIENT
Start: 2025-08-08 | End: 2025-08-10 | Stop reason: HOSPADM

## 2025-08-08 RX ORDER — POTASSIUM CHLORIDE 20 MEQ/1
20 TABLET, EXTENDED RELEASE ORAL DAILY
Status: DISCONTINUED | OUTPATIENT
Start: 2025-08-08 | End: 2025-08-10 | Stop reason: HOSPADM

## 2025-08-08 RX ORDER — HEPARIN SODIUM 10000 [USP'U]/100ML
0-4500 INJECTION, SOLUTION INTRAVENOUS CONTINUOUS
Status: DISCONTINUED | OUTPATIENT
Start: 2025-08-08 | End: 2025-08-10 | Stop reason: ALTCHOICE

## 2025-08-08 RX ORDER — PANTOPRAZOLE SODIUM 40 MG/10ML
40 INJECTION, POWDER, LYOPHILIZED, FOR SOLUTION INTRAVENOUS
Status: DISCONTINUED | OUTPATIENT
Start: 2025-08-08 | End: 2025-08-10 | Stop reason: HOSPADM

## 2025-08-08 RX ORDER — ACETAMINOPHEN 325 MG/1
650 TABLET ORAL EVERY 4 HOURS PRN
Status: DISCONTINUED | OUTPATIENT
Start: 2025-08-08 | End: 2025-08-10 | Stop reason: HOSPADM

## 2025-08-08 RX ORDER — PANTOPRAZOLE SODIUM 40 MG/1
40 TABLET, DELAYED RELEASE ORAL
Status: DISCONTINUED | OUTPATIENT
Start: 2025-08-08 | End: 2025-08-10 | Stop reason: HOSPADM

## 2025-08-08 RX ORDER — VANCOMYCIN HYDROCHLORIDE 1 G/20ML
INJECTION, POWDER, LYOPHILIZED, FOR SOLUTION INTRAVENOUS DAILY PRN
Status: DISCONTINUED | OUTPATIENT
Start: 2025-08-08 | End: 2025-08-10

## 2025-08-08 RX ADMIN — MORPHINE SULFATE 4 MG: 4 INJECTION, SOLUTION INTRAMUSCULAR; INTRAVENOUS at 04:35

## 2025-08-08 RX ADMIN — OLANZAPINE 5 MG: 5 TABLET, FILM COATED ORAL at 21:39

## 2025-08-08 RX ADMIN — SODIUM CHLORIDE, SODIUM LACTATE, POTASSIUM CHLORIDE, AND CALCIUM CHLORIDE 1000 ML: .6; .31; .03; .02 INJECTION, SOLUTION INTRAVENOUS at 03:24

## 2025-08-08 RX ADMIN — PIPERACILLIN SODIUM AND TAZOBACTAM SODIUM 4.5 G: 4; .5 INJECTION, SOLUTION INTRAVENOUS at 23:19

## 2025-08-08 RX ADMIN — POTASSIUM CHLORIDE 20 MEQ: 20 TABLET, EXTENDED RELEASE ORAL at 09:42

## 2025-08-08 RX ADMIN — LEVOTHYROXINE SODIUM 100 MCG: 0.1 TABLET ORAL at 07:44

## 2025-08-08 RX ADMIN — IOHEXOL 75 ML: 350 INJECTION, SOLUTION INTRAVENOUS at 04:47

## 2025-08-08 RX ADMIN — PIPERACILLIN SODIUM AND TAZOBACTAM SODIUM 4.5 G: 4; .5 INJECTION, SOLUTION INTRAVENOUS at 04:25

## 2025-08-08 RX ADMIN — MORPHINE SULFATE 15 MG: 15 TABLET, FILM COATED, EXTENDED RELEASE ORAL at 09:42

## 2025-08-08 RX ADMIN — ONDANSETRON 4 MG: 2 INJECTION, SOLUTION INTRAMUSCULAR; INTRAVENOUS at 04:36

## 2025-08-08 RX ADMIN — HEPARIN SODIUM 1000 UNITS/HR: 10000 INJECTION, SOLUTION INTRAVENOUS at 06:40

## 2025-08-08 RX ADMIN — PIPERACILLIN SODIUM AND TAZOBACTAM SODIUM 4.5 G: 4; .5 INJECTION, SOLUTION INTRAVENOUS at 09:43

## 2025-08-08 RX ADMIN — MORPHINE SULFATE 15 MG: 15 TABLET, FILM COATED, EXTENDED RELEASE ORAL at 21:38

## 2025-08-08 RX ADMIN — PIPERACILLIN SODIUM AND TAZOBACTAM SODIUM 4.5 G: 4; .5 INJECTION, SOLUTION INTRAVENOUS at 18:40

## 2025-08-08 RX ADMIN — VANCOMYCIN HYDROCHLORIDE 1.5 G: 1.5 INJECTION, SOLUTION INTRAVITREAL at 05:44

## 2025-08-08 RX ADMIN — PANCRELIPASE 2 CAPSULE: 120000; 24000; 76000 CAPSULE, DELAYED RELEASE PELLETS ORAL at 12:17

## 2025-08-08 RX ADMIN — PANTOPRAZOLE SODIUM 40 MG: 40 TABLET, DELAYED RELEASE ORAL at 07:44

## 2025-08-08 RX ADMIN — PANCRELIPASE 2 CAPSULE: 120000; 24000; 76000 CAPSULE, DELAYED RELEASE PELLETS ORAL at 07:50

## 2025-08-08 SDOH — ECONOMIC STABILITY: FOOD INSECURITY: WITHIN THE PAST 12 MONTHS, YOU WORRIED THAT YOUR FOOD WOULD RUN OUT BEFORE YOU GOT THE MONEY TO BUY MORE.: NEVER TRUE

## 2025-08-08 SDOH — SOCIAL STABILITY: SOCIAL INSECURITY: ARE THERE ANY APPARENT SIGNS OF INJURIES/BEHAVIORS THAT COULD BE RELATED TO ABUSE/NEGLECT?: NO

## 2025-08-08 SDOH — SOCIAL STABILITY: SOCIAL INSECURITY: WERE YOU ABLE TO COMPLETE ALL THE BEHAVIORAL HEALTH SCREENINGS?: YES

## 2025-08-08 SDOH — SOCIAL STABILITY: SOCIAL INSECURITY: HAS ANYONE EVER THREATENED TO HURT YOUR FAMILY OR YOUR PETS?: NO

## 2025-08-08 SDOH — SOCIAL STABILITY: SOCIAL INSECURITY: WITHIN THE LAST YEAR, HAVE YOU BEEN AFRAID OF YOUR PARTNER OR EX-PARTNER?: NO

## 2025-08-08 SDOH — SOCIAL STABILITY: SOCIAL INSECURITY: HAVE YOU HAD THOUGHTS OF HARMING ANYONE ELSE?: NO

## 2025-08-08 SDOH — SOCIAL STABILITY: SOCIAL INSECURITY: ABUSE: ADULT

## 2025-08-08 SDOH — SOCIAL STABILITY: SOCIAL INSECURITY: DOES ANYONE TRY TO KEEP YOU FROM HAVING/CONTACTING OTHER FRIENDS OR DOING THINGS OUTSIDE YOUR HOME?: NO

## 2025-08-08 SDOH — ECONOMIC STABILITY: INCOME INSECURITY: IN THE PAST 12 MONTHS HAS THE ELECTRIC, GAS, OIL, OR WATER COMPANY THREATENED TO SHUT OFF SERVICES IN YOUR HOME?: NO

## 2025-08-08 SDOH — SOCIAL STABILITY: SOCIAL INSECURITY: ARE YOU OR HAVE YOU BEEN THREATENED OR ABUSED PHYSICALLY, EMOTIONALLY, OR SEXUALLY BY ANYONE?: NO

## 2025-08-08 SDOH — SOCIAL STABILITY: SOCIAL INSECURITY: WITHIN THE LAST YEAR, HAVE YOU BEEN HUMILIATED OR EMOTIONALLY ABUSED IN OTHER WAYS BY YOUR PARTNER OR EX-PARTNER?: NO

## 2025-08-08 SDOH — ECONOMIC STABILITY: FOOD INSECURITY: WITHIN THE PAST 12 MONTHS, THE FOOD YOU BOUGHT JUST DIDN'T LAST AND YOU DIDN'T HAVE MONEY TO GET MORE.: NEVER TRUE

## 2025-08-08 SDOH — SOCIAL STABILITY: SOCIAL INSECURITY: DO YOU FEEL ANYONE HAS EXPLOITED OR TAKEN ADVANTAGE OF YOU FINANCIALLY OR OF YOUR PERSONAL PROPERTY?: NO

## 2025-08-08 SDOH — SOCIAL STABILITY: SOCIAL INSECURITY: HAVE YOU HAD ANY THOUGHTS OF HARMING ANYONE ELSE?: NO

## 2025-08-08 SDOH — SOCIAL STABILITY: SOCIAL INSECURITY: DO YOU FEEL UNSAFE GOING BACK TO THE PLACE WHERE YOU ARE LIVING?: NO

## 2025-08-08 ASSESSMENT — ENCOUNTER SYMPTOMS
RHINORRHEA: 0
NERVOUS/ANXIOUS: 0
ARTHRALGIAS: 0
WEAKNESS: 0
CONFUSION: 0
SHORTNESS OF BREATH: 0
WHEEZING: 0
FEVER: 1
COLOR CHANGE: 0
COUGH: 0
NUMBNESS: 0
DIZZINESS: 0
DIARRHEA: 0
LIGHT-HEADEDNESS: 1
CHILLS: 1
CONSTIPATION: 1
SINUS PAIN: 0
APNEA: 0
ACTIVITY CHANGE: 0
DYSURIA: 0
MYALGIAS: 0
VOMITING: 0
SORE THROAT: 0
JOINT SWELLING: 0
HEMATURIA: 0
FREQUENCY: 0
PALPITATIONS: 0
DECREASED CONCENTRATION: 0
WOUND: 0
NAUSEA: 0
FATIGUE: 0
DIFFICULTY URINATING: 0
HEADACHES: 0
CHEST TIGHTNESS: 0
BACK PAIN: 0
APPETITE CHANGE: 0
DIAPHORESIS: 1
STRIDOR: 0
ABDOMINAL PAIN: 0
FLANK PAIN: 0
AGITATION: 0
ABDOMINAL DISTENTION: 0

## 2025-08-08 ASSESSMENT — COGNITIVE AND FUNCTIONAL STATUS - GENERAL
CLIMB 3 TO 5 STEPS WITH RAILING: A LITTLE
DAILY ACTIVITIY SCORE: 24
CLIMB 3 TO 5 STEPS WITH RAILING: A LITTLE
WALKING IN HOSPITAL ROOM: A LITTLE
MOBILITY SCORE: 21
PATIENT BASELINE BEDBOUND: NO
STANDING UP FROM CHAIR USING ARMS: A LITTLE
MOBILITY SCORE: 22
DAILY ACTIVITIY SCORE: 24
WALKING IN HOSPITAL ROOM: A LITTLE

## 2025-08-08 ASSESSMENT — ACTIVITIES OF DAILY LIVING (ADL)
DRESSING YOURSELF: INDEPENDENT
GROOMING: INDEPENDENT
ADEQUATE_TO_COMPLETE_ADL: YES
BATHING: INDEPENDENT
FEEDING YOURSELF: INDEPENDENT
WALKS IN HOME: NEEDS ASSISTANCE
BATHING: NEEDS ASSISTANCE
WALKS IN HOME: NEEDS ASSISTANCE
FEEDING YOURSELF: INDEPENDENT
ASSISTIVE_DEVICE: EYEGLASSES;WALKER
PATIENT'S MEMORY ADEQUATE TO SAFELY COMPLETE DAILY ACTIVITIES?: YES
GROOMING: INDEPENDENT
HEARING - LEFT EAR: DIFFICULTY WITH NOISE
HEARING - LEFT EAR: DIFFICULTY WITH NOISE
HEARING - RIGHT EAR: DIFFICULTY WITH NOISE
JUDGMENT_ADEQUATE_SAFELY_COMPLETE_DAILY_ACTIVITIES: YES
PATIENT'S MEMORY ADEQUATE TO SAFELY COMPLETE DAILY ACTIVITIES?: YES
DRESSING YOURSELF: INDEPENDENT
ASSISTIVE_DEVICE: WALKER;EYEGLASSES
HEARING - RIGHT EAR: DIFFICULTY WITH NOISE
TOILETING: NEEDS ASSISTANCE
LACK_OF_TRANSPORTATION: NO
TOILETING: NEEDS ASSISTANCE

## 2025-08-08 ASSESSMENT — PAIN SCALES - GENERAL
PAINLEVEL_OUTOF10: 2
PAINLEVEL_OUTOF10: 4
PAINLEVEL_OUTOF10: 0 - NO PAIN
PAINLEVEL_OUTOF10: 6
PAINLEVEL_OUTOF10: 0 - NO PAIN

## 2025-08-08 ASSESSMENT — PATIENT HEALTH QUESTIONNAIRE - PHQ9
SUM OF ALL RESPONSES TO PHQ9 QUESTIONS 1 & 2: 0
1. LITTLE INTEREST OR PLEASURE IN DOING THINGS: NOT AT ALL
2. FEELING DOWN, DEPRESSED OR HOPELESS: NOT AT ALL

## 2025-08-08 ASSESSMENT — LIFESTYLE VARIABLES
TOTAL SCORE: 0
HOW OFTEN DO YOU HAVE A DRINK CONTAINING ALCOHOL: NEVER
HOW OFTEN DO YOU HAVE 6 OR MORE DRINKS ON ONE OCCASION: NEVER
SKIP TO QUESTIONS 9-10: 1
AUDIT-C TOTAL SCORE: 0
HAVE PEOPLE ANNOYED YOU BY CRITICIZING YOUR DRINKING: NO
AUDIT-C TOTAL SCORE: 0
EVER HAD A DRINK FIRST THING IN THE MORNING TO STEADY YOUR NERVES TO GET RID OF A HANGOVER: NO
HAVE YOU EVER FELT YOU SHOULD CUT DOWN ON YOUR DRINKING: NO
HOW MANY STANDARD DRINKS CONTAINING ALCOHOL DO YOU HAVE ON A TYPICAL DAY: PATIENT DOES NOT DRINK
EVER FELT BAD OR GUILTY ABOUT YOUR DRINKING: NO

## 2025-08-08 ASSESSMENT — PAIN - FUNCTIONAL ASSESSMENT
PAIN_FUNCTIONAL_ASSESSMENT: 0-10

## 2025-08-08 NOTE — ED PROVIDER NOTES
Nanci Colón is a 69 y.o. patient presenting to the ED for fever.  The patient reports that she is receiving chemotherapy for pancreatic cancer.  Her most recent dose was 3 weeks ago.  She was scheduled to have chemo last week however this was canceled after she was found to be febrile at presentation to her appointment.  At that time she was sent to the emergency department and did end up hospitalized without further fever or source of infection identified.  She is scheduled for chemotherapy this coming Monday.  She has been having some abdominal pain and hip pain that has been going on for some time.  This is not any different than usual.  Last night she was feeling better than she has recently.  She was able to cook dinner and was more active than she has been.  She woke up during the night with chills and then sweating.  She also felt dizzy and lightheaded.  She denies any new or different pain.  She was not short of breath.  The dizziness did improve and she was able to ambulate to the bathroom.  She did take her temperature and found it to be elevated at 103.  Because of the dizziness she did call EMS.  They report a temperature of 101.  The patient has not had any nausea or vomiting recently.  Several days ago she did have an episode of diarrhea for which she took an Imodium.  Since then she has been somewhat constipated.  She denies any rashes but does note that she has a sore area on her bottom that she believes is due to wearing a pad that had rubbed.    Additional History Obtained from: Significant other at bedside  Limitations to History: None  ------------------------------------------------------------------------------------------------------------------------------------------  Physical Exam:  Appearance: Alert, cooperative, not distressed appearing.  Skin: Warm, dry, appropriate color for ethnicity.   Eyes: Cornea clear. No scleral icterus or injection.   ENT: Mucous membranes moist.  Pulmonary:  No accessory muscle use or stridor. Clear lung sounds bilaterally without rhonchi or wheezing.   Cardiac: Heart sounds regular without murmur. B/L radial pulses full and symmetric.   Abdomen: Soft, not tender.  No rebound or guarding.   : There is a small area of erythema on the buttock at the edge of the patient's pad.  There is no induration or open wound.  She does have large external hemorrhoids that do not appear to be bleeding or thrombosed.  Musculoskeletal: No gross deformities.   Neurological: Face symmetrical. Voice clear. Appropriately conversant.   Psychiatric: Appropriate mood and affect.    Medical Decision Making:  Chronic Medical Conditions Significantly Affecting Care:  has a past medical history of Diabetes (Multi), History of Chiari malformation, Hyperlipidemia, Hypertension, Hypothyroidism, and Pancreatic cancer (Multi).    Social Determinants of Health Significantly Affecting Care: Chemotherapy patient    Differential Diagnosis Considered but not limited to: Patient presenting with fever.  Infectious source is high on the differential especially given that she has not recently received chemotherapy which was suspected as the potential source of her last fever.  She does not have an infectious source that is clearly identified on exam.  She does have a small area of erythema on her buttock however this does not appear to be cellulitic and there is no open wound.  Because of this CT of the chest, abdomen, pelvis was ordered.  Additionally will obtain labs to rule out neutropenia or electrolyte disturbance.      External Records Reviewed:   I reviewed recent and relevant outside records including:     Independent Interpretation of Studies: The following studies were ordered as part of the emergency department work up and independently interpreted by me. See ED Course for details.    CBC shows normal white blood cell count at 9.1 with differential including low lymphocytes which may represent  viral infection.  No bandemia.  Additionally there is mild anemia with hemoglobin of 10.5.  Platelets are normal.    CMP shows elevated LFTs without any other significant abnormality.  COVID, flu, RSV are negative.  Urinalysis is without evidence of infection.    CT scan does show evidence of groundglass opacities with tree-in-bud opacities that may represent infectious or inflammatory process.  Additionally there is note of a left subclavian thrombus.  This was not present on the imaging 4 months ago however in retrospect is identified on imaging of 6/25/2025.  Finally there are findings related to the patient's known cancer.    Given the subclavian thrombus and patient's lack of anticoagulation she was started on heparin.  Additionally she was given broad-spectrum antibiotics for concern of infection in the setting of chemotherapy and potential neutropenia at presentation.  I do recommend admission to the hospital and the patient is agreeable.  The case was discussed with Dr. Aguilar, hospitalist who will admit the patient for further care.    Diagnoses as of 08/08/25 0654   Fever and chills   Thrombosis of left subclavian vein   Pneumonia of left lung due to infectious organism, unspecified part of lung            Rosa M Clarke, DO  08/08/25 0654

## 2025-08-08 NOTE — DOCUMENTATION CLARIFICATION NOTE
"    PATIENT:               FARSHAD MURRY  ACCT #:                  [id number]  MRN:                       [id number]  :                       1955  ADMIT DATE:       2025 12:42 PM  DISCH DATE:        2025 11:05 AM  RESPONDING PROVIDER #:        [id number]          PROVIDER RESPONSE TEXT:    Non-infectious SIRS 2/2 neutropenic fever from chemotherapy with acute hepatic organ dysfunction of transaminitis    CDI QUERY TEXT:    Clarification        Instruction:    Based on your assessment of the patient and the clinical information, please provide the requested documentation by clicking on the appropriate radio button and enter any additional information if prompted.    Question: Please further clarify if there is a diagnosis related to the clinical information    When answering this query, please exercise your independent professional judgment. The fact that a question is being asked, does not imply that any particular answer is desired or expected.    The patient's clinical indicators include:  Clinical Information:  69 y.o. female with PMHx of pancreatic cancer on chemotherapy, diabetes and HTN who presented from her infusion center with fever    Clinical Indicators:    25 Vitals:  T: 38.6  37.7  HR  122  119  103  96    LABS  - :  WBC:  9.4  6.7  5.6  6.5  ALT:  76  56  45  36  AST: 266  113  61  38  BILIRUBIN:  2.1  1.5  0.8  2.5  NEUTROPHIL %: 84.3  64.1  58.4  NEUTROPHIL ABS: 7.92  3.61  3.80    25   Blood cultures  negative  25  Urine culture negative  25  Nare culture:negative     ID Consult:\"#Sepsis like syndrome  Fever, tachycardic.  No obvious source of infection #Transaminitis #Immunosuppressed Pancreatic cancer on chemotherapy Last chemo 2 weeks ago. #Hyperbilirubinemia LFTs and bilirubin was normal on .\"     Oncology Consult: \"neutropenic fever ... This morning patient feeling much better still complains of weakness fatigue no nausea vomiting today " "WBC count 5.6, hemoglobin 8.3, platelets 187 with 64% neutrophil and absolute neutrophil count 3.6. ... febrile illness, continue IV antibiotic, watch blood culture, ANC 3.6 ... Metastatic pancreatic cancer receiving second line systemic chemotherapy including Abraxane and gemcitabine ... Transaminitis could be due to chemotherapy\"    8/2 Discharge Summary: \"She has had no fever in 24 hours since admission. ... Suspect fever of malignancy. Transaminases are now back down to normal.\"      Treatment:  7/30/25:   Infectious Disease consult  7/30-8/1: IV Vancomycin  7/30-8/2: IV Zosyn  8/1: Oncology Consult  Serial LFTs      Risk Factors:  Immunosuppressed, chemotherapy, pancreatic cancer  Options provided:  -- Non-infectious SIRS 2/2 neutropenic fever from chemotherapy with acute hepatic organ dysfunction of transaminitis  -- Non-infectious SIRS 2/2 pancreatic cancer with acute hepatic organ dysfunction of transaminitis  -- Other - I will add my own diagnosis  -- Refer to Clinical Documentation Reviewer    Query created by: Maribel Kumar on 8/8/2025 10:06 AM      Electronically signed by:  MARIXA MONTANA MD 8/8/2025 11:09 AM          "

## 2025-08-08 NOTE — PROGRESS NOTES
Medication Adjustment    The following medication(s) was/were adjusted for Nanci Colón per protocol/policy due to indication for the medication , of pneumonia    Medication(s) adjusted:   Zosyn 3.375 gm Q6H changed to Zosyn 4.5 gms Q6H with calc crcl= 51.2 ml/min    Tara Shelton, PharmD

## 2025-08-08 NOTE — PROGRESS NOTES
"Nanci Colón is a 69 y.o. female on day 0 of admission presenting with Fever and chills.    Subjective   Patient seen awake in bed. No further reported fever as per patient.        Objective     Physical Exam  Gen: Adult female , NAD, age appropriate.  HEENT: Normocephalic, atraumatic, good dentition, no oral lesions appreciated  Neck: No cervical lymphadenopathy appreciated, no thyroid enlargement appreciated  CV: No limb edema appreciated  Resp: No increased work of breathing, no wheezes appreciated  Abdomen: nondistended  MSK: No joint swelling appreciated, full ROM  Psych: appropriate mood and affect      Last Recorded Vitals  Blood pressure 92/60, pulse 86, temperature 37.1 °C (98.7 °F), temperature source Temporal, resp. rate 16, height 1.626 m (5' 4\"), weight 57.2 kg (126 lb), SpO2 96%.  Intake/Output last 3 Shifts:  No intake/output data recorded.      Relevant Results  Scheduled medications  Scheduled Medications[1]  Continuous medications  Continuous Medications[2]  PRN medications  PRN Medications[3]    Results for orders placed or performed during the hospital encounter of 08/08/25 (from the past 24 hours)   CBC and Auto Differential   Result Value Ref Range    WBC 9.1 4.4 - 11.3 x10*3/uL    nRBC 0.0 0.0 - 0.0 /100 WBCs    RBC 3.08 (L) 4.00 - 5.20 x10*6/uL    Hemoglobin 10.5 (L) 12.0 - 16.0 g/dL    Hematocrit 31.0 (L) 36.0 - 46.0 %     (H) 80 - 100 fL    MCH 34.1 (H) 26.0 - 34.0 pg    MCHC 33.9 32.0 - 36.0 g/dL    RDW 15.9 (H) 11.5 - 14.5 %    Platelets 202 150 - 450 x10*3/uL    Neutrophils % 95.8 40.0 - 80.0 %    Immature Granulocytes %, Automated 0.2 0.0 - 0.9 %    Lymphocytes % 1.9 13.0 - 44.0 %    Monocytes % 1.9 2.0 - 10.0 %    Eosinophils % 0.1 0.0 - 6.0 %    Basophils % 0.1 0.0 - 2.0 %    Neutrophils Absolute 8.74 (H) 1.20 - 7.70 x10*3/uL    Immature Granulocytes Absolute, Automated 0.02 0.00 - 0.70 x10*3/uL    Lymphocytes Absolute 0.17 (L) 1.20 - 4.80 x10*3/uL    Monocytes Absolute 0.17 " 0.10 - 1.00 x10*3/uL    Eosinophils Absolute 0.01 0.00 - 0.70 x10*3/uL    Basophils Absolute 0.01 0.00 - 0.10 x10*3/uL   Comprehensive Metabolic Panel   Result Value Ref Range    Glucose 134 (H) 74 - 99 mg/dL    Sodium 133 (L) 136 - 145 mmol/L    Potassium 4.0 3.5 - 5.3 mmol/L    Chloride 100 98 - 107 mmol/L    Bicarbonate 24 21 - 32 mmol/L    Anion Gap 13 10 - 20 mmol/L    Urea Nitrogen 13 6 - 23 mg/dL    Creatinine 0.92 0.50 - 1.05 mg/dL    eGFR 68 >60 mL/min/1.73m*2    Calcium 8.7 8.6 - 10.3 mg/dL    Albumin 3.3 (L) 3.4 - 5.0 g/dL    Alkaline Phosphatase 899 (H) 33 - 136 U/L    Total Protein 6.5 6.4 - 8.2 g/dL     (H) 9 - 39 U/L    Bilirubin, Total 2.5 (H) 0.0 - 1.2 mg/dL    ALT 45 7 - 45 U/L   Lactate   Result Value Ref Range    Lactate 1.4 0.4 - 2.0 mmol/L   Blood Culture    Specimen: Peripheral Venipuncture; Blood culture   Result Value Ref Range    Blood Culture Loaded on Instrument - Culture in progress    Blood Culture    Specimen: Peripheral Venipuncture; Blood culture   Result Value Ref Range    Blood Culture Loaded on Instrument - Culture in progress    Sars-CoV-2, Influenza A/B and RSV PCR   Result Value Ref Range    Coronavirus 2019, PCR Not Detected Not Detected    Flu A Result Not Detected Not Detected    Flu B Result Not Detected Not Detected    RSV PCR Not Detected Not Detected   Urinalysis with Reflex Culture and Microscopic   Result Value Ref Range    Color, Urine Yellow Light-Yellow, Yellow, Dark-Yellow    Appearance, Urine Clear Clear    Specific Gravity, Urine 1.021 1.005 - 1.035    pH, Urine 6.5 5.0, 5.5, 6.0, 6.5, 7.0, 7.5, 8.0    Protein, Urine NEGATIVE NEGATIVE, 10 (TRACE), 20 (TRACE) mg/dL    Glucose, Urine Normal Normal mg/dL    Blood, Urine NEGATIVE NEGATIVE mg/dL    Ketones, Urine NEGATIVE NEGATIVE mg/dL    Bilirubin, Urine NEGATIVE NEGATIVE mg/dL    Urobilinogen, Urine Normal Normal mg/dL    Nitrite, Urine NEGATIVE NEGATIVE    Leukocyte Esterase, Urine NEGATIVE NEGATIVE   aPTT  - baseline   Result Value Ref Range    aPTT 28 26 - 36 seconds   POCT GLUCOSE   Result Value Ref Range    POCT Glucose 183 (H) 74 - 99 mg/dL   MRSA Surveillance for Vancomycin De-escalation, PCR    Specimen: Anterior Nares; Swab   Result Value Ref Range    MRSA PCR Not Detected Not Detected   Heparin Assay, UFH   Result Value Ref Range    Heparin Unfractionated 0.9 See Comment Below for Therapeutic Ranges IU/mL   POCT GLUCOSE   Result Value Ref Range    POCT Glucose 150 (H) 74 - 99 mg/dL   POCT GLUCOSE   Result Value Ref Range    POCT Glucose 138 (H) 74 - 99 mg/dL   Heparin Assay, UFH   Result Value Ref Range    Heparin Unfractionated 0.8 See Comment Below for Therapeutic Ranges IU/mL     *Note: Due to a large number of results and/or encounters for the requested time period, some results have not been displayed. A complete set of results can be found in Results Review.       Imaging  CT chest abdomen pelvis w IV contrast  Result Date: 8/8/2025  Patchy ground-glass opacities throughout the lungs have worsened compared to prior imaging along with new tree-in-bud nodular opacities in the lingula. Findings are concerning for an infectious or inflammatory process. Metastatic disease is also not entirely excluded in the setting of known malignancy.   A filling defect is present in the left subclavian vein most compatible with thrombus. This was also present on prior imaging 06/24/2025.   Stable appearance of the pancreatic head mass, which is poorly visualized on CT corresponding to known pancreatic malignancy. Redemonstrated CBD stent with persistent intrahepatic pneumobilia and biliary dilatation.   Focal narrowing of the splenic vein which is new compared to prior imaging likely related to overlying mass effect.   Small to moderate volume ascites, increased compared to prior imaging.   Underdistention versus wall thickening diffusely throughout small bowel. Correlate for symptoms of enteritis.     MACRO: Dustin  Finkelstein discussed the significance and urgency of this critical finding by telephone with  BO DIAS on 8/8/2025 at 5:54 am. (**-RCF-**) Findings:  See findings.     Signed by: Evan Finkelstein 8/8/2025 5:55 AM Dictation workstation:   [id number]      Cardiology, Vascular, and Other Imaging  No other imaging results found for the past 7 days           This patient currently has cardiac telemetry ordered; if you would like to modify or discontinue the telemetry order, click here to go to the orders activity to modify/discontinue the order.                 Assessment/Plan   Assessment & Plan  Fever and chills    Hypothyroidism    Type 2 diabetes mellitus without complication, without long-term current use of insulin    Bilateral pneumonia    Subclavian vein thrombosis, left    Malignant neoplasm of head of pancreas (Multi)        Fever in immunocompromised pt, Bilateral pneumonia:  CT shows worsening ground-glass opacities throughout the lungs and new tree-in-bud nodular opacities in the lingula.  Pt does not meet sepsis criteria at time of admission.  ID consulted  Continue Vanc and Zosyn  Bcx x2 pending.   Covid and Flu negative. Urine strep and legionella ordered.  Monitor respiratory status.     L subclavian vein thrombus:  Present on prior imaging 6/24/25, but not noticed until today.  Heparin drip started  Hematology/oncology consulted.  Anticipate switching to DOAC at discharge and likely continuing lifelong.     DM2:  SSI ordered while inpatient.  Accuchecks, hypoglycemic protocol.     Hypothyroidism:  Continue home Synthroid.     Pancreatic cancer:  ALK phos elevated at 899, AST elevated at 143. Pt has known malignancy. Continue to monitor  Continue home Creon     Diet: Consistent carb  DVT Prophylaxis: Heparin drip  Code Status: Full Code   Case Discussed With: ED provider  Additional Sources Reviewed: ED note day of admission; past admission notes, oncology notes                    Andrea Case,  MD             [1] insulin lispro, 0-5 Units, subcutaneous, TID AC  levothyroxine, 100 mcg, oral, Daily  lipase-protease-amylase, 2 capsule, oral, TID  morphine CR, 15 mg, oral, q12h LAMONTE  OLANZapine, 5 mg, oral, Nightly  pantoprazole, 40 mg, oral, Daily before breakfast   Or  pantoprazole, 40 mg, intravenous, Daily before breakfast  piperacillin-tazobactam, 4.5 g, intravenous, q6h  potassium chloride CR, 20 mEq, oral, Daily  [START ON 8/9/2025] vancomycin, 1,250 mg, intravenous, q24h    [2] heparin, 0-4,500 Units/hr, Last Rate: 900 Units/hr (08/08/25 1119)    [3] PRN medications: acetaminophen, dextrose, dextrose, glucagon, glucagon, guaiFENesin, heparin, melatonin, ondansetron **OR** ondansetron, vancomycin

## 2025-08-08 NOTE — PROGRESS NOTES
Vancomycin Dosing by Pharmacy- INITIAL    Nanci Colón is a 69 y.o. year old female who Pharmacy has been consulted for vancomycin dosing for pneumonia. Based on the patient's indication and renal status this patient will be dosed based on a goal AUC of 400-600.     Renal function is currently stable.    Visit Vitals  BP 82/53   Pulse 85   Temp 36.9 °C (98.5 °F)   Resp 10        Lab Results   Component Value Date    CREATININE 0.92 2025    CREATININE 0.76 2025    CREATININE 0.80 2025    CREATININE 0.81 2025        Patient weight is as follows:   Vitals:    25 0249   Weight: 57.2 kg (126 lb)       Cultures:  No results found for the encounter in last 14 days.        No intake/output data recorded.  I/O during current shift:  No intake/output data recorded.    Temp (24hrs), Av.9 °C (98.5 °F), Min:36.9 °C (98.5 °F), Max:36.9 °C (98.5 °F)         Assessment/Plan     Patient has already been given a loading dose of 1500 mg.  Will initiate vancomycin maintenance, 1250 mg every 24 hours.    This dosing regimen is predicted by [employer] to result in the following pharmacokinetic parameters:  Loading dose: N/A  Regimen: 1250 mg IV every 24 hours.  Start time: 05:44 on 2025  Exposure target: AUC24 (range) 400-600 mg/L.hr   MQS93-90: 408 mg/L.hr  AUC24,ss: 520 mg/L.hr  Probability of AUC24 > 400: 95 %  Ctrough,ss: 14.7 mg/L  Probability of Ctrough,ss > 20: 12 %    Follow-up level will be ordered on 8/10 at 0500 unless clinically indicated sooner.  Will continue to monitor renal function daily while on vancomycin and order serum creatinine at least every 48 hours if not already ordered.  Follow for continued vancomycin needs, clinical response, and signs/symptoms of toxicity.       JERARDO MCCALL

## 2025-08-08 NOTE — H&P
[facility] - GENERAL MEDICINE HISTORY AND PHYSICAL    HISTORY OF PRESENT ILLNESS     History Obtained From (Primary Source): Patient  Collateral History (Secondary Sources): D/w ED physician    History Of Present Illness (HPI):  Nanci Colón is a 69 y.o. female with PMHx s/f pancreatic cancer on chemotherapy, DM2, HTN presenting with fever. Pt was just discharged about a week ago after being admitted for fever. Infectious worked was negative and her symptoms were attributed to fever of malignancy. Pt presents again today because she started having severe fevers in the middle of the night. They woke her from sleep and were accompanied by chills and diaphoresis. She also felt a little lightheaded and has been dealing with constipation over the past few days. She took her temperature and it was 103 degrees F, which has since improved. She gets chemotherapy every two weeks normally, but last chemotherapy was three weeks ago due to her fevers. No nausea, vomiting, shortness of breath, cough, sputum production, urinary issues, chest pain, or abdominal pain.    ED Course:   Vitals on presentation: T 36.9 °C (98.5 °F)  HR (!) 120 -> 99 bpm  BP 77/58  RR 19  O2 100 %    Labs:   CBC with WBC 9.1, Hgb 10.5, Plts 202, absolute neutrophils 8.74  CMP with glucose 134, Na 133, K 4.0, BUN 13, sCr 0.92, alk phos 899, ALT 45, , bilirubin 2.5. Magnesium 1.90.   Lactate 1.4  Flu, RSV, and COVID PCR negative  UA unremarkable  Imaging -   CT c/a/p w contrast - Patchy ground-glass opacities throughout the lungs have worsened   compared to prior imaging along with new tree-in-bud nodular   opacities in the lingula. Findings are concerning for an infectious   or inflammatory process. Metastatic disease is also not entirely   excluded in the setting of known malignancy.   A filling defect is present in the left subclavian vein most   compatible with thrombus. This was also present on prior imaging    06/24/2025.  Stable appearance of the pancreatic head mass, which is poorly   visualized on CT corresponding to known pancreatic malignancy.   Redemonstrated CBD stent with persistent intrahepatic pneumobilia and   biliary dilatation.   Focal narrowing of the splenic vein which is new compared to prior   imaging likely related to overlying mass effect.   Small to moderate volume ascites, increased compared to prior imaging.   Underdistention versus wall thickening diffusely throughout small   bowel. Correlate for symptoms of enteritis.   agree with radiology interpretation(s):   Interventions: Vanc and Zosyn given, Heparin drip started. Zofran 4 mg IV, morphine 4 mg IV, LR 1 L bolus, Pt admitted for further care.    12-point ROS reviewed and found to be negative aside from aforementioned positives in HPI and/or noted in dedicated ROS section below.     Decision made to admit the patient to the hospitalist service after evaluation of the patient, review of the above, and discussion with ED provider.     LABS AND IMAGING     I have personally reviewed the following labs from 08/08/25: CBC, CMP, Magnesium, UA, Lactate, and Viral / Respiratory Panel  I have personally reviewed the following imaging studies from 08/08/25: CT c/a/p w contrast, with my personal interpretations as documented in ED course above.   I have personally reviewed the patient's vitals on presentation to the ED and any/all changes through to time of admission (on 08/08/25).     ED Course (From ED Provider):  Diagnoses as of 08/08/25 0636   Fever and chills     Relevant Results  Results for orders placed or performed during the hospital encounter of 08/08/25 (from the past 24 hours)   CBC and Auto Differential   Result Value Ref Range    WBC 9.1 4.4 - 11.3 x10*3/uL    nRBC 0.0 0.0 - 0.0 /100 WBCs    RBC 3.08 (L) 4.00 - 5.20 x10*6/uL    Hemoglobin 10.5 (L) 12.0 - 16.0 g/dL    Hematocrit 31.0 (L) 36.0 - 46.0 %     (H) 80 - 100 fL    MCH 34.1  (H) 26.0 - 34.0 pg    MCHC 33.9 32.0 - 36.0 g/dL    RDW 15.9 (H) 11.5 - 14.5 %    Platelets 202 150 - 450 x10*3/uL    Neutrophils % 95.8 40.0 - 80.0 %    Immature Granulocytes %, Automated 0.2 0.0 - 0.9 %    Lymphocytes % 1.9 13.0 - 44.0 %    Monocytes % 1.9 2.0 - 10.0 %    Eosinophils % 0.1 0.0 - 6.0 %    Basophils % 0.1 0.0 - 2.0 %    Neutrophils Absolute 8.74 (H) 1.20 - 7.70 x10*3/uL    Immature Granulocytes Absolute, Automated 0.02 0.00 - 0.70 x10*3/uL    Lymphocytes Absolute 0.17 (L) 1.20 - 4.80 x10*3/uL    Monocytes Absolute 0.17 0.10 - 1.00 x10*3/uL    Eosinophils Absolute 0.01 0.00 - 0.70 x10*3/uL    Basophils Absolute 0.01 0.00 - 0.10 x10*3/uL   Comprehensive Metabolic Panel   Result Value Ref Range    Glucose 134 (H) 74 - 99 mg/dL    Sodium 133 (L) 136 - 145 mmol/L    Potassium 4.0 3.5 - 5.3 mmol/L    Chloride 100 98 - 107 mmol/L    Bicarbonate 24 21 - 32 mmol/L    Anion Gap 13 10 - 20 mmol/L    Urea Nitrogen 13 6 - 23 mg/dL    Creatinine 0.92 0.50 - 1.05 mg/dL    eGFR 68 >60 mL/min/1.73m*2    Calcium 8.7 8.6 - 10.3 mg/dL    Albumin 3.3 (L) 3.4 - 5.0 g/dL    Alkaline Phosphatase 899 (H) 33 - 136 U/L    Total Protein 6.5 6.4 - 8.2 g/dL     (H) 9 - 39 U/L    Bilirubin, Total 2.5 (H) 0.0 - 1.2 mg/dL    ALT 45 7 - 45 U/L   Lactate   Result Value Ref Range    Lactate 1.4 0.4 - 2.0 mmol/L   Sars-CoV-2, Influenza A/B and RSV PCR   Result Value Ref Range    Coronavirus 2019, PCR Not Detected Not Detected    Flu A Result Not Detected Not Detected    Flu B Result Not Detected Not Detected    RSV PCR Not Detected Not Detected   Urinalysis with Reflex Culture and Microscopic   Result Value Ref Range    Color, Urine Yellow Light-Yellow, Yellow, Dark-Yellow    Appearance, Urine Clear Clear    Specific Gravity, Urine 1.021 1.005 - 1.035    pH, Urine 6.5 5.0, 5.5, 6.0, 6.5, 7.0, 7.5, 8.0    Protein, Urine NEGATIVE NEGATIVE, 10 (TRACE), 20 (TRACE) mg/dL    Glucose, Urine Normal Normal mg/dL    Blood, Urine NEGATIVE  NEGATIVE mg/dL    Ketones, Urine NEGATIVE NEGATIVE mg/dL    Bilirubin, Urine NEGATIVE NEGATIVE mg/dL    Urobilinogen, Urine Normal Normal mg/dL    Nitrite, Urine NEGATIVE NEGATIVE    Leukocyte Esterase, Urine NEGATIVE NEGATIVE     *Note: Due to a large number of results and/or encounters for the requested time period, some results have not been displayed. A complete set of results can be found in Results Review.      Imaging  CT chest abdomen pelvis w IV contrast  Result Date: 8/8/2025  Patchy ground-glass opacities throughout the lungs have worsened compared to prior imaging along with new tree-in-bud nodular opacities in the lingula. Findings are concerning for an infectious or inflammatory process. Metastatic disease is also not entirely excluded in the setting of known malignancy.   A filling defect is present in the left subclavian vein most compatible with thrombus. This was also present on prior imaging 06/24/2025.   Stable appearance of the pancreatic head mass, which is poorly visualized on CT corresponding to known pancreatic malignancy. Redemonstrated CBD stent with persistent intrahepatic pneumobilia and biliary dilatation.   Focal narrowing of the splenic vein which is new compared to prior imaging likely related to overlying mass effect.   Small to moderate volume ascites, increased compared to prior imaging.   Underdistention versus wall thickening diffusely throughout small bowel. Correlate for symptoms of enteritis.     MACRO: Evan Finkelstein discussed the significance and urgency of this critical finding by telephone with  BO DIAS on 8/8/2025 at 5:54 am. (**-RCF-**) Findings:  See findings.     Signed by: Evan Finkelstein 8/8/2025 5:55 AM Dictation workstation:   [id number]      Cardiology, Vascular, and Other Imaging  No other imaging results found for the past 2 days       PAST HISTORIES AND ALLERGIES     Past Medical History  She has a past medical history of Diabetes (Multi), History of  Chiari malformation, Hyperlipidemia, Hypertension, Hypothyroidism, and Pancreatic cancer (Multi).    Surgical History  She has a past surgical history that includes Appendectomy; Knee surgery; and Foot surgery.     Social History  She reports that she has never smoked. She has never been exposed to tobacco smoke. She has never used smokeless tobacco. She reports that she does not drink alcohol and does not use drugs.    Family History  Family History[1]    Allergies  Emend [fosaprepitant] and Shellfish containing products    MEDICATIONS     Scheduled Medications:  Scheduled Medications[2]  Continuous Medications:  Continuous Medications[3]  PRN Medications:  PRN Medications[4]     REVIEW OF SYSTEMS     Review of Systems   Constitutional:  Positive for chills, diaphoresis and fever. Negative for activity change, appetite change and fatigue.   HENT:  Negative for congestion, ear pain, rhinorrhea, sinus pain and sore throat.    Respiratory:  Negative for apnea, cough, chest tightness, shortness of breath, wheezing and stridor.    Cardiovascular:  Negative for chest pain, palpitations and leg swelling.   Gastrointestinal:  Positive for constipation. Negative for abdominal distention, abdominal pain, diarrhea, nausea and vomiting.   Genitourinary:  Negative for difficulty urinating, dysuria, flank pain, frequency, hematuria and urgency.   Musculoskeletal:  Negative for arthralgias, back pain, gait problem, joint swelling and myalgias.   Skin:  Negative for color change, pallor, rash and wound.   Neurological:  Positive for light-headedness. Negative for dizziness, syncope, weakness, numbness and headaches.   Psychiatric/Behavioral:  Negative for agitation, behavioral problems, confusion and decreased concentration. The patient is not nervous/anxious.    All other systems reviewed and are negative.      OBJECTIVE     Last Recorded Vitals  BP 96/64   Pulse 99   Temp 36.9 °C (98.5 °F)   Resp 18   Wt 57.2 kg (126 lb)    SpO2 95%      Physical Exam:  Vital signs and nursing notes reviewed.   Constitutional: Pleasant and cooperative. Laying in bed in no acute distress. Conversant.   Skin: Warm and dry; no obvious lesions, rashes, or jaundice. Pale  Eyes: EOMI. Anicteric sclera.   ENT: Mucous membranes moist; no obvious injury or deformity appreciated.   Head and Neck: Normocephalic, atraumatic. ROM preserved. Trachea midline. No appreciable JVD.   Respiratory: Nonlabored on RA. Lungs clear to auscultation bilaterally without obvious adventitious sounds. Chest rise is equal.  Cardiovascular: RRR. No gross murmur, gallop, or rub. Extremities are warm and well-perfused with good capillary refill (< 3 seconds). No chest wall tenderness.   GI: Abdomen soft, nontender, nondistended. No obvious organomegaly appreciated. Bowel sounds are present.  : No CVA tenderness.   MSK: No gross abnormalities appreciated. No limitations to AROM/PROM appreciated.   Extremities: No cyanosis, edema, or clubbing evident. Neurovascularly intact.   Neuro: A&Ox3. CN 2-12 grossly intact. Able to respond to questions appropriately and clearly. No acute focal neurologic deficits appreciated.  Psych: Appropriate mood and behavior.    ASSESSMENT AND PLAN   Assessment/Plan     69 y.o. female with PMHx s/f pancreatic cancer on chemotherapy, DM2, HTN presenting with fever.    Plan:  Admit to SDU.    Fever in immunocompromised pt, Bilateral pneumonia:  CT shows worsening ground-glass opacities throughout the lungs and new tree-in-bud nodular opacities in the lingula.  Pt does not meet sepsis criteria at time of admission.  Vanc and Zosyn given.  Infectious disease consulted.  Bcx x2 pending. Urine strep and legionella ordered.  Monitor respiratory status.    L subclavian vein thrombus:  Present on prior imaging 6/24/25, but not noticed until today.  Heparin drip started  Hematology/oncology consulted.  Anticipate switching to DOAC at discharge and likely continuing  lifelong.    DM2:  SSI ordered while inpatient.  Accuchecks, hypoglycemic protocol.    Hypothyroidism:  Continue home Synthroid.    Pancreatic cancer:  ALK phos elevated at 899, AST elevated at 143. Pt has known malignancy. Continue to monitor  Continue home Creon    Diet: Consistent carb  DVT Prophylaxis: Heparin drip  Code Status: Full Code   Case Discussed With: ED provider  Additional Sources Reviewed: ED note day of admission; past admission notes, oncology notes    Anticipated Length of Stay (LOS): Patient will require two-plus midnight stay for further evaluation and management of the above.      DO Hope Choudhary dictation software was used to dictate this note and thus there may be minor errors in translation/transcription including garbled speech or misspellings. Please contact for clarification if needed.       [1]   Family History  Problem Relation Name Age of Onset    Cancer Mother      Heart disease Father     [2] heparin, 80 Units/kg, intravenous, Once  levothyroxine, 100 mcg, oral, Daily  lipase-protease-amylase, 2 capsule, oral, TID  OLANZapine, 5 mg, oral, Nightly  potassium chloride CR, 20 mEq, oral, Daily  vancomycin, 1.5 g, intravenous, Once    [3] heparin, 0-4,500 Units/hr    [4] PRN medications: heparin, morphine

## 2025-08-08 NOTE — CONSULTS
Infectious Disease Inpatient Consult    Inpatient consult to Infectious Diseases  Consult performed by: Oscar Gutierrez MD  Consult ordered by: José Miguel Aguilar,         Primary MD: Diana Blunt DO    Reason For Consult  Fever      Assessment/Plan:    #Fever   #Lung groundglass opacities  Patchy groundglass opacities seen on the CT chest.  Viral versus PJP versus toxo.  Patient does have a cat at home but  takes care of the litter box.  Check extended respiratory pathogen panel, Fungitell.  MRSA PCR, COVID, flu, RSV negative.    #Ascites  Seen on the CT scan.  No abdominal pain on physical exam.  No concerns for peritonitis    #Immunosuppressed  Pancreatic cancer on chemotherapy     #Transaminitis   #Hyperbilirubinemia   Intermittently have this likely due to pancreatic cancer.  No abdominal pain    Recommendations:    -Continue Zosyn every 6 hours  -Continue vancomycin.  Pharmacy to dose  -Monitor blood cultures x 2  -Check extended respiratory pathogen panel  -Check Fungitell  -Check toxo antibody  -Check Pro-Jamil  -Strep pneumo antigen, Legionella urine antigen pending  -Thank you for consult.  Will continue to follow    Oscar Gutierrez MD  Date of service: 8/8/2025  Time of service: 8:21 AM      History Of Present Illness  Nanci Colón is a 69 y.o. female with PMHx of pancreatic CA on chemo on [place] presented to the hospital for fever.  Patient states that she woke up with a fever of 103    On admission, afebrile, tachycardic, RR 19, BP 77/58.  Labs showed WBC count 9.1, Hb 10.5, platelet 202, potassium 4, creatinine 0.92, , ALT 45, total bilirubin 2.5.  Patient tested negative for COVID, flu, RSV.  Blood cultures drawn and patient started on vancomycin and Zosyn.    CT chest shows patchy groundglass opacities throughout the lung which have worsened, infectious versus inflammatory process, small to moderate volume ascites     Past Medical History  She has a past medical history of  Diabetes (Multi), History of Chiari malformation, Hyperlipidemia, Hypertension, Hypothyroidism, and Pancreatic cancer (Multi).    Surgical History  She has a past surgical history that includes Appendectomy; Knee surgery; and Foot surgery.     Social History     Occupational History    Not on file   Tobacco Use    Smoking status: Never     Passive exposure: Never    Smokeless tobacco: Never   Vaping Use    Vaping status: Never Used   Substance and Sexual Activity    Alcohol use: Never    Drug use: Never    Sexual activity: Defer     Travel History   Travel since 07/08/25    No documented travel since 07/08/25              Family History  Family History[1]  Allergies  Emend [fosaprepitant] and Shellfish containing products     Immunization History   Administered Date(s) Administered    Flu vaccine (IIV4), preservative free *Check age/dose* 10/04/2016, 09/15/2017, 09/24/2018, 01/06/2020    Flu vaccine, trivalent, preservative free, age 6 months and greater (Fluarix/Fluzone/Flulaval) 12/07/2012, 10/13/2015    Influenza, seasonal, injectable 10/02/2013    Adán SARS-CoV-2 Vaccination 05/16/2021     Medications  Home medications:  Prescriptions Prior to Admission[2]  Current medications:  Scheduled medications  Scheduled Medications[3]  Continuous medications  Continuous Medications[4]  PRN medications  PRN Medications[5]    Review of Systems     Constitutional: Reports chills, fatigue, fever  HENT:  Denies ear discharge, ear pain, sore throat    Eyes:  Denies photophobia, eye drainage  Respiratory:  Denies cough, choking, chest tightness, shortness of breath  Cardiovascular:  Denies chest pain, palpitations  Gastrointestinal:  Denies abdominal pain, diarrhea, nausea and vomiting.   Genitourinary:  Denies dysuria, flank pain, frequency  Musculoskeletal:  Denies joint pain  Skin:  Denies ulcer and rash   Neurological:  Denies light-headedness, numbness and headaches.    Objective  Range of Vitals (last 24 hours)  Heart  "Rate:  []   Temperature:  [36.9 °C (98.5 °F)]   Respirations:  [15-19]   BP: ()/(53-64)   Height:  [162.6 cm (5' 4\")]   Weight:  [57.2 kg (126 lb)]   Pulse Ox:  [95 %-100 %]   Daily Weight  25 : 57.2 kg (126 lb)    Body mass index is 21.63 kg/m².     Physical Exam  /59   Pulse 96   Temp 36.9 °C (98.5 °F)   Resp 15   Ht 1.626 m (5' 4\")   Wt 57.2 kg (126 lb)   SpO2 95%   BMI 21.63 kg/m²   Temp (24hrs), Av.9 °C (98.5 °F), Min:36.9 °C (98.5 °F), Max:36.9 °C (98.5 °F)      General: alert, oriented, NAD  HEENT: No conjunctival pallor, no scleral icterus  Neck: No LAD, No JVD  Lungs: bilaterally clear to auscultation, no wheezing  Chest: R ACW port  Abdomen: soft, non tender, non distended, BS+  Neuro: AAO x 3, CN grossly intact  Extremities: no edema, no cyanosis  Skin: No rashes or ulcers  MSK: No joint inflammation     Relevant Results    Labs  Results from last 72 hours   Lab Units 25  0323   WBC AUTO x10*3/uL 9.1   HEMOGLOBIN g/dL 10.5*   HEMATOCRIT % 31.0*   PLATELETS AUTO x10*3/uL 202   NEUTROS PCT AUTO % 95.8   LYMPHS PCT AUTO % 1.9   MONOS PCT AUTO % 1.9   EOS PCT AUTO % 0.1     Results from last 72 hours   Lab Units 25  0323   SODIUM mmol/L 133*   POTASSIUM mmol/L 4.0   CHLORIDE mmol/L 100   CO2 mmol/L 24   BUN mg/dL 13   CREATININE mg/dL 0.92   GLUCOSE mg/dL 134*   CALCIUM mg/dL 8.7   ANION GAP mmol/L 13   EGFR mL/min/1.73m*2 68     Results from last 72 hours   Lab Units 25  0323   ALK PHOS U/L 899*   BILIRUBIN TOTAL mg/dL 2.5*   PROTEIN TOTAL g/dL 6.5   ALT U/L 45   AST U/L 143*   ALBUMIN g/dL 3.3*     Estimated Creatinine Clearance: 51.2 mL/min (by C-G formula based on SCr of 0.92 mg/dL).  No results found for: \"CRP\", \"SEDRATE\"  No results found for: \"HIV1X2\", \"HIVCONF\", \"PECYFT0KY\"  Hepatitis C AB   Date Value Ref Range Status   2024 Nonreactive Nonreactive Final     Comment:     Results from patients taking biotin supplements or receiving high-dose " biotin therapy should be interpreted with caution due to possible interference with this test. Providers may contact their local laboratory for further information.       Microbiology  No results found for the last 14 days.      Imaging  CT chest abdomen pelvis w IV contrast  Result Date: 8/8/2025  Patchy ground-glass opacities throughout the lungs have worsened compared to prior imaging along with new tree-in-bud nodular opacities in the lingula. Findings are concerning for an infectious or inflammatory process. Metastatic disease is also not entirely excluded in the setting of known malignancy.   A filling defect is present in the left subclavian vein most compatible with thrombus. This was also present on prior imaging 06/24/2025.   Stable appearance of the pancreatic head mass, which is poorly visualized on CT corresponding to known pancreatic malignancy. Redemonstrated CBD stent with persistent intrahepatic pneumobilia and biliary dilatation.   Focal narrowing of the splenic vein which is new compared to prior imaging likely related to overlying mass effect.   Small to moderate volume ascites, increased compared to prior imaging.   Underdistention versus wall thickening diffusely throughout small bowel. Correlate for symptoms of enteritis.     MACRO: Evan Finkelstein discussed the significance and urgency of this critical finding by telephone with  BO DIAS on 8/8/2025 at 5:54 am. (**-RCF-**) Findings:  See findings.     Signed by: Evan Finkelstein 8/8/2025 5:55 AM Dictation workstation:   [id number]    US abdomen limited liver  Result Date: 7/31/2025  Study is limited by interfering bowel gas with pancreas and distal common bile duct obscured.   Common bile duct stent is partially visible. Pneumobilia is present but the intrahepatic ducts do not appear dilated.   Ascites, new since the CT in June   MACRO: None.   Signed by: Regla Brewster 7/31/2025 1:03 PM Dictation workstation:   [id number]    XR chest  2 views  Result Date: 7/30/2025  No evidence of consolidating infiltrate or effusion. Signed by Ricky Hayward MD             [1]   Family History  Problem Relation Name Age of Onset    Cancer Mother      Heart disease Father     [2] (Not in a hospital admission)  [3] insulin lispro, 0-5 Units, subcutaneous, TID AC  levothyroxine, 100 mcg, oral, Daily  lipase-protease-amylase, 2 capsule, oral, TID  morphine CR, 15 mg, oral, q12h LAMONTE  OLANZapine, 5 mg, oral, Nightly  pantoprazole, 40 mg, oral, Daily before breakfast   Or  pantoprazole, 40 mg, intravenous, Daily before breakfast  piperacillin-tazobactam, 4.5 g, intravenous, q6h  potassium chloride CR, 20 mEq, oral, Daily  [4] heparin, 0-4,500 Units/hr, Last Rate: 1,000 Units/hr (08/08/25 0751)  [5] PRN medications: acetaminophen, dextrose, dextrose, glucagon, glucagon, guaiFENesin, heparin, melatonin, ondansetron **OR** ondansetron, vancomycin

## 2025-08-08 NOTE — CARE PLAN
Problem: Pain - Adult  Goal: Verbalizes/displays adequate comfort level or baseline comfort level  8/8/2025 1935 by Chioma Arnold RN  Outcome: Progressing  8/8/2025 1934 by Chioma Arnold RN  Outcome: Progressing     Problem: Safety - Adult  Goal: Free from fall injury  8/8/2025 1935 by Chioma Arnold RN  Outcome: Progressing  8/8/2025 1934 by Chioma Arnold RN  Outcome: Progressing     Problem: Discharge Planning  Goal: Discharge to home or other facility with appropriate resources  8/8/2025 1935 by Chioma Arnold RN  Outcome: Progressing  8/8/2025 1934 by Chioma Anrold RN  Outcome: Progressing     Problem: Chronic Conditions and Co-morbidities  Goal: Patient's chronic conditions and co-morbidity symptoms are monitored and maintained or improved  8/8/2025 1935 by Chioma Arnold RN  Outcome: Progressing  8/8/2025 1934 by Chioma Arnold RN  Outcome: Progressing     Problem: Nutrition  Goal: Nutrient intake appropriate for maintaining nutritional needs  8/8/2025 1935 by Chioma Arnold RN  Outcome: Progressing  8/8/2025 1934 by Chioma Arnold RN  Outcome: Progressing     Problem: Fall/Injury  Goal: Not fall by end of shift  Outcome: Progressing  Goal: Be free from injury by end of the shift  Outcome: Progressing  Goal: Verbalize understanding of personal risk factors for fall in the hospital  Outcome: Progressing     Problem: Pain  Goal: Takes deep breaths with improved pain control throughout the shift  Outcome: Progressing  Goal: Turns in bed with improved pain control throughout the shift  Outcome: Progressing  Goal: Walks with improved pain control throughout the shift  Outcome: Progressing  Goal: Performs ADL's with improved pain control throughout shift  Outcome: Progressing    The clinical goals for the shift include pt will maintain adequate comfort level throughout the shift

## 2025-08-08 NOTE — PROGRESS NOTES
Nanci Colón is a 69 y.o. female admitted for Fever and chills. Pharmacy reviewed the patient's [id number] medications and allergies for accuracy.    The list below reflects the PTA list prior to pharmacy medication history. A summary a changes to the [facility] medication list has been listed below. Please review each medication in order reconciliation for additional clarification and justification.    Source of information:  Discharge paperwork 08/02/25 and med rec done 07/31/25  No changes!  Medications added:    Medications modified:    Medications to be removed:    Medications of concern:      Prior to Admission Medications   Prescriptions Last Dose Informant Patient Reported? Taking?   OLANZapine (ZyPREXA) 5 mg tablet   No No   Sig: Take 1 tablet (5 mg) by mouth once daily at bedtime.   OneTouch Verio test strips   Yes No   Synthroid 100 mcg tablet   No No   Sig: Take 1 tablet (100 mcg) by mouth early in the morning.. Take on an empty stomach at the same time each day, either 30 to 60 minutes prior to breakfast   atorvastatin (Lipitor) 40 mg tablet   No No   Sig: Take 1 tablet (40 mg) by mouth every other day.   Patient not taking: Reported on 7/31/2025   docusate sodium (Colace) 100 mg capsule   No No   Sig: Take 1 capsule (100 mg) by mouth 2 times a day as needed for constipation (for hard stools).   lactulose 20 gram/30 mL oral solution   No No   Sig: Take 15 mL (10 g) by mouth 3 times a day as needed (constipation). Only take as many times as needed for a BM   lipase-protease-amylase (Creon) 24,000-76,000 -120,000 unit capsule   No No   Sig: Take 2 capsules by mouth 3 times daily (morning, midday, late afternoon). With meals and 1 with snacks for 8 per day   metFORMIN (Glucophage) 500 mg tablet   No No   Sig: Take 1 tablet (500 mg) by mouth 2 times daily (morning and late afternoon).   Patient taking differently: Take 1 tablet (500 mg) by mouth 2 times a day as needed.   metoprolol succinate XL  (Toprol-XL) 50 mg 24 hr tablet   No No   Sig: Take 1 tablet (50 mg) by mouth once daily.   Patient not taking: Reported on 7/31/2025   morphine (MSIR) 15 mg tablet   No No   Sig: Take 1 tablet (15 mg) by mouth every 4 hours if needed for severe pain (7 - 10). Take one tablet as needed every 4 hours for cancer related pain G89.3   morphine CR (MS Contin) 15 mg 12 hr tablet   No No   Sig: Take 1 tablet (15 mg) by mouth 2 times a day. Do not crush, chew, or split.   multivitamin tablet   Yes No   Sig: Take 1 tablet by mouth once daily.   ondansetron ODT (Zofran-ODT) 8 mg disintegrating tablet   No No   Sig: DISSOLVE ONE TABLET BY MOUTH EVERY 8 HOURS IF NEEDED FOR NAUSEA OR VOMITING FOR UP TO 7 DAYS   Patient not taking: Reported on 7/31/2025   pantoprazole (ProtoNix) 40 mg EC tablet   No No   Sig: Take 1 tablet (40 mg) by mouth once daily. Do not crush, chew, or split.   potassium chloride CR 10 mEq ER tablet   No No   Sig: Take 2 tablets (20 mEq) by mouth once daily. Do not crush, chew, or split.   potassium chloride CR 20 mEq ER tablet   No No   Sig: Take 1 tablet (20 mEq) by mouth once daily. Do not crush, chew, or split.   Patient not taking: Reported on 7/31/2025      Facility-Administered Medications: None       Martina Bales

## 2025-08-09 LAB
ALBUMIN SERPL BCP-MCNC: 2.6 G/DL (ref 3.4–5)
ALP SERPL-CCNC: 398 U/L (ref 33–136)
ALT SERPL W P-5'-P-CCNC: 34 U/L (ref 7–45)
ANION GAP SERPL CALC-SCNC: 9 MMOL/L (ref 10–20)
AST SERPL W P-5'-P-CCNC: 57 U/L (ref 9–39)
ATRIAL RATE: 103 BPM
BILIRUB SERPL-MCNC: 1.4 MG/DL (ref 0–1.2)
BUN SERPL-MCNC: 14 MG/DL (ref 6–23)
CALCIUM SERPL-MCNC: 8.1 MG/DL (ref 8.6–10.3)
CHLORIDE SERPL-SCNC: 104 MMOL/L (ref 98–107)
CO2 SERPL-SCNC: 28 MMOL/L (ref 21–32)
CREAT SERPL-MCNC: 0.92 MG/DL (ref 0.5–1.05)
EGFRCR SERPLBLD CKD-EPI 2021: 68 ML/MIN/1.73M*2
ERYTHROCYTE [DISTWIDTH] IN BLOOD BY AUTOMATED COUNT: 16.2 % (ref 11.5–14.5)
EST. AVERAGE GLUCOSE BLD GHB EST-MCNC: 97 MG/DL
GLUCOSE BLD MANUAL STRIP-MCNC: 148 MG/DL (ref 74–99)
GLUCOSE BLD MANUAL STRIP-MCNC: 164 MG/DL (ref 74–99)
GLUCOSE BLD MANUAL STRIP-MCNC: 190 MG/DL (ref 74–99)
GLUCOSE BLD MANUAL STRIP-MCNC: 92 MG/DL (ref 74–99)
GLUCOSE SERPL-MCNC: 88 MG/DL (ref 74–99)
HBA1C MFR BLD: 5 % (ref ?–5.7)
HCT VFR BLD AUTO: 25.4 % (ref 36–46)
HGB BLD-MCNC: 8.3 G/DL (ref 12–16)
LEGIONELLA AG UR QL: NEGATIVE
MAGNESIUM SERPL-MCNC: 1.51 MG/DL (ref 1.6–2.4)
MCH RBC QN AUTO: 34.2 PG (ref 26–34)
MCHC RBC AUTO-ENTMCNC: 32.7 G/DL (ref 32–36)
MCV RBC AUTO: 105 FL (ref 80–100)
NRBC BLD-RTO: 0 /100 WBCS (ref 0–0)
P AXIS: 61 DEGREES
PHOSPHATE SERPL-MCNC: 3.3 MG/DL (ref 2.5–4.9)
PLATELET # BLD AUTO: 150 X10*3/UL (ref 150–450)
POTASSIUM SERPL-SCNC: 4 MMOL/L (ref 3.5–5.3)
PR INTERVAL: 151 MS
PROT SERPL-MCNC: 5.2 G/DL (ref 6.4–8.2)
Q ONSET: 249 MS
QRS COUNT: 16 BEATS
QRS DURATION: 86 MS
QT INTERVAL: 336 MS
QTC CALCULATION(BAZETT): 438 MS
QTC FREDERICIA: 400 MS
R AXIS: -3 DEGREES
RBC # BLD AUTO: 2.43 X10*6/UL (ref 4–5.2)
S PNEUM AG UR QL: NEGATIVE
SODIUM SERPL-SCNC: 137 MMOL/L (ref 136–145)
T AXIS: 42 DEGREES
T OFFSET: 417 MS
UFH PPP CHRO-ACNC: 0.4 IU/ML (ref ?–1.1)
VENTRICULAR RATE: 102 BPM
WBC # BLD AUTO: 14 X10*3/UL (ref 4.4–11.3)

## 2025-08-09 PROCEDURE — 85520 HEPARIN ASSAY: CPT | Performed by: EMERGENCY MEDICINE

## 2025-08-09 PROCEDURE — 2060000001 HC INTERMEDIATE ICU ROOM DAILY

## 2025-08-09 PROCEDURE — 85027 COMPLETE CBC AUTOMATED: CPT | Performed by: STUDENT IN AN ORGANIZED HEALTH CARE EDUCATION/TRAINING PROGRAM

## 2025-08-09 PROCEDURE — 83735 ASSAY OF MAGNESIUM: CPT | Performed by: FAMILY MEDICINE

## 2025-08-09 PROCEDURE — 99232 SBSQ HOSP IP/OBS MODERATE 35: CPT | Performed by: FAMILY MEDICINE

## 2025-08-09 PROCEDURE — 2500000001 HC RX 250 WO HCPCS SELF ADMINISTERED DRUGS (ALT 637 FOR MEDICARE OP): Performed by: FAMILY MEDICINE

## 2025-08-09 PROCEDURE — 84100 ASSAY OF PHOSPHORUS: CPT | Performed by: FAMILY MEDICINE

## 2025-08-09 PROCEDURE — 84075 ASSAY ALKALINE PHOSPHATASE: CPT | Performed by: STUDENT IN AN ORGANIZED HEALTH CARE EDUCATION/TRAINING PROGRAM

## 2025-08-09 PROCEDURE — 2500000001 HC RX 250 WO HCPCS SELF ADMINISTERED DRUGS (ALT 637 FOR MEDICARE OP): Performed by: STUDENT IN AN ORGANIZED HEALTH CARE EDUCATION/TRAINING PROGRAM

## 2025-08-09 PROCEDURE — 82947 ASSAY GLUCOSE BLOOD QUANT: CPT

## 2025-08-09 PROCEDURE — 2500000004 HC RX 250 GENERAL PHARMACY W/ HCPCS (ALT 636 FOR OP/ED): Performed by: STUDENT IN AN ORGANIZED HEALTH CARE EDUCATION/TRAINING PROGRAM

## 2025-08-09 PROCEDURE — 99221 1ST HOSP IP/OBS SF/LOW 40: CPT | Performed by: INTERNAL MEDICINE

## 2025-08-09 PROCEDURE — 2500000004 HC RX 250 GENERAL PHARMACY W/ HCPCS (ALT 636 FOR OP/ED)

## 2025-08-09 PROCEDURE — 36415 COLL VENOUS BLD VENIPUNCTURE: CPT | Performed by: STUDENT IN AN ORGANIZED HEALTH CARE EDUCATION/TRAINING PROGRAM

## 2025-08-09 PROCEDURE — 2500000002 HC RX 250 W HCPCS SELF ADMINISTERED DRUGS (ALT 637 FOR MEDICARE OP, ALT 636 FOR OP/ED): Performed by: STUDENT IN AN ORGANIZED HEALTH CARE EDUCATION/TRAINING PROGRAM

## 2025-08-09 PROCEDURE — 2500000004 HC RX 250 GENERAL PHARMACY W/ HCPCS (ALT 636 FOR OP/ED): Performed by: EMERGENCY MEDICINE

## 2025-08-09 PROCEDURE — 83036 HEMOGLOBIN GLYCOSYLATED A1C: CPT | Mod: PORLAB | Performed by: FAMILY MEDICINE

## 2025-08-09 RX ORDER — LOPERAMIDE HYDROCHLORIDE 2 MG/1
2 CAPSULE ORAL 4 TIMES DAILY PRN
Status: DISCONTINUED | OUTPATIENT
Start: 2025-08-09 | End: 2025-08-10 | Stop reason: HOSPADM

## 2025-08-09 RX ORDER — LOPERAMIDE HYDROCHLORIDE 2 MG/1
4 CAPSULE ORAL ONCE
Status: COMPLETED | OUTPATIENT
Start: 2025-08-09 | End: 2025-08-09

## 2025-08-09 RX ADMIN — PIPERACILLIN SODIUM AND TAZOBACTAM SODIUM 4.5 G: 4; .5 INJECTION, SOLUTION INTRAVENOUS at 10:34

## 2025-08-09 RX ADMIN — PIPERACILLIN SODIUM AND TAZOBACTAM SODIUM 4.5 G: 4; .5 INJECTION, SOLUTION INTRAVENOUS at 17:21

## 2025-08-09 RX ADMIN — MORPHINE SULFATE 15 MG: 15 TABLET, FILM COATED, EXTENDED RELEASE ORAL at 10:32

## 2025-08-09 RX ADMIN — LOPERAMIDE HYDROCHLORIDE 4 MG: 2 CAPSULE ORAL at 10:32

## 2025-08-09 RX ADMIN — MORPHINE SULFATE 15 MG: 15 TABLET, FILM COATED, EXTENDED RELEASE ORAL at 20:11

## 2025-08-09 RX ADMIN — HEPARIN SODIUM 800 UNITS/HR: 10000 INJECTION, SOLUTION INTRAVENOUS at 05:50

## 2025-08-09 RX ADMIN — PIPERACILLIN SODIUM AND TAZOBACTAM SODIUM 4.5 G: 4; .5 INJECTION, SOLUTION INTRAVENOUS at 05:43

## 2025-08-09 RX ADMIN — LEVOTHYROXINE SODIUM 100 MCG: 0.1 TABLET ORAL at 05:42

## 2025-08-09 RX ADMIN — PANTOPRAZOLE SODIUM 40 MG: 40 TABLET, DELAYED RELEASE ORAL at 05:42

## 2025-08-09 RX ADMIN — PIPERACILLIN SODIUM AND TAZOBACTAM SODIUM 4.5 G: 4; .5 INJECTION, SOLUTION INTRAVENOUS at 23:56

## 2025-08-09 RX ADMIN — PANCRELIPASE 2 CAPSULE: 120000; 24000; 76000 CAPSULE, DELAYED RELEASE PELLETS ORAL at 17:21

## 2025-08-09 RX ADMIN — PANCRELIPASE 2 CAPSULE: 120000; 24000; 76000 CAPSULE, DELAYED RELEASE PELLETS ORAL at 12:46

## 2025-08-09 RX ADMIN — VANCOMYCIN HYDROCHLORIDE 1250 MG: 1.25 INJECTION, SOLUTION INTRAVITREAL at 10:34

## 2025-08-09 RX ADMIN — OLANZAPINE 5 MG: 5 TABLET, FILM COATED ORAL at 20:11

## 2025-08-09 ASSESSMENT — PAIN SCALES - GENERAL
PAINLEVEL_OUTOF10: 0 - NO PAIN

## 2025-08-09 ASSESSMENT — PAIN - FUNCTIONAL ASSESSMENT
PAIN_FUNCTIONAL_ASSESSMENT: 0-10

## 2025-08-09 NOTE — CARE PLAN
The patient's goals for the shift include  staying informed    The clinical goals for the shift include pt will maintain adequate comfort level throughout the shift    Over the shift, the patient did not make progress toward the following goals. Barriers to progression include understanding. Recommendations to address these barriers include education.

## 2025-08-09 NOTE — PROGRESS NOTES
Nanci Colón is a 69 y.o. female on day 1 of admission presenting with Fever and chills.      Assessment/Plan:     #Fever   #Lung groundglass opacities  Patchy groundglass opacities seen on the CT chest.  Viral versus PJP versus toxo.  Patient does have a cat at home but  takes care of the litter box.  Check extended respiratory pathogen panel, Fungitell.  MRSA PCR, COVID, flu, RSV negative.  Toxo IgG negative.  Pro-Jamil at 14.40    #L subclavian thrombus  Was also seen on the prior images in June.  Was not on any anticoagulation.  This could be the cause of her fever as well.  Now on heparin     #Ascites  Seen on the CT scan.  No abdominal pain on physical exam.  No concerns for peritonitis     #Immunosuppressed  Pancreatic cancer on chemotherapy     #Transaminitis   #Hyperbilirubinemia   Intermittently have this likely due to pancreatic cancer.  No abdominal pain     Recommendations:     - Continue Zosyn every 6 hours  - Continue vancomycin.  Pharmacy to dose  - Monitor blood cultures x 2  - Pending extended respiratory pathogen panel  - Pending Fungitell  - Pending toxo IgM antibody  - Strep pneumo antigen, Legionella urine antigen pending  - Will continue to follow      Oscar Gutierrez MD  Date of service: 8/9/2025  Time of service: 8:04 AM      Subjective   Interval History: No acute events overnight.  Patient [place] any new complaint.        Review of Systems  Denies: fever, chills, nausea, vomiting, diarrhea, dysuria    Objective   Range of Vitals (last 24 hours)  Heart Rate:  [78-90]   Temp:  [36.9 °C (98.4 °F)-37.2 °C (99 °F)]   Resp:  [10-18]   BP: ()/(52-69)   SpO2:  [93 %-98 %]  Daily Weight  08/08/25 : 57.2 kg (126 lb)   Body mass index is 21.63 kg/m².    General: alert, oriented, NAD  HEENT: No conjunctival pallor, no scleral icterus  Lungs: bilaterally clear to auscultation, no wheezing  Chest: R ACW port  Abdomen: soft, non tender, non distended, BS+  Neuro: AAO x 3  Extremities: no edema,  "no cyanosis  MSK: No joint inflammation            Antibiotics  piperacillin-tazobactam - 4.5 gram/100 mL  vancomycin - 1.25 gram/250 mL      Relevant Results  Labs  Results from last 72 hours   Lab Units 08/09/25 0317 08/08/25  0323   WBC AUTO x10*3/uL 14.0* 9.1   HEMOGLOBIN g/dL 8.3* 10.5*   HEMATOCRIT % 25.4* 31.0*   PLATELETS AUTO x10*3/uL 150 202   NEUTROS PCT AUTO %  --  95.8   LYMPHS PCT AUTO %  --  1.9   MONOS PCT AUTO %  --  1.9   EOS PCT AUTO %  --  0.1     Results from last 72 hours   Lab Units 08/09/25 0317 08/08/25  0323   SODIUM mmol/L 137 133*   POTASSIUM mmol/L 4.0 4.0   CHLORIDE mmol/L 104 100   CO2 mmol/L 28 24   BUN mg/dL 14 13   CREATININE mg/dL 0.92 0.92   GLUCOSE mg/dL 88 134*   CALCIUM mg/dL 8.1* 8.7   ANION GAP mmol/L 9* 13   EGFR mL/min/1.73m*2 68 68   PHOSPHORUS mg/dL 3.3  --      Results from last 72 hours   Lab Units 08/09/25 0317 08/08/25  0323   ALK PHOS U/L 398* 899*   BILIRUBIN TOTAL mg/dL 1.4* 2.5*   PROTEIN TOTAL g/dL 5.2* 6.5   ALT U/L 34 45   AST U/L 57* 143*   ALBUMIN g/dL 2.6* 3.3*     Estimated Creatinine Clearance: 51.2 mL/min (by C-G formula based on SCr of 0.92 mg/dL).  No results found for: \"CRP\"    Microbiology  No results found for the last 14 days.      Imaging    CT chest abdomen pelvis w IV contrast  Result Date: 8/8/2025  Patchy ground-glass opacities throughout the lungs have worsened compared to prior imaging along with new tree-in-bud nodular opacities in the lingula. Findings are concerning for an infectious or inflammatory process. Metastatic disease is also not entirely excluded in the setting of known malignancy.   A filling defect is present in the left subclavian vein most compatible with thrombus. This was also present on prior imaging 06/24/2025.   Stable appearance of the pancreatic head mass, which is poorly visualized on CT corresponding to known pancreatic malignancy. Redemonstrated CBD stent with persistent intrahepatic pneumobilia and biliary " dilatation.   Focal narrowing of the splenic vein which is new compared to prior imaging likely related to overlying mass effect.   Small to moderate volume ascites, increased compared to prior imaging.   Underdistention versus wall thickening diffusely throughout small bowel. Correlate for symptoms of enteritis.     MACRO: Evan Finkelstein discussed the significance and urgency of this critical finding by telephone with  BO DIAS on 8/8/2025 at 5:54 am. (**-RCF-**) Findings:  See findings.     Signed by: Evan Finkelstein 8/8/2025 5:55 AM Dictation workstation:   [id number]    US abdomen limited liver  Result Date: 7/31/2025  Study is limited by interfering bowel gas with pancreas and distal common bile duct obscured.   Common bile duct stent is partially visible. Pneumobilia is present but the intrahepatic ducts do not appear dilated.   Ascites, new since the CT in June   MACRO: None.   Signed by: Regla Brewster 7/31/2025 1:03 PM Dictation workstation:   [id number]    XR chest 2 views  Result Date: 7/30/2025  No evidence of consolidating infiltrate or effusion. Signed by Ricky Hayward MD

## 2025-08-09 NOTE — PROGRESS NOTES
Vancomycin Dosing by Pharmacy- FOLLOW UP    Nanci Colón is a 69 y.o. year old female who Pharmacy has been consulted for vancomycin dosing for pneumonia. Based on the patient's indication and renal status this patient is being dosed based on a goal AUC of 400-600.     Renal function is currently stable.    Current vancomycin dose: 1250 mg given every 24 hours    Estimated vancomycin AUC on current dose: 520 mg/L.hr     Visit Vitals  BP 90/61 (BP Location: Right arm, Patient Position: Lying)   Pulse 85   Temp 37.2 °C (99 °F) (Temporal)   Resp 17        Lab Results   Component Value Date    CREATININE 0.92 2025    CREATININE 0.92 2025    CREATININE 0.76 2025    CREATININE 0.80 2025        Patient weight is as follows:   Vitals:    25 0249   Weight: 57.2 kg (126 lb)       Cultures:  No results found for the encounter in last 14 days.       No intake/output data recorded.  I/O during current shift:  I/O this shift:  In: 650.2 [I.V.:250.2; IV Piggyback:400]  Out: -     Temp (24hrs), Av.1 °C (98.7 °F), Min:36.9 °C (98.4 °F), Max:37.2 °C (99 °F)      Assessment/Plan    Within goal AUC range. Continue current vancomycin regimen.    This dosing regimen is predicted by [employer] to result in the following pharmacokinetic parameters:  Loading dose: N/A  Regimen: 1250 mg IV every 24 hours.  Start time: 05:44 on 2025  Exposure target: AUC24 (range) 400-600 mg/L.hr   [id number]: 408 mg/L.hr  AUC24,ss: 520 mg/L.hr  Probability of AUC24 > 400: 95 %  Ctrough,ss: 14.7 mg/L  Probability of Ctrough,ss > 20: 12 %    The next level will be obtained on 8/10 at 0500. May be obtained sooner if clinically indicated.   Will continue to monitor renal function daily while on vancomycin and order serum creatinine at least every 48 hours if not already ordered.  Follow for continued vancomycin needs, clinical response, and signs/symptoms of toxicity.       Cirilo Turk, PharmD

## 2025-08-09 NOTE — CONSULTS
Reason For Consult  Pancreatic cancer, recurrent fever    History Of Present Illness  Nanci Colón is a 69 y.o. female with PMHx s/f pancreatic cancer on chemotherapy, DM2, HTN presenting with fever. Pt was just discharged about a week ago after being admitted for fever. Infectious worked was negative and her symptoms were attributed to fever of malignancy. Pt presents again today because she started having severe fevers in the middle of the night. They woke her from sleep and were accompanied by chills and diaphoresis. She also felt a little lightheaded and has been dealing with constipation over the past few days. She took her temperature and it was 103 degrees F, which has since improved. She gets chemotherapy every two weeks normally, but last chemotherapy was three weeks ago due to her fevers. No nausea, vomiting, shortness of breath, cough, sputum production, urinary issues, chest pain, or abdominal pain.     ED Course:   Vitals on presentation: T 36.9 °C (98.5 °F)  HR (!) 120 -> 99 bpm  BP 77/58  RR 19  O2 100 %    Labs:   CBC with WBC 9.1, Hgb 10.5, Plts 202, absolute neutrophils 8.74  CMP with glucose 134, Na 133, K 4.0, BUN 13, sCr 0.92, alk phos 899, ALT 45, , bilirubin 2.5. Magnesium 1.90.   Lactate 1.4  Flu, RSV, and COVID PCR negative  UA unremarkable  Imaging -   CT c/a/p w contrast - Patchy ground-glass opacities throughout the lungs have worsened   compared to prior imaging along with new tree-in-bud nodular   opacities in the lingula. Findings are concerning for an infectious   or inflammatory process. Metastatic disease is also not entirely   excluded in the setting of known malignancy.   A filling defect is present in the left subclavian vein most   compatible with thrombus. This was also present on prior imaging   06/24/2025.  Stable appearance of the pancreatic head mass, which is poorly   visualized on CT corresponding to known pancreatic malignancy.   Redemonstrated CBD stent  "with persistent intrahepatic pneumobilia and   biliary dilatation.   Focal narrowing of the splenic vein which is new compared to prior   imaging likely related to overlying mass effect.   Small to moderate volume ascites, increased compared to prior imaging.   Underdistention versus wall thickening diffusely throughout small   bowel. Correlate for symptoms of enteritis.   He was readmitted with a fever, ID and follow-up.  Started on IV antibiotic.  Clinically improving complaining some nausea and loose bowel movement today.    Medical oncology, stable    Medical records reviewed  Past Medical History  She has a past medical history of Diabetes (Multi), History of Chiari malformation, Hyperlipidemia, Hypertension, Hypothyroidism, and Pancreatic cancer (Multi).    Surgical History  She has a past surgical history that includes Appendectomy; Knee surgery; and Foot surgery.     Social History  She reports that she has never smoked. She has never been exposed to tobacco smoke. She has never used smokeless tobacco. She reports that she does not drink alcohol and does not use drugs.    Family History  Family History[1]     Allergies  Emend [fosaprepitant] and Shellfish containing products    Review of Systems  Weakness, fever, nausea, diarrhea     Physical Exam  Patient is afebrile, chronically ill     Neck supple, no cervical lymphadenopathy     Lung examination did show good air movement on the both side     Heart rate normal regular rhythm     Abdomen is soft normotonic bowel sound nontender     Extremity no edema cyanosis     Last Recorded Vitals  Blood pressure 87/52, pulse 79, temperature 36.8 °C (98.3 °F), temperature source Temporal, resp. rate 18, height 1.626 m (5' 4\"), weight 57.2 kg (126 lb), SpO2 94%.    Relevant Results   Latest Reference Range & Units 08/09/25 03:17   WBC 4.4 - 11.3 x10*3/uL 14.0 (H)   nRBC 0.0 - 0.0 /100 WBCs 0.0   RBC 4.00 - 5.20 x10*6/uL 2.43 (L)   HEMOGLOBIN 12.0 - 16.0 g/dL 8.3 (L) "   HEMATOCRIT 36.0 - 46.0 % 25.4 (L)   MCV 80 - 100 fL 105 (H)   MCH 26.0 - 34.0 pg 34.2 (H)   MCHC 32.0 - 36.0 g/dL 32.7   RED CELL DISTRIBUTION WIDTH 11.5 - 14.5 % 16.2 (H)   Platelets 150 - 450 x10*3/uL 150      [facility] Reference Range & Units 08/09/25 03:17   GLUCOSE 74 - 99 mg/dL 88   SODIUM 136 - 145 mmol/L 137   POTASSIUM 3.5 - 5.3 mmol/L 4.0   CHLORIDE 98 - 107 mmol/L 104   Bicarbonate 21 - 32 mmol/L 28   Anion Gap 10 - 20 mmol/L 9 (L)   Blood Urea Nitrogen 6 - 23 mg/dL 14   Creatinine 0.50 - 1.05 mg/dL 0.92   EGFR >60 mL/min/1.73m*2 68   Calcium 8.6 - 10.3 mg/dL 8.1 (L)   PHOSPHORUS 2.5 - 4.9 mg/dL 3.3   Albumin 3.4 - 5.0 g/dL 2.6 (L)   Alkaline Phosphatase 33 - 136 U/L 398 (H)   ALT 7 - 45 U/L 34   AST 9 - 39 U/L 57 (H)   Bilirubin Total 0.0 - 1.2 mg/dL 1.4 (H)   (L): Data is abnormally low  (H): Data is abnormally high  Assessment/Plan   #1 febrile illness, #Lung groundglass opacities  ,continue IV antibiotic, watch blood culture,       2.  Metastatic pancreatic cancer receiving second line systemic chemotherapy including Abraxane and gemcitabine     3.  Left subclavian thrombosis     4.  Malnutrition  Continue supportive care, IV antibiotic, increase activity    I spent 40 minutes in the professional and overall care of this patient.      Saeed Mireles MD         [1]   Family History  Problem Relation Name Age of Onset    Cancer Mother      Heart disease Father

## 2025-08-09 NOTE — PROGRESS NOTES
"Nanci Colón is a 69 y.o. female on day 1 of admission presenting with Fever and chills.    Subjective   Patient seen awake in bed. No further reported fever as per patient. No reported overnight events.     Constitutional: No fever, no chills  HEENT: No headache, no vision changes, no hearing loss, no nasal congestion  Respiratory: No cough, no SOB  Cardiac: No chest pain, no palpitations, no swelling of limbs  GI: Diarrhea. No nausea, no vomiting  Musculoskeletal: No back pain, no myalgia  Neuro: No seizures, no dizziness, no lightheadedness  Heme: No easy bruising, no bleeding  Genitourinary:  No Dysuria         Objective     Physical Exam  Gen: Adult female , NAD, age appropriate.  HEENT: Normocephalic, atraumatic, good dentition, no oral lesions appreciated  Neck: No cervical lymphadenopathy appreciated, no thyroid enlargement appreciated  CV: No limb edema appreciated  Resp: No increased work of breathing, no wheezes appreciated  Abdomen: nondistended  MSK: No joint swelling appreciated, full ROM  Psych: appropriate mood and affect      Last Recorded Vitals  Blood pressure 87/52, pulse 79, temperature 36.8 °C (98.3 °F), temperature source Temporal, resp. rate 18, height 1.626 m (5' 4\"), weight 57.2 kg (126 lb), SpO2 94%.  Intake/Output last 3 Shifts:  I/O last 3 completed shifts:  In: 650.2 (11.4 mL/kg) [I.V.:250.2 (4.4 mL/kg); IV Piggyback:400]  Out: - (0 mL/kg)   Weight: 57.2 kg       Relevant Results  Scheduled medications  Scheduled Medications[1]  Continuous medications  Continuous Medications[2]  PRN medications  PRN Medications[3]    Results for orders placed or performed during the hospital encounter of 08/08/25 (from the past 24 hours)   POCT GLUCOSE   Result Value Ref Range    POCT Glucose 138 (H) 74 - 99 mg/dL   Heparin Assay, [facility]   Result Value Ref Range    Heparin Unfractionated 0.8 See Comment Below for Therapeutic Ranges IU/mL   POCT GLUCOSE   Result Value Ref Range    POCT Glucose 137 (H) 74 " - 99 mg/dL   Heparin Assay, [facility]FH   Result Value Ref Range    Heparin Unfractionated 0.4 See Comment Below for Therapeutic Ranges IU/mL   CBC   Result Value Ref Range    WBC 14.0 (H) 4.4 - 11.3 x10*3/uL    nRBC 0.0 0.0 - 0.0 /100 WBCs    RBC 2.43 (L) 4.00 - 5.20 x10*6/uL    Hemoglobin 8.3 (L) 12.0 - 16.0 g/dL    Hematocrit 25.4 (L) 36.0 - 46.0 %     (H) 80 - 100 fL    MCH 34.2 (H) 26.0 - 34.0 pg    MCHC 32.7 32.0 - 36.0 g/dL    RDW 16.2 (H) 11.5 - 14.5 %    Platelets 150 150 - 450 x10*3/uL   Comprehensive Metabolic Panel   Result Value Ref Range    Glucose 88 74 - 99 mg/dL    Sodium 137 136 - 145 mmol/L    Potassium 4.0 3.5 - 5.3 mmol/L    Chloride 104 98 - 107 mmol/L    Bicarbonate 28 21 - 32 mmol/L    Anion Gap 9 (L) 10 - 20 mmol/L    Urea Nitrogen 14 6 - 23 mg/dL    Creatinine 0.92 0.50 - 1.05 mg/dL    eGFR 68 >60 mL/min/1.73m*2    Calcium 8.1 (L) 8.6 - 10.3 mg/dL    Albumin 2.6 (L) 3.4 - 5.0 g/dL    Alkaline Phosphatase 398 (H) 33 - 136 U/L    Total Protein 5.2 (L) 6.4 - 8.2 g/dL    AST 57 (H) 9 - 39 U/L    Bilirubin, Total 1.4 (H) 0.0 - 1.2 mg/dL    ALT 34 7 - 45 U/L   Magnesium   Result Value Ref Range    Magnesium 1.51 (L) 1.60 - 2.40 mg/dL   Phosphorus   Result Value Ref Range    Phosphorus 3.3 2.5 - 4.9 mg/dL   Heparin Assay, UFH   Result Value Ref Range    Heparin Unfractionated 0.4 See Comment Below for Therapeutic Ranges IU/mL   POCT GLUCOSE   Result Value Ref Range    POCT Glucose 92 74 - 99 mg/dL     *Note: Due to a large number of results and/or encounters for the requested time period, some results have not been displayed. A complete set of results can be found in Results Review.       Imaging  CT chest abdomen pelvis w IV contrast  Result Date: 8/8/2025  Patchy ground-glass opacities throughout the lungs have worsened compared to prior imaging along with new tree-in-bud nodular opacities in the lingula. Findings are concerning for an infectious or inflammatory process. Metastatic disease is  also not entirely excluded in the setting of known malignancy.   A filling defect is present in the left subclavian vein most compatible with thrombus. This was also present on prior imaging 06/24/2025.   Stable appearance of the pancreatic head mass, which is poorly visualized on CT corresponding to known pancreatic malignancy. Redemonstrated CBD stent with persistent intrahepatic pneumobilia and biliary dilatation.   Focal narrowing of the splenic vein which is new compared to prior imaging likely related to overlying mass effect.   Small to moderate volume ascites, increased compared to prior imaging.   Underdistention versus wall thickening diffusely throughout small bowel. Correlate for symptoms of enteritis.     MACRO: Evan Finkelstein discussed the significance and urgency of this critical finding by telephone with  BO DIAS on 8/8/2025 at 5:54 am. (**-RCF-**) Findings:  See findings.     Signed by: Evan Finkelstein 8/8/2025 5:55 AM Dictation workstation:   [id number]      Cardiology, Vascular, and Other Imaging  No other imaging results found for the past 7 days           This patient currently has cardiac telemetry ordered; if you would like to modify or discontinue the telemetry order, click here to go to the orders activity to modify/discontinue the order.                 Assessment/Plan   Assessment & Plan  Fever and chills    Hypothyroidism    Type 2 diabetes mellitus without complication, without long-term current use of insulin    Bilateral pneumonia    Subclavian vein thrombosis, left    Malignant neoplasm of head of pancreas (Multi)        Fever in immunocompromised pt, Bilateral pneumonia:  CT shows worsening ground-glass opacities throughout the lungs and new tree-in-bud nodular opacities in the lingula.  Pt does not meet sepsis criteria at time of admission.  ID consulted  Continue Vanc and Zosyn  Bcx x2 no growth to date, will monitor   Covid and Flu negative. Urine strep and legionella  ordered.  Monitor respiratory status.  Fevers appear to have stopped but patient has leukocytosis     L subclavian vein thrombus:  Present on prior imaging 6/24/25, but not noticed until today.  Heparin drip started  Hematology/oncology consulted.  Anticipate switching to DOAC at discharge and likely continuing lifelong.  As per ID, thrombus may be source of fever if infection ruled out     DM2:  A1c pending  SSI ordered while inpatient.  Accuchecks, hypoglycemic protocol.     Hypothyroidism:  Continue home Synthroid.     Pancreatic cancer:  ALK phos elevated at 899, AST elevated at 143. Pt has known malignancy. Continue to monitor  Continue home Creon  Oncology consulted, recommends continue antibiotic     Diet: Consistent carb  DVT Prophylaxis: Heparin drip  Code Status: Full Code                     Andrea Case MD               [1] insulin lispro, 0-5 Units, subcutaneous, TID AC  levothyroxine, 100 mcg, oral, Daily  lipase-protease-amylase, 2 capsule, oral, TID  morphine CR, 15 mg, oral, q12h LAMONTE  OLANZapine, 5 mg, oral, Nightly  pantoprazole, 40 mg, oral, Daily before breakfast   Or  pantoprazole, 40 mg, intravenous, Daily before breakfast  piperacillin-tazobactam, 4.5 g, intravenous, q6h  potassium chloride CR, 20 mEq, oral, Daily  vancomycin, 1,250 mg, intravenous, q24h     [2] heparin, 0-4,500 Units/hr, Last Rate: 800 Units/hr (08/09/25 0550)     [3] PRN medications: acetaminophen, dextrose, dextrose, glucagon, glucagon, guaiFENesin, heparin, loperamide, melatonin, ondansetron **OR** ondansetron, vancomycin

## 2025-08-10 VITALS
DIASTOLIC BLOOD PRESSURE: 59 MMHG | SYSTOLIC BLOOD PRESSURE: 94 MMHG | BODY MASS INDEX: 21.75 KG/M2 | WEIGHT: 127.43 LBS | TEMPERATURE: 97.5 F | OXYGEN SATURATION: 95 % | RESPIRATION RATE: 16 BRPM | HEART RATE: 66 BPM | HEIGHT: 64 IN

## 2025-08-10 LAB
ALBUMIN SERPL BCP-MCNC: 2.5 G/DL (ref 3.4–5)
ANION GAP SERPL CALC-SCNC: 10 MMOL/L (ref 10–20)
BACTERIA BLD CULT: NORMAL
BACTERIA BLD CULT: NORMAL
BUN SERPL-MCNC: 14 MG/DL (ref 6–23)
CALCIUM SERPL-MCNC: 8.1 MG/DL (ref 8.6–10.3)
CHLORIDE SERPL-SCNC: 105 MMOL/L (ref 98–107)
CO2 SERPL-SCNC: 26 MMOL/L (ref 21–32)
CREAT SERPL-MCNC: 0.87 MG/DL (ref 0.5–1.05)
EGFRCR SERPLBLD CKD-EPI 2021: 73 ML/MIN/1.73M*2
ERYTHROCYTE [DISTWIDTH] IN BLOOD BY AUTOMATED COUNT: 16.3 % (ref 11.5–14.5)
ERYTHROCYTE [DISTWIDTH] IN BLOOD BY AUTOMATED COUNT: 16.3 % (ref 11.5–14.5)
GLUCOSE BLD MANUAL STRIP-MCNC: 115 MG/DL (ref 74–99)
GLUCOSE SERPL-MCNC: 117 MG/DL (ref 74–99)
HCT VFR BLD AUTO: 24.1 % (ref 36–46)
HCT VFR BLD AUTO: 24.3 % (ref 36–46)
HGB BLD-MCNC: 8 G/DL (ref 12–16)
HGB BLD-MCNC: 8.1 G/DL (ref 12–16)
MAGNESIUM SERPL-MCNC: 1.62 MG/DL (ref 1.6–2.4)
MCH RBC QN AUTO: 33.8 PG (ref 26–34)
MCH RBC QN AUTO: 34.6 PG (ref 26–34)
MCHC RBC AUTO-ENTMCNC: 32.9 G/DL (ref 32–36)
MCHC RBC AUTO-ENTMCNC: 33.6 G/DL (ref 32–36)
MCV RBC AUTO: 103 FL (ref 80–100)
MCV RBC AUTO: 103 FL (ref 80–100)
NRBC BLD-RTO: 0 /100 WBCS (ref 0–0)
NRBC BLD-RTO: 0 /100 WBCS (ref 0–0)
PHOSPHATE SERPL-MCNC: 3.1 MG/DL (ref 2.5–4.9)
PLATELET # BLD AUTO: 144 X10*3/UL (ref 150–450)
PLATELET # BLD AUTO: 147 X10*3/UL (ref 150–450)
POTASSIUM SERPL-SCNC: 3.6 MMOL/L (ref 3.5–5.3)
RBC # BLD AUTO: 2.34 X10*6/UL (ref 4–5.2)
RBC # BLD AUTO: 2.37 X10*6/UL (ref 4–5.2)
SODIUM SERPL-SCNC: 137 MMOL/L (ref 136–145)
UFH PPP CHRO-ACNC: 0.1 IU/ML (ref ?–1.1)
VANCOMYCIN SERPL-MCNC: 13.4 UG/ML (ref 5–20)
WBC # BLD AUTO: 7.6 X10*3/UL (ref 4.4–11.3)
WBC # BLD AUTO: 9.3 X10*3/UL (ref 4.4–11.3)

## 2025-08-10 PROCEDURE — 83735 ASSAY OF MAGNESIUM: CPT | Performed by: FAMILY MEDICINE

## 2025-08-10 PROCEDURE — 2500000001 HC RX 250 WO HCPCS SELF ADMINISTERED DRUGS (ALT 637 FOR MEDICARE OP): Performed by: FAMILY MEDICINE

## 2025-08-10 PROCEDURE — 85027 COMPLETE CBC AUTOMATED: CPT | Performed by: EMERGENCY MEDICINE

## 2025-08-10 PROCEDURE — 99239 HOSP IP/OBS DSCHRG MGMT >30: CPT | Performed by: FAMILY MEDICINE

## 2025-08-10 PROCEDURE — 36415 COLL VENOUS BLD VENIPUNCTURE: CPT | Performed by: EMERGENCY MEDICINE

## 2025-08-10 PROCEDURE — 2500000002 HC RX 250 W HCPCS SELF ADMINISTERED DRUGS (ALT 637 FOR MEDICARE OP, ALT 636 FOR OP/ED): Performed by: STUDENT IN AN ORGANIZED HEALTH CARE EDUCATION/TRAINING PROGRAM

## 2025-08-10 PROCEDURE — 82947 ASSAY GLUCOSE BLOOD QUANT: CPT

## 2025-08-10 PROCEDURE — 80069 RENAL FUNCTION PANEL: CPT | Performed by: FAMILY MEDICINE

## 2025-08-10 PROCEDURE — 85520 HEPARIN ASSAY: CPT | Performed by: EMERGENCY MEDICINE

## 2025-08-10 PROCEDURE — 80202 ASSAY OF VANCOMYCIN: CPT

## 2025-08-10 PROCEDURE — 36415 COLL VENOUS BLD VENIPUNCTURE: CPT | Performed by: FAMILY MEDICINE

## 2025-08-10 PROCEDURE — 2500000004 HC RX 250 GENERAL PHARMACY W/ HCPCS (ALT 636 FOR OP/ED): Performed by: EMERGENCY MEDICINE

## 2025-08-10 PROCEDURE — 2500000004 HC RX 250 GENERAL PHARMACY W/ HCPCS (ALT 636 FOR OP/ED): Performed by: STUDENT IN AN ORGANIZED HEALTH CARE EDUCATION/TRAINING PROGRAM

## 2025-08-10 PROCEDURE — 85027 COMPLETE CBC AUTOMATED: CPT | Performed by: FAMILY MEDICINE

## 2025-08-10 PROCEDURE — 2500000001 HC RX 250 WO HCPCS SELF ADMINISTERED DRUGS (ALT 637 FOR MEDICARE OP): Performed by: STUDENT IN AN ORGANIZED HEALTH CARE EDUCATION/TRAINING PROGRAM

## 2025-08-10 RX ADMIN — PANCRELIPASE 2 CAPSULE: 120000; 24000; 76000 CAPSULE, DELAYED RELEASE PELLETS ORAL at 08:10

## 2025-08-10 RX ADMIN — LEVOTHYROXINE SODIUM 100 MCG: 0.1 TABLET ORAL at 06:12

## 2025-08-10 RX ADMIN — MORPHINE SULFATE 15 MG: 15 TABLET, FILM COATED, EXTENDED RELEASE ORAL at 08:10

## 2025-08-10 RX ADMIN — HEPARIN SODIUM 900 UNITS/HR: 10000 INJECTION, SOLUTION INTRAVENOUS at 06:12

## 2025-08-10 RX ADMIN — PIPERACILLIN SODIUM AND TAZOBACTAM SODIUM 4.5 G: 4; .5 INJECTION, SOLUTION INTRAVENOUS at 06:12

## 2025-08-10 RX ADMIN — POTASSIUM CHLORIDE 20 MEQ: 20 TABLET, EXTENDED RELEASE ORAL at 08:11

## 2025-08-10 RX ADMIN — LOPERAMIDE HYDROCHLORIDE 2 MG: 2 CAPSULE ORAL at 09:25

## 2025-08-10 RX ADMIN — PANTOPRAZOLE SODIUM 40 MG: 40 TABLET, DELAYED RELEASE ORAL at 06:12

## 2025-08-10 RX ADMIN — APIXABAN 10 MG: 5 TABLET, FILM COATED ORAL at 10:42

## 2025-08-10 ASSESSMENT — PAIN SCALES - GENERAL
PAINLEVEL_OUTOF10: 0 - NO PAIN

## 2025-08-10 ASSESSMENT — PAIN - FUNCTIONAL ASSESSMENT
PAIN_FUNCTIONAL_ASSESSMENT: 0-10

## 2025-08-10 NOTE — PROGRESS NOTES
Vancomycin Dosing by Pharmacy- FOLLOW UP    Nanci Colón is a 69 y.o. year old female who Pharmacy has been consulted for vancomycin dosing for pneumonia. Based on the patient's indication and renal status this patient is being dosed based on a goal AUC of 400-600.     Renal function is currently stable.    Current vancomycin dose: 1250 mg given every 24 hours    Estimated vancomycin AUC on current dose: 516 mg/L.hr     Visit Vitals  BP 89/58 (BP Location: Right arm, Patient Position: Lying)   Pulse 66   Temp 36.3 °C (97.4 °F) (Temporal)   Resp 17        Lab Results   Component Value Date    CREATININE 0.87 08/10/2025    CREATININE 0.92 2025    CREATININE 0.92 2025    CREATININE 0.76 2025        Patient weight is as follows:   Vitals:    08/10/25 0412   Weight: 57.8 kg (127 lb 6.8 oz)       Cultures:  No results found for the encounter in last 14 days.       I/O last 3 completed shifts:  In: 1343.6 (23.5 mL/kg) [P.O.:240; I.V.:353.6 (6.2 mL/kg); IV Piggyback:750]  Out: - (0 mL/kg)   Weight: 57.2 kg   I/O during current shift:  I/O this shift:  In: 280.8 [I.V.:80.8; IV Piggyback:200]  Out: -     Temp (24hrs), Av.7 °C (98.1 °F), Min:36.3 °C (97.4 °F), Max:37.1 °C (98.8 °F)      Assessment/Plan    Within goal AUC range. Continue current vancomycin regimen.    This dosing regimen is predicted by [employer] to result in the following pharmacokinetic parameters:  Loading dose: N/A  Regimen: 1250 mg IV every 24 hours.  Start time: 10:34 on 08/10/2025  Exposure target: AUC24 (range) 400-600 mg/L.hr   [id number]: 484 mg/L.hr  AUC24,ss: 516 mg/L.hr  Probability of AUC24 > 400: 95 %  Ctrough,ss: 14.5 mg/L  Probability of Ctrough,ss > 20: 9 %    The next level will be obtained on  at 0500. May be obtained sooner if clinically indicated.   Will continue to monitor renal function daily while on vancomycin and order serum creatinine at least every 48 hours if not already ordered.  Follow for continued  vancomycin needs, clinical response, and signs/symptoms of toxicity.       Cirilo Turk, PharmD

## 2025-08-10 NOTE — PROGRESS NOTES
Vancomycin Dosing by Pharmacy- Cessation of Therapy    Consult to pharmacy for vancomycin dosing has been discontinued by the prescriber, pharmacy will sign off at this time.    Please call pharmacy if there are further questions or re-enter a consult if vancomycin is resumed.     Cirilo Turk, PharmD

## 2025-08-10 NOTE — DISCHARGE SUMMARY
Discharge Diagnosis  Fever and chills           Issues Requiring Follow-Up  Patient was admitted for fever, which may be due to a VTE. Followup with pending labs and ID if needed. Pending extended respiratory panel and fungitell and toxo IgM  Patient was started on eliquis and may need lifelong anticoagulation.   Patient will need outpatient followup for DM evaluation. A1c 5.0% down from 8.0% previously and metformin was discontinued    Discharge Meds     Medication List      START taking these medications     apixaban 5 mg tablet; Commonly known as: Eliquis; Take 2 tablets (10 mg)   by mouth 2 times a day for 7 days, THEN 1 tablet (5 mg) 2 times a day.;   Start taking on: August 10, 2025     CONTINUE taking these medications     Creon 24,000-76,000 -120,000 unit capsule; Generic drug:   lipase-protease-amylase; Take 2 capsules by mouth 3 times daily (morning,   midday, late afternoon). With meals and 1 with snacks for 8 per day   docusate sodium 100 mg capsule; Commonly known as: Colace; Take 1   capsule (100 mg) by mouth 2 times a day as needed for constipation (for   hard stools).   lactulose 20 gram/30 mL oral solution; Take 15 mL (10 g) by mouth 3   times a day as needed (constipation). Only take as many times as needed   for a BM   * morphine CR 15 mg 12 hr tablet; Commonly known as: MS Contin; Take 1   tablet (15 mg) by mouth 2 times a day. Do not crush, chew, or split.   * morphine 15 mg tablet; Commonly known as: MSIR; Take 1 tablet (15 mg)   by mouth every 4 hours if needed for severe pain (7 - 10). Take one tablet   as needed every 4 hours for cancer related pain G89.3   multivitamin tablet   OLANZapine 5 mg tablet; Commonly known as: ZyPREXA; Take 1 tablet (5 mg)   by mouth once daily at bedtime.   OneTouch Verio test strips; Generic drug: blood sugar diagnostic   pantoprazole 40 mg EC tablet; Commonly known as: ProtoNix; Take 1 tablet   (40 mg) by mouth once daily. Do not crush, chew, or split.   *  potassium chloride CR 10 mEq ER tablet; Commonly known as: Klor-Con;   Take 2 tablets (20 mEq) by mouth once daily. Do not crush, chew, or split.   Synthroid 100 mcg tablet; Generic drug: levothyroxine; Take 1 tablet   (100 mcg) by mouth early in the morning.. Take on an empty stomach at the   same time each day, either 30 to 60 minutes prior to breakfast  * This list has 3 medication(s) that are the same as other medications   prescribed for you. Read the directions carefully, and ask your doctor or   other care provider to review them with you.     STOP taking these medications     metFORMIN 500 mg tablet; Commonly known as: Glucophage     ASK your doctor about these medications     atorvastatin 40 mg tablet; Commonly known as: Lipitor; Take 1 tablet (40   mg) by mouth every other day.   metoprolol succinate XL 50 mg 24 hr tablet; Commonly known as:   Toprol-XL; Take 1 tablet (50 mg) by mouth once daily.   ondansetron ODT 8 mg disintegrating tablet; Commonly known as:   Zofran-ODT; DISSOLVE ONE TABLET BY MOUTH EVERY 8 HOURS IF NEEDED FOR   NAUSEA OR VOMITING FOR UP TO 7 DAYS   * potassium chloride CR 20 mEq ER tablet; Commonly known as: Klor-Con   M20; Take 1 tablet (20 mEq) by mouth once daily. Do not crush, chew, or   split.  * This list has 1 medication(s) that are the same as other medications   prescribed for you. Read the directions carefully, and ask your doctor or   other care provider to review them with you.       Test Results Pending At Discharge  Pending Labs       Order Current Status    Extra Urine Gray Tube In process    Fungitell Beta-D Glucan Serum In process    Respiratory Viral Panel by PCR (Sputum,Tracheal,BAL,Bronch Wash) In process    Toxoplasma gondii antibody, IgM In process    Urinalysis with Reflex Culture and Microscopic In process    Blood Culture Preliminary result    Blood Culture Preliminary result            History Of Present Illness (HPI):  Nanci Colón is a 69 y.o. female with  PMHx s/f pancreatic cancer on chemotherapy, DM2, HTN presenting with fever. Pt was just discharged about a week ago after being admitted for fever. Infectious worked was negative and her symptoms were attributed to fever of malignancy. Pt presents again today because she started having severe fevers in the middle of the night. They woke her from sleep and were accompanied by chills and diaphoresis. She also felt a little lightheaded and has been dealing with constipation over the past few days. She took her temperature and it was 103 degrees F, which has since improved. She gets chemotherapy every two weeks normally, but last chemotherapy was three weeks ago due to her fevers. No nausea, vomiting, shortness of breath, cough, sputum production, urinary issues, chest pain, or abdominal pain.     ED Course:   Vitals on presentation: T 36.9 °C (98.5 °F)  HR (!) 120 -> 99 bpm  BP 77/58  RR 19  O2 100 %    Labs:   CBC with WBC 9.1, Hgb 10.5, Plts 202, absolute neutrophils 8.74  CMP with glucose 134, Na 133, K 4.0, BUN 13, sCr 0.92, alk phos 899, ALT 45, , bilirubin 2.5. Magnesium 1.90.   Lactate 1.4  Flu, RSV, and COVID PCR negative  UA unremarkable  Imaging -   CT c/a/p w contrast - Patchy ground-glass opacities throughout the lungs have worsened   compared to prior imaging along with new tree-in-bud nodular   opacities in the lingula. Findings are concerning for an infectious   or inflammatory process. Metastatic disease is also not entirely   excluded in the setting of known malignancy.   A filling defect is present in the left subclavian vein most   compatible with thrombus. This was also present on prior imaging   06/24/2025.  Stable appearance of the pancreatic head mass, which is poorly   visualized on CT corresponding to known pancreatic malignancy.   Redemonstrated CBD stent with persistent intrahepatic pneumobilia and   biliary dilatation.   Focal narrowing of the splenic vein which is new compared to  prior   imaging likely related to overlying mass effect.   Small to moderate volume ascites, increased compared to prior imaging.   Underdistention versus wall thickening diffusely throughout small   bowel. Correlate for symptoms of enteritis.   agree with radiology interpretation(s):   Interventions: Vanc and Zosyn given, Heparin drip started. Zofran 4 mg IV, morphine 4 mg IV, LR 1 L bolus, Pt admitted for further care.    Hospital Course:     Fever in immunocompromised pt, Bilateral pneumonia:  Unclear etiology, fever may be due to VTE  CT shows worsening ground-glass opacities throughout the lungs and new tree-in-bud nodular opacities in the lingula.  Pt does not meet sepsis criteria at time of admission.  ID consulted, s/p Vanc and Zosyn  Bcx x2 no growth to date, discontinued abx as per ID  Covid and Flu negative. Urine strep and legionella pending at time of discharge  No further fevers, leukocytosis resolved  Discharge with instructions for outpatient followup     L subclavian vein thrombus:  Present on prior imaging 6/24/25, but not noticed until this admission  Hematology/oncology consulted.  S/p Heparin drip, switched to Eliquis, likely lifelong AC needed  As per ID, thrombus may be source of fever      DM2:  A1c 5.0%  Accuchecks, hypoglycemic protocol while admitted  Discontinued home metformin, followup as outpatient     Hypothyroidism:  Continue home Synthroid.     Pancreatic cancer:  ALK phos elevated at 899, AST elevated at 143. Pt has known malignancy. Continue to monitor  Continue home Creon  Oncology consulted while admitted    Pertinent Physical Exam At Time of Discharge  Gen: Adult female , NAD, age appropriate.  HEENT: Normocephalic, atraumatic, good dentition, no oral lesions appreciated  Neck: No cervical lymphadenopathy appreciated, no thyroid enlargement appreciated  CV: No limb edema appreciated  Resp: No increased work of breathing, no wheezes appreciated  Abdomen: nondistended  MSK: No  joint swelling appreciated, full ROM  Psych: appropriate mood and affect      Outpatient Follow-Up  Future Appointments   Date Time Provider Department [place]   8/12/2025 12:00 PM INF 04 [place] [id number] [facility]   8/13/2025 12:30 PM INF 02 [place] [id number] [facility]   8/13/2025  1:00 PM Spring Perez RDN, LD [id number] [facility]   8/19/2025  9:45 AM [facility] [place] [address]   8/20/2025  1:00 PM NF 00 [place] [id number] [facility]   8/20/2025  1:45 PM POR CT 1 PORCT [facility]   8/26/2025 10:00 AM INF 12 MINOFF [id number] [facility]   8/26/2025 10:20 AM Francisco Cruz MD [id number] [facility]   8/27/2025  2:30 PM INF 02 [place] [id number] [facility]   9/9/2025  1:30 PM NF 00 [place] [id number] [facility]   9/10/2025 12:30 PM INF 02 [place] [id number] [facility]   9/26/2025  3:00 PM WENDIE Quinn-CNS [id number] [facility]   12/1/2025  2:40 PM Diana Blunt DO [id number] [facility]       Total length of encounter 45 minutes    Andrea Case MD

## 2025-08-10 NOTE — CARE PLAN
The patient's goals for the shift include  staying informed     The clinical goals for the shift include patient will remain hemodynamically stable throughout this shift    Over the shift, the patient did not make progress toward the following goals. Barriers to progression include understanding. Recommendations to address these barriers include education.

## 2025-08-10 NOTE — PROGRESS NOTES
Nanci Colón is a 69 y.o. female on day 2 of admission presenting with Fever and chills.      Assessment/Plan:     #Fever , likely due to subclavian thrombus  #Lung groundglass opacities  Patchy groundglass opacities seen on the CT chest.  Viral versus PJP versus toxo.  Patient does have a cat at home but  takes care of the litter box.  Check extended respiratory pathogen panel, Fungitell.  MRSA PCR, COVID, flu, RSV negative.  Toxo IgG negative.  Pro-Jamil at 14.40.  Strep pneumo antigen, Legionella urine antigen negative    #L subclavian thrombus  Was also seen on the prior images in June.  Was not on any anticoagulation.  This could be the cause of her fever as well.  Now on heparin     #Ascites  Seen on the CT scan.  No abdominal pain on physical exam.  No concerns for peritonitis     #Immunosuppressed  Pancreatic cancer on chemotherapy     #Transaminitis   #Hyperbilirubinemia   Intermittently have this likely due to pancreatic cancer.  No abdominal pain     Recommendations:     - DC vancomycin and Zosyn  - Monitor blood cultures x 2  - Pending extended respiratory pathogen panel  - Pending Fungitell  - Pending toxo IgM antibody  - Patient to follow-up closely as an outpatient with oncology to monitor groundglass opacities  - Patient can be discharged from ID standpoint      Oscar Gutierrez MD  Date of service: 8/10/2025  Time of service: 8:33 AM      Subjective   Interval History: No acute events overnight.  Patient denies any new complaint.        Review of Systems  Denies: fever, chills, nausea, vomiting, diarrhea, dysuria    Objective   Range of Vitals (last 24 hours)  Heart Rate:  [61-80]   Temp:  [36.3 °C (97.4 °F)-37.1 °C (98.8 °F)]   Resp:  [16-18]   BP: (89-97)/(58-63)   Weight:  [57.8 kg (127 lb 6.8 oz)]   SpO2:  [94 %-97 %]  Daily Weight  08/10/25 : 57.8 kg (127 lb 6.8 oz)   Body mass index is 21.87 kg/m².    General: alert, oriented, NAD  HEENT: No conjunctival pallor, no scleral icterus  Lungs:  "bilaterally clear to auscultation, no wheezing  Chest: R ACW port  Abdomen: soft, non tender, non distended, BS+  Neuro: AAO x 3  Extremities: no edema, no cyanosis  MSK: No joint inflammation            Antibiotics  This patient does not have an active medication from one of the medication groupers.      Relevant Results  Labs  Results from last 72 hours   Lab Units 08/10/25  0424 08/10/25  0003 08/09/25 0317 08/08/25  0323   WBC AUTO x10*3/uL 7.6 9.3 14.0* 9.1   HEMOGLOBIN g/dL 8.0* 8.1* 8.3* 10.5*   HEMATOCRIT % 24.3* 24.1* 25.4* 31.0*   PLATELETS AUTO x10*3/uL 144* 147* 150 202   NEUTROS PCT AUTO %  --   --   --  95.8   LYMPHS PCT AUTO %  --   --   --  1.9   MONOS PCT AUTO %  --   --   --  1.9   EOS PCT AUTO %  --   --   --  0.1     Results from last 72 hours   Lab Units 08/10/25  0424 08/09/25  0317 08/08/25  0323   SODIUM mmol/L 137 137 133*   POTASSIUM mmol/L 3.6 4.0 4.0   CHLORIDE mmol/L 105 104 100   CO2 mmol/L 26 28 24   BUN mg/dL 14 14 13   CREATININE mg/dL 0.87 0.92 0.92   GLUCOSE mg/dL 117* 88 134*   CALCIUM mg/dL 8.1* 8.1* 8.7   ANION GAP mmol/L 10 9* 13   EGFR mL/min/1.73m*2 73 68 68   PHOSPHORUS mg/dL 3.1 3.3  --      Results from last 72 hours   Lab Units 08/10/25  0424 08/09/25  0317 08/08/25  0323   ALK PHOS U/L  --  398* 899*   BILIRUBIN TOTAL mg/dL  --  1.4* 2.5*   PROTEIN TOTAL g/dL  --  5.2* 6.5   ALT U/L  --  34 45   AST U/L  --  57* 143*   ALBUMIN g/dL 2.5* 2.6* 3.3*     Estimated Creatinine Clearance: 54.2 mL/min (by C-G formula based on SCr of 0.87 mg/dL).  No results found for: \"CRP\"    Microbiology  No results found for the last 14 days.      Imaging    CT chest abdomen pelvis w IV contrast  Result Date: 8/8/2025  Patchy ground-glass opacities throughout the lungs have worsened compared to prior imaging along with new tree-in-bud nodular opacities in the lingula. Findings are concerning for an infectious or inflammatory process. Metastatic disease is also not entirely excluded in the " setting of known malignancy.   A filling defect is present in the left subclavian vein most compatible with thrombus. This was also present on prior imaging 06/24/2025.   Stable appearance of the pancreatic head mass, which is poorly visualized on CT corresponding to known pancreatic malignancy. Redemonstrated CBD stent with persistent intrahepatic pneumobilia and biliary dilatation.   Focal narrowing of the splenic vein which is new compared to prior imaging likely related to overlying mass effect.   Small to moderate volume ascites, increased compared to prior imaging.   Underdistention versus wall thickening diffusely throughout small bowel. Correlate for symptoms of enteritis.     MACRO: Evan Finkelstein discussed the significance and urgency of this critical finding by telephone with  BO DIAS on 8/8/2025 at 5:54 am. (**-RCF-**) Findings:  See findings.     Signed by: Evan Finkelstein 8/8/2025 5:55 AM Dictation workstation:   [id number]    US abdomen limited liver  Result Date: 7/31/2025  Study is limited by interfering bowel gas with pancreas and distal common bile duct obscured.   Common bile duct stent is partially visible. Pneumobilia is present but the intrahepatic ducts do not appear dilated.   Ascites, new since the CT in June   MACRO: None.   Signed by: Regla Brewster 7/31/2025 1:03 PM Dictation workstation:   [id number]    XR chest 2 views  Result Date: 7/30/2025  No evidence of consolidating infiltrate or effusion. Signed by Ricky Hayward MD

## 2025-08-10 NOTE — DISCHARGE INSTRUCTIONS
Your A1c score is a longterm measure of sugar in your blood. In 2024 it was 8.0% and your goal is to stay under 7%. Today it's 5.0%. That is very good, low enough that it would no longer be considered diabetes at this time. You should stop taking the metformin for now and follow up with your doctor if you need to take it in the future.

## 2025-08-11 ENCOUNTER — TELEPHONE (OUTPATIENT)
Dept: ADMISSION | Facility: HOSPITAL | Age: 70
End: 2025-08-11
Payer: MEDICARE

## 2025-08-11 DIAGNOSIS — E11.9 TYPE 2 DIABETES MELLITUS WITHOUT COMPLICATION, WITHOUT LONG-TERM CURRENT USE OF INSULIN: Primary | ICD-10-CM

## 2025-08-11 LAB
HOLD SPECIMEN: NORMAL
T GONDII IGM SER-ACNC: <3 AU/ML

## 2025-08-12 ENCOUNTER — INFUSION (OUTPATIENT)
Dept: HEMATOLOGY/ONCOLOGY | Facility: CLINIC | Age: 70
End: 2025-08-12
Payer: MEDICARE

## 2025-08-12 DIAGNOSIS — C25.0 MALIGNANT NEOPLASM OF HEAD OF PANCREAS (MULTI): ICD-10-CM

## 2025-08-12 LAB
ADENOVIRUS RVP, VIRC: NOT DETECTED
ALBUMIN SERPL BCP-MCNC: 3.1 G/DL (ref 3.4–5)
ALP SERPL-CCNC: 551 U/L (ref 33–136)
ALT SERPL W P-5'-P-CCNC: 21 U/L (ref 7–45)
ANION GAP SERPL CALC-SCNC: 10 MMOL/L (ref 10–20)
AST SERPL W P-5'-P-CCNC: 23 U/L (ref 9–39)
BACTERIA BLD CULT: NORMAL
BACTERIA BLD CULT: NORMAL
BASOPHILS # BLD AUTO: 0.03 X10*3/UL (ref 0–0.1)
BASOPHILS NFR BLD AUTO: 0.5 %
BILIRUB SERPL-MCNC: 1 MG/DL (ref 0–1.2)
BUN SERPL-MCNC: 13 MG/DL (ref 6–23)
CALCIUM SERPL-MCNC: 8.6 MG/DL (ref 8.6–10.3)
CHLORIDE SERPL-SCNC: 102 MMOL/L (ref 98–107)
CO2 SERPL-SCNC: 24 MMOL/L (ref 21–32)
CREAT SERPL-MCNC: 0.81 MG/DL (ref 0.5–1.05)
EGFRCR SERPLBLD CKD-EPI 2021: 80 ML/MIN/1.73M*2
ENTEROVIRUS/RHINOVIRUS RVP, VIRC: NOT DETECTED
EOSINOPHIL # BLD AUTO: 0.03 X10*3/UL (ref 0–0.7)
EOSINOPHIL NFR BLD AUTO: 0.5 %
ERYTHROCYTE [DISTWIDTH] IN BLOOD BY AUTOMATED COUNT: 15.2 % (ref 11.5–14.5)
FUNGITELL BETA-D GLUCAN,SERUM: 74 PG/ML
GLUCOSE SERPL-MCNC: 212 MG/DL (ref 74–99)
HCT VFR BLD AUTO: 30.4 % (ref 36–46)
HGB BLD-MCNC: 9.9 G/DL (ref 12–16)
HUMAN BOCAVIRUS RVP, VIRC: NOT DETECTED
HUMAN CORONAVIRUS RVP, VIRC: NOT DETECTED
IMM GRANULOCYTES # BLD AUTO: 0.02 X10*3/UL (ref 0–0.7)
IMM GRANULOCYTES NFR BLD AUTO: 0.3 % (ref 0–0.9)
INFLUENZA A , VIRC: NOT DETECTED
INFLUENZA A H1N1-09 , VIRC: NOT DETECTED
INFLUENZA B PCR, VIRC: NOT DETECTED
LYMPHOCYTES # BLD AUTO: 1.11 X10*3/UL (ref 1.2–4.8)
LYMPHOCYTES NFR BLD AUTO: 17.2 %
MCH RBC QN AUTO: 33.9 PG (ref 26–34)
MCHC RBC AUTO-ENTMCNC: 32.6 G/DL (ref 32–36)
MCV RBC AUTO: 104 FL (ref 80–100)
METAPNEUMOVIRUS , VIRC: NOT DETECTED
MONOCYTES # BLD AUTO: 0.46 X10*3/UL (ref 0.1–1)
MONOCYTES NFR BLD AUTO: 7.1 %
NEUTROPHILS # BLD AUTO: 4.82 X10*3/UL (ref 1.2–7.7)
NEUTROPHILS NFR BLD AUTO: 74.4 %
NRBC BLD-RTO: ABNORMAL /100{WBCS}
PARAINFLUENZA PCR, VIRC: NOT DETECTED
PLATELET # BLD AUTO: 185 X10*3/UL (ref 150–450)
POTASSIUM SERPL-SCNC: 4.1 MMOL/L (ref 3.5–5.3)
PROT SERPL-MCNC: 6.1 G/DL (ref 6.4–8.2)
RBC # BLD AUTO: 2.92 X10*6/UL (ref 4–5.2)
RSV PCR, RVP, VIRC: NOT DETECTED
SODIUM SERPL-SCNC: 132 MMOL/L (ref 136–145)
WBC # BLD AUTO: 6.5 X10*3/UL (ref 4.4–11.3)

## 2025-08-12 PROCEDURE — 36415 COLL VENOUS BLD VENIPUNCTURE: CPT

## 2025-08-12 PROCEDURE — 85025 COMPLETE CBC W/AUTO DIFF WBC: CPT

## 2025-08-12 PROCEDURE — 36591 DRAW BLOOD OFF VENOUS DEVICE: CPT

## 2025-08-12 PROCEDURE — 2500000004 HC RX 250 GENERAL PHARMACY W/ HCPCS (ALT 636 FOR OP/ED): Performed by: INTERNAL MEDICINE

## 2025-08-12 PROCEDURE — 84075 ASSAY ALKALINE PHOSPHATASE: CPT

## 2025-08-12 RX ORDER — HEPARIN 100 UNIT/ML
500 SYRINGE INTRAVENOUS AS NEEDED
OUTPATIENT
Start: 2025-08-12

## 2025-08-12 RX ORDER — HEPARIN 100 UNIT/ML
500 SYRINGE INTRAVENOUS AS NEEDED
Status: DISCONTINUED | OUTPATIENT
Start: 2025-08-12 | End: 2025-08-12 | Stop reason: HOSPADM

## 2025-08-12 RX ORDER — HEPARIN SODIUM,PORCINE/PF 10 UNIT/ML
50 SYRINGE (ML) INTRAVENOUS AS NEEDED
OUTPATIENT
Start: 2025-08-12

## 2025-08-12 RX ADMIN — Medication 500 UNITS: at 12:11

## 2025-08-13 ENCOUNTER — INFUSION (OUTPATIENT)
Dept: HEMATOLOGY/ONCOLOGY | Facility: CLINIC | Age: 70
End: 2025-08-13
Payer: MEDICARE

## 2025-08-13 ENCOUNTER — NUTRITION (OUTPATIENT)
Dept: HEMATOLOGY/ONCOLOGY | Facility: CLINIC | Age: 70
End: 2025-08-13
Payer: MEDICARE

## 2025-08-13 ENCOUNTER — SOCIAL WORK (OUTPATIENT)
Dept: HEMATOLOGY/ONCOLOGY | Facility: CLINIC | Age: 70
End: 2025-08-13
Payer: MEDICARE

## 2025-08-13 VITALS — HEIGHT: 64 IN | WEIGHT: 123.4 LBS | BODY MASS INDEX: 21.07 KG/M2

## 2025-08-13 VITALS
DIASTOLIC BLOOD PRESSURE: 81 MMHG | SYSTOLIC BLOOD PRESSURE: 118 MMHG | HEART RATE: 95 BPM | BODY MASS INDEX: 21.07 KG/M2 | TEMPERATURE: 97.9 F | OXYGEN SATURATION: 96 % | RESPIRATION RATE: 16 BRPM | WEIGHT: 123.4 LBS | HEIGHT: 64 IN

## 2025-08-13 DIAGNOSIS — C25.0 MALIGNANT NEOPLASM OF HEAD OF PANCREAS (MULTI): Primary | ICD-10-CM

## 2025-08-13 PROCEDURE — 96413 CHEMO IV INFUSION 1 HR: CPT

## 2025-08-13 PROCEDURE — 96375 TX/PRO/DX INJ NEW DRUG ADDON: CPT | Mod: INF

## 2025-08-13 PROCEDURE — 2500000004 HC RX 250 GENERAL PHARMACY W/ HCPCS (ALT 636 FOR OP/ED): Performed by: INTERNAL MEDICINE

## 2025-08-13 PROCEDURE — 96417 CHEMO IV INFUS EACH ADDL SEQ: CPT

## 2025-08-13 RX ORDER — FAMOTIDINE 10 MG/ML
20 INJECTION, SOLUTION INTRAVENOUS ONCE AS NEEDED
Status: CANCELLED | OUTPATIENT
Start: 2025-08-13

## 2025-08-13 RX ORDER — ONDANSETRON HYDROCHLORIDE 2 MG/ML
8 INJECTION, SOLUTION INTRAVENOUS ONCE
OUTPATIENT
Start: 2025-08-27

## 2025-08-13 RX ORDER — FAMOTIDINE 10 MG/ML
20 INJECTION, SOLUTION INTRAVENOUS ONCE AS NEEDED
OUTPATIENT
Start: 2025-08-27

## 2025-08-13 RX ORDER — EPINEPHRINE 0.3 MG/.3ML
0.3 INJECTION SUBCUTANEOUS EVERY 5 MIN PRN
Status: CANCELLED | OUTPATIENT
Start: 2025-08-13

## 2025-08-13 RX ORDER — PROCHLORPERAZINE EDISYLATE 5 MG/ML
10 INJECTION INTRAMUSCULAR; INTRAVENOUS EVERY 6 HOURS PRN
OUTPATIENT
Start: 2025-08-27

## 2025-08-13 RX ORDER — ONDANSETRON HYDROCHLORIDE 2 MG/ML
8 INJECTION, SOLUTION INTRAVENOUS ONCE
Status: CANCELLED | OUTPATIENT
Start: 2025-08-13

## 2025-08-13 RX ORDER — ALBUTEROL SULFATE 0.83 MG/ML
3 SOLUTION RESPIRATORY (INHALATION) AS NEEDED
OUTPATIENT
Start: 2025-08-27

## 2025-08-13 RX ORDER — ONDANSETRON HYDROCHLORIDE 2 MG/ML
8 INJECTION, SOLUTION INTRAVENOUS ONCE
Status: COMPLETED | OUTPATIENT
Start: 2025-08-13 | End: 2025-08-13

## 2025-08-13 RX ORDER — DIPHENHYDRAMINE HYDROCHLORIDE 50 MG/ML
50 INJECTION, SOLUTION INTRAMUSCULAR; INTRAVENOUS AS NEEDED
OUTPATIENT
Start: 2025-08-27

## 2025-08-13 RX ORDER — PROCHLORPERAZINE EDISYLATE 5 MG/ML
10 INJECTION INTRAMUSCULAR; INTRAVENOUS EVERY 6 HOURS PRN
Status: CANCELLED | OUTPATIENT
Start: 2025-08-13

## 2025-08-13 RX ORDER — ALBUTEROL SULFATE 0.83 MG/ML
3 SOLUTION RESPIRATORY (INHALATION) AS NEEDED
Status: CANCELLED | OUTPATIENT
Start: 2025-08-13

## 2025-08-13 RX ORDER — PROCHLORPERAZINE MALEATE 10 MG
10 TABLET ORAL EVERY 6 HOURS PRN
OUTPATIENT
Start: 2025-08-27

## 2025-08-13 RX ORDER — DIPHENHYDRAMINE HYDROCHLORIDE 50 MG/ML
50 INJECTION, SOLUTION INTRAMUSCULAR; INTRAVENOUS AS NEEDED
Status: CANCELLED | OUTPATIENT
Start: 2025-08-13

## 2025-08-13 RX ORDER — PROCHLORPERAZINE MALEATE 10 MG
10 TABLET ORAL EVERY 6 HOURS PRN
Status: CANCELLED | OUTPATIENT
Start: 2025-08-13

## 2025-08-13 RX ORDER — EPINEPHRINE 0.3 MG/.3ML
0.3 INJECTION SUBCUTANEOUS EVERY 5 MIN PRN
OUTPATIENT
Start: 2025-08-27

## 2025-08-13 RX ADMIN — GEMCITABINE 1303.4 MG: 38 INJECTION, SOLUTION INTRAVENOUS at 15:46

## 2025-08-13 RX ADMIN — ONDANSETRON 8 MG: 2 INJECTION INTRAMUSCULAR; INTRAVENOUS at 14:25

## 2025-08-13 RX ADMIN — PACLITAXEL 165 MG: 100 INJECTION, POWDER, LYOPHILIZED, FOR SUSPENSION INTRAVENOUS at 15:08

## 2025-08-13 ASSESSMENT — PAIN SCALES - GENERAL: PAINLEVEL_OUTOF10: 0-NO PAIN

## 2025-08-18 ENCOUNTER — TELEPHONE (OUTPATIENT)
Dept: ADMISSION | Facility: HOSPITAL | Age: 70
End: 2025-08-18

## 2025-08-18 ENCOUNTER — APPOINTMENT (OUTPATIENT)
Dept: PRIMARY CARE | Facility: CLINIC | Age: 70
End: 2025-08-18
Payer: MEDICARE

## 2025-08-18 VITALS
HEART RATE: 102 BPM | HEIGHT: 64 IN | SYSTOLIC BLOOD PRESSURE: 119 MMHG | OXYGEN SATURATION: 98 % | BODY MASS INDEX: 21.68 KG/M2 | DIASTOLIC BLOOD PRESSURE: 82 MMHG | WEIGHT: 127 LBS

## 2025-08-18 DIAGNOSIS — Z92.89 HOSPITALIZATION WITHIN LAST 30 DAYS: Primary | ICD-10-CM

## 2025-08-18 DIAGNOSIS — Z86.718 HISTORY OF VENOUS THROMBOEMBOLISM: ICD-10-CM

## 2025-08-18 DIAGNOSIS — C25.0 MALIGNANT NEOPLASM OF HEAD OF PANCREAS (MULTI): ICD-10-CM

## 2025-08-18 DIAGNOSIS — K64.9 HEMORRHOIDS, UNSPECIFIED HEMORRHOID TYPE: ICD-10-CM

## 2025-08-18 DIAGNOSIS — G89.3 CANCER RELATED PAIN: ICD-10-CM

## 2025-08-18 DIAGNOSIS — K59.00 CONSTIPATION, UNSPECIFIED CONSTIPATION TYPE: ICD-10-CM

## 2025-08-18 DIAGNOSIS — F32.9 REACTIVE DEPRESSION: ICD-10-CM

## 2025-08-18 DIAGNOSIS — H61.23 IMPACTED CERUMEN OF BOTH EARS: ICD-10-CM

## 2025-08-18 DIAGNOSIS — F51.01 PRIMARY INSOMNIA: ICD-10-CM

## 2025-08-18 RX ORDER — MORPHINE SULFATE 15 MG/1
15 TABLET, FILM COATED, EXTENDED RELEASE ORAL 2 TIMES DAILY
Qty: 60 TABLET | Refills: 0 | Status: SHIPPED | OUTPATIENT
Start: 2025-08-22 | End: 2025-09-21

## 2025-08-18 ASSESSMENT — PATIENT HEALTH QUESTIONNAIRE - PHQ9
5. POOR APPETITE OR OVEREATING: MORE THAN HALF THE DAYS
7. TROUBLE CONCENTRATING ON THINGS, SUCH AS READING THE NEWSPAPER OR WATCHING TELEVISION: MORE THAN HALF THE DAYS
SUM OF ALL RESPONSES TO PHQ9 QUESTIONS 1 AND 2: 2
2. FEELING DOWN, DEPRESSED OR HOPELESS: SEVERAL DAYS
SUM OF ALL RESPONSES TO PHQ QUESTIONS 1-9: 12
8. MOVING OR SPEAKING SO SLOWLY THAT OTHER PEOPLE COULD HAVE NOTICED. OR THE OPPOSITE, BEING SO FIGETY OR RESTLESS THAT YOU HAVE BEEN MOVING AROUND A LOT MORE THAN USUAL: NOT AT ALL
3. TROUBLE FALLING OR STAYING ASLEEP OR SLEEPING TOO MUCH: NEARLY EVERY DAY
1. LITTLE INTEREST OR PLEASURE IN DOING THINGS: SEVERAL DAYS
6. FEELING BAD ABOUT YOURSELF - OR THAT YOU ARE A FAILURE OR HAVE LET YOURSELF OR YOUR FAMILY DOWN: NOT AT ALL
4. FEELING TIRED OR HAVING LITTLE ENERGY: NEARLY EVERY DAY
9. THOUGHTS THAT YOU WOULD BE BETTER OFF DEAD, OR OF HURTING YOURSELF: NOT AT ALL

## 2025-08-18 ASSESSMENT — ENCOUNTER SYMPTOMS
STRIDOR: 0
ANAL BLEEDING: 0
LOSS OF SENSATION IN FEET: 0
CONFUSION: 0
DECREASED CONCENTRATION: 1
OCCASIONAL FEELINGS OF UNSTEADINESS: 0
FATIGUE: 1
DEPRESSION: 1
COUGH: 0
LIGHT-HEADEDNESS: 0
HEADACHES: 0
ACTIVITY CHANGE: 1
DYSPHORIC MOOD: 1
CONSTIPATION: 1
SHORTNESS OF BREATH: 0
FEVER: 0
SLEEP DISTURBANCE: 1
DIARRHEA: 1
CHOKING: 0
CHEST TIGHTNESS: 0

## 2025-08-19 ENCOUNTER — TELEMEDICINE (OUTPATIENT)
Age: 70
End: 2025-08-19
Payer: MEDICARE

## 2025-08-19 VITALS — HEIGHT: 64 IN | BODY MASS INDEX: 21.51 KG/M2 | WEIGHT: 126 LBS

## 2025-08-19 DIAGNOSIS — E11.9 TYPE 2 DIABETES MELLITUS WITHOUT COMPLICATION, WITHOUT LONG-TERM CURRENT USE OF INSULIN: ICD-10-CM

## 2025-08-19 DIAGNOSIS — E03.9 HYPOTHYROIDISM, UNSPECIFIED TYPE: Primary | ICD-10-CM

## 2025-08-19 PROCEDURE — 99214 OFFICE O/P EST MOD 30 MIN: CPT | Performed by: STUDENT IN AN ORGANIZED HEALTH CARE EDUCATION/TRAINING PROGRAM

## 2025-08-19 PROCEDURE — 3008F BODY MASS INDEX DOCD: CPT | Performed by: STUDENT IN AN ORGANIZED HEALTH CARE EDUCATION/TRAINING PROGRAM

## 2025-08-19 PROCEDURE — 1111F DSCHRG MED/CURRENT MED MERGE: CPT | Performed by: STUDENT IN AN ORGANIZED HEALTH CARE EDUCATION/TRAINING PROGRAM

## 2025-08-19 PROCEDURE — 3051F HG A1C>EQUAL 7.0%<8.0%: CPT | Performed by: STUDENT IN AN ORGANIZED HEALTH CARE EDUCATION/TRAINING PROGRAM

## 2025-08-19 PROCEDURE — 1159F MED LIST DOCD IN RCRD: CPT | Performed by: STUDENT IN AN ORGANIZED HEALTH CARE EDUCATION/TRAINING PROGRAM

## 2025-08-19 PROCEDURE — G2211 COMPLEX E/M VISIT ADD ON: HCPCS | Performed by: STUDENT IN AN ORGANIZED HEALTH CARE EDUCATION/TRAINING PROGRAM

## 2025-08-19 ASSESSMENT — PATIENT HEALTH QUESTIONNAIRE - PHQ9
8. MOVING OR SPEAKING SO SLOWLY THAT OTHER PEOPLE COULD HAVE NOTICED. OR THE OPPOSITE, BEING SO FIGETY OR RESTLESS THAT YOU HAVE BEEN MOVING AROUND A LOT MORE THAN USUAL: NOT AT ALL
6. FEELING BAD ABOUT YOURSELF - OR THAT YOU ARE A FAILURE OR HAVE LET YOURSELF OR YOUR FAMILY DOWN: NOT AT ALL
SUM OF ALL RESPONSES TO PHQ QUESTIONS 1-9: 0
SUM OF ALL RESPONSES TO PHQ9 QUESTIONS 1 AND 2: 0
7. TROUBLE CONCENTRATING ON THINGS, SUCH AS READING THE NEWSPAPER OR WATCHING TELEVISION: NOT AT ALL
5. POOR APPETITE OR OVEREATING: NOT AT ALL
9. THOUGHTS THAT YOU WOULD BE BETTER OFF DEAD, OR OF HURTING YOURSELF: NOT AT ALL
3. TROUBLE FALLING OR STAYING ASLEEP OR SLEEPING TOO MUCH: NOT AT ALL
4. FEELING TIRED OR HAVING LITTLE ENERGY: NOT AT ALL
1. LITTLE INTEREST OR PLEASURE IN DOING THINGS: NOT AT ALL
2. FEELING DOWN, DEPRESSED OR HOPELESS: NOT AT ALL

## 2025-08-19 ASSESSMENT — ENCOUNTER SYMPTOMS
OCCASIONAL FEELINGS OF UNSTEADINESS: 1
DEPRESSION: 0
LOSS OF SENSATION IN FEET: 1

## 2025-08-20 ENCOUNTER — APPOINTMENT (OUTPATIENT)
Dept: HEMATOLOGY/ONCOLOGY | Facility: CLINIC | Age: 70
End: 2025-08-20
Payer: MEDICARE

## 2025-08-20 ENCOUNTER — HOSPITAL ENCOUNTER (OUTPATIENT)
Dept: RADIOLOGY | Facility: HOSPITAL | Age: 70
Discharge: HOME | End: 2025-08-20
Payer: MEDICARE

## 2025-08-20 ENCOUNTER — INFUSION (OUTPATIENT)
Dept: HEMATOLOGY/ONCOLOGY | Facility: CLINIC | Age: 70
End: 2025-08-20
Payer: MEDICARE

## 2025-08-20 DIAGNOSIS — C25.0 MALIGNANT NEOPLASM OF HEAD OF PANCREAS (MULTI): ICD-10-CM

## 2025-08-20 PROCEDURE — 2550000001 HC RX 255 CONTRASTS: Performed by: NURSE PRACTITIONER

## 2025-08-20 PROCEDURE — 71260 CT THORAX DX C+: CPT | Performed by: STUDENT IN AN ORGANIZED HEALTH CARE EDUCATION/TRAINING PROGRAM

## 2025-08-20 PROCEDURE — 2500000004 HC RX 250 GENERAL PHARMACY W/ HCPCS (ALT 636 FOR OP/ED): Mod: JW | Performed by: NURSE PRACTITIONER

## 2025-08-20 PROCEDURE — 96523 IRRIG DRUG DELIVERY DEVICE: CPT

## 2025-08-20 PROCEDURE — 74177 CT ABD & PELVIS W/CONTRAST: CPT | Performed by: STUDENT IN AN ORGANIZED HEALTH CARE EDUCATION/TRAINING PROGRAM

## 2025-08-20 PROCEDURE — 71260 CT THORAX DX C+: CPT

## 2025-08-20 RX ORDER — HEPARIN 100 UNIT/ML
500 SYRINGE INTRAVENOUS AS NEEDED
OUTPATIENT
Start: 2025-08-20

## 2025-08-20 RX ORDER — HEPARIN 100 UNIT/ML
100 SYRINGE INTRAVENOUS AS NEEDED
Status: DISCONTINUED | OUTPATIENT
Start: 2025-08-20 | End: 2025-08-21 | Stop reason: HOSPADM

## 2025-08-20 RX ORDER — HEPARIN 100 UNIT/ML
500 SYRINGE INTRAVENOUS AS NEEDED
Status: DISCONTINUED | OUTPATIENT
Start: 2025-08-20 | End: 2025-08-20 | Stop reason: HOSPADM

## 2025-08-20 RX ORDER — HEPARIN SODIUM,PORCINE/PF 10 UNIT/ML
50 SYRINGE (ML) INTRAVENOUS AS NEEDED
OUTPATIENT
Start: 2025-08-20

## 2025-08-20 RX ADMIN — Medication 100 UNITS: at 14:32

## 2025-08-20 RX ADMIN — IOHEXOL 75 ML: 350 INJECTION, SOLUTION INTRAVENOUS at 14:16

## 2025-08-26 ENCOUNTER — LAB (OUTPATIENT)
Dept: HEMATOLOGY/ONCOLOGY | Facility: CLINIC | Age: 70
End: 2025-08-26
Payer: MEDICARE

## 2025-08-26 ENCOUNTER — OFFICE VISIT (OUTPATIENT)
Dept: HEMATOLOGY/ONCOLOGY | Facility: CLINIC | Age: 70
End: 2025-08-26
Payer: MEDICARE

## 2025-08-26 ENCOUNTER — SPECIALTY PHARMACY (OUTPATIENT)
Dept: PHARMACY | Facility: CLINIC | Age: 70
End: 2025-08-26

## 2025-08-26 VITALS — HEART RATE: 118 BPM | OXYGEN SATURATION: 95 %

## 2025-08-26 VITALS
SYSTOLIC BLOOD PRESSURE: 123 MMHG | RESPIRATION RATE: 16 BRPM | DIASTOLIC BLOOD PRESSURE: 84 MMHG | BODY MASS INDEX: 22.63 KG/M2 | WEIGHT: 131.84 LBS | HEART RATE: 123 BPM | OXYGEN SATURATION: 97 % | TEMPERATURE: 97.2 F

## 2025-08-26 DIAGNOSIS — C25.9 MALIGNANT NEOPLASM OF PANCREAS, UNSPECIFIED LOCATION OF MALIGNANCY (MULTI): Primary | ICD-10-CM

## 2025-08-26 DIAGNOSIS — C25.0 MALIGNANT NEOPLASM OF HEAD OF PANCREAS (MULTI): ICD-10-CM

## 2025-08-26 LAB
ALBUMIN SERPL BCP-MCNC: 3 G/DL (ref 3.4–5)
ALP SERPL-CCNC: 651 U/L (ref 33–136)
ALT SERPL W P-5'-P-CCNC: 15 U/L (ref 7–45)
ANION GAP SERPL CALC-SCNC: 14 MMOL/L (ref 10–20)
AST SERPL W P-5'-P-CCNC: 24 U/L (ref 9–39)
BASOPHILS # BLD AUTO: 0.03 X10*3/UL (ref 0–0.1)
BASOPHILS NFR BLD AUTO: 0.6 %
BILIRUB SERPL-MCNC: 0.9 MG/DL (ref 0–1.2)
BUN SERPL-MCNC: 12 MG/DL (ref 6–23)
CALCIUM SERPL-MCNC: 8.6 MG/DL (ref 8.6–10.6)
CHLORIDE SERPL-SCNC: 101 MMOL/L (ref 98–107)
CO2 SERPL-SCNC: 24 MMOL/L (ref 21–32)
CREAT SERPL-MCNC: 0.77 MG/DL (ref 0.5–1.05)
EGFRCR SERPLBLD CKD-EPI 2021: 85 ML/MIN/1.73M*2
EOSINOPHIL # BLD AUTO: 0.03 X10*3/UL (ref 0–0.7)
EOSINOPHIL NFR BLD AUTO: 0.6 %
ERYTHROCYTE [DISTWIDTH] IN BLOOD BY AUTOMATED COUNT: 15.4 % (ref 11.5–14.5)
GLUCOSE SERPL-MCNC: 172 MG/DL (ref 74–99)
HCT VFR BLD AUTO: 30 % (ref 36–46)
HGB BLD-MCNC: 9.7 G/DL (ref 12–16)
IMM GRANULOCYTES # BLD AUTO: 0.01 X10*3/UL (ref 0–0.7)
IMM GRANULOCYTES NFR BLD AUTO: 0.2 % (ref 0–0.9)
LYMPHOCYTES # BLD AUTO: 0.96 X10*3/UL (ref 1.2–4.8)
LYMPHOCYTES NFR BLD AUTO: 20 %
MCH RBC QN AUTO: 33.3 PG (ref 26–34)
MCHC RBC AUTO-ENTMCNC: 32.3 G/DL (ref 32–36)
MCV RBC AUTO: 103 FL (ref 80–100)
MONOCYTES # BLD AUTO: 0.59 X10*3/UL (ref 0.1–1)
MONOCYTES NFR BLD AUTO: 12.3 %
NEUTROPHILS # BLD AUTO: 3.19 X10*3/UL (ref 1.2–7.7)
NEUTROPHILS NFR BLD AUTO: 66.3 %
NRBC BLD-RTO: ABNORMAL /100{WBCS}
PLATELET # BLD AUTO: 203 X10*3/UL (ref 150–450)
POTASSIUM SERPL-SCNC: 3.9 MMOL/L (ref 3.5–5.3)
PROT SERPL-MCNC: 5.8 G/DL (ref 6.4–8.2)
RBC # BLD AUTO: 2.91 X10*6/UL (ref 4–5.2)
SODIUM SERPL-SCNC: 135 MMOL/L (ref 136–145)
WBC # BLD AUTO: 4.8 X10*3/UL (ref 4.4–11.3)

## 2025-08-26 PROCEDURE — 99215 OFFICE O/P EST HI 40 MIN: CPT | Performed by: INTERNAL MEDICINE

## 2025-08-26 PROCEDURE — 3051F HG A1C>EQUAL 7.0%<8.0%: CPT | Performed by: INTERNAL MEDICINE

## 2025-08-26 PROCEDURE — 36415 COLL VENOUS BLD VENIPUNCTURE: CPT

## 2025-08-26 PROCEDURE — 1111F DSCHRG MED/CURRENT MED MERGE: CPT | Performed by: INTERNAL MEDICINE

## 2025-08-26 PROCEDURE — 1159F MED LIST DOCD IN RCRD: CPT | Performed by: INTERNAL MEDICINE

## 2025-08-26 PROCEDURE — 36591 DRAW BLOOD OFF VENOUS DEVICE: CPT

## 2025-08-26 PROCEDURE — 80053 COMPREHEN METABOLIC PANEL: CPT

## 2025-08-26 PROCEDURE — 2500000004 HC RX 250 GENERAL PHARMACY W/ HCPCS (ALT 636 FOR OP/ED): Performed by: INTERNAL MEDICINE

## 2025-08-26 PROCEDURE — 85025 COMPLETE CBC W/AUTO DIFF WBC: CPT

## 2025-08-26 RX ORDER — HEPARIN 100 UNIT/ML
500 SYRINGE INTRAVENOUS AS NEEDED
OUTPATIENT
Start: 2025-08-26

## 2025-08-26 RX ORDER — HEPARIN 100 UNIT/ML
500 SYRINGE INTRAVENOUS AS NEEDED
Status: DISCONTINUED | OUTPATIENT
Start: 2025-08-26 | End: 2025-08-26 | Stop reason: HOSPADM

## 2025-08-26 RX ORDER — HEPARIN SODIUM,PORCINE/PF 10 UNIT/ML
50 SYRINGE (ML) INTRAVENOUS AS NEEDED
OUTPATIENT
Start: 2025-08-26

## 2025-08-26 RX ORDER — CAPECITABINE 500 MG/1
TABLET, FILM COATED ORAL
Qty: 80 TABLET | Refills: 3 | Status: SHIPPED | OUTPATIENT
Start: 2025-08-26

## 2025-08-26 RX ADMIN — HEPARIN 500 UNITS: 100 SYRINGE at 10:05

## 2025-08-26 ASSESSMENT — PAIN SCALES - GENERAL: PAINLEVEL_OUTOF10: 0-NO PAIN

## 2025-08-27 ENCOUNTER — TELEPHONE (OUTPATIENT)
Dept: HEMATOLOGY/ONCOLOGY | Facility: CLINIC | Age: 70
End: 2025-08-27
Payer: MEDICARE

## 2025-08-27 ENCOUNTER — APPOINTMENT (OUTPATIENT)
Dept: HEMATOLOGY/ONCOLOGY | Facility: CLINIC | Age: 70
End: 2025-08-27
Payer: MEDICARE

## 2025-08-28 ENCOUNTER — TELEPHONE (OUTPATIENT)
Dept: ADMISSION | Facility: HOSPITAL | Age: 70
End: 2025-08-28
Payer: MEDICARE

## 2025-08-28 ENCOUNTER — TELEPHONE (OUTPATIENT)
Dept: HEMATOLOGY/ONCOLOGY | Facility: CLINIC | Age: 70
End: 2025-08-28
Payer: MEDICARE

## 2025-08-28 DIAGNOSIS — I82.B12 THROMBOSIS OF LEFT SUBCLAVIAN VEIN: ICD-10-CM

## 2025-08-29 ENCOUNTER — SPECIALTY PHARMACY (OUTPATIENT)
Dept: PHARMACY | Facility: CLINIC | Age: 70
End: 2025-08-29

## 2025-08-29 PROCEDURE — RXMED WILLOW AMBULATORY MEDICATION CHARGE

## 2025-09-02 ENCOUNTER — TELEPHONE (OUTPATIENT)
Dept: HEMATOLOGY/ONCOLOGY | Facility: CLINIC | Age: 70
End: 2025-09-02
Payer: MEDICARE

## 2025-09-02 ENCOUNTER — SOCIAL WORK (OUTPATIENT)
Dept: HEMATOLOGY/ONCOLOGY | Facility: CLINIC | Age: 70
End: 2025-09-02
Payer: MEDICARE

## 2025-09-02 ENCOUNTER — PHARMACY VISIT (OUTPATIENT)
Dept: PHARMACY | Facility: CLINIC | Age: 70
End: 2025-09-02
Payer: MEDICARE

## 2025-09-03 ENCOUNTER — TELEPHONE (OUTPATIENT)
Dept: HEMATOLOGY/ONCOLOGY | Facility: CLINIC | Age: 70
End: 2025-09-03

## 2025-09-03 ENCOUNTER — ONCOLOGY MEDICATION OUTREACH (OUTPATIENT)
Dept: HEMATOLOGY/ONCOLOGY | Facility: HOSPITAL | Age: 70
End: 2025-09-03

## 2025-09-03 ENCOUNTER — APPOINTMENT (OUTPATIENT)
Dept: GENETICS | Facility: CLINIC | Age: 70
End: 2025-09-03
Payer: MEDICARE

## 2025-09-03 ENCOUNTER — TELEPHONE (OUTPATIENT)
Age: 70
End: 2025-09-03

## 2025-09-03 DIAGNOSIS — E03.9 HYPOTHYROIDISM, UNSPECIFIED TYPE: ICD-10-CM

## 2025-09-03 DIAGNOSIS — C25.9 PANCREATIC ADENOCARCINOMA (MULTI): Primary | ICD-10-CM

## 2025-09-03 DIAGNOSIS — E11.9 TYPE 2 DIABETES MELLITUS WITHOUT COMPLICATION, WITHOUT LONG-TERM CURRENT USE OF INSULIN: Primary | ICD-10-CM

## 2025-09-03 LAB
C PEPTIDE SERPL-MCNC: 0.67 NG/ML (ref 0.8–3.85)
GLUCOSE P FAST SERPL-MCNC: 133 MG/DL (ref 65–99)
T4 FREE SERPL-MCNC: 1.3 NG/DL (ref 0.8–1.8)
TSH SERPL-ACNC: 68.01 MIU/L (ref 0.4–4.5)

## 2025-09-03 RX ORDER — LEVOTHYROXINE SODIUM 125 UG/1
125 TABLET ORAL DAILY
Qty: 30 TABLET | Refills: 11 | Status: SHIPPED | OUTPATIENT
Start: 2025-09-03 | End: 2026-09-03

## 2025-09-03 RX ORDER — ONDANSETRON 8 MG/1
8 TABLET, FILM COATED ORAL EVERY 8 HOURS PRN
Qty: 30 TABLET | Refills: 1 | Status: SHIPPED | OUTPATIENT
Start: 2025-09-03 | End: 2025-10-03

## 2025-09-03 RX ORDER — PEN NEEDLE, DIABETIC 30 GX3/16"
NEEDLE, DISPOSABLE MISCELLANEOUS
Qty: 90 EACH | Refills: 3 | Status: SHIPPED | OUTPATIENT
Start: 2025-09-03

## 2025-09-03 RX ORDER — INSULIN GLARGINE 100 [IU]/ML
INJECTION, SOLUTION SUBCUTANEOUS
Qty: 3 ML | Refills: 11 | Status: SHIPPED | OUTPATIENT
Start: 2025-09-03

## 2025-09-03 RX ORDER — PROCHLORPERAZINE MALEATE 10 MG
10 TABLET ORAL EVERY 6 HOURS PRN
Qty: 30 TABLET | Refills: 1 | Status: SHIPPED | OUTPATIENT
Start: 2025-09-03 | End: 2025-11-02

## 2025-09-09 ENCOUNTER — APPOINTMENT (OUTPATIENT)
Dept: ENDOCRINOLOGY | Facility: CLINIC | Age: 70
End: 2025-09-09
Payer: MEDICARE

## 2025-09-10 ENCOUNTER — APPOINTMENT (OUTPATIENT)
Dept: HEMATOLOGY/ONCOLOGY | Facility: CLINIC | Age: 70
End: 2025-09-10
Payer: MEDICARE

## 2025-10-02 ENCOUNTER — APPOINTMENT (OUTPATIENT)
Dept: PHARMACY | Facility: HOSPITAL | Age: 70
End: 2025-10-02
Payer: MEDICARE

## 2025-12-01 ENCOUNTER — APPOINTMENT (OUTPATIENT)
Dept: PRIMARY CARE | Facility: CLINIC | Age: 70
End: 2025-12-01
Payer: MEDICARE

## (undated) DEVICE — DRAPE, TIBURON W/ADHESIVE, 19 X 30

## (undated) DEVICE — COVER, CART, 45 X 27 X 48 IN, CLEAR

## (undated) DEVICE — SEAL, SCOPE WARMER DISPOSABLE

## (undated) DEVICE — DRAPE, INCISE, ANTIMICROBIAL, IOBAN 2, LARGE, 17 X 23 IN, DISPOSABLE, STERILE

## (undated) DEVICE — TROCAR SYSTEM, BALLOON, KII GELPORT, 12 X 100MM

## (undated) DEVICE — PUMP, STRYKERFLOW 2 & HANDPIECE W/10FT. IRRIGATION TUBING

## (undated) DEVICE — DRAPE, C-ARM IMAGE

## (undated) DEVICE — SPONGE, LAP, XRAY DECT, 18IN X 18IN, W/LOOP, STERILE

## (undated) DEVICE — TUBE SET, PNEUMOCLEAR, SMOKE EVACU, HIGH-FLOW

## (undated) DEVICE — INTRODUCER SET, MICROPUNCTURE, REG, 4FR 10CM W/NITINOL GUIDEWIRE

## (undated) DEVICE — DRESSING, TRANSPARENT, TEGADERM, 2-3/8 X 2-3/4 IN

## (undated) DEVICE — Device

## (undated) DEVICE — SCOPE WARMER, LAPAROSCOPE, BAG ONLY, LF

## (undated) DEVICE — ADHESIVE, SKIN, MASTISOL, 2/3 CC VIAL

## (undated) DEVICE — SUTURE, PROLENE, 3-0, 36 IN, SH, DA, BLUE

## (undated) DEVICE — SUTURE, VICRYL, 0, 27 IN, UR-6, VIOLET

## (undated) DEVICE — DRAPE, INSTRUMENT, W/POUCH, STERI DRAPE, 7 X 11 IN, DISPOSABLE, STERILE

## (undated) DEVICE — TROCAR, KII OPTICAL BLADELESS 5MM Z THREAD 100MM LNGTH

## (undated) DEVICE — ANTIFOG, SOLUTION, FOG-OUT

## (undated) DEVICE — DRESSING, GAUZE, SPONGE, VERSALON, 4 PLY, 2 X 2 IN, STERILE

## (undated) DEVICE — PACK, UNIVERSAL

## (undated) DEVICE — TUBE, SALEM SUMP, 16 FR X 48IN, ENFIT

## (undated) DEVICE — SPONGE GAUZE, XRAY SC+RFID, 4X4 16 PLY, STERILE

## (undated) DEVICE — SUTURE, VICRYL, 3-0, 27 IN, SH

## (undated) DEVICE — SYSTEM, TROCAR LAP, 11X100MM, SHIELD BLADED, KII ADVANCED FIX ABDOMINAL

## (undated) DEVICE — HOLSTER, ELECTROSURGERY ACCESSORY, STERILE

## (undated) DEVICE — SUTURE, VICRYL, 4-0, 18 IN, UNDYED BR PS-2

## (undated) DEVICE — GOWN, ASTOUND, XL

## (undated) DEVICE — DRAPE, SHEET, MINOR PROCEDURE, T, PEDIATRIC, 100X122X77

## (undated) DEVICE — MANIFOLD, 4 PORT NEPTUNE STANDARD

## (undated) DEVICE — STRIP, SKIN CLOSURE, STERI STRIP, REINFORCED, 0.5 X 4 IN